# Patient Record
Sex: FEMALE | Race: BLACK OR AFRICAN AMERICAN | Employment: OTHER | URBAN - METROPOLITAN AREA
[De-identification: names, ages, dates, MRNs, and addresses within clinical notes are randomized per-mention and may not be internally consistent; named-entity substitution may affect disease eponyms.]

---

## 2018-01-01 ENCOUNTER — HOSPITAL ENCOUNTER (INPATIENT)
Age: 83
LOS: 3 days | Discharge: HOME HEALTH | DRG: 291 | End: 2018-11-11
Attending: EMERGENCY MEDICINE | Admitting: INTERNAL MEDICINE
Payer: MEDICARE

## 2018-01-01 ENCOUNTER — APPOINTMENT (OUTPATIENT)
Dept: GENERAL RADIOLOGY | Age: 83
DRG: 291 | End: 2018-01-01
Attending: HOSPITALIST
Payer: MEDICARE

## 2018-01-01 ENCOUNTER — APPOINTMENT (OUTPATIENT)
Dept: GENERAL RADIOLOGY | Age: 83
DRG: 291 | End: 2018-01-01
Attending: EMERGENCY MEDICINE
Payer: MEDICARE

## 2018-01-01 ENCOUNTER — APPOINTMENT (OUTPATIENT)
Dept: CT IMAGING | Age: 83
DRG: 291 | End: 2018-01-01
Attending: INTERNAL MEDICINE
Payer: MEDICARE

## 2018-01-01 ENCOUNTER — PATIENT OUTREACH (OUTPATIENT)
Dept: CASE MANAGEMENT | Age: 83
End: 2018-01-01

## 2018-01-01 ENCOUNTER — HOSPITAL ENCOUNTER (EMERGENCY)
Age: 83
Discharge: HOME | End: 2018-07-23
Attending: EMERGENCY MEDICINE
Payer: MEDICARE

## 2018-01-01 ENCOUNTER — APPOINTMENT (OUTPATIENT)
Dept: GENERAL RADIOLOGY | Age: 83
End: 2018-01-01
Attending: EMERGENCY MEDICINE
Payer: MEDICARE

## 2018-01-01 VITALS
WEIGHT: 111.5 LBS | HEART RATE: 66 BPM | DIASTOLIC BLOOD PRESSURE: 75 MMHG | BODY MASS INDEX: 18.58 KG/M2 | HEIGHT: 65 IN | OXYGEN SATURATION: 96 % | SYSTOLIC BLOOD PRESSURE: 114 MMHG | RESPIRATION RATE: 18 BRPM | TEMPERATURE: 98.4 F

## 2018-01-01 VITALS
OXYGEN SATURATION: 98 % | TEMPERATURE: 98 F | DIASTOLIC BLOOD PRESSURE: 72 MMHG | BODY MASS INDEX: 19.49 KG/M2 | WEIGHT: 117 LBS | HEART RATE: 66 BPM | HEIGHT: 65 IN | SYSTOLIC BLOOD PRESSURE: 131 MMHG | RESPIRATION RATE: 19 BRPM

## 2018-01-01 DIAGNOSIS — Z51.5 ENCOUNTER FOR PALLIATIVE CARE: ICD-10-CM

## 2018-01-01 DIAGNOSIS — I50.9 CONGESTIVE HEART FAILURE, UNSPECIFIED HF CHRONICITY, UNSPECIFIED HEART FAILURE TYPE (HCC): Primary | ICD-10-CM

## 2018-01-01 DIAGNOSIS — Z71.89 COUNSELING AND COORDINATION OF CARE: ICD-10-CM

## 2018-01-01 DIAGNOSIS — M25.552 LEFT HIP PAIN: Primary | ICD-10-CM

## 2018-01-01 DIAGNOSIS — R06.02 SOB (SHORTNESS OF BREATH): ICD-10-CM

## 2018-01-01 LAB
ALBUMIN SERPL-MCNC: 3 G/DL (ref 3.2–4.6)
ALBUMIN SERPL-MCNC: 3 G/DL (ref 3.2–4.6)
ALBUMIN/GLOB SERPL: 0.7 {RATIO} (ref 1.2–3.5)
ALBUMIN/GLOB SERPL: 0.8 {RATIO} (ref 1.2–3.5)
ALP SERPL-CCNC: 110 U/L (ref 50–136)
ALP SERPL-CCNC: 133 U/L (ref 50–136)
ALT SERPL-CCNC: 18 U/L (ref 12–65)
ALT SERPL-CCNC: 23 U/L (ref 12–65)
ANION GAP SERPL CALC-SCNC: 6 MMOL/L (ref 7–16)
ANION GAP SERPL CALC-SCNC: 7 MMOL/L (ref 7–16)
ANION GAP SERPL CALC-SCNC: 7 MMOL/L (ref 7–16)
ANION GAP SERPL CALC-SCNC: 9 MMOL/L (ref 7–16)
AST SERPL-CCNC: 17 U/L (ref 15–37)
AST SERPL-CCNC: 30 U/L (ref 15–37)
ATRIAL RATE: 55 BPM
ATRIAL RATE: 84 BPM
BACTERIA URNS QL MICRO: 0 /HPF
BASOPHILS # BLD: 0 K/UL (ref 0–0.2)
BASOPHILS # BLD: 0 K/UL (ref 0–0.2)
BASOPHILS NFR BLD: 0 % (ref 0–2)
BASOPHILS NFR BLD: 1 % (ref 0–2)
BILIRUB SERPL-MCNC: 0.5 MG/DL (ref 0.2–1.1)
BILIRUB SERPL-MCNC: 0.6 MG/DL (ref 0.2–1.1)
BNP SERPL-MCNC: 1247 PG/ML
BUN SERPL-MCNC: 10 MG/DL (ref 8–23)
BUN SERPL-MCNC: 10 MG/DL (ref 8–23)
BUN SERPL-MCNC: 8 MG/DL (ref 8–23)
BUN SERPL-MCNC: 9 MG/DL (ref 8–23)
CALCIUM SERPL-MCNC: 7.9 MG/DL (ref 8.3–10.4)
CALCIUM SERPL-MCNC: 8.3 MG/DL (ref 8.3–10.4)
CALCIUM SERPL-MCNC: 8.6 MG/DL (ref 8.3–10.4)
CALCIUM SERPL-MCNC: 9.1 MG/DL (ref 8.3–10.4)
CALCULATED R AXIS, ECG10: -65 DEGREES
CALCULATED R AXIS, ECG10: -67 DEGREES
CALCULATED T AXIS, ECG11: 32 DEGREES
CALCULATED T AXIS, ECG11: 52 DEGREES
CASTS URNS QL MICRO: 0 /LPF
CHLORIDE SERPL-SCNC: 106 MMOL/L (ref 98–107)
CHLORIDE SERPL-SCNC: 107 MMOL/L (ref 98–107)
CHLORIDE SERPL-SCNC: 109 MMOL/L (ref 98–107)
CHLORIDE SERPL-SCNC: 110 MMOL/L (ref 98–107)
CO2 SERPL-SCNC: 26 MMOL/L (ref 21–32)
CO2 SERPL-SCNC: 27 MMOL/L (ref 21–32)
CO2 SERPL-SCNC: 28 MMOL/L (ref 21–32)
CO2 SERPL-SCNC: 30 MMOL/L (ref 21–32)
CREAT SERPL-MCNC: 0.92 MG/DL (ref 0.6–1)
CREAT SERPL-MCNC: 1.07 MG/DL (ref 0.6–1)
CREAT SERPL-MCNC: 1.09 MG/DL (ref 0.6–1)
CREAT SERPL-MCNC: 1.1 MG/DL (ref 0.6–1)
CRYSTALS URNS QL MICRO: 0 /LPF
DIAGNOSIS, 93000: NORMAL
DIAGNOSIS, 93000: NORMAL
DIFFERENTIAL METHOD BLD: ABNORMAL
DIFFERENTIAL METHOD BLD: ABNORMAL
EOSINOPHIL # BLD: 0.1 K/UL (ref 0–0.8)
EOSINOPHIL # BLD: 0.3 K/UL (ref 0–0.8)
EOSINOPHIL NFR BLD: 4 % (ref 0.5–7.8)
EOSINOPHIL NFR BLD: 9 % (ref 0.5–7.8)
EPI CELLS #/AREA URNS HPF: NORMAL /HPF
ERYTHROCYTE [DISTWIDTH] IN BLOOD BY AUTOMATED COUNT: 16.9 % (ref 11.9–14.6)
ERYTHROCYTE [DISTWIDTH] IN BLOOD BY AUTOMATED COUNT: 18.6 %
GLOBULIN SER CALC-MCNC: 3.7 G/DL (ref 2.3–3.5)
GLOBULIN SER CALC-MCNC: 4.4 G/DL (ref 2.3–3.5)
GLUCOSE SERPL-MCNC: 106 MG/DL (ref 65–100)
GLUCOSE SERPL-MCNC: 73 MG/DL (ref 65–100)
GLUCOSE SERPL-MCNC: 75 MG/DL (ref 65–100)
GLUCOSE SERPL-MCNC: 86 MG/DL (ref 65–100)
HCT VFR BLD AUTO: 32.4 % (ref 35.8–46.3)
HCT VFR BLD AUTO: 34.2 % (ref 35.8–46.3)
HGB BLD-MCNC: 10.6 G/DL (ref 11.7–15.4)
HGB BLD-MCNC: 10.6 G/DL (ref 11.7–15.4)
IMM GRANULOCYTES # BLD: 0 K/UL (ref 0–0.5)
IMM GRANULOCYTES # BLD: 0 K/UL (ref 0–0.5)
IMM GRANULOCYTES NFR BLD AUTO: 0 % (ref 0–5)
IMM GRANULOCYTES NFR BLD AUTO: 0 % (ref 0–5)
INR PPP: 2.2
INR PPP: 3
INR PPP: 3.5
INR PPP: 4
LYMPHOCYTES # BLD: 1.1 K/UL (ref 0.5–4.6)
LYMPHOCYTES # BLD: 1.4 K/UL (ref 0.5–4.6)
LYMPHOCYTES NFR BLD: 31 % (ref 13–44)
LYMPHOCYTES NFR BLD: 37 % (ref 13–44)
MAGNESIUM SERPL-MCNC: 1.8 MG/DL (ref 1.8–2.4)
MCH RBC QN AUTO: 25.9 PG (ref 26.1–32.9)
MCH RBC QN AUTO: 26.2 PG (ref 26.1–32.9)
MCHC RBC AUTO-ENTMCNC: 31 G/DL (ref 31.4–35)
MCHC RBC AUTO-ENTMCNC: 32.7 G/DL (ref 31.4–35)
MCV RBC AUTO: 80.2 FL (ref 79.6–97.8)
MCV RBC AUTO: 83.6 FL (ref 79.6–97.8)
MONOCYTES # BLD: 0.3 K/UL (ref 0.1–1.3)
MONOCYTES # BLD: 0.3 K/UL (ref 0.1–1.3)
MONOCYTES NFR BLD: 8 % (ref 4–12)
MONOCYTES NFR BLD: 9 % (ref 4–12)
MUCOUS THREADS URNS QL MICRO: 0 /LPF
NEUTS SEG # BLD: 1.8 K/UL (ref 1.7–8.2)
NEUTS SEG # BLD: 1.9 K/UL (ref 1.7–8.2)
NEUTS SEG NFR BLD: 46 % (ref 43–78)
NEUTS SEG NFR BLD: 55 % (ref 43–78)
NRBC # BLD: 0 K/UL (ref 0–0.2)
OTHER OBSERVATIONS,UCOM: NORMAL
PLATELET # BLD AUTO: 102 K/UL (ref 150–450)
PLATELET # BLD AUTO: 121 K/UL (ref 150–450)
PMV BLD AUTO: 11.2 FL (ref 10.8–14.1)
PMV BLD AUTO: ABNORMAL FL (ref 9.4–12.3)
POTASSIUM SERPL-SCNC: 2.9 MMOL/L (ref 3.5–5.1)
POTASSIUM SERPL-SCNC: 3.2 MMOL/L (ref 3.5–5.1)
POTASSIUM SERPL-SCNC: 3.2 MMOL/L (ref 3.5–5.1)
POTASSIUM SERPL-SCNC: 3.7 MMOL/L (ref 3.5–5.1)
PROT SERPL-MCNC: 6.7 G/DL (ref 6.3–8.2)
PROT SERPL-MCNC: 7.4 G/DL (ref 6.3–8.2)
PROTHROMBIN TIME: 23.8 SEC (ref 11.5–14.5)
PROTHROMBIN TIME: 30 SEC (ref 11.5–14.5)
PROTHROMBIN TIME: 33.4 SEC (ref 11.5–14.5)
PROTHROMBIN TIME: 37.2 SEC (ref 11.5–14.5)
Q-T INTERVAL, ECG07: 428 MS
Q-T INTERVAL, ECG07: 442 MS
QRS DURATION, ECG06: 118 MS
QRS DURATION, ECG06: 130 MS
QTC CALCULATION (BEZET), ECG08: 413 MS
QTC CALCULATION (BEZET), ECG08: 522 MS
RBC # BLD AUTO: 4.04 M/UL (ref 4.05–5.25)
RBC # BLD AUTO: 4.09 M/UL (ref 4.05–5.2)
RBC #/AREA URNS HPF: NORMAL /HPF
SODIUM SERPL-SCNC: 142 MMOL/L (ref 136–145)
SODIUM SERPL-SCNC: 142 MMOL/L (ref 136–145)
SODIUM SERPL-SCNC: 143 MMOL/L (ref 136–145)
SODIUM SERPL-SCNC: 145 MMOL/L (ref 136–145)
TROPONIN I SERPL-MCNC: 0.03 NG/ML (ref 0.02–0.05)
TROPONIN I SERPL-MCNC: 0.04 NG/ML (ref 0.02–0.05)
TROPONIN I SERPL-MCNC: 0.05 NG/ML (ref 0.02–0.05)
TROPONIN I SERPL-MCNC: 0.05 NG/ML (ref 0.02–0.05)
TROPONIN I SERPL-MCNC: <0.02 NG/ML (ref 0.02–0.05)
VENTRICULAR RATE, ECG03: 56 BPM
VENTRICULAR RATE, ECG03: 84 BPM
WBC # BLD AUTO: 3.4 K/UL (ref 4.3–11.1)
WBC # BLD AUTO: 3.8 K/UL (ref 4.3–11.1)
WBC URNS QL MICRO: NORMAL /HPF

## 2018-01-01 PROCEDURE — 84484 ASSAY OF TROPONIN QUANT: CPT | Performed by: EMERGENCY MEDICINE

## 2018-01-01 PROCEDURE — 76450000000

## 2018-01-01 PROCEDURE — 99285 EMERGENCY DEPT VISIT HI MDM: CPT | Performed by: EMERGENCY MEDICINE

## 2018-01-01 PROCEDURE — 74011250637 HC RX REV CODE- 250/637: Performed by: INTERNAL MEDICINE

## 2018-01-01 PROCEDURE — 99222 1ST HOSP IP/OBS MODERATE 55: CPT | Performed by: NURSE PRACTITIONER

## 2018-01-01 PROCEDURE — 85610 PROTHROMBIN TIME: CPT

## 2018-01-01 PROCEDURE — 65660000000 HC RM CCU STEPDOWN

## 2018-01-01 PROCEDURE — 71046 X-RAY EXAM CHEST 2 VIEWS: CPT

## 2018-01-01 PROCEDURE — 72100 X-RAY EXAM L-S SPINE 2/3 VWS: CPT

## 2018-01-01 PROCEDURE — 74011250637 HC RX REV CODE- 250/637: Performed by: HOSPITALIST

## 2018-01-01 PROCEDURE — 74011000250 HC RX REV CODE- 250: Performed by: INTERNAL MEDICINE

## 2018-01-01 PROCEDURE — 74011250636 HC RX REV CODE- 250/636: Performed by: HOSPITALIST

## 2018-01-01 PROCEDURE — G8980 MOBILITY D/C STATUS: HCPCS

## 2018-01-01 PROCEDURE — 93005 ELECTROCARDIOGRAM TRACING: CPT | Performed by: EMERGENCY MEDICINE

## 2018-01-01 PROCEDURE — 36415 COLL VENOUS BLD VENIPUNCTURE: CPT

## 2018-01-01 PROCEDURE — 70450 CT HEAD/BRAIN W/O DYE: CPT

## 2018-01-01 PROCEDURE — 97161 PT EVAL LOW COMPLEX 20 MIN: CPT

## 2018-01-01 PROCEDURE — 83880 ASSAY OF NATRIURETIC PEPTIDE: CPT

## 2018-01-01 PROCEDURE — G8979 MOBILITY GOAL STATUS: HCPCS

## 2018-01-01 PROCEDURE — 74011250636 HC RX REV CODE- 250/636: Performed by: INTERNAL MEDICINE

## 2018-01-01 PROCEDURE — 85025 COMPLETE CBC W/AUTO DIFF WBC: CPT

## 2018-01-01 PROCEDURE — 74011250637 HC RX REV CODE- 250/637: Performed by: NURSE PRACTITIONER

## 2018-01-01 PROCEDURE — 74011250636 HC RX REV CODE- 250/636: Performed by: FAMILY MEDICINE

## 2018-01-01 PROCEDURE — 71045 X-RAY EXAM CHEST 1 VIEW: CPT

## 2018-01-01 PROCEDURE — 96374 THER/PROPH/DIAG INJ IV PUSH: CPT | Performed by: EMERGENCY MEDICINE

## 2018-01-01 PROCEDURE — G8978 MOBILITY CURRENT STATUS: HCPCS

## 2018-01-01 PROCEDURE — 97530 THERAPEUTIC ACTIVITIES: CPT

## 2018-01-01 PROCEDURE — 74011250636 HC RX REV CODE- 250/636: Performed by: EMERGENCY MEDICINE

## 2018-01-01 PROCEDURE — 83735 ASSAY OF MAGNESIUM: CPT

## 2018-01-01 PROCEDURE — 73502 X-RAY EXAM HIP UNI 2-3 VIEWS: CPT

## 2018-01-01 PROCEDURE — C8929 TTE W OR WO FOL WCON,DOPPLER: HCPCS

## 2018-01-01 PROCEDURE — 81003 URINALYSIS AUTO W/O SCOPE: CPT | Performed by: EMERGENCY MEDICINE

## 2018-01-01 PROCEDURE — 84484 ASSAY OF TROPONIN QUANT: CPT

## 2018-01-01 PROCEDURE — 80053 COMPREHEN METABOLIC PANEL: CPT

## 2018-01-01 PROCEDURE — 85025 COMPLETE CBC W/AUTO DIFF WBC: CPT | Performed by: EMERGENCY MEDICINE

## 2018-01-01 PROCEDURE — 80053 COMPREHEN METABOLIC PANEL: CPT | Performed by: EMERGENCY MEDICINE

## 2018-01-01 PROCEDURE — 80048 BASIC METABOLIC PNL TOTAL CA: CPT

## 2018-01-01 PROCEDURE — 81015 MICROSCOPIC EXAM OF URINE: CPT | Performed by: EMERGENCY MEDICINE

## 2018-01-01 RX ORDER — COLCHICINE 0.6 MG/1
0.6 TABLET ORAL
Qty: 30 TAB | Refills: 0 | Status: SHIPPED
Start: 2018-01-01 | End: 2019-01-01

## 2018-01-01 RX ORDER — AMIODARONE HYDROCHLORIDE 100 MG/1
100 TABLET ORAL
Qty: 30 TAB | Refills: 0 | Status: SHIPPED
Start: 2018-01-01 | End: 2018-01-01

## 2018-01-01 RX ORDER — LEVOTHYROXINE SODIUM 75 UG/1
75 TABLET ORAL
Status: DISCONTINUED | OUTPATIENT
Start: 2018-01-01 | End: 2018-01-01 | Stop reason: HOSPADM

## 2018-01-01 RX ORDER — LOSARTAN POTASSIUM 25 MG/1
25 TABLET ORAL DAILY
Qty: 30 TAB | Refills: 2 | Status: SHIPPED | OUTPATIENT
Start: 2018-01-01 | End: 2018-01-01

## 2018-01-01 RX ORDER — GABAPENTIN 300 MG/1
300 CAPSULE ORAL 3 TIMES DAILY
Status: DISCONTINUED | OUTPATIENT
Start: 2018-01-01 | End: 2018-01-01 | Stop reason: HOSPADM

## 2018-01-01 RX ORDER — FUROSEMIDE 40 MG/1
40 TABLET ORAL DAILY
Status: DISCONTINUED | OUTPATIENT
Start: 2018-01-01 | End: 2018-01-01 | Stop reason: HOSPADM

## 2018-01-01 RX ORDER — POTASSIUM CHLORIDE 14.9 MG/ML
20 INJECTION INTRAVENOUS
Status: COMPLETED | OUTPATIENT
Start: 2018-01-01 | End: 2018-01-01

## 2018-01-01 RX ORDER — FUROSEMIDE 10 MG/ML
40 INJECTION INTRAMUSCULAR; INTRAVENOUS EVERY 12 HOURS
Status: DISCONTINUED | OUTPATIENT
Start: 2018-01-01 | End: 2018-01-01

## 2018-01-01 RX ORDER — NITROFURANTOIN 25; 75 MG/1; MG/1
100 CAPSULE ORAL 2 TIMES DAILY
Qty: 14 CAP | Refills: 0 | Status: SHIPPED | OUTPATIENT
Start: 2018-01-01 | End: 2018-01-01

## 2018-01-01 RX ORDER — FUROSEMIDE 10 MG/ML
40 INJECTION INTRAMUSCULAR; INTRAVENOUS
Status: COMPLETED | OUTPATIENT
Start: 2018-01-01 | End: 2018-01-01

## 2018-01-01 RX ORDER — PREDNISONE 20 MG/1
20 TABLET ORAL DAILY
Qty: 4 TAB | Refills: 0 | Status: SHIPPED | OUTPATIENT
Start: 2018-01-01 | End: 2018-01-01

## 2018-01-01 RX ORDER — LOSARTAN POTASSIUM 25 MG/1
25 TABLET ORAL DAILY
Status: DISCONTINUED | OUTPATIENT
Start: 2018-01-01 | End: 2018-01-01 | Stop reason: HOSPADM

## 2018-01-01 RX ORDER — LIDOCAINE 4 G/100G
PATCH TOPICAL
Qty: 1 PATCH | Refills: 0 | Status: SHIPPED | OUTPATIENT
Start: 2018-01-01 | End: 2019-01-01

## 2018-01-01 RX ORDER — LATANOPROST 50 UG/ML
1 SOLUTION/ DROPS OPHTHALMIC
Status: DISCONTINUED | OUTPATIENT
Start: 2018-01-01 | End: 2018-01-01 | Stop reason: HOSPADM

## 2018-01-01 RX ORDER — CARVEDILOL 3.12 MG/1
3.12 TABLET ORAL 2 TIMES DAILY WITH MEALS
Qty: 60 TAB | Refills: 2 | Status: SHIPPED | OUTPATIENT
Start: 2018-01-01 | End: 2018-01-01

## 2018-01-01 RX ORDER — FUROSEMIDE 10 MG/ML
20 INJECTION INTRAMUSCULAR; INTRAVENOUS 2 TIMES DAILY
Status: DISCONTINUED | OUTPATIENT
Start: 2018-01-01 | End: 2018-01-01

## 2018-01-01 RX ORDER — AMIODARONE HYDROCHLORIDE 200 MG/1
200 TABLET ORAL
Status: DISCONTINUED | OUTPATIENT
Start: 2018-01-01 | End: 2018-01-01

## 2018-01-01 RX ORDER — LISINOPRIL 5 MG/1
2.5 TABLET ORAL DAILY
Status: DISCONTINUED | OUTPATIENT
Start: 2018-01-01 | End: 2018-01-01

## 2018-01-01 RX ORDER — SIMVASTATIN 20 MG/1
20 TABLET, FILM COATED ORAL
Status: DISCONTINUED | OUTPATIENT
Start: 2018-01-01 | End: 2018-01-01 | Stop reason: HOSPADM

## 2018-01-01 RX ORDER — AMIODARONE HYDROCHLORIDE 200 MG/1
100 TABLET ORAL
Status: DISCONTINUED | OUTPATIENT
Start: 2018-01-01 | End: 2018-01-01

## 2018-01-01 RX ORDER — AMIODARONE HYDROCHLORIDE 200 MG/1
100 TABLET ORAL
Status: DISCONTINUED | OUTPATIENT
Start: 2018-01-01 | End: 2018-01-01 | Stop reason: HOSPADM

## 2018-01-01 RX ORDER — FUROSEMIDE 20 MG/1
20 TABLET ORAL 2 TIMES DAILY
Qty: 60 TAB | Refills: 2 | Status: SHIPPED | OUTPATIENT
Start: 2018-01-01 | End: 2018-01-01

## 2018-01-01 RX ORDER — AMIODARONE HYDROCHLORIDE 200 MG/1
100 TABLET ORAL DAILY
COMMUNITY
End: 2018-01-01

## 2018-01-01 RX ORDER — CARVEDILOL 3.12 MG/1
3.12 TABLET ORAL 2 TIMES DAILY WITH MEALS
Status: DISCONTINUED | OUTPATIENT
Start: 2018-01-01 | End: 2018-01-01 | Stop reason: HOSPADM

## 2018-01-01 RX ORDER — WARFARIN 1 MG/1
1 TABLET ORAL EVERY EVENING
Status: DISCONTINUED | OUTPATIENT
Start: 2018-01-01 | End: 2018-01-01 | Stop reason: HOSPADM

## 2018-01-01 RX ORDER — POTASSIUM CHLORIDE 20MEQ/15ML
40 LIQUID (ML) ORAL 2 TIMES DAILY WITH MEALS
Status: DISCONTINUED | OUTPATIENT
Start: 2018-01-01 | End: 2018-01-01 | Stop reason: HOSPADM

## 2018-01-01 RX ADMIN — AMIODARONE HYDROCHLORIDE 100 MG: 200 TABLET ORAL at 22:46

## 2018-01-01 RX ADMIN — FUROSEMIDE 20 MG: 10 INJECTION, SOLUTION INTRAMUSCULAR; INTRAVENOUS at 16:47

## 2018-01-01 RX ADMIN — CARVEDILOL 3.12 MG: 3.12 TABLET, FILM COATED ORAL at 08:31

## 2018-01-01 RX ADMIN — Medication 1 AMPULE: at 21:30

## 2018-01-01 RX ADMIN — SIMVASTATIN 20 MG: 20 TABLET, FILM COATED ORAL at 22:46

## 2018-01-01 RX ADMIN — FUROSEMIDE 40 MG: 40 TABLET ORAL at 12:28

## 2018-01-01 RX ADMIN — Medication 1 AMPULE: at 09:04

## 2018-01-01 RX ADMIN — POTASSIUM CHLORIDE 20 MEQ: 200 INJECTION, SOLUTION INTRAVENOUS at 10:54

## 2018-01-01 RX ADMIN — GABAPENTIN 300 MG: 300 CAPSULE ORAL at 21:29

## 2018-01-01 RX ADMIN — FUROSEMIDE 40 MG: 10 INJECTION, SOLUTION INTRAMUSCULAR; INTRAVENOUS at 22:46

## 2018-01-01 RX ADMIN — CARVEDILOL 3.12 MG: 3.12 TABLET, FILM COATED ORAL at 17:02

## 2018-01-01 RX ADMIN — LOSARTAN POTASSIUM 25 MG: 25 TABLET ORAL at 08:31

## 2018-01-01 RX ADMIN — GABAPENTIN 300 MG: 300 CAPSULE ORAL at 22:46

## 2018-01-01 RX ADMIN — Medication 1 AMPULE: at 21:52

## 2018-01-01 RX ADMIN — PERFLUTREN 1 ML: 6.52 INJECTION, SUSPENSION INTRAVENOUS at 10:00

## 2018-01-01 RX ADMIN — LEVOTHYROXINE SODIUM 75 MCG: 75 TABLET ORAL at 05:27

## 2018-01-01 RX ADMIN — GABAPENTIN 300 MG: 300 CAPSULE ORAL at 09:03

## 2018-01-01 RX ADMIN — Medication 1 AMPULE: at 21:25

## 2018-01-01 RX ADMIN — Medication 1 AMPULE: at 08:30

## 2018-01-01 RX ADMIN — POTASSIUM CHLORIDE 20 MEQ: 200 INJECTION, SOLUTION INTRAVENOUS at 10:00

## 2018-01-01 RX ADMIN — FUROSEMIDE 20 MG: 10 INJECTION, SOLUTION INTRAMUSCULAR; INTRAVENOUS at 09:02

## 2018-01-01 RX ADMIN — LATANOPROST 1 DROP: 50 SOLUTION OPHTHALMIC at 23:21

## 2018-01-01 RX ADMIN — FUROSEMIDE 40 MG: 10 INJECTION, SOLUTION INTRAMUSCULAR; INTRAVENOUS at 09:03

## 2018-01-01 RX ADMIN — LATANOPROST 1 DROP: 50 SOLUTION OPHTHALMIC at 21:57

## 2018-01-01 RX ADMIN — GABAPENTIN 300 MG: 300 CAPSULE ORAL at 16:48

## 2018-01-01 RX ADMIN — LISINOPRIL 2.5 MG: 5 TABLET ORAL at 09:03

## 2018-01-01 RX ADMIN — WARFARIN SODIUM 1 MG: 1 TABLET ORAL at 16:48

## 2018-01-01 RX ADMIN — POTASSIUM CHLORIDE 20 MEQ: 200 INJECTION, SOLUTION INTRAVENOUS at 12:28

## 2018-01-01 RX ADMIN — GABAPENTIN 300 MG: 300 CAPSULE ORAL at 21:52

## 2018-01-01 RX ADMIN — CARVEDILOL 3.12 MG: 3.12 TABLET, FILM COATED ORAL at 16:47

## 2018-01-01 RX ADMIN — CARVEDILOL 3.12 MG: 3.12 TABLET, FILM COATED ORAL at 17:16

## 2018-01-01 RX ADMIN — POTASSIUM CHLORIDE 20 MEQ: 200 INJECTION, SOLUTION INTRAVENOUS at 09:01

## 2018-01-01 RX ADMIN — FUROSEMIDE 20 MG: 10 INJECTION, SOLUTION INTRAMUSCULAR; INTRAVENOUS at 17:16

## 2018-01-01 RX ADMIN — SIMVASTATIN 20 MG: 20 TABLET, FILM COATED ORAL at 21:52

## 2018-01-01 RX ADMIN — GABAPENTIN 300 MG: 300 CAPSULE ORAL at 09:01

## 2018-01-01 RX ADMIN — GABAPENTIN 300 MG: 300 CAPSULE ORAL at 17:16

## 2018-01-01 RX ADMIN — FUROSEMIDE 20 MG: 10 INJECTION, SOLUTION INTRAMUSCULAR; INTRAVENOUS at 08:32

## 2018-01-01 RX ADMIN — Medication 1 AMPULE: at 09:01

## 2018-01-01 RX ADMIN — LATANOPROST 1 DROP: 50 SOLUTION OPHTHALMIC at 21:33

## 2018-01-01 RX ADMIN — LEVOTHYROXINE SODIUM 75 MCG: 75 TABLET ORAL at 05:23

## 2018-01-01 RX ADMIN — CARVEDILOL 3.12 MG: 3.12 TABLET, FILM COATED ORAL at 09:04

## 2018-01-01 RX ADMIN — LEVOTHYROXINE SODIUM 75 MCG: 75 TABLET ORAL at 06:30

## 2018-01-01 RX ADMIN — POTASSIUM CHLORIDE 40 MEQ: 20 SOLUTION ORAL at 17:01

## 2018-01-01 RX ADMIN — SIMVASTATIN 20 MG: 20 TABLET, FILM COATED ORAL at 21:29

## 2018-01-01 RX ADMIN — AMIODARONE HYDROCHLORIDE 100 MG: 200 TABLET ORAL at 21:29

## 2018-01-01 RX ADMIN — GABAPENTIN 300 MG: 300 CAPSULE ORAL at 17:02

## 2018-01-01 RX ADMIN — GABAPENTIN 300 MG: 300 CAPSULE ORAL at 08:31

## 2018-01-01 RX ADMIN — POTASSIUM CHLORIDE 40 MEQ: 20 SOLUTION ORAL at 08:30

## 2018-01-01 RX ADMIN — FUROSEMIDE 40 MG: 10 INJECTION, SOLUTION INTRAMUSCULAR; INTRAVENOUS at 15:13

## 2018-01-01 RX ADMIN — WARFARIN SODIUM 1 MG: 1 TABLET ORAL at 17:02

## 2018-01-01 RX ADMIN — AMIODARONE HYDROCHLORIDE 200 MG: 200 TABLET ORAL at 22:23

## 2018-07-23 NOTE — ED PROVIDER NOTES
HPI Comments: Patient is an 80-year-old female who presents with left hip pain. Patient states that she has had pain for the past 2-3 days, denies any falls or injuries, states just pain with movement of her left leg and to palpation. Denies any abdominal pain, family states she has had some diarrhea, a mistake she has had some fatigue lately, and that her blood pressure was low yesterday to 100/55 and that she felt fatigue then however no LOC, no chest pain, no abdominal pain. Patients only complaint at this time is left hip pain. Patient is a 80 y.o. female presenting with hip pain. The history is provided by the patient. No  was used. Hip Injury    Associated symptoms include back pain. Pertinent negatives include no neck pain. Past Medical History:   Diagnosis Date    Acquired hypothyroidism     Acute pancreatitis     Arthritis     CAD (coronary artery disease) 4/29/11    RCA occluded and fills with collaterals, LAD and Cx nonobstructive CAD, EF 50-55%    Cancer ([address]     THYROID - treated with surgery and radiation    Chronic pain     right hip    Coagulopathy ([address] 6/16/2011    Colon polyps     Follicular thyroid cancer ([address]     Gallstone pancreatitis     Glaucoma     Gout     hx of gout to right great toe - no problems    Hypertension     controlled with medication    Other ill-defined conditions(799.89)     Pancreatitis     PE (pulmonary embolism) 2008    Pulmonary HTN ([address]     moderate    Thromboembolus ([address] 1995    right leg - Hemashield graft/filter placed in abdomen    Thyroid disease     pt had total thyroidectomy - takes levoxyl daily       Past Surgical History:   Procedure Laterality Date    HX APPENDECTOMY  ? [address]  6/2011    Dr. Vera Piña - [facility]    [address]    hemashield graft/filter placed due to blood clot right leg    HX THYROIDECTOMY  2010    partial    HX TONSILLECTOMY      TOTAL HIP ARTHROPLASTY 2008 & 2011    right hip         Family History:   Problem Relation Age of Onset    Stroke Mother     Hypertension Mother     Cancer Father      prostate    Diabetes Maternal Uncle        Social History     Social History    Marital status:      Spouse name: N/A    Number of children: N/A    Years of education: N/A     Occupational History    Not on file. Social History Main Topics    Smoking status: Former Smoker    Smokeless tobacco: Never Used      Comment: quit smoking 1995    Alcohol use No    Drug use: No    Sexual activity: Not on file     Other Topics Concern    Not on file     Social History Narrative         ALLERGIES: Ativan [lorazepam]; Lortab [hydrocodone-acetaminophen]; and Morphine    Review of Systems   Constitutional: Negative for chills and fever. HENT: Negative for rhinorrhea and sore throat. Eyes: Negative for visual disturbance. Respiratory: Negative for cough and shortness of breath. Cardiovascular: Negative for chest pain and leg swelling. Gastrointestinal: Negative for abdominal pain, diarrhea, nausea and vomiting. Genitourinary: Negative for dysuria. Musculoskeletal: Positive for arthralgias and back pain. Negative for neck pain. Skin: Negative for rash. Neurological: Negative for weakness and headaches. Psychiatric/Behavioral: The patient is not nervous/anxious. Vitals:    07/23/18 1609 07/23/18 1621   BP: 124/76 139/79   Pulse: 66 65   Resp: 20    Temp: 98 °F (36.7 °C)    SpO2: 99% 100%   Weight: 53.1 kg (117 lb)    Height: 5' 5\" (1.651 m)             Physical Exam   Constitutional: She is oriented to person, place, and time. She appears well-developed and well-nourished. HENT:   Head: Normocephalic. Right Ear: External ear normal.   Left Ear: External ear normal.   Eyes: Conjunctivae and EOM are normal. Pupils are equal, round, and reactive to light. Neck: Normal range of motion. Neck supple. No tracheal deviation present. Cardiovascular: Normal rate, regular rhythm, normal heart sounds and intact distal pulses. No murmur heard. Pulmonary/Chest: Effort normal and breath sounds normal. No respiratory distress. Abdominal: Soft. She exhibits no distension. There is no tenderness. There is no rebound. Non-tender to deep palpation through-out entire abdomen. Very benign abdominal exam.   Genitourinary:   Genitourinary Comments: Formed brown stool, fecal occult negative. Rectal tone fully intact. Musculoskeletal: Normal range of motion. She exhibits tenderness (to palpation of lower lumbar spine, and left hip. ROM is intact and strength is 5/5 bilaterally. DP pulse 2+ bilaterally). Neurological: She is alert and oriented to person, place, and time. No cranial nerve deficit. Skin: No rash noted. Nursing note and vitals reviewed.        MDM  Number of Diagnoses or Management Options  Left hip pain: new and requires workup     Amount and/or Complexity of Data Reviewed  Clinical lab tests: ordered and reviewed  Tests in the radiology section of CPT®: ordered and reviewed  Tests in the medicine section of CPT®: ordered and reviewed  Review and summarize past medical records: yes    Risk of Complications, Morbidity, and/or Mortality  Presenting problems: high  Diagnostic procedures: high  Management options: high    Patient Progress  Patient progress: stable        ED Course       Procedures    Recent Results (from the past 12 hour(s))   CBC WITH AUTOMATED DIFF    Collection Time: 07/23/18  6:54 PM   Result Value Ref Range    WBC 3.8 (L) 4.3 - 11.1 K/uL    RBC 4.04 (L) 4.05 - 5.25 M/uL    HGB 10.6 (L) 11.7 - 15.4 g/dL    HCT 32.4 (L) 35.8 - 46.3 %    MCV 80.2 79.6 - 97.8 FL    MCH 26.2 26.1 - 32.9 PG    MCHC 32.7 31.4 - 35.0 g/dL    RDW 16.9 (H) 11.9 - 14.6 %    PLATELET 733 (L) 234 - 450 K/uL    MPV 11.2 10.8 - 14.1 FL    DF AUTOMATED      NEUTROPHILS 46 43 - 78 %    LYMPHOCYTES 37 13 - 44 %    MONOCYTES 8 4.0 - 12.0 % EOSINOPHILS 9 (H) 0.5 - 7.8 %    BASOPHILS 0 0.0 - 2.0 %    IMMATURE GRANULOCYTES 0 0.0 - 5.0 %    ABS. NEUTROPHILS 1.8 1.7 - 8.2 K/UL    ABS. LYMPHOCYTES 1.4 0.5 - 4.6 K/UL    ABS. MONOCYTES 0.3 0.1 - 1.3 K/UL    ABS. EOSINOPHILS 0.3 0.0 - 0.8 K/UL    ABS. BASOPHILS 0.0 0.0 - 0.2 K/UL    ABS. IMM. GRANS. 0.0 0.0 - 0.5 K/UL   METABOLIC PANEL, COMPREHENSIVE    Collection Time: 07/23/18  6:54 PM   Result Value Ref Range    Sodium 145 136 - 145 mmol/L    Potassium 3.2 (L) 3.5 - 5.1 mmol/L    Chloride 110 (H) 98 - 107 mmol/L    CO2 26 21 - 32 mmol/L    Anion gap 9 7 - 16 mmol/L    Glucose 86 65 - 100 mg/dL    BUN 10 8 - 23 MG/DL    Creatinine 0.92 0.6 - 1.0 MG/DL    GFR est AA >60 >60 ml/min/1.73m2    GFR est non-AA >60 >60 ml/min/1.73m2    Calcium 9.1 8.3 - 10.4 MG/DL    Bilirubin, total 0.5 0.2 - 1.1 MG/DL    ALT (SGPT) 23 12 - 65 U/L    AST (SGOT) 30 15 - 37 U/L    Alk.  phosphatase 133 50 - 136 U/L    Protein, total 7.4 6.3 - 8.2 g/dL    Albumin 3.0 (L) 3.2 - 4.6 g/dL    Globulin 4.4 (H) 2.3 - 3.5 g/dL    A-G Ratio 0.7 (L) 1.2 - 3.5     TROPONIN I    Collection Time: 07/23/18  6:54 PM   Result Value Ref Range    Troponin-I, Qt. <0.02 (L) 0.02 - 0.05 NG/ML   EKG, 12 LEAD, INITIAL    Collection Time: 07/23/18  7:39 PM   Result Value Ref Range    Ventricular Rate 56 BPM    Atrial Rate 55 BPM    QRS Duration 118 ms    Q-T Interval 428 ms    QTC Calculation (Bezet) 413 ms    Calculated R Axis -67 degrees    Calculated T Axis 32 degrees    Diagnosis       !! AGE AND GENDER SPECIFIC ECG ANALYSIS !!  Junctional rhythm  Left axis deviation  Right bundle branch block  Anterior infarct (cited on or before 23-JUL-2018)  Abnormal ECG  When compared with ECG of 23-JUL-2018 19:39,  Previous ECG has undetermined rhythm, needs review     URINE MICROSCOPIC    Collection Time: 07/23/18  8:04 PM   Result Value Ref Range    WBC 20-50 0 /hpf    RBC 0-3 0 /hpf    Epithelial cells 0-3 0 /hpf    Bacteria 0 0 /hpf    Casts 0 0 /lpf Crystals, urine 0 0 /LPF    Mucus 0 0 /lpf    Other observations RESULTS VERIFIED MANUALLY       Xr Spine Lumb 2 Or 3 V    Result Date: 7/23/2018  LUMBAR SPINE SERIES. Clinical Indication: Left hip pain, lower back pain for three days Findings: The bones are diffusely osteopenic. No obvious loss of vertebral body height. There is mild levoscoliosis with grade 1 anterolisthesis noted at L4-L5. The disc spaces are maintained. The SI joints are intact. IMPRESSION: Generalized osteopenia without definite acute compression fracture. Grade 1 anterolisthesis present at L4-L5 with mild levoscoliosis. Xr Hip Lt W Or Wo Pelv 2-3 Vws    Result Date: 7/23/2018  Left hip CLINICAL INDICATION: Left hip pain, no known injury FINDINGS: Three views of the left hip show no fracture or dislocation. The bones are osteopenic. The joint space is maintained. The SI joints and pubic symphysis are intact. Atherosclerotic vascular opacification is present. The patient is status post right hip arthroplasty. IMPRESSION: No acute osseous abnormality of the left hip. Note, the bones are demineralized. Occult fracture is possible. If there is strong clinical concern for occult fracture, consider further evaluation with MRI. 81 yo female with left hip pain:     Patient is very well appearing, NAD, only complaint is pain in her left hip and low back. She is ambulatory, her vital signs are completely within normal limits on monitoring while in ED for 3+ hours. Feel this is likely sciatica and discussed need for further evaluation with PCP or return with any worsening pain, any nausea or vomiting, any fevers or chills, any abdominal pain, any chest pain or SOB or any further concerns. Patient to see PCP tomorrow for further workup and management. Patient and family in room in full agreement with plan.

## 2018-07-24 NOTE — ED NOTES
I have reviewed discharge instructions with the patient. The patient verbalized understanding. Patient left ED via Discharge Method: wheelchair to Home with family. Opportunity for questions and clarification provided. Patient given 3 scripts. To continue your aftercare when you leave the hospital, you may receive an automated call from our care team to check in on how you are doing. This is a free service and part of our promise to provide the best care and service to meet your aftercare needs.  If you have questions, or wish to unsubscribe from this service please call [phone]. Thank you for Choosing our [facility].

## 2018-07-24 NOTE — DISCHARGE INSTRUCTIONS
AS WE DISCUSSED, WE DO NOT HAVE AN EXACT ETIOLOGY OF YOUR SYMPTOMS AT THIS TIME HOWEVER IF YOUR SYMPTOMS PERSIST OR WORSEN OR YOU DEVELOP DIFFICULTY WALKING DUE TO THE PAIN OR ANY FURTHER CONCERNS THEN PLEASE RETURN IMMEDIATELY TO THE EMERGENCY DEPARTMENT. OTHERWISE PLEASE FOLLOW UP WITH YOUR PRIMARY CARE PROVIDER AND ALSO WITH CARDIOLOGY FOR THE NEW RIGHT BUNDLE BRANCH BLOCK FOUND ON EKG WHICH WE DISCUSSED. Hip Pain: Care Instructions  Your Care Instructions    Hip pain may be caused by many things, including overuse, a fall, or a twisting movement. Another cause of hip pain is arthritis. Your pain may increase when you stand up, walk, or squat. The pain may come and go or may be constant. Home treatment can help relieve hip pain, swelling, and stiffness. If your pain is ongoing, you may need more tests and treatment. Follow-up care is a key part of your treatment and safety. Be sure to make and go to all appointments, and call your doctor if you are having problems. It's also a good idea to know your test results and keep a list of the medicines you take. How can you care for yourself at home? · Take pain medicines exactly as directed. ¨ If the doctor gave you a prescription medicine for pain, take it as prescribed. ¨ If you are not taking a prescription pain medicine, ask your doctor if you can take an over-the-counter medicine. · Rest and protect your hip. Take a break from any activity, including standing or walking, that may cause pain. · Put ice or a cold pack against your hip for 10 to 20 minutes at a time. Try to do this every 1 to 2 hours for the next 3 days (when you are awake) or until the swelling goes down. Put a thin cloth between the ice and your skin. · Sleep on your healthy side with a pillow between your knees, or sleep on your back with pillows under your knees. · If there is no swelling, you can put moist heat, a heating pad, or a warm cloth on your hip.  Do gentle stretching exercises to help keep your hip flexible. · Learn how to prevent falls. Have your vision and hearing checked regularly. Wear slippers or shoes with a nonskid sole. · Stay at a healthy weight. · Wear comfortable shoes. When should you call for help? Call 911 anytime you think you may need emergency care. For example, call if:    · You have sudden chest pain and shortness of breath, or you cough up blood.     · You are not able to stand or walk or bear weight.     · Your buttocks, legs, or feet feel numb or tingly.     · Your leg or foot is cool or pale or changes color.     · You have severe pain.    Call your doctor now or seek immediate medical care if:    · You have signs of infection, such as:  ¨ Increased pain, swelling, warmth, or redness in the hip area. ¨ Red streaks leading from the hip area. ¨ Pus draining from the hip area. ¨ A fever.     · You have signs of a blood clot, such as:  ¨ Pain in your calf, back of the knee, thigh, or groin. ¨ Redness and swelling in your leg or groin.     · You are not able to bend, straighten, or move your leg normally.     · You have trouble urinating or having bowel movements.    Watch closely for changes in your health, and be sure to contact your doctor if:    · You do not get better as expected. Where can you learn more? Go to http://denise-ana m.info/. Enter B136 in the search box to learn more about \"Hip Pain: Care Instructions. \"  Current as of: November 20, 2017  Content Version: 11.7  © 9053-9404 [employer]. Care instructions adapted under license by [employer] (which disclaims liability or warranty for this information). If you have questions about a medical condition or this instruction, always ask your healthcare professional. [place] [address] any warranty or liability for your use of this information.        Urinary Tract Infection in Women: Care Instructions  Your Care Instructions    A urinary tract infection, or UTI, is a general term for an infection anywhere between the kidneys and the urethra (where urine comes out). Most UTIs are bladder infections. They often cause pain or burning when you urinate. UTIs are caused by bacteria and can be cured with antibiotics. Be sure to complete your treatment so that the infection goes away. Follow-up care is a key part of your treatment and safety. Be sure to make and go to all appointments, and call your doctor if you are having problems. It's also a good idea to know your test results and keep a list of the medicines you take. How can you care for yourself at home? · Take your antibiotics as directed. Do not stop taking them just because you feel better. You need to take the full course of antibiotics. · Drink extra water and other fluids for the next day or two. This may help wash out the bacteria that are causing the infection. (If you have kidney, heart, or liver disease and have to limit fluids, talk with your doctor before you increase your fluid intake.)  · Avoid drinks that are carbonated or have caffeine. They can irritate the bladder. · Urinate often. Try to empty your bladder each time. · To relieve pain, take a hot bath or lay a heating pad set on low over your lower belly or genital area. Never go to sleep with a heating pad in place. To prevent UTIs  · Drink plenty of water each day. This helps you urinate often, which clears bacteria from your system. (If you have kidney, heart, or liver disease and have to limit fluids, talk with your doctor before you increase your fluid intake.)  · Urinate when you need to. · Urinate right after you have sex. · Change sanitary pads often. · Avoid douches, bubble baths, feminine hygiene sprays, and other feminine hygiene products that have deodorants. · After going to the bathroom, wipe from front to back. When should you call for help?   Call your doctor now or seek immediate medical care if:    · Symptoms such as fever, chills, nausea, or vomiting get worse or appear for the first time.     · You have new pain in your back just below your rib cage. This is called flank pain.     · There is new blood or pus in your urine.     · You have any problems with your antibiotic medicine.    Watch closely for changes in your health, and be sure to contact your doctor if:    · You are not getting better after taking an antibiotic for 2 days.     · Your symptoms go away but then come back. Where can you learn more? Go to http://denise-ana m.info/. Enter W179 in the search box to learn more about \"Urinary Tract Infection in Women: Care Instructions. \"  Current as of: May 12, 2017  Content Version: 11.7  © 3116-3462 [employer]. Care instructions adapted under license by [employer] (which disclaims liability or warranty for this information). If you have questions about a medical condition or this instruction, always ask your healthcare professional. [place] [address] any warranty or liability for your use of this information. Back Pain: Care Instructions  Your Care Instructions    Back pain has many possible causes. It is often related to problems with muscles and ligaments of the back. It may also be related to problems with the nerves, discs, or bones of the back. Moving, lifting, standing, sitting, or sleeping in an awkward way can strain the back. Sometimes you don't notice the injury until later. Arthritis is another common cause of back pain. Although it may hurt a lot, back pain usually improves on its own within several weeks. Most people recover in 12 weeks or less. Using good home treatment and being careful not to stress your back can help you feel better sooner. Follow-up care is a key part of your treatment and safety. Be sure to make and go to all appointments, and call your doctor if you are having problems.  It's also a good idea to know your test results and keep a list of the medicines you take. How can you care for yourself at home? · Sit or lie in positions that are most comfortable and reduce your pain. Try one of these positions when you lie down:  ¨ Lie on your back with your knees bent and supported by large pillows. ¨ Lie on the floor with your legs on the seat of a sofa or chair. [place] Left on your side with your knees and hips bent and a pillow between your legs. ¨ Lie on your stomach if it does not make pain worse. · Do not sit up in bed, and avoid soft couches and twisted positions. Bed rest can help relieve pain at first, but it delays healing. Avoid bed rest after the first day of back pain. · Change positions every 30 minutes. If you must sit for long periods of time, take breaks from sitting. Get up and walk around, or lie in a comfortable position. · Try using a heating pad on a low or medium setting for 15 to 20 minutes every 2 or 3 hours. Try a warm shower in place of one session with the heating pad. · You can also try an ice pack for 10 to 15 minutes every 2 to 3 hours. Put a thin cloth between the ice pack and your skin. · Take pain medicines exactly as directed. ¨ If the doctor gave you a prescription medicine for pain, take it as prescribed. ¨ If you are not taking a prescription pain medicine, ask your doctor if you can take an over-the-counter medicine. · Take short walks several times a day. You can start with 5 to 10 minutes, 3 or 4 times a day, and work up to longer walks. Walk on level surfaces and avoid hills and stairs until your back is better. · Return to work and other activities as soon as you can. Continued rest without activity is usually not good for your back. · To prevent future back pain, do exercises to stretch and strengthen your back and stomach. Learn how to use good posture, safe lifting techniques, and proper body mechanics. When should you call for help?   Call your doctor now or seek immediate medical care if:    · You have new or worsening numbness in your legs.     · You have new or worsening weakness in your legs. (This could make it hard to stand up.)     · You lose control of your bladder or bowels.    Watch closely for changes in your health, and be sure to contact your doctor if:    · You have a fever, lose weight, or don't feel well.     · You do not get better as expected. Where can you learn more? Go to http://denise-ana m.info/. Enter M087 in the search box to learn more about \"Back Pain: Care Instructions. \"  Current as of: November 29, 2017  Content Version: 11.7  © 9405-7673 [employer]. Care instructions adapted under license by [email or web address] (which disclaims liability or warranty for this information). If you have questions about a medical condition or this instruction, always ask your healthcare professional. [address] any warranty or liability for your use of this information.

## 2018-11-08 PROBLEM — R79.1 SUPRATHERAPEUTIC INR: Status: ACTIVE | Noted: 2018-01-01

## 2018-11-08 PROBLEM — J90 PLEURAL EFFUSION: Status: ACTIVE | Noted: 2018-01-01

## 2018-11-08 PROBLEM — R60.9 EDEMA: Status: ACTIVE | Noted: 2018-01-01

## 2018-11-08 PROBLEM — I50.9 CHF (CONGESTIVE HEART FAILURE) (HCC): Status: ACTIVE | Noted: 2018-01-01

## 2018-11-08 PROBLEM — R42 LIGHTHEADEDNESS: Status: ACTIVE | Noted: 2018-01-01

## 2018-11-08 PROBLEM — I48.0 PAROXYSMAL A-FIB (HCC): Status: ACTIVE | Noted: 2018-01-01

## 2018-11-08 NOTE — PROGRESS NOTES
Notified MD about patients concerns about lightheadedness upon standing. MD agreed to place external catheter for now. Will continue to monitor.

## 2018-11-08 NOTE — PROGRESS NOTES
TRANSFER - IN REPORT: 
 
Verbal report received from lukasz(name) on Para Jovanna  being received from er(unit) for routine progression of care Report consisted of patients Situation, Background, Assessment and  
Recommendations(SBAR). Information from the following report(s) ED Summary was reviewed with the receiving nurse. Opportunity for questions and clarification was provided. Assessment completed upon patients arrival to unit and care assumed. Awaiting arrival, report to [facility] PROVIDERS [facility] in [place]

## 2018-11-08 NOTE — PROGRESS NOTES
Pt arrived to room 829 via transport at 1630. Pt is axox3. Pt is up x 2 assist. Respirations are even and unlabored. Pt denies chest pain and/or SOB. Bilateral edema in lower left leg and lower right leg. Skin is intact. IV site is clean dry and intact. Bed is in low and locked position. Call light is within reach. Pt is instructed to ask for assistance if needed. Will continue to monitor.

## 2018-11-08 NOTE — H&P
Hospitalist H&P Note Admit Date:  2018 12:55 PM  
Name:  Kika Orozco Age:  80 y.o. 
:  1933 MRN:  [id number] PCP:  Manning Cooks, MD 
Treatment Team: Attending Provider: Júnior Banda MD; Primary Nurse: Tera Gonzalez RN 
CC: SOB and whooziness x 1 day HPI:  
80yr old female with phx sig for cad, chf last known ef around 42% follows with [facility] cardiology, afib, dvt's. Has noted leg swelling and sob for several weeks. Her son started giving her lasix that he had but when she went to her cardiology op visit on 2018- although her legs were still swollen she was told to stop taking his lasix and was not prescribed any of her own. She was at home this am around 3/4am when she did not feel right - had lightheadedness and whoozy in the head. Later in the morning when she got up. [place] She went to the kitchen, she washed some dishes in the sink made her coffee and went to sit down and could not get back up she was so sob. She denies any chest pain. Wt loss since  of unclear etiology. Does not weigh daily. Not on fluid restriction. Not on chf meds. Hard of hearing She was found to be in chf exacerbation here in the ER. Hospitalist asked to admit. 10 systems reviewed and negative except as noted in HPI. Past Medical History:  
Diagnosis Date  Acquired hypothyroidism  Acute pancreatitis  Arthritis  CAD (coronary artery disease) 11 RCA occluded and fills with collaterals, LAD and Cx nonobstructive CAD, EF 50-55%  Cancer ([address] THYROID - treated with surgery and radiation  CHF (congestive heart failure) ([address] 2018  Chronic pain   
 right hip  Coagulopathy ([address] 2011  Colon polyps  Follicular thyroid cancer ([address]  Gallstone pancreatitis  Glaucoma  Gout   
 hx of gout to right great toe - no problems  Hypertension   
 controlled with medication  Other ill-defined conditions(799.89)  Pancreatitis  PE (pulmonary embolism)   Pulmonary HTN (HCC)   
 moderate  Thromboembolus ([address]   
 right leg - Hemashield graft/filter placed in abdomen  Thyroid disease   
 pt had total thyroidectomy - takes levoxyl daily Past Surgical History:  
Procedure Laterality Date  HX APPENDECTOMY  ? 1990  
 HX CHOLECYSTECTOMY  2011 Dr. Alek Ramsey [facility] [address]  
 hemashield graft/filter placed due to blood clot right leg  HX THYROIDECTOMY    
 partial  
 HX TONSILLECTOMY [address] [facility] ARTHROPLASTY   &   
 right hip Allergies Allergen Reactions  Ativan [Lorazepam] Other (comments) Has opposite reaction  Lortab [Hydrocodone-Acetaminophen] Other (comments) Causes disorientation. - denies allergy, states she can take it  Morphine Other (comments) Causes disorientation Social History Tobacco Use  Smoking status: Former Smoker  Smokeless tobacco: Never Used  Tobacco comment: quit smoking  Substance Use Topics  Alcohol use: No  
  
Family History Problem Relation Age of Onset  Stroke Mother  Hypertension Mother  Cancer Father   
     prostate  Diabetes Maternal Uncle Immunization History Administered Date(s) Administered  Tuberculin 2008 PTA Medications: 
Prior to Admission Medications Prescriptions Last Dose Informant Patient Reported? Taking? ALPHAGAN P 0.1 % Drop  Self Yes No  
Sig: Apply 1 Drop to eye two (2) times a day. 1 DROP TO EACH EYE. Pt to use DOS per anesthesia protocol. HYDROcodone-acetaminophen (NORCO) 5-325 mg per tablet   No No  
Sig: Take 1 Tab by mouth every four (4) hours as needed for Pain. Max Daily Amount: 6 Tabs. TRAVATAN 0.004 % Drop  Self Yes No  
Si Drop by Both Eyes route every evening.    
acetaminophen (TYLENOL EX STR ARTHRITIS PAIN) 500 mg tablet   Yes No  
 Sig: Take  by mouth every six (6) hours as needed. amlodipine-benazepril (LOTREL) 10-20 mg per capsule   Yes No  
Sig: Take 1 Cap by mouth as needed. carvedilol (COREG) 6.25 mg tablet   Yes No  
Sig: Take 6.25 mg by mouth two (2) times daily (with meals). colchicine (COLCRYS) 0.6 mg tablet   Yes No  
Sig: Take 0.6 mg by mouth four (4) times daily as needed. Indications: GOUT  
ergocalciferol (ERGOCALCIFEROL) 50,000 unit capsule  Self Yes No  
Sig: Take 50,000 Units by mouth every Sunday. folic acid (FOLVITE) 1 mg tablet   Yes No  
Sig: Take 1 mg by mouth daily. gabapentin (NEURONTIN) 300 mg capsule   Yes No  
Sig: Take 300 mg by mouth three (3) times daily. levothyroxine (LEVOXYL) 125 mcg tablet  Self Yes No  
Sig: Take 125 mcg by mouth daily (before breakfast). Take DOS per anesthesia protocol. lidocaine (SALONPAS/ASPERCREME) 4 % patch   No No  
Sig: Place on area of pain  
ondansetron (ZOFRAN ODT) 4 mg disintegrating tablet   No No  
Sig: Take 1 Tab by mouth every eight (8) hours as needed for Nausea. oxyCODONE IR (ROXICODONE) 5 mg immediate release tablet   No No  
Sig: Take 1 Tab by mouth every six (6) hours as needed for Pain. Max Daily Amount: 20 mg.  
simvastatin (ZOCOR) 20 mg tablet  Self Yes No  
Sig: Take 20 mg by mouth daily. Take DOS per anesthesia protocol. warfarin (COUMADIN) 4 mg tablet   Yes No  
Sig: Take 4 mg by mouth as directed. To hold X 5 days prior to surgery. Facility-Administered Medications: None Objective:  
 
Patient Vitals for the past 24 hrs: 
 Temp Pulse Resp BP SpO2  
11/08/18 1425  (!) 113  135/83 90 % 11/08/18 1424  (!) 105 19  95 % 11/08/18 1422  (!) 118 18 134/86 93 % 11/08/18 1325  (!) 102 22 152/79 95 % 11/08/18 1306  85  (!) 152/121 95 % 11/08/18 1253 98.9 °F (37.2 °C) 85 18 (!) 156/94 95 % Oxygen Therapy O2 Sat (%): 90 % (11/08/18 1425) Pulse via Oximetry: 110 beats per minute (11/08/18 1425) O2 Device: Room air (11/08/18 1253) No intake or output data in the 24 hours ending 11/08/18 1606 Physical Exam: 
General:    Well nourished. Alert. Eyes:   Normal sclera. Extraocular movements intact. ENT:  Normocephalic, atraumatic. Moist mucous membranes CV:   RRR. No m/r/g. Peripheral pulses present. Capillary refill normal 
Lungs:  CTAB. No wheezing, rhonchi, or rales. diminshed in the based. Abdomen: Soft, nontender, nondistended. Bowel sounds normal.  
Extremities: Warm and dry. Significant bl pedal edema. Neurologic: CN II-XII grossly intact. Sensation intact. Skin:     No rashes or jaundice. Normal coloration Psych:  Normal mood and affect. I reviewed the labs, imaging, EKGs, telemetry, and other studies done this admission. Data Review:  
Recent Results (from the past 24 hour(s)) EKG, 12 LEAD, INITIAL Collection Time: 11/08/18 12:56 PM  
Result Value Ref Range Ventricular Rate 84 BPM  
 Atrial Rate 84 BPM  
 QRS Duration 130 ms  
 Q-T Interval 442 ms QTC Calculation (Bezet) 522 ms Calculated R Axis -65 degrees Calculated T Axis 52 degrees Diagnosis    
  undetermined rhythm ? afib Left axis deviation Right bundle branch block Anterior infarct (cited on or before 23-JUL-2018) Abnormal ECG When compared with ECG of 23-JUL-2018 19:39, 
Previous ECG has undetermined rhythm, needs review QT has lengthened 
rate has increased Confirmed by JOSE NICOLAS (), Caitlin Solitario ([id number]) on 11/8/2018 3:12:03 PM 
  
CBC WITH AUTOMATED DIFF Collection Time: 11/08/18  1:00 PM  
Result Value Ref Range WBC 3.4 (L) 4.3 - 11.1 K/uL  
 RBC 4.09 4.05 - 5.2 M/uL  
 HGB 10.6 (L) 11.7 - 15.4 g/dL HCT 34.2 (L) 35.8 - 46.3 % MCV 83.6 79.6 - 97.8 FL  
 MCH 25.9 (L) 26.1 - 32.9 PG  
 MCHC 31.0 (L) 31.4 - 35.0 g/dL  
 RDW 18.6 % PLATELET 811 (L) 695 - 450 K/uL MPV Unable to calculate. Recommend adding IPF. 9.4 - 12.3 FL ABSOLUTE NRBC 0.00 0.0 - 0.2 K/uL  
 DF AUTOMATED NEUTROPHILS 55 43 - 78 % LYMPHOCYTES 31 13 - 44 % MONOCYTES 9 4.0 - 12.0 % EOSINOPHILS 4 0.5 - 7.8 % BASOPHILS 1 0.0 - 2.0 % IMMATURE GRANULOCYTES 0 0.0 - 5.0 %  
 ABS. NEUTROPHILS 1.9 1.7 - 8.2 K/UL  
 ABS. LYMPHOCYTES 1.1 0.5 - 4.6 K/UL  
 ABS. MONOCYTES 0.3 0.1 - 1.3 K/UL  
 ABS. EOSINOPHILS 0.1 0.0 - 0.8 K/UL  
 ABS. BASOPHILS 0.0 0.0 - 0.2 K/UL  
 ABS. IMM. GRANS. 0.0 0.0 - 0.5 K/UL METABOLIC PANEL, COMPREHENSIVE Collection Time: 11/08/18  1:00 PM  
Result Value Ref Range Sodium 142 136 - 145 mmol/L Potassium 3.7 3.5 - 5.1 mmol/L Chloride 109 (H) 98 - 107 mmol/L  
 CO2 27 21 - 32 mmol/L Anion gap 6 (L) 7 - 16 mmol/L Glucose 106 (H) 65 - 100 mg/dL BUN 8 8 - 23 MG/DL Creatinine 1.09 (H) 0.6 - 1.0 MG/DL  
 GFR est AA >60 >60 ml/min/1.73m2 GFR est non-AA 51 (L) >60 ml/min/1.73m2 Calcium 8.6 8.3 - 10.4 MG/DL Bilirubin, total 0.6 0.2 - 1.1 MG/DL  
 ALT (SGPT) 18 12 - 65 U/L  
 AST (SGOT) 17 15 - 37 U/L Alk. phosphatase 110 50 - 136 U/L Protein, total 6.7 6.3 - 8.2 g/dL Albumin 3.0 (L) 3.2 - 4.6 g/dL Globulin 3.7 (H) 2.3 - 3.5 g/dL A-G Ratio 0.8 (L) 1.2 - 3.5 BNP Collection Time: 11/08/18  1:00 PM  
Result Value Ref Range BNP 1,247 pg/mL TROPONIN I Collection Time: 11/08/18  1:00 PM  
Result Value Ref Range Troponin-I, Qt. 0.05 0.02 - 0.05 NG/ML  
PROTHROMBIN TIME + INR Collection Time: 11/08/18  3:17 PM  
Result Value Ref Range Prothrombin time 37.2 (H) 11.5 - 14.5 sec INR 4.0 (HH) All Micro Results None Other Studies: Xr Chest Pa Lat Result Date: 11/8/2018 Two-view chest x-ray November 8, 2018 Reference exam: June 21, 2016 INDICATION: Short of breath FINDINGS: Cardiac silhouette is enlarged with some haziness blurring the vascular borders, probable effusions are seen bilaterally larger on the left. IMPRESSION: Findings suggest some CHF. Assessment and Plan: Hospital Problems as of 11/8/2018 Date Reviewed: 6/23/2011 Codes Class Noted - Resolved POA  
 CHF (congestive heart failure) ([facility] ICD-10-CM: I50.9 ICD-9-CM: 428.0  11/8/2018 - Present Unknown Supratherapeutic INR ICD-10-CM: R79.1 ICD-9-CM: 790.92  11/8/2018 - Present Yes Pleural effusion ICD-10-CM: J90 ICD-9-CM: 511.9  11/8/2018 - Present Yes Overview Signed 11/8/2018  4:18 PM by Marek Maier MD  
  bilateral 
  
  
   
 Edema ICD-10-CM: R60.9 ICD-9-CM: 782.3  11/8/2018 - Present Yes Overview Signed 11/8/2018  4:18 PM by Marek Maier MD  
  Bilateral lower legs Lightheadedness ICD-10-CM: X68 ICD-9-CM: 780.4  11/8/2018 - Present Yes Paroxysmal A-fib (HCC) ICD-10-CM: I48.0 ICD-9-CM: 427.31  11/8/2018 - Present Yes PLAN: 
· chf- manage per chf protocol, consult cards, echo, daily weight strict I' & O's 
· Pleural effusions and pulm edema- lasix · Paroxysmal afib- re-start her home amio · supra therapeutic inr - hold warfarin · Light headedness and dizziness- non- focal- ct head, orthostatics · Daughter requesting herman- will do wick- if that does not work then get herman. · Further workup and mgt based on her clinical course DVT ppx: On warfarin and supratherapeutic Anticipated DC needs:  chf teaching Code status:  Full Estimated LOS:  Greater than 2 midnights Risk:  high Signed: 
Erick Carter MD

## 2018-11-08 NOTE — ED TRIAGE NOTES
Pt reports she feels bad and that both of her legs are swelling. Pt reports she is also having trouble breathing since 3 last night

## 2018-11-08 NOTE — ED PROVIDER NOTES
77-year-old female with history of A. Fib presents with complaint of worsening shortness of breath the past several days and increased bilateral lower extremity edema. Denies history of CHF. States she is not on any home oxygen at this time. Denies chest pain, fever, chills, cough, hemoptysis, dizziness, weakness, abdominal pain. The patient last underwent echocardiogram in 2011 with EF of 55%. Patient's currently on warfarin. History of previous DVT. The history is provided by the patient and a relative. No  was used. Shortness of Breath This is a new problem. The average episode lasts 3 days. The problem occurs rarely. The current episode started more than 2 days ago. The problem has been gradually worsening. Associated symptoms include cough and leg swelling. Pertinent negatives include no fever, no headaches, no coryza, no rhinorrhea, no sore throat, no swollen glands, no sputum production, no hemoptysis, no wheezing, no chest pain, no syncope, no vomiting, no abdominal pain, no rash and no leg pain. She has tried nothing for the symptoms. The treatment provided no relief. Past Medical History:  
Diagnosis Date  Acquired hypothyroidism  Acute pancreatitis  Arthritis  CAD (coronary artery disease) 4/29/11 RCA occluded and fills with collaterals, LAD and Cx nonobstructive CAD, EF 50-55%  Cancer ([address] THYROID - treated with surgery and radiation  CHF (congestive heart failure) ([address] 11/8/2018  Chronic pain   
 right hip  Coagulopathy ([address] 6/16/2011  Colon polyps  Follicular thyroid cancer ([address]  Gallstone pancreatitis  Glaucoma  Gout   
 hx of gout to right great toe - no problems  Hypertension   
 controlled with medication  Other ill-defined conditions(799.89)  Pancreatitis  PE (pulmonary embolism) 2008  Pulmonary HTN (HCC)   
 moderate  Thromboembolus ([address] 1995 right leg - Hemashield graft/filter placed in abdomen  Thyroid disease   
 pt had total thyroidectomy - takes levoxyl daily Past Surgical History:  
Procedure Laterality Date  HX APPENDECTOMY  ? 1990  
 HX CHOLECYSTECTOMY  6/2011 Dr. Venus Naik [facility] [address]  
 hemashield graft/filter placed due to blood clot right leg  HX THYROIDECTOMY  2010  
 partial  
 HX TONSILLECTOMY [address] [facility] ARTHROPLASTY  2008 & 2011  
 right hip Family History:  
Problem Relation Age of Onset  Stroke Mother  Hypertension Mother  Cancer Father   
     prostate  Diabetes Maternal Uncle Social History Socioeconomic History  Marital status:  Spouse name: Not on file  Number of children: Not on file  Years of education: Not on file  Highest education level: Not on file Social Needs  Financial resource strain: Not on file  Food insecurity - worry: Not on file  Food insecurity - inability: Not on file  Transportation needs - medical: Not on file  Transportation needs - non-medical: Not on file Occupational History  Not on file Tobacco Use  Smoking status: Former Smoker  Smokeless tobacco: Never Used  Tobacco comment: quit smoking 1995 Substance and Sexual Activity  Alcohol use: No  
 Drug use: No  
 Sexual activity: Not on file Other Topics Concern  Not on file Social History Narrative  Not on file ALLERGIES: Ativan [lorazepam]; Lortab [hydrocodone-acetaminophen]; and Morphine Review of Systems Constitutional: Negative for fever. HENT: Negative for rhinorrhea and sore throat. Respiratory: Positive for cough and shortness of breath. Negative for hemoptysis, sputum production and wheezing. Cardiovascular: Positive for leg swelling. Negative for chest pain and syncope. Gastrointestinal: Negative for abdominal pain and vomiting. Skin: Negative for rash. Neurological: Negative for headaches. Vitals:  
 11/08/18 1325 11/08/18 1422 11/08/18 1424 11/08/18 1425 BP: 152/79 134/86  135/83 Pulse: (!) 102 (!) 118 (!) 105 (!) 113 Resp: 22 18 19 Temp:      
SpO2: 95% 93% 95% 90% Weight:      
Height:      
      
 
Physical Exam  
Constitutional: She is oriented to person, place, and time. She appears well-developed. Patient well-appearing and in no acute distress. HENT:  
Head: Normocephalic and atraumatic. MMM. Eyes: Conjunctivae and EOM are normal. Pupils are equal, round, and reactive to light. Neck: Neck supple. No JVD present. Cardiovascular: Normal rate, regular rhythm and normal heart sounds. Radial pulses 2+ and equal bilaterally. Pulmonary/Chest: Effort normal.  
Coarse breath sounds bilaterally. Abdominal: Soft. Bowel sounds are normal. She exhibits no distension. There is no tenderness. Soft, NTND. No CVAT. Musculoskeletal: Normal range of motion. She exhibits no edema. 2-3 pitting edema to bilateral LEs. Neurological: She is alert and oriented to person, place, and time. No cranial nerve deficit or sensory deficit. Strength 5/5 throughout. Normal sensory exam.  No facial droop. No dysarthria. Normal gait. Skin: Skin is warm and dry. No rash. Psychiatric: She has a normal mood and affect. Nursing note and vitals reviewed. MDM Number of Diagnoses or Management Options Congestive heart failure, unspecified HF chronicity, unspecified heart failure type [facility]): new and requires workup SOB (shortness of breath): new and requires workup Diagnosis management comments: Chest x-ray suggestive of CHF. BNP greater than 1200. EKG with A. Fib. Patient given Lasix 40 mg IV. Hospitalist consulted for admission. Amount and/or Complexity of Data Reviewed Clinical lab tests: ordered and reviewed Tests in the radiology section of CPT®: ordered and reviewed Tests in the medicine section of CPT®: ordered and reviewed Review and summarize past medical records: yes Independent visualization of images, tracings, or specimens: yes Risk of Complications, Morbidity, and/or Mortality Presenting problems: moderate Diagnostic procedures: moderate Management options: moderate Patient Progress Patient progress: stable EKG Date/Time: 11/8/2018 3:19 PM 
Performed by: Elana Fisher MD 
Authorized by: Elana Fisher MD  
 
ECG reviewed by ED Physician in the absence of a cardiologist: yes Rate:  
  ECG rate:  84 ECG rate assessment: normal   
Rhythm:  
  Rhythm: atrial fibrillation Ectopy:  
  Ectopy: none QRS:  
  QRS axis:  Left QRS intervals:  Normal 
Conduction:  
  Conduction: abnormal   
  Abnormal conduction: complete RBBB   
ST segments: ST segments:  Normal 
T waves:  
  T waves: normal   
 
 
 
Results Include: 
 
Recent Results (from the past 24 hour(s)) EKG, 12 LEAD, INITIAL Collection Time: 11/08/18 12:56 PM  
Result Value Ref Range Ventricular Rate 84 BPM  
 Atrial Rate 84 BPM  
 QRS Duration 130 ms  
 Q-T Interval 442 ms QTC Calculation (Bezet) 522 ms Calculated R Axis -65 degrees Calculated T Axis 52 degrees Diagnosis    
  undetermined rhythm ? afib Left axis deviation Right bundle branch block Anterior infarct (cited on or before 23-JUL-2018) Abnormal ECG When compared with ECG of 23-JUL-2018 19:39, 
Previous ECG has undetermined rhythm, needs review QT has lengthened 
rate has increased Confirmed by JOSE NICOLAS (), [facility] ([id number]) on 11/8/2018 3:12:03 PM 
  
CBC WITH AUTOMATED DIFF Collection Time: 11/08/18  1:00 PM  
Result Value Ref Range WBC 3.4 (L) 4.3 - 11.1 K/uL  
 RBC 4.09 4.05 - 5.2 M/uL  
 HGB 10.6 (L) 11.7 - 15.4 g/dL HCT 34.2 (L) 35.8 - 46.3 % MCV 83.6 79.6 - 97.8 FL  
 MCH 25.9 (L) 26.1 - 32.9 PG  
 MCHC 31.0 (L) 31.4 - 35.0 g/dL  
 RDW 18.6 % PLATELET 276 (L) 886 - 450 K/uL MPV Unable to calculate. Recommend adding IPF. 9.4 - 12.3 FL ABSOLUTE NRBC 0.00 0.0 - 0.2 K/uL  
 DF AUTOMATED NEUTROPHILS 55 43 - 78 % LYMPHOCYTES 31 13 - 44 % MONOCYTES 9 4.0 - 12.0 % EOSINOPHILS 4 0.5 - 7.8 % BASOPHILS 1 0.0 - 2.0 % IMMATURE GRANULOCYTES 0 0.0 - 5.0 %  
 ABS. NEUTROPHILS 1.9 1.7 - 8.2 K/UL  
 ABS. LYMPHOCYTES 1.1 0.5 - 4.6 K/UL  
 ABS. MONOCYTES 0.3 0.1 - 1.3 K/UL  
 ABS. EOSINOPHILS 0.1 0.0 - 0.8 K/UL  
 ABS. BASOPHILS 0.0 0.0 - 0.2 K/UL  
 ABS. IMM. GRANS. 0.0 0.0 - 0.5 K/UL METABOLIC PANEL, COMPREHENSIVE Collection Time: 11/08/18  1:00 PM  
Result Value Ref Range Sodium 142 136 - 145 mmol/L Potassium 3.7 3.5 - 5.1 mmol/L Chloride 109 (H) 98 - 107 mmol/L  
 CO2 27 21 - 32 mmol/L Anion gap 6 (L) 7 - 16 mmol/L Glucose 106 (H) 65 - 100 mg/dL BUN 8 8 - 23 MG/DL Creatinine 1.09 (H) 0.6 - 1.0 MG/DL  
 GFR est AA >60 >60 ml/min/1.73m2 GFR est non-AA 51 (L) >60 ml/min/1.73m2 Calcium 8.6 8.3 - 10.4 MG/DL Bilirubin, total 0.6 0.2 - 1.1 MG/DL  
 ALT (SGPT) 18 12 - 65 U/L  
 AST (SGOT) 17 15 - 37 U/L Alk. phosphatase 110 50 - 136 U/L Protein, total 6.7 6.3 - 8.2 g/dL Albumin 3.0 (L) 3.2 - 4.6 g/dL Globulin 3.7 (H) 2.3 - 3.5 g/dL A-G Ratio 0.8 (L) 1.2 - 3.5 BNP Collection Time: 11/08/18  1:00 PM  
Result Value Ref Range BNP 1,247 pg/mL TROPONIN I Collection Time: 11/08/18  1:00 PM  
Result Value Ref Range Troponin-I, Qt. 0.05 0.02 - 0.05 NG/ML Evgeny Isidro MD; 11/8/2018 @3:19 PM Voice dictation software was used during the making of this note. This software is not perfect and grammatical and other typographical errors may be present.   This note has not been proofread for errors. 
===================================================================

## 2018-11-08 NOTE — PROGRESS NOTES
Warfarin dosing per pharmacist 
 
Meghan Bro is a 80 y.o. female. Height: 5' 5\" (165.1 cm)    Weight: 49.4 kg (109 lb) Indication:  Afib and hx of DVT Goal INR:  2-3 Home dose: Unclear. 2 mg every evening per most recent anticoag clinic visit note on 9/27/18. 
4 mg daily per PTA med list, but suspect patient is taking one half tablet daily? Risk factors or significant drug interactions:  Amiodarone Other anticoagulants:  N/A Daily Monitoring Date  INR     Warfarin dose HGB              Notes 11/8  4.0  Hold  10.6 Pharmacy consulted to assist dosing warfarin in patient with history of Afib and DVT. INR supratherapeutic at 4.0 on admission, so will hold warfarin for now. Will need to clarify how patient has been taking warfarin at home recently before resuming. Will check INR daily for now. Thank you, 
Echo Mandujano, PharmD Clinical Pharmacist 
[phone]

## 2018-11-08 NOTE — PROGRESS NOTES
Pt resting in bed with family at the bedside. Pt denies chest pain or SOB at this time. SBAR report given to oncoming nurse.

## 2018-11-08 NOTE — ED NOTES
TRANSFER - OUT REPORT: 
 
Verbal report given to ana Burrell(name) on [facility] Flicker  being transferred to 829(unit) for routine progression of care Report consisted of patients Situation, Background, Assessment and  
Recommendations(SBAR). Information from the following report(s) SBAR, Kardex, ED Summary, [facility] and Recent Results was reviewed with the receiving nurse. Lines:  
Peripheral IV 11/08/18 Left Antecubital (Active) Site Assessment Clean, dry, & intact 11/8/2018  1:00 PM  
Phlebitis Assessment 0 11/8/2018  1:00 PM  
Infiltration Assessment 0 11/8/2018  1:00 PM  
Dressing Status Clean, dry, & intact 11/8/2018  1:00 PM  
  
 
Opportunity for questions and clarification was provided. Patient transported with: 
 [employer]

## 2018-11-09 NOTE — PROGRESS NOTES
Patient resting in bed with no complaints at this time. Patient is alert and orientated with no distress noted. IV intact and patent with no s/s of infection noted. Respirations even and unlabored with heart rate regular. Patient unable to ambulate independently without assistance; needs x2 r/t weakness and swelling. Bed in low locked position with call light within reach. Patient instructed to call if assistance is needed. Will continue to monitor.

## 2018-11-09 NOTE — PROGRESS NOTES
Echo reviewed. Severe LA enlargement and marked LV dysfxn. Will not attempt rhythm control but will rate control and rx for heart failure.

## 2018-11-09 NOTE — PROGRESS NOTES
Problem: Falls - Risk of 
Goal: *Absence of Falls Document [place] Fall Risk and appropriate interventions in the flowsheet. Outcome: Progressing Towards Goal 
Fall Risk Interventions: 
Mobility Interventions: Assess mobility with egress test, Communicate number of staff needed for ambulation/transfer, Patient to call before getting OOB, OT consult for ADLs, PT Consult for mobility concerns Medication Interventions: Evaluate medications/consider consulting pharmacy Elimination Interventions: Patient to call for help with toileting needs, Call light in reach

## 2018-11-09 NOTE — PROGRESS NOTES
Warfarin dosing per pharmacist 
 
Rogelio Nguyen is a 80 y.o. female. Height: 5' 5\" (165.1 cm)    Weight: 49.8 kg (109 lb 11.2 oz) Indication:  Afib and hx of DVT Goal INR:  2-3 Home dose: Unclear. 2 mg every evening per most recent anticoag clinic visit note on 9/27/18. 
4 mg daily per PTA med list, but suspect patient is taking one half tablet daily? Risk factors or significant drug interactions:  Amiodarone Other anticoagulants:  N/A Daily Monitoring Date  INR     Warfarin dose HGB              Notes 11/8  4.0  Hold  10.6  
11/9  3.5  Hold  --- Pharmacy consulted to assist dosing warfarin in patient with history of Afib and DVT. INR supratherapeutic on admission, so warfarin initially held. Will need to clarify how patient has been taking warfarin at home recently before resuming. INR remains supratherapeutic. Will continue holding warfarin. Will check INR daily for now. Thank you, Manuel Lenz, PharmD, BCPS Clinical Pharmacist 
[phone]

## 2018-11-09 NOTE — PROGRESS NOTES
Patient stable with no complaints at this time. Patient is resting in bed with family surrounding her. Patient walked with PT today with little assistance. Family given opportunity to speak with MD and get all questions answered. Patient has sat up in recliner for most of the day. Patient denies any pain and appears comfortable. Call light within reach and patient instructed to call if assistance is needed. Report to be given to oncoming RN 7p-7a

## 2018-11-09 NOTE — PROGRESS NOTES
Patient resting in bed alert and oriented, cooperative with care, breathing even and unlabored, denies pain or distress, call light within reach, safety measures in place.

## 2018-11-09 NOTE — ACP (ADVANCE CARE PLANNING)
Counseled on the chronic and progressive nature of CHF. Family is hopeful she can live a long time with CHF. Encouraged home health support at discharge for monitoring and CHF education. Pt has a HCPOA and Living Will on file.

## 2018-11-09 NOTE — H&P
Hospitalist H&P Note Admit Date:  2018 12:55 PM  
Name:  Tigre Cabezas Age:  80 y.o. 
:  1933 MRN:  [id number] PCP:  Girish Barker MD 
Treatment Team: Attending Provider: Alberta Snellen, MD; Utilization Review: Saddie Cushing, RN 
CC: SOB and whooziness x 1 day SUBJECTIVE:  
80yr old female with phx sig for cad, chf last known ef around 42% follows with [facility] cardiology, afib, dvt's admitted on  for acute decompensated CHF in view of elevated BNP 1200's and bilateral LE edema with acute hypoxic resp failure on pulmonary edema on CXR. 
 
 
: 
Pt seen at bedside Reports feeling better, saturating >94% on RA Denies chest pain, palpitations, nausea/vomiting, headache, fever, chills or night sweats Pt stated that main reason she came to ER yesterday was dizziness/lightheadedness, which has resolved now. Discussed with the family about plan of care. 10 systems reviewed and negative except as noted in HPI. Past Medical History:  
Diagnosis Date  Acquired hypothyroidism  Acute pancreatitis  Arthritis  CAD (coronary artery disease) 11 RCA occluded and fills with collaterals, LAD and Cx nonobstructive CAD, EF 50-55%  Cancer ([address] THYROID - treated with surgery and radiation  CHF (congestive heart failure) ([address] 2018  Chronic pain   
 right hip  Coagulopathy ([address] 2011  Colon polyps  Follicular thyroid cancer ([address]  Gallstone pancreatitis  Glaucoma  Gout   
 hx of gout to right great toe - no problems  Hypertension   
 controlled with medication  Other ill-defined conditions(799.89)  Pancreatitis  Paroxysmal A-fib ([address] 2018  PE (pulmonary embolism)   Pulmonary HTN (HCC)   
 moderate  Thromboembolus ([address]   
 right leg - Hemashield graft/filter placed in abdomen  Thyroid disease   
 pt had total thyroidectomy - takes levoxyl daily Past Surgical History: Procedure Laterality Date  HX APPENDECTOMY  ?   
 HX CHOLECYSTECTOMY  2011 [facility] [address]  
 hemashield graft/filter placed due to blood clot right leg  HX THYROIDECTOMY    
 partial  
 HX TONSILLECTOMY [facility] ARTHROPLASTY   &   
 right hip Allergies Allergen Reactions  Ativan [Lorazepam] Other (comments) Has opposite reaction  Lortab [Hydrocodone-Acetaminophen] Other (comments) Causes disorientation. - denies allergy, states she can take it  Morphine Other (comments) Causes disorientation Social History Tobacco Use  Smoking status: Former Smoker  Smokeless tobacco: Never Used  Tobacco comment: quit smoking  Substance Use Topics  Alcohol use: No  
  
Family History Problem Relation Age of Onset  Stroke Mother  Hypertension Mother  Cancer Father   
     prostate  Diabetes Maternal Uncle Immunization History Administered Date(s) Administered  Tuberculin 2008 PTA Medications: 
Prior to Admission Medications Prescriptions Last Dose Informant Patient Reported? Taking? ALPHAGAN P 0.1 % Drop  Self Yes No  
Sig: Apply 1 Drop to eye two (2) times a day. 1 DROP TO EACH EYE. Pt to use DOS per anesthesia protocol. HYDROcodone-acetaminophen (NORCO) 5-325 mg per tablet   No No  
Sig: Take 1 Tab by mouth every four (4) hours as needed for Pain. Max Daily Amount: 6 Tabs. TRAVATAN 0.004 % Drop  Self Yes No  
Si Drop by Both Eyes route every evening. acetaminophen (TYLENOL EX STR ARTHRITIS PAIN) 500 mg tablet   Yes No  
Sig: Take  by mouth every six (6) hours as needed. amlodipine-benazepril (LOTREL) 10-20 mg per capsule   Yes No  
Sig: Take 1 Cap by mouth as needed. carvedilol (COREG) 6.25 mg tablet   Yes No  
Sig: Take 6.25 mg by mouth two (2) times daily (with meals).   
colchicine (COLCRYS) 0.6 mg tablet   Yes No  
 Sig: Take 0.6 mg by mouth four (4) times daily as needed. Indications: GOUT  
ergocalciferol (ERGOCALCIFEROL) 50,000 unit capsule  Self Yes No  
Sig: Take 50,000 Units by mouth every Sunday. folic acid (FOLVITE) 1 mg tablet   Yes No  
Sig: Take 1 mg by mouth daily. gabapentin (NEURONTIN) 300 mg capsule   Yes No  
Sig: Take 300 mg by mouth three (3) times daily. levothyroxine (LEVOXYL) 125 mcg tablet  Self Yes No  
Sig: Take 125 mcg by mouth daily (before breakfast). Take DOS per anesthesia protocol. lidocaine (SALONPAS/ASPERCREME) 4 % patch   No No  
Sig: Place on area of pain  
ondansetron (ZOFRAN ODT) 4 mg disintegrating tablet   No No  
Sig: Take 1 Tab by mouth every eight (8) hours as needed for Nausea. oxyCODONE IR (ROXICODONE) 5 mg immediate release tablet   No No  
Sig: Take 1 Tab by mouth every six (6) hours as needed for Pain. Max Daily Amount: 20 mg.  
simvastatin (ZOCOR) 20 mg tablet  Self Yes No  
Sig: Take 20 mg by mouth daily. Take DOS per anesthesia protocol. warfarin (COUMADIN) 4 mg tablet   Yes No  
Sig: Take 4 mg by mouth as directed. To hold X 5 days prior to surgery. Facility-Administered Medications: None Objective:  
 
Patient Vitals for the past 24 hrs: 
 Temp Pulse Resp BP SpO2  
11/09/18 0255 98.9 °F (37.2 °C) 100 18 115/69 95 % 11/08/18 2355 98.8 °F (37.1 °C) 100 18 104/73 95 % 11/08/18 1901 98.6 °F (37 °C) 100 18 (!) 138/93 93 % 11/08/18 1636 98.4 °F (36.9 °C) 97 19 133/85 95 % 11/08/18 1605  (!) 107 25 (!) 145/93 95 % 11/08/18 1425  (!) 113  135/83 90 % 11/08/18 1424  (!) 105 19  95 % 11/08/18 1422  (!) 118 18 134/86 93 % 11/08/18 1325  (!) 102 22 152/79 95 % 11/08/18 1306  85  (!) 152/121 95 % 11/08/18 1253 98.9 °F (37.2 °C) 85 18 (!) 156/94 95 % Oxygen Therapy O2 Sat (%): 95 % (11/09/18 0255) Pulse via Oximetry: 104 beats per minute (11/08/18 1605) O2 Device: Room air (11/08/18 1253) Intake/Output Summary (Last 24 hours) at 11/9/2018 0775 Last data filed at 11/9/2018 7414 Gross per 24 hour Intake 238 ml Output 2800 ml Net -2562 ml Physical Exam: 
General:    Well nourished. Alert. Eyes:   Normal sclera. Extraocular movements intact. ENT:  Normocephalic, atraumatic. Moist mucous membranes CV:   RRR. No m/r/g. Peripheral pulses present. Capillary refill normal 
Lungs:  CTAB. No wheezing, rhonchi, or rales. diminshed in the bases. Abdomen: Soft, nontender, nondistended. Bowel sounds normal.  
Extremities: Warm and dry. Significant bl pedal edema. Neurologic: CN II-XII grossly intact. Sensation intact. Skin:     No rashes or jaundice. Normal coloration Psych:  Normal mood and affect. I reviewed the labs, imaging, EKGs, telemetry, and other studies done this admission. Data Review:  
Recent Results (from the past 24 hour(s)) EKG, 12 LEAD, INITIAL Collection Time: 11/08/18 12:56 PM  
Result Value Ref Range Ventricular Rate 84 BPM  
 Atrial Rate 84 BPM  
 QRS Duration 130 ms  
 Q-T Interval 442 ms QTC Calculation (Bezet) 522 ms Calculated R Axis -65 degrees Calculated T Axis 52 degrees Diagnosis    
  undetermined rhythm ? afib Left axis deviation Right bundle branch block Anterior infarct (cited on or before 23-JUL-2018) Abnormal ECG When compared with ECG of 23-JUL-2018 19:39, 
Previous ECG has undetermined rhythm, needs review QT has lengthened 
rate has increased Confirmed by JOSE NICOLAS (), Bhavin Umana ([id number]) on 11/8/2018 3:12:03 PM 
  
CBC WITH AUTOMATED DIFF Collection Time: 11/08/18  1:00 PM  
Result Value Ref Range WBC 3.4 (L) 4.3 - 11.1 K/uL  
 RBC 4.09 4.05 - 5.2 M/uL  
 HGB 10.6 (L) 11.7 - 15.4 g/dL HCT 34.2 (L) 35.8 - 46.3 % MCV 83.6 79.6 - 97.8 FL  
 MCH 25.9 (L) 26.1 - 32.9 PG  
 MCHC 31.0 (L) 31.4 - 35.0 g/dL  
 RDW 18.6 % PLATELET 318 (L) 630 - 450 K/uL MPV Unable to calculate. Recommend adding IPF. 9.4 - 12.3 FL ABSOLUTE NRBC 0.00 0.0 - 0.2 K/uL  
 DF AUTOMATED NEUTROPHILS 55 43 - 78 % LYMPHOCYTES 31 13 - 44 % MONOCYTES 9 4.0 - 12.0 % EOSINOPHILS 4 0.5 - 7.8 % BASOPHILS 1 0.0 - 2.0 % IMMATURE GRANULOCYTES 0 0.0 - 5.0 %  
 ABS. NEUTROPHILS 1.9 1.7 - 8.2 K/UL  
 ABS. LYMPHOCYTES 1.1 0.5 - 4.6 K/UL  
 ABS. MONOCYTES 0.3 0.1 - 1.3 K/UL  
 ABS. EOSINOPHILS 0.1 0.0 - 0.8 K/UL  
 ABS. BASOPHILS 0.0 0.0 - 0.2 K/UL  
 ABS. IMM. GRANS. 0.0 0.0 - 0.5 K/UL METABOLIC PANEL, COMPREHENSIVE Collection Time: 11/08/18  1:00 PM  
Result Value Ref Range Sodium 142 136 - 145 mmol/L Potassium 3.7 3.5 - 5.1 mmol/L Chloride 109 (H) 98 - 107 mmol/L  
 CO2 27 21 - 32 mmol/L Anion gap 6 (L) 7 - 16 mmol/L Glucose 106 (H) 65 - 100 mg/dL BUN 8 8 - 23 MG/DL Creatinine 1.09 (H) 0.6 - 1.0 MG/DL  
 GFR est AA >60 >60 ml/min/1.73m2 GFR est non-AA 51 (L) >60 ml/min/1.73m2 Calcium 8.6 8.3 - 10.4 MG/DL Bilirubin, total 0.6 0.2 - 1.1 MG/DL  
 ALT (SGPT) 18 12 - 65 U/L  
 AST (SGOT) 17 15 - 37 U/L Alk. phosphatase 110 50 - 136 U/L Protein, total 6.7 6.3 - 8.2 g/dL Albumin 3.0 (L) 3.2 - 4.6 g/dL Globulin 3.7 (H) 2.3 - 3.5 g/dL A-G Ratio 0.8 (L) 1.2 - 3.5 BNP Collection Time: 11/08/18  1:00 PM  
Result Value Ref Range BNP 1,247 pg/mL TROPONIN I Collection Time: 11/08/18  1:00 PM  
Result Value Ref Range Troponin-I, Qt. 0.05 0.02 - 0.05 NG/ML  
PROTHROMBIN TIME + INR Collection Time: 11/08/18  3:17 PM  
Result Value Ref Range Prothrombin time 37.2 (H) 11.5 - 14.5 sec INR 4.0 (HH)    
TROPONIN I Collection Time: 11/08/18 11:52 PM  
Result Value Ref Range Troponin-I, Qt. 0.05 0.02 - 0.05 NG/ML  
MAGNESIUM Collection Time: 11/08/18 11:52 PM  
Result Value Ref Range Magnesium 1.8 1.8 - 2.4 mg/dL TROPONIN I Collection Time: 11/09/18  5:22 AM  
Result Value Ref Range Troponin-I, Qt. 0.04 0.02 - 0.05 NG/ML  
PROTHROMBIN TIME + INR Collection Time: 11/09/18  5:22 AM  
Result Value Ref Range Prothrombin time 33.4 (H) 11.5 - 14.5 sec INR 3.5 All Micro Results None Other Studies: Xr Chest Pa Lat Result Date: 11/8/2018 Two-view chest x-ray November 8, 2018 Reference exam: June 21, 2016 INDICATION: Short of breath FINDINGS: Cardiac silhouette is enlarged with some haziness blurring the vascular borders, probable effusions are seen bilaterally larger on the left. IMPRESSION: Findings suggest some CHF. Ct Head Wo Cont Result Date: 11/9/2018 HEAD CT without contrast  11/9/2018 7:13 AM CLINICAL INFORMATION: Lightheadedness and dizziness. COMPARISON: 6/21/2016, 8/9/2015. PROCEDURE: Emergent noncontrast head CT was performed in the axial plane. Brain, bone, and soft tissue windows reviewed. Radiation dose reduction techniques were used for this study. Our CT scanners used one or all of the following: Automated exposure control, adjustment of the MA and/or kV according to patient size, iterative reconstruction. FINDINGS: No new acute intracranial hemorrhage, mass, or mass effect. Small hyperattenuating lesion at the base of the foramen of Monro is unchanged, likely a colloid cyst. No extra-axial fluid collections. No midline shift; the basilar cisterns are patent. The ventricles are normal in size and symmetric to the subarachnoid spaces. Mild periventricular and deep patchy white matter low attenuation is nonspecific but likely related to chronic small vessel ischemic change. Small hypodensities are new in the left basal ganglia (image 13), likely related to subacute/chronic interval lacunar infarcts. There are no specific signs to suggest acute large vessel territory ischemia. The gray-white differentiation is otherwise preserved. Vascular calcifications are present.  No aggressive calvarial process. Chronic opacification of the right maxillary sinus is partially visualized. Visualized portions of the paranasal sinuses and mastoid air cells are otherwise patent. IMPRESSION: 1. No convincing acute intracranial abnormality. Please note that MRI is more sensitive for detection of acute ischemia. 2. Interval development of small hypodensities in the left basal ganglia suspicious for subacute/chronic lacunar infarcts. Assessment and Plan:  
 
Hospital Problems as of 11/9/2018 Date Reviewed: 6/23/2011 Codes Class Noted - Resolved POA  
 CHF (congestive heart failure) ([facility] [address] ICD-10-CM: I50.9 ICD-9-CM: 428.0  11/8/2018 - Present Unknown Supratherapeutic INR ICD-10-CM: R79.1 ICD-9-CM: 790.92  11/8/2018 - Present Yes Pleural effusion ICD-10-CM: J90 ICD-9-CM: 511.9  11/8/2018 - Present Yes Overview Signed 11/8/2018  4:18 PM by Glen Samuel MD  
  bilateral 
  
  
   
 Edema ICD-10-CM: R60.9 ICD-9-CM: 782.3  11/8/2018 - Present Yes Overview Signed 11/8/2018  4:18 PM by Glen Samuel MD  
  Bilateral lower legs Lightheadedness ICD-10-CM: K11 ICD-9-CM: 780.4  11/8/2018 - Present Yes Paroxysmal A-fib (HCC) ICD-10-CM: I48.0 ICD-9-CM: 427.31  11/8/2018 - Present Yes PLAN: 
· Dizziness/lightheadedness: resolved. Likely from volume depletion. Pt's family mentioned that pt does not take lasix, BB due to her BP being on lower side. For past 1 weeks, she took lasix from her son for B/L LE edema. · ?CHF: currently on lasix IV 20 mg bid, coreg and losartan. ECHO is pending. As per pt's daughter, pt's primary cardiologist didn't recommend any BB, lasix or ACE-inh. Will f/u with final recommendations from Card. · Pleural effusions and pulm edema- lasix · Paroxysmal afib- re-start her home amio · supra therapeutic inr  3.5- hold warfarin · Will get orthostatic vitals · Further workup and mgt based on her clinical course DVT ppx: On warfarin and supratherapeutic Anticipated DC needs:  chf teaching Code status:  Full Estimated LOS:  Potential discharge to home in next 1-2 days. Risk:  high Signed: 
Carissa Jenkins MD

## 2018-11-09 NOTE — CONSULTS
[facility] Cardiology Consult Date of  Admission: 11/8/2018 12:55 PM  
 
Primary Care Physician: Dr Lluvia Butler Primary Cardiologist: [facility] Referring Physician: Hospital Medicine Consulting Physician: Dr Phil Ge 
 
CC/Reason for consult: CHF w/ PAF, Bertha Lee is a 80 y.o. female with a h/o PAF,HFrEF, CAD w/  of RCA, colon polyps, follicular thyroid CA, HTN, PE, pulmonary HTN, and thyroidectomy. She was admitted by hospital medicine after being brought to the hospital on 11/8/2018 for noted leg swelling and SOB. Her SOB has been for several weeks. Per the patient she was told to stop her lasix on 11/6/2018 even with her legs still being swollen. On the morning of 11/8/2018 she was not feeling right including light headedness, and not being able to stand because she was so SOB. PT was found to be in a fib RVR on admission with a supertheraputic INR. The patient was restarted on her home amiodarone and CHF protocol was started with IV lasix given by hospital medicine. After talking with the patient she was never given Lasix by her own Dr but was taking her brothers because of the swelling in her legs. She stated the main reason she is hear at the hospital is for her weakness and dizziness not the swelling that is an ongoing problem but has gotten worse. She denies CP, Chest pressure, palpitations, N/V, GIB, ha, current SOB, or other new medical problems. Echo: Pending Previous EF of by limited Echo on 10/29 at [facility] showed · The left ventricular systolic function is decreased (40 - 45%). Left ventricular global hypokinesis. There is a small pericardial effusion present NST: 8/1/2018 Protocol: Resting ECG: Atrial fibrillation, , RBBB, LAFB. Hemodynamic Response: adequate. Symptoms: shortness of breath. Stress ECG: unchanged from baseline. LV Function: EF is 45%. LV function is mildly reduced. There is global  Hypokinesis.   Defect 1: Small defect of severe severity present in the apex location. The defect is non-reversible. Defect 2: Large defect of mild severity present in the basal inferior,  basal inferolateral, mid inferior, mid inferolateral and apical inferior  
location. The defect is reversible. Findings consistent with a small apical transmural scar; mild  inferior/inferolateral ischemia extending from base to apex. Knox Rotunda Trop I: 0.04 EKG: A Fib RBBB Event Monitor: 10/12/2018 The patient was enrolled for 31 days during which 1 total transmissions were posted. The initial Baseline transmission shows SINUS BRADYCARDIA, IVCD The high average heart rate seen for the monitored period was 50 BPM. The low average heart rate seen for the monitored period was 50 BPM. No Pauses noted for 3 seconds or longer. Onl baseline rhythm was transmitted. No arrhythmias detected. No symptoms recorded. Patient Active Problem List  
Diagnosis Code  [facility]st[facility] failure [facility] T84.018A, F39.284  KENNEDY (total hip arthroplasty)  Unstable angina (HCC) I20.0  H/O PE (pulmonary embolism) & IVC filter I26.99  
 Acute pancreatitis K85.90  
 Coagulopathy ([facility]) D68.9  
 HTN (hypertension) I10  
 Acquired hypothyroidism E03.9  Pericardial effusion I31.3  Pancreatitis K85.90  Gallstone pancreatitis K85.10  Follicular thyroid cancer ([facility] C73  
 Nutritional deficiency E63.9  Impaired mobility Z74.09  
 Debility R53.81  
 Colon polyps K63.5  Iron deficiency anemia D50.9  CHF (congestive heart failure) ([facility]) I50.9  Supratherapeutic INR R79.1  Pleural effusion J90  
 Edema R60.9 [address]  Paroxysmal A-fib (HCC) I48.0 Past Medical History:  
Diagnosis Date  Acquired hypothyroidism  Acute pancreatitis  Arthritis  CAD (coronary artery disease) 4/29/11 RCA occluded and fills with collaterals, LAD and Cx nonobstructive CAD, EF 50-55%  Cancer ([facility] THYROID - treated with surgery and radiation  CHF (congestive heart failure) ([facility] 11/8/2018  Chronic pain   
 right hip  Coagulopathy ([facility] 6/16/2011  Colon polyps  Follicular thyroid cancer ([facility]  Gallstone pancreatitis  Glaucoma  Gout   
 hx of gout to right great toe - no problems  Hypertension   
 controlled with medication  Other ill-defined conditions(799.89)  Pancreatitis  Paroxysmal A-fib ([facility] 11/8/2018  PE (pulmonary embolism) 2008  Pulmonary HTN (HCC)   
 moderate  Thromboembolus ([facility] 1995  
 right leg - Hemashield graft/filter placed in abdomen  Thyroid disease   
 pt had total thyroidectomy - takes levoxyl daily Past Surgical History:  
Procedure Laterality Date  HX APPENDECTOMY  ? 1990  
 HX CHOLECYSTECTOMY  6/2011 Dr. Annabella Mary [address] [employer]  
 hemashield graft/filter placed due to blood clot right leg  HX THYROIDECTOMY  2010  
 partial  
 HX TONSILLECTOMY [address] [facility] ARTHROPLASTY  2008 & 2011  
 right hip Allergies Allergen Reactions  Ativan [Lorazepam] Other (comments) Has opposite reaction  Lortab [Hydrocodone-Acetaminophen] Other (comments) Causes disorientation. - denies allergy, states she can take it  Morphine Other (comments) Causes disorientation Family History Problem Relation Age of Onset  Stroke Mother  Hypertension Mother  Cancer Father   
     prostate  Diabetes Maternal Uncle Social History Socioeconomic History  Marital status:  Spouse name: Not on file  Number of children: Not on file  Years of education: Not on file  Highest education level: Not on file Social Needs  Financial resource strain: Not on file  Food insecurity - worry: Not on file  Food insecurity - inability: Not on file  Transportation needs - medical: Not on file  Transportation needs - non-medical: Not on file Occupational History  Not on file Tobacco Use  Smoking status: Former Smoker  Smokeless tobacco: Never Used  Tobacco comment: quit smoking 1995 Substance and Sexual Activity  Alcohol use: No  
 Drug use: No  
 Sexual activity: Not on file Other Topics Concern  Not on file Social History Narrative  Not on file Current Facility-Administered Medications Medication Dose Route Frequency  lisinopril (PRINIVIL, ZESTRIL) tablet 2.5 mg  2.5 mg Oral DAILY  carvedilol (COREG) tablet 3.125 mg  3.125 mg Oral BID WITH MEALS  furosemide (LASIX) injection 40 mg  40 mg IntraVENous Q12H  levothyroxine (SYNTHROID) tablet 75 mcg  75 mcg Oral ACB  amiodarone (CORDARONE) tablet 100 mg  100 mg Oral QHS  simvastatin (ZOCOR) tablet 20 mg  20 mg Oral QHS  latanoprost (XALATAN) 0.005 % ophthalmic solution 1 Drop  1 Drop Both Eyes QHS  gabapentin (NEURONTIN) capsule 300 mg  300 mg Oral TID  alcohol 62% (NOZIN) nasal  1 Ampule  1 Ampule Topical Q12H  Warfarin Pharmacy Dosing  - On Hold   Other Rx Dosing/Monitoring Review of Systems Constitution: Negative for chills, diaphoresis, fever, weight gain and weight loss. HENT: Negative. Eyes: Negative. Cardiovascular: Positive for leg swelling. Negative for chest pain (currently pain free), claudication, cyanosis, dyspnea on exertion, irregular heartbeat, near-syncope, orthopnea, palpitations, paroxysmal nocturnal dyspnea and syncope. Respiratory: Negative for cough, shortness of breath and wheezing. Endocrine: Negative. Skin: Negative. Musculoskeletal: Negative. Gastrointestinal: Negative for nausea and vomiting. Genitourinary: Negative. Neurological: Positive for dizziness (not currently dizzy). Psychiatric/Behavioral: Negative. Allergic/Immunologic: Negative. Physical Exam 
Vitals:  
 11/08/18 1901 11/08/18 2355 11/09/18 0255 11/09/18 0099 BP: (!) 138/93 104/73 115/69 Pulse: 100 100 100 Resp: 18 18 18 Temp: 98.6 °F (37 °C) 98.8 °F (37.1 °C) 98.9 °F (37.2 °C) SpO2: 93% 95% 95% Weight:    49.8 kg (109 lb 11.2 oz) Height:      
 
 
Physical Exam: 
General: Well Developed, Well Nourished, No Acute Distress HEENT: pupils equal and round, no abnormalities noted Neck: supple, no JVD, no carotid bruits Heart: S1S2 with ireggular RR without murmurs or gallops Lungs: Clear throughout auscultation bilaterally without adventitious sounds Abd: soft, nontender, nondistended, with good bowel sounds Ext: warm, +2 edema, calves supple/nontender, pulses 2+ bilaterally Skin: warm and dry Psychiatric: Normal mood and affect Neurologic: Alert and oriented X 3 Labs:  
Recent Labs 11/09/18 
0522 11/08/18 
2352 11/08/18 [id number] 11/08/18 
1300 NA  --   --   --  142 K  --   --   --  3.7 MG  --  1.8  --   --   
BUN  --   --   --  8  
CREA  --   --   --  1.09* GLU  --   --   --  106* WBC  --   --   --  3.4* HGB  --   --   --  10.6* HCT  --   --   --  34.2*  
PLT  --   --   --  121* INR 3.5  --  4.0*  --   
 
 
Echo Results  (Last 48 hours) None CXR Results  (Last 48 hours) 11/08/18 1343  XR CHEST PA LAT Final result Impression:  IMPRESSION: Findings suggest some CHF. Narrative:  Two-view chest x-ray November 8, 2018 Reference exam: June 21, 2016 INDICATION: Short of breath FINDINGS: Cardiac silhouette is enlarged with some haziness blurring the  
vascular borders, probable effusions are seen bilaterally larger on the left. Assessment/Plan: 
 
 Assessment:  
  
Active Problems: 
  CHF (congestive heart failure) (HCC) (11/8/2018) Agree with fluid restriction, daily weights, IV lasix 40 mg BID for now. Pt is on HF approved BB. Will switched to ARB from ACE for possible future use of Entresto for HF. Supratherapeutic INR (11/8/2018) Pharmacy Managing Pleural effusion (11/8/2018) Overview: bilateral 
 
  Edema (11/8/2018) Overview: Bilateral lower legs see above Lightheadedness (11/8/2018) Paroxysmal A-fib ([address] (11/8/2018) on [facility] pharmacy managing, Placed back on Amioderone but will increase dosing. Thank you very much for this referral. We appreciate the opportunity to participate in this patient's care. We will follow along with above stated plan. Jordon Simeon NP Consulting MD: Bernie Cobb MD

## 2018-11-09 NOTE — PROGRESS NOTES
Visit with patient to build rapport with . Patient is calm. Receptive to  presence. Encouraged and assured patient of our continued care. Tez Monaco,  Staff  C: [phone]  /  [email or web address]

## 2018-11-09 NOTE — PROGRESS NOTES
Problem: Mobility Impaired (Adult and Pediatric) Goal: *Acute Goals and Plan of Care (Insert Text) LTG: 
(1.)Ms. Hailee Carr will move from supine to sit and sit to supine , scoot up and down and roll side to side with MODIFIED INDEPENDENCE within 7 treatment day(s). (2.)Ms. Hailee Carr will transfer from bed to chair and chair to bed with MODIFIED INDEPENDENCE using the least restrictive device within 7 treatment day(s). (3.)Ms. Hailee Carr will ambulate with MODIFIED INDEPENDENCE for 250+ feet with the least restrictive device within 7 treatment day(s). (4.)Ms. Hailee Carr will be independent in [facility] for B LE strengthening and mobility within 7 treatment days. (5.)Ms. Hailee Carr will perform 3 steps with HR and SBA within 7 treatment days for safety ascending and descending stairs for home.  
______________________________________________________________________________________________ PHYSICAL THERAPY: Initial Assessment, AM 11/9/2018INPATIENT: Hospital Day: 2 Payor: CARE IMPROVEMENT PLUS [place] / Product Type: [facility] Care Medicare /  
  
NAME/AGE/GENDER: Deandre Funes is a 80 y.o. female PRIMARY DIAGNOSIS: CHF (congestive heart failure) ([facility]) <principal problem not specified> <principal problem not specified> 
 
  
ICD-10: Treatment Diagnosis:  
 · Generalized Muscle Weakness (M62.81) · Difficulty in walking, Not elsewhere classified (R26.2) Precaution/Allergies: 
Ativan [lorazepam]; Lortab [hydrocodone-acetaminophen]; and Morphine ASSESSMENT:  
Ms. Hailee Carr presents with decreased bed mobility, transfers, ambulation, balance, strength and mobility, and overall general functional mobility s/p hospital admission with CHF. Pt A & O x 3, family present in room during assessment. Pt on room air, pure wick donned. Pt states lives in 1 story home, 3 steps to enter with HR. Pt uses \"crutch\"/cane in home as well as furniture walks. Pt has RW for community.  Pt reports independent in ADLs, does have some assist with socks and shoes. Pt today required MIN A for bed mobility, min A x 2 for sit to stand transfer to [facility]. Pt leaning posterior in sitting, limiting hip flexion, cues required to bend B LEs and place feet to floor. Pt leaning posteriorly so much she urinated on floor, required max to total assist for cleaning and donning of new brief. Pt ambulated into hallway for 150' with RW, wearing lift shoe on R for leg length discrepancy. Pt CGA for mobility, fair to good standing balance. PT to follow for acute care needs to address deficits noted above, pt may benefit from HHPT at discharge pending progress. Ms. Li Lakhani was discharged from our facility before further treatment could be provided in this setting. This section established at most recent assessment PROBLEM LIST (Impairments causing functional limitations): 1. Decreased Strength 2. Decreased ADL/Functional Activities 3. Decreased Transfer Abilities 4. Decreased Ambulation Ability/Technique 5. Decreased Balance 6. Decreased Activity Tolerance 7. Decreased [place] with Home Exercise Program 
 INTERVENTIONS PLANNED: (Benefits and precautions of physical therapy have been discussed with the patient.) 1. Balance Exercise 2. Bed Mobility 3. Family Education 4. Gait Training 5. Home Exercise Program (HEP) 6. Therapeutic Activites 7. Therapeutic Exercise/Strengthening 8. Transfer Training TREATMENT PLAN: Frequency/Duration: 3 times a week for duration of hospital stay Rehabilitation Potential For Stated Goals: Good RECOMMENDED REHABILITATION/EQUIPMENT: (at time of discharge pending progress): Due to the probability of continued deficits (see above) this patient will likely need continued skilled physical therapy after discharge. Equipment:  
? None at this time HISTORY:  
History of Present Injury/Illness (Reason for Referral): 
 80yr old female with phx sig for cad, chf last known ef around 42% follows with [facility] cardiology, afib, dvt's. Has noted leg swelling and sob for several weeks. Her son started giving her lasix that he had but when she went to her cardiology op visit on 11/06/2018- although her legs were still swollen she was told to stop taking his lasix and was not prescribed any of her own.  
  
She was at home this am around 3/4am when she did not feel right - had lightheadedness and whoozy in the head. Later in the morning when she got up. Dean Allen She went to the kitchen, she washed some dishes in the sink made her coffee and went to sit down and could not get back up she was so sob. She denies any chest pain.   
  
Wt loss since 2014 of unclear etiology. Does not weigh daily. Not on fluid restriction. Not on chf meds. Hard of hearing 
  
Past Medical History/Comorbidities: Ms. Lindy Boateng  has a past medical history of Acquired hypothyroidism, Acute pancreatitis, Arthritis, CAD (coronary artery disease), Cancer ([address] CHF (congestive heart failure) ([address] Chronic pain, Coagulopathy ([address] Colon polyps, Follicular thyroid cancer ([address] Gallstone pancreatitis, Glaucoma, Gout, Hypertension, Other ill-defined conditions(799.89), Pancreatitis, Paroxysmal A-fib ([address] PE (pulmonary embolism), Pulmonary HTN ([address] Thromboembolus ([address] and Thyroid disease. Ms. Lindy Boateng  has a past surgical history that includes hx thyroidectomy (2010); hx appendectomy (?1990); hx tonsillectomy; hx other surgical (1995); pr total hip arthroplasty (2008 & 2011); hx cholecystectomy (6/2011); HIP ARTHROPLASTY TOTAL REVISION (Right, 10/27/2011); CHOLECYSTECTOMY LAPAROSCOPIC WITH INTRAOP CHOLANGIOGRAM (N/A, 6/23/2011); and HIP ARTHROPLASTY TOTAL REVISION (Right, 4/26/2011). Social History/Living Environment:  
Home Environment: Private residence # Steps to Enter: 3 One/Two Story Residence: One story Living Alone: No 
Support Systems: Child(chavo) Patient Expects to be Discharged to[de-identified] Private residence Current DME Used/Available at Home: Cane, straight, Shower chair, Walker, rolling Tub or Shower Type: Tub/Shower combination Prior Level of Function/Work/Activity: 
Lives with 2 sons; has 12 children who are attentive; Pt uses \"crutch\"/cane, RW for amb; some assist ADLs for lower body dressing Personal Factors:   
      Sex:  female Age:  80 y.o. Overall Behavior:  agreeable Number of Personal Factors/Comorbidities that affect the Plan of Care: 
CAD, CHF, cancer 3+: HIGH COMPLEXITY EXAMINATION:  
Most Recent Physical Functioning:  
Gross Assessment: 
AROM: Generally decreased, functional(B LE) Strength: Generally decreased, functional(B LE grossly 4/5) Coordination: Generally decreased, functional 
         
  
Posture: 
Posture (WDL): Exceptions to [facility] Posture Assessment: Rounded shoulders Balance: 
Sitting: Impaired Sitting - Static: Prop sitting Sitting - Dynamic: Fair (occasional) Standing: Impaired Standing - Static: Good;Fair 
Standing - Dynamic : Fair Bed Mobility: 
Supine to Sit: Contact guard assistance;Minimum assistance Sit to Supine: (left up in chair) Wheelchair Mobility: 
  
Transfers: 
Sit to Stand: Minimum assistance Stand to Sit: Minimum assistance Bed to Chair: Minimum assistance Gait: 
  
Base of Support: Narrowed Speed/Laura: Slow Step Length: Left shortened;Right shortened Swing Pattern: Left symmetrical;Right symmetrical 
Gait Abnormalities: Decreased step clearance; Steppage gait Distance (ft): 150 Feet (ft) Assistive Device: Walker, rolling Ambulation - Level of Assistance: Contact guard assistance Interventions: Safety awareness training;Verbal cues Body Structures Involved: 1. Heart 2. Muscles Body Functions Affected: 1. Cardio 2. Movement Related Activities and Participation Affected: 1. General Tasks and Demands 2. Mobility 3. Self Care Number of elements that affect the Plan of Care: 4+: HIGH COMPLEXITY CLINICAL PRESENTATION:  
Presentation: Evolving clinical presentation with changing clinical characteristics: MODERATE COMPLEXITY CLINICAL DECISION MAKIN [facility] [address] [address] AM-PAC 6 Clicks Basic Mobility Inpatient Short Form How much difficulty does the patient currently have. .. Unable A Lot A Little None 1. Turning over in bed (including adjusting bedclothes, sheets and blankets)? [] 1   [] 2   [x] 3   [] 4  
2. Sitting down on and standing up from a chair with arms ( e.g., wheelchair, bedside commode, etc.)   [] 1   [] 2   [x] 3   [] 4  
3. Moving from lying on back to sitting on the side of the bed? [] 1   [] 2   [x] 3   [] 4 How much help from another person does the patient currently need. .. Total A Lot A Little None 4. Moving to and from a bed to a chair (including a wheelchair)? [] 1   [] 2   [x] 3   [] 4  
5. Need to walk in hospital room? [] 1   [] 2   [x] 3   [] 4  
6. Climbing 3-5 steps with a railing? [] 1   [] 2   [x] 3   [] 4  
© , Trustees of [facility] [address] [address], under license to [employer]. All rights reserved Score:  Initial: 18 Most Recent: X (Date: -- ) Interpretation of Tool:  Represents activities that are increasingly more difficult (i.e. Bed mobility, Transfers, Gait). Score 24 23 22-20 19-15 14-10 9-7 6 Modifier CH CI CJ CK CL CM CN   
 
? Mobility - Walking and Moving Around:  
  - CURRENT STATUS: CK - 40%-59% impaired, limited or restricted  - GOAL STATUS: CJ - 20%-39% impaired, limited or restricted  - D/C STATUS:  CK - 40%-59% impaired, limited or restricted Payor: CARE IMPROVEMENT PLUS [place] / Product Type: [facility] Medicare /   
 
Medical Necessity:    
· Patient is expected to demonstrate progress in strength, range of motion, balance and coordination to decrease assistance required with overall functional mobility, transfers, ambulation. · Patient demonstrates fair rehab potential due to higher previous functional level. Reason for Services/Other Comments: 
· Patient continues to require present interventions due to patient's inability to perform bed mobility, transfers, ambulation safely and effectively. Use of outcome tool(s) and clinical judgement create a POC that gives a: Clear prediction of patient's progress: LOW COMPLEXITY  
  
 
 
 
TREATMENT:  
(In addition to Assessment/Re-Assessment sessions the following treatments were rendered) Pre-treatment Symptoms/Complaints:  \"I guess I will, how far to I have to go? \" 
Pain: Initial:  
Pain Intensity 1: 0  Post Session:  0/10 post session Therapeutic Activity: (    10 min): Therapeutic activities including Toilet transfers, Ambulation on level ground and weight shifting, walker safety to improve mobility, strength, balance and coordination. Required minimal Safety awareness training;Verbal cues to promote static and dynamic balance in standing and promote coordination of bilateral, lower extremity(s). Braces/Orthotics/Lines/Etc:  
· pure wick · O2 Device: Room air Treatment/Session Assessment:   
· Response to Treatment:  Tolerated well · Interdisciplinary Collaboration:  
o Physical Therapist 
o Registered Nurse · After treatment position/precautions:  
o Up in chair 
o Bed/Chair-wheels locked 
o Bed in low position 
o Call light within reach 
o RN notified 
o Family at bedside · Compliance with Program/Exercises: Will assess as treatment progresses · Recommendations/Intent for next treatment session: \"Next visit will focus on advancements to more challenging activities\". Total Treatment Duration: PT Patient Time In/Time Out Time In: 1742 Time Out: 1156 Lyndsay Ahmadi, PT

## 2018-11-09 NOTE — CONSULTS
Palliative Care Patient: Bennie Napier MRN: [id number]  SSN: [id number] YOB: 1933  Age: 80 y.o. Sex: female Date of Request: 11/9/2018 Date of Consult:  11/9/2018 Reason for Consult:  CHF Requesting Physician: Dr Vishnu Lopez Assessment/Plan:  
 
Principal Diagnosis: Dyspnea  R06.00 Additional Diagnoses: · Debility, Unspecified  R53.81 
· Edema  R60.9 · Counseling, Encounter for Medical Advice  Z71.9 
· Encounter for Palliative Care  Z51.5 Palliative Performance Scale (PPS): PPS: 70 Medical Decision Making:  
Reviewed and summarized notes from admission to present Discussed case with appropriate providers Reviewed laboratory and x-ray data from admission to present Pt sitting in chair, no distress noted. Daughter, Sergio Parks [facility]), at bedside. Introduced role of PC and reviewed events of hospitalization. Daughter states they have never been told pt had CHF until this hospitalization. Counseled on the chronic and progressive nature of CHF. Family is hopeful she can live a long time with CHF. Encouraged home health support at discharge for monitoring and CHF education. Pt has a HCPOA and Living Will on file. We will not plan to follow. Will discuss findings with members of the interdisciplinary team.   
 
Thank you for this referral.    
 
  
. 
 
Subjective:  
 
History obtained from:  Patient, Family and Chart Chief Complaint: CHF History of Present Illness:  Ms Maureen Squires is an 81 yo AA female with PMH Of CAD, CHF, thyroid cancer, HTN, and other conditions listed below, who presented to the ER from home on 11/8/2018 with c/o increasing LE edema and dyspnea for several weeks. She had apparently been taking her sons lasix without improvement in symptoms. Work up revealed CHF exacerbation, and pt was admitted for further management. She reports her breathing has improved since admission. Advance Directive:  Yes      
 Code Status:  Full Code Health Care Power of : Yes - Copy of [address] on file. Past Medical History:  
Diagnosis Date  Acquired hypothyroidism  Acute pancreatitis  Arthritis  CAD (coronary artery disease) 4/29/11 RCA occluded and fills with collaterals, LAD and Cx nonobstructive CAD, EF 50-55%  Cancer ([facility] THYROID - treated with surgery and radiation  CHF (congestive heart failure) ([address] 11/8/2018  Chronic pain   
 right hip  Coagulopathy ([address] 6/16/2011  Colon polyps  Follicular thyroid cancer ([facility]  Gallstone pancreatitis  Glaucoma  Gout   
 hx of gout to right great toe - no problems  Hypertension   
 controlled with medication  Other ill-defined conditions(799.89)  Pancreatitis  Paroxysmal A-fib ([facility] 11/8/2018  PE (pulmonary embolism) 2008  Pulmonary HTN (HCC)   
 moderate  Thromboembolus ([address] 1995  
 right leg - Hemashield graft/filter placed in abdomen  Thyroid disease   
 pt had total thyroidectomy - takes levoxyl daily Past Surgical History:  
Procedure Laterality Date  HX APPENDECTOMY  ? 1990  
 HX CHOLECYSTECTOMY  6/2011 Dr. Donovan [facility] [address]  
 hemashield graft/filter placed due to blood clot right leg  HX THYROIDECTOMY  2010  
 partial  
 HX TONSILLECTOMY [address] [facility] ARTHROPLASTY  2008 & 2011  
 right hip Family History Problem Relation Age of Onset  Stroke Mother  Hypertension Mother  Cancer Father   
     prostate  Diabetes Maternal Uncle Social History Tobacco Use  Smoking status: Former Smoker  Smokeless tobacco: Never Used  Tobacco comment: quit smoking 1995 Substance Use Topics  Alcohol use: No  
 
Prior to Admission medications Medication Sig Start Date End Date Taking? Authorizing Provider  
amiodarone (CORDARONE) 200 mg tablet Take 100 mg by mouth daily.    Yes Provider, Historical  
 lidocaine (SALONPAS/ASPERCREME) 4 % patch Place on area of pain 7/23/18  Yes Kerry Arevalo MD  
HYDROcodone-acetaminophen (NORCO) 5-325 mg per tablet Take 1 Tab by mouth every four (4) hours as needed for Pain. Max Daily Amount: 6 Tabs. 6/21/16  Yes Rossi Birmingham MD  
ondansetron (ZOFRAN ODT) 4 mg disintegrating tablet Take 1 Tab by mouth every eight (8) hours as needed for Nausea. 6/17/12  Yes Darien Ivan MD  
colchicine (COLCRYS) 0.6 mg tablet Take 0.6 mg by mouth four (4) times daily as needed. Indications: GOUT   Yes Provider, Historical  
folic acid (FOLVITE) 1 mg tablet Take 1 mg by mouth daily. Yes Provider, Historical  
gabapentin (NEURONTIN) 300 mg capsule Take 300 mg by mouth three (3) times daily. Yes Provider, Historical  
warfarin (COUMADIN) 4 mg tablet Take 4 mg by mouth as directed. To hold X 5 days prior to surgery. Yes Provider, Historical  
levothyroxine (LEVOXYL) 125 mcg tablet Take 75 mcg by mouth Daily (before breakfast). Take DOS per anesthesia protocol. Yes Provider, Historical  
simvastatin (ZOCOR) 20 mg tablet Take 20 mg by mouth daily. Take DOS per anesthesia protocol. 4/29/11  Yes Bernice Kitchen MD  
ALPHAGAN P 0.1 % Drop Apply 1 Drop to eye two (2) times a day. 1 DROP TO EACH EYE. Pt to use DOS per anesthesia protocol. 4/21/11  Yes Provider, Historical  
TRAVATAN 0.004 % Drop 1 Drop by Both Eyes route every evening. Yes Provider, Historical  
oxyCODONE IR (ROXICODONE) 5 mg immediate release tablet Take 1 Tab by mouth every six (6) hours as needed for Pain. Max Daily Amount: 20 mg. 1/17/16   Elvia Cleveland MD  
acetaminophen (TYLENOL EX STR ARTHRITIS PAIN) 500 mg tablet Take  by mouth every six (6) hours as needed. Provider, Historical  
carvedilol (COREG) 6.25 mg tablet Take 6.25 mg by mouth two (2) times daily (with meals).     Provider, Historical  
amlodipine-benazepril (LOTREL) 10-20 mg per capsule Take 1 Cap by mouth as needed. Provider, Historical  
ergocalciferol (ERGOCALCIFEROL) 50,000 unit capsule Take 50,000 Units by mouth every Sunday. 6/19/11   Other, MD Emi  
 
 
Allergies Allergen Reactions  Ativan [Lorazepam] Other (comments) Has opposite reaction  Lortab [Hydrocodone-Acetaminophen] Other (comments) Causes disorientation. - denies allergy, states she can take it  Morphine Other (comments) Causes disorientation Review of Systems: A comprehensive review of systems was negative except for:  
Constitutional: Positive for fatigue. Respiratory: Positive for improving dyspnea Objective:  
 
Visit Vitals BP 99/65 Pulse (!) 112 Temp 98.3 °F (36.8 °C) Resp 17 Ht 5' 5\" (1.651 m) Wt 109 lb 11.2 oz (49.8 kg) SpO2 95% Breastfeeding? No  
BMI 18.26 kg/m² Physical Exam: 
 
General:  Cooperative. No acute distress. Eyes:  Conjunctivae/corneas clear Nose: Nares normal. Septum midline. Neck: Supple, symmetrical, trachea midline Lungs:   Clear to auscultation bilaterally, unlabored Heart:  Regular rate and rhythm Abdomen:   Soft, non-tender, non-distended Extremities: Normal, atraumatic, no cyanosis. BLE edema Skin: Skin color, texture, turgor normal.   
Neurologic: Nonfocal  
Psych: Alert and oriented Assessment:  
 
Hospital Problems  Date Reviewed: 6/23/2011 Codes Class Noted POA  
 CHF [facility]congestive [facility] failure) ([facility] ICD-10-CM: I50.9 ICD-9-CM: 428.0  11/8/2018 Unknown Supratherapeutic INR ICD-10-CM: R79.1 ICD-9-CM: 790.92  11/8/2018 Yes Pleural effusion ICD-10-CM: J90 ICD-9-CM: 511.9  11/8/2018 Yes Overview Signed 11/8/2018  4:18 PM by Kyung Ahumada MD  
  bilateral 
  
  
   
 Edema ICD-10-CM: R60.9 ICD-9-CM: 782.3  11/8/2018 Yes Overview Signed 11/8/2018  4:18 PM by Kyung Ahumada MD  
  Bilateral lower legs Lightheadedness ICD-10-CM: H87 ICD-9-CM: 780.4  11/8/2018 Yes Paroxysmal A-fib (HCC) ICD-10-CM: I48.0 ICD-9-CM: 427.31  11/8/2018 Yes Signed By: Linda Whaley NP November 9, 2018

## 2018-11-09 NOTE — ROUTINE PROCESS
Spoke with patient and son in regards to CHF and patient had very little insight into disease state. We discussed disease process and s/s related to disease. We discussed medication changes and how to manage once OP. Patient will need lots of reinforcement. Planner left at bedside. Medication regimen is managed by daughter and she was not available at time of discussion. Numerous medication discrepancies were evident. CHF teaching started post introduction to pt/family; aware of diagnosis. Planner/scale @ BS and will follow. Smoking/ ETOH/Illicit drug use cessation and maintain a healthy weight covered. Pt/family aware that I can not prescribe nor adjust  medications: 15mins Palliative Care score: 22 entered ACP- on file Start 2L/D Fluid restriction/ cardiac diet CHF teaching continues to pt/family. Emphasis on taking prescription meds as ordered, to keep F/U appts and to call MD STAT if any of the following occur: ? If you gain 2 lbs in one day or 5 lbs in a week, and short of breath. ? If you can not lay flat without developing short of breath or rapid breathing at night; or if it wakes you up. Develop a cough or wheezing. ? If you notice swollen hands/feet/ankles or stomach with a bloated/ full feeling. ? If you become confused or mentally fuzzy or dizzy. ? If you notice a rapid or change in your heart rate. ? If you become more exhausted all the time and unable to do the same level of activity without stopping to catch your breath. Drink no more than 8 cups a day in 8 oz. cups. Your Heart can not handle any more. Stay away from salt (limit anything with salt or sodium in it). Limit to 250mg per serving. Pt/family verbalizes understanding, will follow to reinforce teaching skills: 35 mins

## 2018-11-09 NOTE — PROGRESS NOTES
IP consult to Cardiac Rehab. Chart review completed. Echo results pending at this time. I will follow for a qualifying EF result and see pt if appropriate.

## 2018-11-09 NOTE — PROGRESS NOTES
Care Management Interventions PCP Verified by CM: Yes Physical Therapy Consult: Yes Occupational Therapy Consult: No 
Current Support Network: Other Confirm Follow Up Transport: Family Plan discussed with Pt/Family/Caregiver: Yes Freedom of Choice Offered: Yes Discharge Location Discharge Placement: Home Patient is alert and oriented. Two of her sons live with her. She's independent with ambulation and ADLs. She tells CM that the only thing she can't do is put a sock on her right leg. Patient's family (12 children) are supportive and drive her to medical appts. No home 02. No history of HH or STR. Patient doesn't think she will need HH at discharge. CM following.

## 2018-11-09 NOTE — PROGRESS NOTES
Patient voices no concerns at this time. Patient is resting in bed with family at bedside and talking with MD.  Patient did walk with PT today and appears very independent with walker. Patient denies any pain and appears comfortable at this time. Call light within reach and patient instructed to call if assistance is needed. Will continue to monitor.

## 2018-11-10 PROBLEM — I50.23 ACUTE ON CHRONIC SYSTOLIC (CONGESTIVE) HEART FAILURE (HCC): Status: ACTIVE | Noted: 2018-01-01

## 2018-11-10 NOTE — PROGRESS NOTES
Patient alert and oriented, tracheostomy  In place, dry and clean, PEG in place, dry and clean breathing even and unlabored, no visual s/s of pain or distress, safety measure in place, call light within reach.

## 2018-11-10 NOTE — H&P
Hospitalist H&P Note Admit Date:  2018 12:55 PM  
Name:  Esmer Munoz Age:  80 y.o. 
:  1933 MRN:  [id number] PCP:  Vennie Lesch, MD 
Treatment Team: Attending Provider: Shamika Cisse MD; Utilization Review: Cherylene Gruber, RN; Consulting Provider: Sabine Muñoz MD; Care Manager: Roselyn Reece 
CC: SOB and whooziness x 1 day SUBJECTIVE:  
80yr old female with phx sig for cad, chf last known ef around 42% follows with [facility] cardiology, afib, dvt's admitted on  for acute decompensated CHF in view of elevated BNP 1200's and bilateral LE edema with acute hypoxic resp failure on pulmonary edema on CXR. 
 
 
11/10: 
Pt seen at bedside Reports feeling okay, c/o of chronic fatigue. Denies chest pain, nausea/vomiting, chills, fever, SOB, cough. Pedal edema is improving. Pt's son present at bedside. Discussed about echo findings with pt, her son & daughter. 10 systems reviewed and negative except as noted in HPI. Past Medical History:  
Diagnosis Date  Acquired hypothyroidism  Acute pancreatitis  Arthritis  CAD (coronary artery disease) 11 RCA occluded and fills with collaterals, LAD and Cx nonobstructive CAD, EF 50-55%  Cancer ([address] THYROID - treated with surgery and radiation  CHF (congestive heart failure) ([address] 2018  Chronic pain   
 right hip  Coagulopathy ([address] 2011  Colon polyps  Follicular thyroid cancer ([address]  Gallstone pancreatitis  Glaucoma  Gout   
 hx of gout to right great toe - no problems  Hypertension   
 controlled with medication  Other ill-defined conditions(799.89)  Pancreatitis  Paroxysmal A-fib ([address] 2018  PE (pulmonary embolism)   Pulmonary HTN (HCC)   
 moderate  Thromboembolus ([address]   
 right leg - Hemashield graft/filter placed in abdomen  Thyroid disease   
 pt had total thyroidectomy - takes levoxyl daily Past Surgical History:  
Procedure Laterality Date  HX APPENDECTOMY  ?   
 HX CHOLECYSTECTOMY  2011 Dr. [facility]k [facility] [address] [place]  
 hemashield graft/filter placed due to blood clot right leg  HX THYROIDECTOMY  2010  
 partial  
 HX TONSILLECTOMY [address] [facility] ARTHROPLASTY   &   
 right hip Allergies Allergen Reactions  Ativan [Lorazepam] Other (comments) Has opposite reaction  Lortab [Hydrocodone-Acetaminophen] Other (comments) Causes disorientation. - denies allergy, states she can take it  Morphine Other (comments) Causes disorientation Social History Tobacco Use  Smoking status: Former Smoker  Smokeless tobacco: Never Used  Tobacco comment: quit smoking  Substance Use Topics  Alcohol use: No  
  
Family History Problem Relation Age of Onset  Stroke Mother  Hypertension Mother  Cancer Father   
     prostate  Diabetes Maternal Uncle Immunization History Administered Date(s) Administered  Tuberculin 2008 PTA Medications: 
Prior to Admission Medications Prescriptions Last Dose Informant Patient Reported? Taking? ALPHAGAN P 0.1 % Drop  Self Yes No  
Sig: Apply 1 Drop to eye two (2) times a day. 1 DROP TO EACH EYE. Pt to use DOS per anesthesia protocol. HYDROcodone-acetaminophen (NORCO) 5-325 mg per tablet   No No  
Sig: Take 1 Tab by mouth every four (4) hours as needed for Pain. Max Daily Amount: 6 Tabs. TRAVATAN 0.004 % Drop  Self Yes No  
Si Drop by Both Eyes route every evening. acetaminophen (TYLENOL EX STR ARTHRITIS PAIN) 500 mg tablet   Yes No  
Sig: Take  by mouth every six (6) hours as needed. amlodipine-benazepril (LOTREL) 10-20 mg per capsule   Yes No  
Sig: Take 1 Cap by mouth as needed. carvedilol (COREG) 6.25 mg tablet   Yes No  
Sig: Take 6.25 mg by mouth two (2) times daily (with meals).   
colchicine (COLCRYS) 0.6 mg tablet   Yes No  
 Sig: Take 0.6 mg by mouth four (4) times daily as needed. Indications: GOUT  
ergocalciferol (ERGOCALCIFEROL) 50,000 unit capsule  Self Yes No  
Sig: Take 50,000 Units by mouth every Sunday. folic acid (FOLVITE) 1 mg tablet   Yes No  
Sig: Take 1 mg by mouth daily. gabapentin (NEURONTIN) 300 mg capsule   Yes No  
Sig: Take 300 mg by mouth three (3) times daily. levothyroxine (LEVOXYL) 125 mcg tablet  Self Yes No  
Sig: Take 125 mcg by mouth daily (before breakfast). Take DOS per anesthesia protocol. lidocaine (SALONPAS/ASPERCREME) 4 % patch   No No  
Sig: Place on area of pain  
ondansetron (ZOFRAN ODT) 4 mg disintegrating tablet   No No  
Sig: Take 1 Tab by mouth every eight (8) hours as needed for Nausea. oxyCODONE IR (ROXICODONE) 5 mg immediate release tablet   No No  
Sig: Take 1 Tab by mouth every six (6) hours as needed for Pain. Max Daily Amount: 20 mg.  
simvastatin (ZOCOR) 20 mg tablet  Self Yes No  
Sig: Take 20 mg by mouth daily. Take DOS per anesthesia protocol. warfarin (COUMADIN) 4 mg tablet   Yes No  
Sig: Take 4 mg by mouth as directed. To hold X 5 days prior to surgery. Facility-Administered Medications: None Objective:  
 
Patient Vitals for the past 24 hrs: 
 Temp Pulse Resp BP SpO2  
11/10/18 0726 98.4 °F (36.9 °C) (!) 54 18 100/59 94 % 11/10/18 0346 97.7 °F (36.5 °C) 61 18 107/62 100 % 11/09/18 2340  62     
11/09/18 2324 98.4 °F (36.9 °C) (!) 54 18 114/65 96 % 11/09/18 1932 98.5 °F (36.9 °C) (!) 53 18 103/66 96 % 11/09/18 1525 97.6 °F (36.4 °C) 63 18 103/59 98 % 11/09/18 1131 98.3 °F (36.8 °C) (!) 112 17 99/65 95 % 11/09/18 1123     96 % Oxygen Therapy O2 Sat (%): 94 % (11/10/18 0726) Pulse via Oximetry: 104 beats per minute (11/08/18 1605) O2 Device: Room air (11/09/18 1123) Intake/Output Summary (Last 24 hours) at 11/10/2018 2348 Last data filed at 11/10/2018 3925 Gross per 24 hour Intake 238 ml Output 1950 ml Net -1712 ml Physical Exam: 
General:    Well nourished. Alert. Eyes:   Normal sclera. Extraocular movements intact. ENT:  Normocephalic, atraumatic. Moist mucous membranes CV:   RRR. No m/r/g. Peripheral pulses present. Capillary refill normal 
Lungs:  CTAB. No wheezing, rhonchi, or rales. diminshed in the bases. Abdomen: Soft, nontender, nondistended. Bowel sounds normal.  
Extremities: Warm and dry. Significant bl pedal edema. Neurologic: CN II-XII grossly intact. Sensation intact. Skin:     No rashes or jaundice. Normal coloration Psych:  Normal mood and affect. I reviewed the labs, imaging, EKGs, telemetry, and other studies done this admission. Data Review:  
Recent Results (from the past 24 hour(s)) TROPONIN I Collection Time: 11/09/18  9:59 AM  
Result Value Ref Range Troponin-I, Qt. 0.03 0.02 - 0.05 NG/ML  
PROTHROMBIN TIME + INR Collection Time: 11/10/18  5:55 AM  
Result Value Ref Range Prothrombin time 30.0 (H) 11.5 - 14.5 sec INR 3.0 METABOLIC PANEL, BASIC Collection Time: 11/10/18  5:55 AM  
Result Value Ref Range Sodium 143 136 - 145 mmol/L Potassium 2.9 (LL) 3.5 - 5.1 mmol/L Chloride 106 98 - 107 mmol/L  
 CO2 30 21 - 32 mmol/L Anion gap 7 7 - 16 mmol/L Glucose 75 65 - 100 mg/dL BUN 9 8 - 23 MG/DL Creatinine 1.10 (H) 0.6 - 1.0 MG/DL  
 GFR est AA >60 >60 ml/min/1.73m2 GFR est non-AA 50 (L) >60 ml/min/1.73m2 Calcium 7.9 (L) 8.3 - 10.4 MG/DL All Micro Results None Other Studies: No results found. Assessment and Plan:  
 
Hospital Problems as of 11/10/2018 Date Reviewed: 6/23/2011 Codes Class Noted - Resolved POA  
 [facility] ICD-10-CM: I50.9 ICD-9-CM: 428.0  11/8/2018 - Present Unknown Supratherapeutic INR ICD-10-CM: R79.1 ICD-9-CM: 790.92  11/8/2018 - Present Yes Pleural effusion ICD-10-CM: J90 ICD-9-CM: 511.9  11/8/2018 - Present Yes Overview Signed 11/8/2018  4:18 PM by David Holt MD  
  bilateral 
  
  
   
 Edema ICD-10-CM: R60.9 ICD-9-CM: 782.3  11/8/2018 - Present Yes Overview Signed 11/8/2018  4:18 PM by David Holt MD  
  Bilateral lower legs Lightheadedness ICD-10-CM: H96 ICD-9-CM: 780.4  11/8/2018 - Present Yes Paroxysmal A-fib (HCC) ICD-10-CM: I48.0 ICD-9-CM: 427.31  11/8/2018 - Present Yes PLAN: 
· Acute decompensated systolic CHF with EF 95-27%: repeat CXR on 11/10 showed interval improvement in vascular congestion. Continue IV lasix 20 mg bid. · Continue coreg and losartan for optimal BP control and CHF management. May be transitioned to [facility] down the road as outpatient. · Replacing potassium with po and IV, F/U with BMP in AM 
· Pleural effusions and pulm edema- improved · Paroxysmal afib- re-start her home amio · supra therapeutic inr  3.0- hold warfarin DVT ppx: On warfarin and supratherapeutic Anticipated DC needs:  chf teaching Code status:  Full Estimated LOS:  Potential discharge to home in next 1-2 days. Risk:  high Signed: 
Dave Pop MD

## 2018-11-10 NOTE — PROGRESS NOTES
Patient potassium level 2.9 Md Read paged and informed about this patient status. Md Cheng Feeling orders:\"give 40 mEq of potassium chloride via IV PB\".

## 2018-11-10 NOTE — PROGRESS NOTES
Warfarin dosing per pharmacist 
 
Juanita Nugent is a 80 y.o. female. Height: 5' 5\" (165.1 cm)    Weight: 48.6 kg (107 lb 3.2 oz) Indication:  Afib and hx of DVT Goal INR:  2-3 Home dose:  2 mg every evening per most recent anticoag clinic visit note on 9/27/18. Risk factors or significant drug interactions:  Amiodarone Other anticoagulants:  N/A Daily Monitoring Date  INR     Warfarin dose HGB              Notes 11/8  4.0  Hold  10.6  
11/9  3.5  Hold  --- 
11/10  3.0  1 mg  --- Pharmacy consulted to assist dosing warfarin in patient with history of Afib and DVT. INR supratherapeutic on admission, so warfarin initially held. INR down to 3.0 after being held for the last 2 days. Will restart with 50% home dose of 1 mg this evening. Following daily INR. Thank you, Jackie Canseco, Pharm. D. Clinical Pharmacist 
[phone]

## 2018-11-10 NOTE — PROGRESS NOTES
PROGRESS NOTE I was paged because the patient and patient's daughter did not want the patient to have Amiodarone 200mg. I spoke with them and the daughter stated that she had previously been on Amiodarone 200mg and they had a hard time controlling her INR, so she was lowered back to 100mg. She has been \"doing fine\" on this dose. I told them to do the 100mg tonight, and the Cardiologist would discuss options with them in the morning. Otherwise, no acute issues.  
 
Viviana Heck, DO

## 2018-11-10 NOTE — PROGRESS NOTES
Patient resting in bed with no complaints at this time. Patient is alert and orientated with no distress noted. IV intact and patent with no s/s of infection noted. Respirations even and unlabored with heart rate regular. Patient able to ambulate independently without assistance if patient has her shoe r/t right leg shortened. Bed in low locked position with call light within reach. Patient instructed to call if assistance is needed. Will continue to monitor.

## 2018-11-10 NOTE — PROGRESS NOTES
Patient resting in bed alert and oriented, cooperative with care, breathing even and unlabored, no visual s/s of pain or distress, call light within reach.

## 2018-11-10 NOTE — PROGRESS NOTES
[facility] CARDIOLOGY PROGRESS NOTE 
      
 
11/10/2018 9:59 AM 
 
Admit Date: 11/8/2018 Subjective: She complains of chronic fatigue. ROS: 
GEN:  No fever or chills Cardiovascular:  As noted above:no CP. Pulmonary: :no SOB As noted above Neuro:  No new focal motor or sensory loss Objective:  
  
Vitals:  
 11/09/18 2340 11/10/18 0346 11/10/18 0352 11/10/18 6348 BP:  107/62  100/59 Pulse: 62 61  (!) 54 Resp:  18  18 Temp:  97.7 °F (36.5 °C)  98.4 °F (36.9 °C) SpO2:  100%  94% Weight:   48.6 kg (107 lb 3.2 oz) Height:      
 
 
Physical Exam: 
General-no distress Neck- supple, no JVD 
CV- regular rate and rhythm with 1/6 RAYSHAWN Lung- decreased bs bilaterally Abd- soft, nontender, nondistended Ext- trace edema bilaterally. Skin- warm and dry Psychiatric:  Normal mood and affect. Neurologic:  Alert and oriented X 3 Data Review:  
Recent Labs 11/10/18 
0555 11/09/18 
0522 11/08/18 
2352  11/08/18 
1300   --   --   --  142  
K 2.9*  --   --   --  3.7 MG  --   --  1.8  --   --   
BUN 9  --   --   --  8  
CREA 1.10*  --   --   --  1.09* GLU 75  --   --   --  106* WBC  --   --   --   --  3.4* HGB  --   --   --   --  10.6* HCT  --   --   --   --  34.2*  
PLT  --   --   --   --  121* INR 3.0 3.5  --    < >  --   
 < > = values in this interval not displayed. TELEMETRY:  afib Assessment/Plan: Active Problems: 
  CHF (congestive heart failure) ([facility] (11/8/2018): Systolic CHF:Responding to current treatment. Hopefully transition to po Lasix soon. Correct hypokalemia. Supratherapeutic INR (11/8/2018) Pleural effusion (11/8/2018) Overview: bilateral 
 
  Edema (11/8/2018) Overview: Bilateral lower legs Lightheadedness (11/8/2018) Paroxysmal A-fib (HCC) (11/8/2018):Rate control. .Continue current medications.  
 
 
 
 
 
 
 
Lul Bran MD 
11/10/2018 9:59 AM

## 2018-11-10 NOTE — ROUTINE PROCESS
CHF teaching started post introduction to pt/family; aware of diagnosis. Planner/scale @ BS and will follow. Smoking/ ETOH/Illicit drug use cessation and maintain a healthy weight covered. Pt/family aware that I can not prescribe nor adjust  medications: 15mins Palliative Care score:  RATT 22, on  
 
Start 2L/D Fluid restriction/ cardiac diet CHF teaching continues to pt/family. Emphasis on taking prescription meds as ordered, to keep F/U appts and to call MD STAT. 
  
CHF teaching continus, verbalize emphasis on monitoring self and report to MD: 
· If you gain 2 lbs in one day or 5 lbs in a week, and short of breath. · If you can not lay flat without developing short of breath or rapid breathing at night; or if it wakes you up. Develop a cough or wheezing.

## 2018-11-10 NOTE — PROGRESS NOTES
Problem: Falls - Risk of 
Goal: *Absence of Falls Document Kelly Gibbons Fall Risk and appropriate interventions in the flowsheet. Outcome: Progressing Towards Goal 
Fall Risk Interventions: 
Mobility Interventions: Assess mobility with egress test, PT Consult for mobility concerns, PT Consult for assist device competence, OT consult for ADLs, Patient to call before getting OOB, Strengthening exercises (ROM-active/passive) Medication Interventions: Evaluate medications/consider consulting pharmacy Elimination Interventions: Patient to call for help with toileting needs, Call light in reach

## 2018-11-10 NOTE — PROGRESS NOTES
Patient voices no concerns at this time. Patient is resting in bed with son at bedside for support. Patient has received 2 bags of IV K+ this AM.  Patient denies any pain and appears comfortable at this time. Call light within reach and patient instructed to call if assistance is needed. Will continue to monitor.

## 2018-11-10 NOTE — ROUTINE PROCESS
CHF teaching reinforced to pt; emphasis to call STAT with any worsening dyspnea, will follow post XRAY with family @ BS @ 930:m 10mins

## 2018-11-11 NOTE — PROGRESS NOTES
Spoke with Aleena Mane RN and she will contact this RN orders that are to be completed before DC are done.

## 2018-11-11 NOTE — DISCHARGE INSTRUCTIONS
Heart Failure: Care Instructions  Your Care Instructions    Heart failure occurs when your heart does not pump as much blood as the body needs. Failure does not mean that the heart has stopped pumping but rather that it is not pumping as well as it should. Over time, this causes fluid buildup in your lungs and other parts of your body. Fluid buildup can cause shortness of breath, fatigue, swollen ankles, and other problems. By taking medicines regularly, reducing sodium (salt) in your diet, checking your weight every day, and making lifestyle changes, you can feel better and live longer. Follow-up care is a key part of your treatment and safety. Be sure to make and go to all appointments, and call your doctor if you are having problems. It's also a good idea to know your test results and keep a list of the medicines you take. How can you care for yourself at home? Medicines    · Be safe with medicines. Take your medicines exactly as prescribed. Call your doctor if you think you are having a problem with your medicine.     · Do not take any vitamins, over-the-counter medicine, or herbal products without talking to your doctor first. Quinn Saas not take ibuprofen (Advil or Motrin) and naproxen (Aleve) without talking to your doctor first. They could make your heart failure worse.     · You may be taking some of the following medicine. ? Beta-blockers can slow heart rate, decrease blood pressure, and improve your condition. Taking a beta-blocker may lower your chance of needing to be hospitalized. ? Angiotensin-converting enzyme inhibitors (ACEIs) reduce the heart's workload, lower blood pressure, and reduce swelling. Taking an ACEI may lower your chance of needing to be hospitalized again. ? Angiotensin II receptor blockers (ARBs) work like ACEIs. Your doctor may prescribe them instead of ACEIs. ? Diuretics, also called water pills, reduce swelling. ?  Potassium supplements replace this important mineral, which is sometimes lost with diuretics. ? Aspirin and other blood thinners prevent blood clots, which can cause a stroke or heart attack.    You will get more details on the specific medicines your doctor prescribes. Diet    · Your doctor may suggest that you limit sodium to 2,000 milligrams (mg) a day or less. That is less than 1 teaspoon of salt a day, including all the salt you eat in cooking or in packaged foods. People get most of their sodium from processed foods. Fast food and restaurant meals also tend to be very high in sodium.     · Ask your doctor how much liquid you can drink each day. You may have to limit liquids.    Weight    · Weigh yourself without clothing at the same time each day. Record your weight. Call your doctor if you have a sudden weight gain, such as more than 2 to 3 pounds in a day or 5 pounds in a week. (Your doctor may suggest a different range of weight gain.) A sudden weight gain may mean that your heart failure is getting worse.    Activity level    · Start light exercise (if your doctor says it is okay). Even if you can only do a small amount, exercise will help you get stronger, have more energy, and manage your weight and your stress. Walking is an easy way to get exercise. Start out by walking a little more than you did before. Bit by bit, increase the amount you walk.     · When you exercise, watch for signs that your heart is working too hard. You are pushing yourself too hard if you cannot talk while you are exercising. If you become short of breath or dizzy or have chest pain, stop, sit down, and rest.     · If you feel \"wiped out\" the day after you exercise, walk slower or for a shorter distance until you can work up to a better pace.     · Get enough rest at night. Sleeping with 1 or 2 pillows under your upper body and head may help you breathe easier.    Lifestyle changes    · Do not smoke. Smoking can make a heart condition worse.  If you need help quitting, talk to your doctor about stop-smoking programs and medicines. These can increase your chances of quitting for good. Quitting smoking may be the most important step you can take to protect your heart.     · Limit alcohol to 2 drinks a day for men and 1 drink a day for women. Too much alcohol can cause health problems.     · Avoid getting sick from colds and the flu. Get a pneumococcal vaccine shot. If you have had one before, ask your doctor whether you need another dose. Get a flu shot each year. If you must be around people with colds or the flu, wash your hands often. When should you call for help? Call 911 if you have symptoms of sudden heart failure such as:    · You have severe trouble breathing.     · You cough up pink, foamy mucus.     · You have a new irregular or rapid heartbeat.    Call your doctor now or seek immediate medical care if:    · You have new or increased shortness of breath.     · You are dizzy or lightheaded, or you feel like you may faint.     · You have sudden weight gain, such as more than 2 to 3 pounds in a day or 5 pounds in a week. (Your doctor may suggest a different range of weight gain.)     · You have increased swelling in your legs, ankles, or feet.     · You are suddenly so tired or weak that you cannot do your usual activities.    Watch closely for changes in your health, and be sure to contact your doctor if you develop new symptoms. Where can you learn more? Go to http://denise-ana m.info/. Enter D241 in the search box to learn more about \"Heart Failure: Care Instructions. \"  Current as of: December 6, 2017  Content Version: 11.8  © 0639-7117 [employer]. Care instructions adapted under license by [employer] (which disclaims liability or warranty for this information).  If you have questions about a medical condition or this instruction, always ask your healthcare professional. [address] any warranty or liability for your use of this information. DISCHARGE SUMMARY from Nurse    PATIENT INSTRUCTIONS:    After general anesthesia or intravenous sedation, for 24 hours or while taking prescription Narcotics:  · Limit your activities  · Do not drive and operate hazardous machinery  · Do not make important personal or business decisions  · Do  not drink alcoholic beverages  · If you have not urinated within 8 hours after discharge, please contact your surgeon on call. Report the following to your surgeon:  · Excessive pain, swelling, redness or odor of or around the surgical area  · Temperature over 100.5  · Nausea and vomiting lasting longer than 4 hours or if unable to take medications  · Any signs of decreased circulation or nerve impairment to extremity: change in color, persistent  numbness, tingling, coldness or increase pain  · Any questions      *  Please give a list of your current medications to your Primary Care Provider. *  Please update this list whenever your medications are discontinued, doses are      changed, or new medications (including over-the-counter products) are added. *  Please carry medication information at all times in case of emergency situations. These are general instructions for a healthy lifestyle:    No smoking/ No tobacco products/ Avoid exposure to second hand smoke  Surgeon General's Warning:  Quitting smoking now greatly reduces serious risk to your health. Obesity, smoking, and sedentary lifestyle greatly increases your risk for illness    A healthy diet, regular physical exercise & weight monitoring are important for maintaining a healthy lifestyle    You may be retaining fluid if you have a history of heart failure or if you experience any of the following symptoms:  Weight gain of 3 pounds or more overnight or 5 pounds in a week, increased swelling in our hands or feet or shortness of breath while lying flat in bed.   Please call your doctor as soon as you notice any of these symptoms; do not wait until your next office visit. Recognize signs and symptoms of STROKE:    F-face looks uneven    A-arms unable to move or move unevenly    S-speech slurred or non-existent    T-time-call 911 as soon as signs and symptoms begin-DO NOT go       Back to bed or wait to see if you get better-TIME IS BRAIN. Warning Signs of HEART ATTACK     Call 911 if you have these symptoms:   Chest discomfort. Most heart attacks involve discomfort in the center of the chest that lasts more than a few minutes, or that goes away and comes back. It can feel like uncomfortable pressure, squeezing, fullness, or pain.  Discomfort in other areas of the upper body. Symptoms can include pain or discomfort in one or both arms, the back, neck, jaw, or stomach.  Shortness of breath with or without chest discomfort.  Other signs may include breaking out in a cold sweat, nausea, or lightheadedness. Don't wait more than five minutes to call 911 - MINUTES MATTER! Fast action can save your life. Calling 911 is almost always the fastest way to get lifesaving treatment. Emergency Medical Services staff can begin treatment when they arrive -- up to an hour sooner than if someone gets to the hospital by car. The discharge information has been reviewed with the patient. The patient verbalized understanding. Discharge medications reviewed with the patient and appropriate educational materials and side effects teaching were provided.   ___________________________________________________________________________________________________________________________________

## 2018-11-11 NOTE — PROGRESS NOTES
Patients family members verbalized understanding of all discharge and medication instructions and copy given. Rx's for  Lasix and Losartan given. They verbalized understanding of which meds changed and which ones to stop. Copy given to both family members. Primary RN removed IV. Patient left floor via wheel chair per staff to the company of her family with no distress noted on room air.

## 2018-11-11 NOTE — DISCHARGE SUMMARY
Hospitalist Discharge Summary Patient ID: 
Evelin Mas [id number] 
80 y.o. 
5/14/1933 Admit date: 11/8/2018 12:55 PM 
Discharge date and time: 11/11/2018 Attending: Annia Burnett MD 
PCP:  Lashawn Vargas MD 
Treatment Team: Attending Provider: Annia Burnett MD; Utilization Review: Phoenix Washington RN; Care Manager: [facility] Principal Diagnosis Acute on chronic systolic CHF with EF 41-75% Bilateral pedal edema due to acute exacerbation of CHF Supratherapeutic INR Paroxysmal A.fib Physical deconditioning Dizziness; resolved Hypokalemia Active Problems: 
  Supratherapeutic INR (11/8/2018) Pleural effusion (11/8/2018) Overview: bilateral 
 
  Edema (11/8/2018) Overview: Bilateral lower legs Lightheadedness (11/8/2018) Paroxysmal A-fib ([address] (11/8/2018) Acute on chronic systolic (congestive) heart failure ([address] (11/10/2018) Hospital Course: 
Please refer to the admission H&P for details of presentation. In summary, the patient is 80yr old female with phx sig for cad, chf last known ef around 42% follows with [facility] cardiology, afib, dvt's admitted on 11/8 for acute decompensated CHF in view of elevated BNP 1200's and bilateral LE edema with acute hypoxic resp failure on pulmonary edema on CXR. Pt had a repeat ECHO, which showed EF of 30-35% with global hypokinesis. Pt was diuresed with IV lasix with good urine output, and was also started on losartan and continued on Coreg. Cardiology was on board, recommended to continue the same treatment with possible switch to [facility] as outpatient. Pt was weaned off supplemental oxygen within 24 hours and was clinically doing well. Pedal edema subsided completely. Electrolytes were replaced as needed. Pt and her kids are counseled about importance of medication compliance. She is medically cleared for discharge from medical stand point.  She will be discharged with HHA and PT. Rest of the hospital course was uneventful. Significant Diagnostic Studies:  
ECHO: 
SUMMARY: 
 
-  Left ventricle: Systolic function was moderately to markedly reduced. Ejection fraction was estimated in the range of 30 % to 35 %. This study was 
inadequate for the evaluation of regional wall motion. -  Right ventricle: Systolic function was reduced. -  Left atrium: The atrium was markedly dilated. -  Right atrium: The atrium was markedly dilated. -  Aortic valve: The valve was trileaflet. Leaflets exhibited moderately to 
markedly increased thickness. The FILI by planimetry was 1.6cm^2. There was  
mild 
to moderate regurgitation. -  Mitral valve: There was mild to moderate regurgitation. -  Tricuspid valve: There was moderate regurgitation. -  Pericardium: A small to moderate pericardial effusion was identified. CXR: 
Portable AP semiupright chest dated 11/10/2018 at 0903 hours 
  
Prior 11/8/2018 
  
CLINICAL INFORMATION: Pulmonary edema 
  
Cardiomegaly persists. Thoracic aorta is mildly tortuous. Vascularity has 
improved and is now only mildly congested. There is mild hazy infiltrate at the 
left lung base with a small left pleural effusion. 
  
IMPRESSION IMPRESSION: Interval improvement in the appearance of the lungs Two-view chest x-ray November 8, 2018 
  
Reference exam: June 21, 2016 
  
INDICATION: Short of breath 
  
FINDINGS: Cardiac silhouette is enlarged with some haziness blurring the 
vascular borders, probable effusions are seen bilaterally larger on the left. 
  
IMPRESSION IMPRESSION: Findings suggest some CHF. Labs: Results:  
   
Chemistry Recent Labs 11/11/18 
0544 11/10/18 
0555 11/08/18 
1300 GLU 73 75 106*  143 142  
K 3.2* 2.9* 3.7  106 109* CO2 28 30 27 BUN 10 9 8 CREA 1.07* 1.10* 1.09* CA 8.3 7.9* 8.6 AGAP 7 7 6* AP  --   --  110 TP  --   --  6.7 ALB  --   --  3.0*  
 GLOB  --   --  3.7* AGRAT  --   --  0.8* CBC w/Diff Recent Labs 11/08/18 
1300 WBC 3.4*  
RBC 4.09  
HGB 10.6* HCT 34.2*  
* GRANS 55 LYMPH 31 EOS 4 Cardiac Enzymes No results for input(s): CPK, CKND1, KARSON in the last 72 hours. No lab exists for component: Sanam Flores Coagulation Recent Labs 11/11/18 
0384 [id number] 11/10/18 
2152 PTP 23.8* 30.0* INR 2.2 3.0 Lipid Panel Lab Results Component Value Date/Time Cholesterol, total 80 04/28/2011 11:45 PM  
 HDL Cholesterol 24 (L) 04/28/2011 11:45 PM  
 LDL, calculated 37.8 04/28/2011 11:45 PM  
 VLDL, calculated 18.2 04/28/2011 11:45 PM  
 Triglyceride 93 06/20/2011 05:00 AM  
 CHOL/HDL Ratio 3.3 04/28/2011 11:45 PM  
  
BNP No results for input(s): BNPP in the last 72 hours. Liver Enzymes Recent Labs 11/08/18 
1300 TP 6.7 ALB 3.0*  
 SGOT 17 Thyroid Studies Lab Results Component Value Date/Time TSH 0.014 (L) 09/26/2011 01:37 PM  
    
 
 
Discharge Exam: 
Visit Keon Wolf /74 Pulse (!) 57 Temp 98.2 °F (36.8 °C) Resp 18 Ht 5' 5\" (1.651 m) Wt 50.6 kg (111 lb 8 oz) SpO2 93% Breastfeeding? No  
BMI 18.55 kg/m² General:          Well nourished. Alert. Eyes:               Normal sclera. Extraocular movements intact. ENT:                Normocephalic, atraumatic. Moist mucous membranes CV:                  RRR. No m/r/g. Peripheral pulses present. Capillary refill normal 
Lungs:             CTAB. No wheezing, rhonchi, or rales. diminshed in the bases. Abdomen:        Soft, nontender, nondistended. Bowel sounds normal.  
Extremities:     Warm and dry. Significant bl pedal edema. Neurologic:      CN II-XII grossly intact. Sensation intact. Skin:                No rashes or jaundice. Normal coloration Psych:             Normal mood and affect. 
  
 
 
 
Disposition: home Discharge Condition: stable Patient Instructions:  
Current Discharge Medication List  
  
 START taking these medications Details  
losartan (COZAAR) 25 mg tablet Take 1 Tab by mouth daily for 30 days. Qty: 30 Tab, Refills: 2  
  
furosemide (LASIX) 20 mg tablet Take 1 Tab by mouth two (2) times a day for 30 days. Qty: 60 Tab, Refills: 2 CONTINUE these medications which have CHANGED Details  
colchicine (COLCRYS) 0.6 mg tablet Take 1 Tab by mouth daily as needed. Qty: 30 Tab, Refills: 0  
  
carvedilol (COREG) 3.125 mg tablet Take 1 Tab by mouth two (2) times daily (with meals) for 30 days. Qty: 60 Tab, Refills: 2  
  
amiodarone (PACERONE) 100 mg tablet Take 1 Tab by mouth nightly for 30 days. Qty: 30 Tab, Refills: 0 CONTINUE these medications which have NOT CHANGED Details  
lidocaine (SALONPAS/ASPERCREME) 4 % patch Place on area of pain 
Qty: 1 Patch, Refills: 0  
  
ondansetron (ZOFRAN ODT) 4 mg disintegrating tablet Take 1 Tab by mouth every eight (8) hours as needed for Nausea. Qty: 15 Tab, Refills: 0  
  
folic acid (FOLVITE) 1 mg tablet Take 1 mg by mouth daily. gabapentin (NEURONTIN) 300 mg capsule Take 300 mg by mouth three (3) times daily. warfarin (COUMADIN) 4 mg tablet Take 4 mg by mouth as directed. To hold X 5 days prior to surgery. levothyroxine (LEVOXYL) 125 mcg tablet Take 75 mcg by mouth Daily (before breakfast). Take DOS per anesthesia protocol. simvastatin (ZOCOR) 20 mg tablet Take 20 mg by mouth daily. Take DOS per anesthesia protocol. ALPHAGAN P 0.1 % Drop Apply 1 Drop to eye two (2) times a day. 1 DROP TO EACH EYE. Pt to use DOS per anesthesia protocol. TRAVATAN 0.004 % Drop 1 Drop by Both Eyes route every evening. oxyCODONE IR (ROXICODONE) 5 mg immediate release tablet Take 1 Tab by mouth every six (6) hours as needed for Pain. Max Daily Amount: 20 mg. 
Qty: 13 Tab, Refills: 0  
  
acetaminophen (TYLENOL EX STR ARTHRITIS PAIN) 500 mg tablet Take  by mouth every six (6) hours as needed. ergocalciferol (ERGOCALCIFEROL) 50,000 unit capsule Take 50,000 Units by mouth every Sunday. STOP taking these medications HYDROcodone-acetaminophen (NORCO) 5-325 mg per tablet Comments:  
Reason for Stopping:   
   
 amlodipine-benazepril (LOTREL) 10-20 mg per capsule Comments:  
Reason for Stopping:   
   
  
 
 
Activity: PT/OT per [address] [facility] Diet: Cardiac Diet Wound Care: None needed Follow-up ·   Follow up with primary Cardiologist in 1-2 weeks. · Follow up with PCP in 1 week. Time spent to discharge patient 35 minutes Signed: 
Moe Null MD 
11/11/2018 10:33 AM

## 2018-11-11 NOTE — PROGRESS NOTES
Warfarin dosing per pharmacist 
 
Juanita Nugent is a 80 y.o. female. Height: 5' 5\" (165.1 cm)    Weight: 50.6 kg (111 lb 8 oz) Indication:  Afib and hx of DVT Goal INR:  2-3 Home dose:  2 mg every evening per most recent anticoag clinic visit note on 9/27/18. Risk factors or significant drug interactions:  Amiodarone Other anticoagulants:  N/A Daily Monitoring Date  INR     Warfarin dose HGB              Notes 11/8  4.0  Hold  10.6  
11/9  3.5  Hold  --- 
11/10  3.0  1 mg  --- 
11/11  2.2  1 mg  --- Pharmacy consulted to assist dosing warfarin in patient with history of Afib and DVT. INR supratherapeutic on admission, so warfarin initially held. INR trending down. Warfarin restarted yesterday. Will continue warfarin 1 mg daily for now. Would not expect to change in INR until at least 3 days following the re-initiation of warfarin. Daily INR. Thank you, Fabian Rubio, PharmD, BCPS Clinical Pharmacist 
[phone]

## 2018-11-11 NOTE — PROGRESS NOTES
PT note: PT received call after lunch to see pt prior to discharge home today per MD. PT attempted to perform PT treatment this afternoon. Pt with Potassium IV running, declined OOB activity despite reassuring pt IV could roll with us. Pt and family present stated they felt like they were good for home and no need for PT treatment. Per chart, pt and family have declined HHPT. No treatment provided, RN made aware. Akilah Sheridan, PT 
11/11/2018

## 2018-11-11 NOTE — PROGRESS NOTES
CM met with patient to discuss consult for [place]. Patient daughter at bedside states that they are not interested in [place]. States that patient always have family in the home with her. Patient / daughter have declines home health. Patient will be d/c home today. Care Management Interventions PCP Verified by CM: Yes Physical Therapy Consult: Yes Occupational Therapy Consult: No 
Current Support Network: Other Confirm Follow Up Transport: Family Plan discussed with Pt/Family/Caregiver: Yes Freedom of Choice Offered: Yes Discharge Location Discharge Placement: Home

## 2018-11-11 NOTE — ROUTINE PROCESS
CHF teaching completed  CHF teaching completed to pt; emphasis to report worsening Daily WTs, dyspnea, edema, generalized weakness. Cardio diet/ FR 
 reinfroced. Planner/scale @ home.  Pass post test: 1 hr total 
 
CM / PT to see prior to DC today: [facility]

## 2018-11-11 NOTE — PROGRESS NOTES
DC paperwork completed in computer, will review with patient after lunch as she must receive two doses of IV potassium and work with PT before leaving.

## 2018-11-11 NOTE — PROGRESS NOTES
[facility] CARDIOLOGY PROGRESS NOTE 
      
 
11/11/2018 10:08 AM 
 
Admit Date: 11/8/2018 Subjective:  
Expecting discharge. ROS: 
GEN:  No fever or chills Cardiovascular:  As noted above:no CP or palpitations. Pulmonary:  As noted above:no SOB. Neuro:  No new focal motor or sensory loss Objective:  
  
Vitals:  
 11/10/18 2256 11/11/18 9120 11/11/18 0350 11/11/18 0703 BP: 130/74 111/62  118/74 Pulse: (!) 56 (!) 58  (!) 57 Resp: 18 18 18 Temp: 99.8 °F (37.7 °C) 97.3 °F (36.3 °C)  98.2 °F (36.8 °C) SpO2: 95% 94%  93% Weight:   50.6 kg (111 lb 8 oz) Height:      
 
 
Physical Exam: 
General-no distress Neck- supple, no JVD 
CV- regular rate and rhythm with 1/6 RAYSHAWN Lung- clear bilaterally Abd- soft, nontender, nondistended Ext- no edema bilaterally. Skin- warm and dry Psychiatric:  Normal mood and affect. Neurologic:  Alert and oriented X 3 Data Review:  
Recent Labs 11/11/18 
0544 11/10/18 
0555  11/08/18 
2352  11/08/18 
1300  143  --   --   --  142  
K 3.2* 2.9*  --   --   --  3.7 MG  --   --   --  1.8  --   --   
BUN 10 9  --   --   --  8  
CREA 1.07* 1.10*  --   --   --  1.09* GLU 73 75  --   --   --  106* WBC  --   --   --   --   --  3.4* HGB  --   --   --   --   --  10.6* HCT  --   --   --   --   --  34.2*  
PLT  --   --   --   --   --  121* INR 2.2 3.0   < >  --    < >  --   
 < > = values in this interval not displayed. TELEMETRY:  NSR Assessment/Plan: Active Problems: 
  Supratherapeutic INR (11/8/2018): Stable. Pharmacy doses Warfarin. Target INR 2-3. Pleural effusion (11/8/2018) Overview: bilateral 
 
  Edema (11/8/2018) Overview: Bilateral lower legs Lightheadedness (11/8/2018) Paroxysmal A-fib (HCC) (11/8/2018):Remains in NSR on low Amiodarone and Warfarin.  
 
  Acute on chronic systolic (congestive) heart failure ([facility] (11/10/2018): Improved. Continue Coreg & Losartan. Stop iv Lasix. Start po Lasix. Will sign off.  
 
 
 
 
 
 
 
Nabil Torres MD 
11/11/2018 10:08 AM

## 2018-11-11 NOTE — PROGRESS NOTES
Patient resting in bed, alert and oriented, cooperative with care, denies pain or distress, call light within reach, safety measures in place.

## 2018-11-12 NOTE — PROGRESS NOTES
Please note UTR patient for LYNN 1. Please see ph log for details. Scheduled LYNN 2. Worley Holstein, LPN/ Care Coordinator [email or web address]:[phone] [place] [place] [address] 
[email or web address] note will not be viewable in [address]

## 2019-01-01 ENCOUNTER — HOME CARE VISIT (OUTPATIENT)
Dept: SCHEDULING | Facility: HOME HEALTH | Age: 84
End: 2019-01-01
Payer: MEDICARE

## 2019-01-01 ENCOUNTER — APPOINTMENT (OUTPATIENT)
Dept: GENERAL RADIOLOGY | Age: 84
DRG: 389 | End: 2019-01-01
Attending: INTERNAL MEDICINE
Payer: MEDICARE

## 2019-01-01 ENCOUNTER — HOSPITAL ENCOUNTER (INPATIENT)
Age: 84
LOS: 6 days | Discharge: HOME HEALTH | DRG: 389 | End: 2019-06-04
Attending: EMERGENCY MEDICINE | Admitting: INTERNAL MEDICINE
Payer: MEDICARE

## 2019-01-01 ENCOUNTER — APPOINTMENT (OUTPATIENT)
Dept: GENERAL RADIOLOGY | Age: 84
DRG: 682 | End: 2019-01-01
Attending: SURGERY
Payer: MEDICARE

## 2019-01-01 ENCOUNTER — APPOINTMENT (OUTPATIENT)
Dept: GENERAL RADIOLOGY | Age: 84
DRG: 389 | End: 2019-01-01
Attending: EMERGENCY MEDICINE
Payer: MEDICARE

## 2019-01-01 ENCOUNTER — HOME CARE VISIT (OUTPATIENT)
Dept: HOSPICE | Facility: HOSPICE | Age: 84
End: 2019-01-01
Payer: MEDICARE

## 2019-01-01 ENCOUNTER — PATIENT OUTREACH (OUTPATIENT)
Dept: CASE MANAGEMENT | Age: 84
End: 2019-01-01

## 2019-01-01 ENCOUNTER — APPOINTMENT (OUTPATIENT)
Dept: GENERAL RADIOLOGY | Age: 84
DRG: 682 | End: 2019-01-01
Attending: EMERGENCY MEDICINE
Payer: MEDICARE

## 2019-01-01 ENCOUNTER — APPOINTMENT (OUTPATIENT)
Dept: CT IMAGING | Age: 84
DRG: 682 | End: 2019-01-01
Attending: EMERGENCY MEDICINE
Payer: MEDICARE

## 2019-01-01 ENCOUNTER — HOSPITAL ENCOUNTER (INPATIENT)
Age: 84
LOS: 13 days | Discharge: HOSPICE | DRG: 682 | End: 2019-07-11
Attending: EMERGENCY MEDICINE | Admitting: HOSPITALIST
Payer: MEDICARE

## 2019-01-01 ENCOUNTER — APPOINTMENT (OUTPATIENT)
Dept: MRI IMAGING | Age: 84
DRG: 682 | End: 2019-01-01
Attending: INTERNAL MEDICINE
Payer: MEDICARE

## 2019-01-01 ENCOUNTER — APPOINTMENT (OUTPATIENT)
Dept: CT IMAGING | Age: 84
DRG: 391 | End: 2019-01-01
Attending: EMERGENCY MEDICINE
Payer: MEDICARE

## 2019-01-01 ENCOUNTER — HOSPICE ADMISSION (OUTPATIENT)
Dept: HOSPICE | Facility: HOSPICE | Age: 84
End: 2019-01-01
Payer: MEDICARE

## 2019-01-01 ENCOUNTER — HOSPITAL ENCOUNTER (EMERGENCY)
Age: 84
Discharge: HOME | DRG: 389 | End: 2019-05-28
Attending: EMERGENCY MEDICINE
Payer: MEDICARE

## 2019-01-01 ENCOUNTER — APPOINTMENT (OUTPATIENT)
Dept: CT IMAGING | Age: 84
DRG: 389 | End: 2019-01-01
Attending: EMERGENCY MEDICINE
Payer: MEDICARE

## 2019-01-01 ENCOUNTER — APPOINTMENT (OUTPATIENT)
Dept: GENERAL RADIOLOGY | Age: 84
DRG: 391 | End: 2019-01-01
Attending: INTERNAL MEDICINE
Payer: MEDICARE

## 2019-01-01 ENCOUNTER — APPOINTMENT (OUTPATIENT)
Dept: CT IMAGING | Age: 84
DRG: 682 | End: 2019-01-01
Attending: SURGERY
Payer: MEDICARE

## 2019-01-01 ENCOUNTER — APPOINTMENT (OUTPATIENT)
Dept: CT IMAGING | Age: 84
DRG: 682 | End: 2019-01-01
Attending: INTERNAL MEDICINE
Payer: MEDICARE

## 2019-01-01 ENCOUNTER — HOSPITAL ENCOUNTER (INPATIENT)
Age: 84
LOS: 6 days | Discharge: HOME | DRG: 391 | End: 2019-06-19
Attending: EMERGENCY MEDICINE | Admitting: INTERNAL MEDICINE
Payer: MEDICARE

## 2019-01-01 ENCOUNTER — APPOINTMENT (OUTPATIENT)
Dept: GENERAL RADIOLOGY | Age: 84
DRG: 389 | End: 2019-01-01
Attending: SURGERY
Payer: MEDICARE

## 2019-01-01 ENCOUNTER — APPOINTMENT (OUTPATIENT)
Dept: GENERAL RADIOLOGY | Age: 84
DRG: 389 | End: 2019-01-01
Attending: HOSPITALIST
Payer: MEDICARE

## 2019-01-01 ENCOUNTER — APPOINTMENT (OUTPATIENT)
Dept: GENERAL RADIOLOGY | Age: 84
DRG: 682 | End: 2019-01-01
Attending: INTERNAL MEDICINE
Payer: MEDICARE

## 2019-01-01 ENCOUNTER — APPOINTMENT (OUTPATIENT)
Dept: CT IMAGING | Age: 84
DRG: 389 | End: 2019-01-01
Attending: NURSE PRACTITIONER
Payer: MEDICARE

## 2019-01-01 VITALS — SYSTOLIC BLOOD PRESSURE: 122 MMHG | DIASTOLIC BLOOD PRESSURE: 82 MMHG | RESPIRATION RATE: 18 BRPM | HEART RATE: 122 BPM

## 2019-01-01 VITALS
BODY MASS INDEX: 21.12 KG/M2 | RESPIRATION RATE: 18 BRPM | WEIGHT: 114.8 LBS | TEMPERATURE: 98.1 F | SYSTOLIC BLOOD PRESSURE: 132 MMHG | HEART RATE: 65 BPM | OXYGEN SATURATION: 100 % | HEIGHT: 62 IN | DIASTOLIC BLOOD PRESSURE: 70 MMHG

## 2019-01-01 VITALS
BODY MASS INDEX: 25.52 KG/M2 | WEIGHT: 130 LBS | HEART RATE: 127 BPM | DIASTOLIC BLOOD PRESSURE: 85 MMHG | TEMPERATURE: 97.4 F | OXYGEN SATURATION: 96 % | HEIGHT: 60 IN | RESPIRATION RATE: 20 BRPM | SYSTOLIC BLOOD PRESSURE: 126 MMHG

## 2019-01-01 VITALS
HEART RATE: 128 BPM | SYSTOLIC BLOOD PRESSURE: 126 MMHG | OXYGEN SATURATION: 95 % | DIASTOLIC BLOOD PRESSURE: 80 MMHG | TEMPERATURE: 97.4 F

## 2019-01-01 VITALS
BODY MASS INDEX: 18.49 KG/M2 | HEART RATE: 70 BPM | WEIGHT: 111 LBS | DIASTOLIC BLOOD PRESSURE: 77 MMHG | TEMPERATURE: 98.5 F | HEIGHT: 65 IN | SYSTOLIC BLOOD PRESSURE: 107 MMHG | RESPIRATION RATE: 18 BRPM | OXYGEN SATURATION: 98 %

## 2019-01-01 VITALS
RESPIRATION RATE: 18 BRPM | HEART RATE: 120 BPM | TEMPERATURE: 98.4 F | DIASTOLIC BLOOD PRESSURE: 70 MMHG | SYSTOLIC BLOOD PRESSURE: 124 MMHG

## 2019-01-01 VITALS — SYSTOLIC BLOOD PRESSURE: 122 MMHG | RESPIRATION RATE: 24 BRPM | DIASTOLIC BLOOD PRESSURE: 71 MMHG | HEART RATE: 72 BPM

## 2019-01-01 VITALS — SYSTOLIC BLOOD PRESSURE: 78 MMHG | DIASTOLIC BLOOD PRESSURE: 50 MMHG | HEART RATE: 88 BPM | RESPIRATION RATE: 12 BRPM

## 2019-01-01 VITALS
DIASTOLIC BLOOD PRESSURE: 52 MMHG | HEIGHT: 62 IN | BODY MASS INDEX: 19.07 KG/M2 | TEMPERATURE: 97.7 F | SYSTOLIC BLOOD PRESSURE: 100 MMHG | RESPIRATION RATE: 16 BRPM | WEIGHT: 103.62 LBS | HEART RATE: 48 BPM | OXYGEN SATURATION: 98 %

## 2019-01-01 VITALS — HEART RATE: 112 BPM | DIASTOLIC BLOOD PRESSURE: 70 MMHG | RESPIRATION RATE: 16 BRPM | SYSTOLIC BLOOD PRESSURE: 110 MMHG

## 2019-01-01 DIAGNOSIS — M54.9 ACUTE BILATERAL BACK PAIN, UNSPECIFIED BACK LOCATION: ICD-10-CM

## 2019-01-01 DIAGNOSIS — Z71.89 ACP (ADVANCE CARE PLANNING): ICD-10-CM

## 2019-01-01 DIAGNOSIS — E87.6 HYPOKALEMIA: ICD-10-CM

## 2019-01-01 DIAGNOSIS — R45.1 RESTLESSNESS AND AGITATION: ICD-10-CM

## 2019-01-01 DIAGNOSIS — N63.0 BREAST MASS: ICD-10-CM

## 2019-01-01 DIAGNOSIS — K56.7 ILEUS (HCC): ICD-10-CM

## 2019-01-01 DIAGNOSIS — Z51.5 COMFORT MEASURES ONLY STATUS: ICD-10-CM

## 2019-01-01 DIAGNOSIS — M54.9 OTHER ACUTE BACK PAIN: ICD-10-CM

## 2019-01-01 DIAGNOSIS — R53.1 WEAKNESS: ICD-10-CM

## 2019-01-01 DIAGNOSIS — M84.48XA PATHOLOGICAL FRACTURE OF LUMBAR VERTEBRA, INITIAL ENCOUNTER: ICD-10-CM

## 2019-01-01 DIAGNOSIS — N39.0 URINARY TRACT INFECTION WITHOUT HEMATURIA, SITE UNSPECIFIED: ICD-10-CM

## 2019-01-01 DIAGNOSIS — R54 FRAILTY SYNDROME IN GERIATRIC PATIENT: ICD-10-CM

## 2019-01-01 DIAGNOSIS — R53.81 DEBILITY: ICD-10-CM

## 2019-01-01 DIAGNOSIS — R13.10 DYSPHAGIA, UNSPECIFIED TYPE: ICD-10-CM

## 2019-01-01 DIAGNOSIS — Z51.5 ENCOUNTER FOR PALLIATIVE CARE: ICD-10-CM

## 2019-01-01 DIAGNOSIS — G93.40 ENCEPHALOPATHY: ICD-10-CM

## 2019-01-01 DIAGNOSIS — E86.0 DEHYDRATION: ICD-10-CM

## 2019-01-01 DIAGNOSIS — R19.7 DIARRHEA, UNSPECIFIED TYPE: Primary | ICD-10-CM

## 2019-01-01 DIAGNOSIS — K56.50 SMALL BOWEL OBSTRUCTION DUE TO ADHESIONS (HCC): Primary | ICD-10-CM

## 2019-01-01 DIAGNOSIS — N17.9 ACUTE KIDNEY INJURY (HCC): ICD-10-CM

## 2019-01-01 DIAGNOSIS — R10.9 ACUTE ABDOMINAL PAIN: Primary | ICD-10-CM

## 2019-01-01 LAB
ALBUMIN SERPL-MCNC: 2.3 G/DL (ref 3.2–4.6)
ALBUMIN SERPL-MCNC: 2.3 G/DL (ref 3.2–4.6)
ALBUMIN SERPL-MCNC: 2.4 G/DL (ref 3.2–4.6)
ALBUMIN SERPL-MCNC: 2.5 G/DL (ref 3.2–4.6)
ALBUMIN SERPL-MCNC: 2.6 G/DL (ref 3.2–4.6)
ALBUMIN SERPL-MCNC: 2.6 G/DL (ref 3.2–4.6)
ALBUMIN SERPL-MCNC: 2.7 G/DL (ref 3.2–4.6)
ALBUMIN SERPL-MCNC: 2.8 G/DL (ref 3.2–4.6)
ALBUMIN SERPL-MCNC: 3 G/DL (ref 3.2–4.6)
ALBUMIN SERPL-MCNC: 3.2 G/DL (ref 3.2–4.6)
ALBUMIN SERPL-MCNC: 3.2 G/DL (ref 3.2–4.6)
ALBUMIN SERPL-MCNC: 3.8 G/DL (ref 3.2–4.6)
ALBUMIN/GLOB SERPL: 0.6 {RATIO} (ref 1.2–3.5)
ALBUMIN/GLOB SERPL: 0.6 {RATIO} (ref 1.2–3.5)
ALBUMIN/GLOB SERPL: 0.7 {RATIO} (ref 1.2–3.5)
ALBUMIN/GLOB SERPL: 0.8 {RATIO} (ref 1.2–3.5)
ALBUMIN/GLOB SERPL: 0.9 {RATIO} (ref 1.2–3.5)
ALBUMIN/GLOB SERPL: 0.9 {RATIO} (ref 1.2–3.5)
ALP SERPL-CCNC: 100 U/L (ref 50–136)
ALP SERPL-CCNC: 104 U/L (ref 50–136)
ALP SERPL-CCNC: 106 U/L (ref 50–136)
ALP SERPL-CCNC: 121 U/L (ref 50–136)
ALP SERPL-CCNC: 127 U/L (ref 50–136)
ALP SERPL-CCNC: 135 U/L (ref 50–136)
ALP SERPL-CCNC: 144 U/L (ref 50–136)
ALP SERPL-CCNC: 86 U/L (ref 50–136)
ALP SERPL-CCNC: 87 U/L (ref 50–136)
ALP SERPL-CCNC: 87 U/L (ref 50–136)
ALP SERPL-CCNC: 94 U/L (ref 50–136)
ALP SERPL-CCNC: 98 U/L (ref 50–136)
ALT SERPL-CCNC: 17 U/L (ref 12–65)
ALT SERPL-CCNC: 17 U/L (ref 12–65)
ALT SERPL-CCNC: 18 U/L (ref 12–65)
ALT SERPL-CCNC: 20 U/L (ref 12–65)
ALT SERPL-CCNC: 22 U/L (ref 12–65)
ALT SERPL-CCNC: 23 U/L (ref 12–65)
ALT SERPL-CCNC: 25 U/L (ref 12–65)
ALT SERPL-CCNC: 25 U/L (ref 12–65)
ALT SERPL-CCNC: 27 U/L (ref 12–65)
ALT SERPL-CCNC: 27 U/L (ref 12–65)
AMMONIA PLAS-SCNC: 21 UMOL/L (ref 11–32)
AMORPH CRY URNS QL MICRO: ABNORMAL
AMORPH CRY URNS QL MICRO: ABNORMAL
ANA SER QL: NEGATIVE
ANION GAP SERPL CALC-SCNC: 10 MMOL/L (ref 7–16)
ANION GAP SERPL CALC-SCNC: 11 MMOL/L (ref 7–16)
ANION GAP SERPL CALC-SCNC: 13 MMOL/L (ref 7–16)
ANION GAP SERPL CALC-SCNC: 14 MMOL/L (ref 7–16)
ANION GAP SERPL CALC-SCNC: 6 MMOL/L (ref 7–16)
ANION GAP SERPL CALC-SCNC: 6 MMOL/L (ref 7–16)
ANION GAP SERPL CALC-SCNC: 7 MMOL/L (ref 7–16)
ANION GAP SERPL CALC-SCNC: 8 MMOL/L (ref 7–16)
ANION GAP SERPL CALC-SCNC: 9 MMOL/L (ref 7–16)
ANION GAP SERPL CALC-SCNC: 9 MMOL/L (ref 7–16)
APPEARANCE UR: ABNORMAL
APPEARANCE UR: ABNORMAL
APTT PPP: 105.6 SEC (ref 24.7–39.8)
APTT PPP: 132.8 SEC (ref 24.7–39.8)
APTT PPP: 166.9 SEC (ref 24.7–39.8)
APTT PPP: 28 SEC (ref 24.7–39.8)
APTT PPP: 29.5 SEC (ref 24.7–39.8)
APTT PPP: 30.4 SEC (ref 24.7–39.8)
APTT PPP: 45.5 SEC (ref 24.7–39.8)
APTT PPP: 47.9 SEC (ref 24.7–39.8)
APTT PPP: 62.1 SEC (ref 24.7–39.8)
APTT PPP: 69.1 SEC (ref 24.7–39.8)
APTT PPP: 78.7 SEC (ref 24.7–39.8)
APTT PPP: 78.9 SEC (ref 24.7–39.8)
AST SERPL-CCNC: 14 U/L (ref 15–37)
AST SERPL-CCNC: 18 U/L (ref 15–37)
AST SERPL-CCNC: 19 U/L (ref 15–37)
AST SERPL-CCNC: 20 U/L (ref 15–37)
AST SERPL-CCNC: 24 U/L (ref 15–37)
AST SERPL-CCNC: 26 U/L (ref 15–37)
AST SERPL-CCNC: 29 U/L (ref 15–37)
AST SERPL-CCNC: 34 U/L (ref 15–37)
AST SERPL-CCNC: 38 U/L (ref 15–37)
AST SERPL-CCNC: 44 U/L (ref 15–37)
ATRIAL RATE: 110 BPM
ATRIAL RATE: 113 BPM
ATRIAL RATE: 47 BPM
ATRIAL RATE: 66 BPM
ATRIAL RATE: 74 BPM
ATRIAL RATE: 86 BPM
BACTERIA SPEC CULT: ABNORMAL
BACTERIA SPEC CULT: NORMAL
BACTERIA SPEC CULT: NORMAL
BACTERIA URNS QL MICRO: ABNORMAL /HPF
BACTERIA URNS QL MICRO: ABNORMAL /HPF
BACTERIA URNS QL MICRO: NORMAL /HPF
BASOPHILS # BLD: 0 K/UL (ref 0–0.2)
BASOPHILS # BLD: 0.1 K/UL (ref 0–0.2)
BASOPHILS NFR BLD: 0 % (ref 0–2)
BASOPHILS NFR BLD: 1 % (ref 0–2)
BILIRUB SERPL-MCNC: 0.3 MG/DL (ref 0.2–1.1)
BILIRUB SERPL-MCNC: 0.4 MG/DL (ref 0.2–1.1)
BILIRUB SERPL-MCNC: 0.4 MG/DL (ref 0.2–1.1)
BILIRUB SERPL-MCNC: 0.5 MG/DL (ref 0.2–1.1)
BILIRUB SERPL-MCNC: 0.5 MG/DL (ref 0.2–1.1)
BILIRUB SERPL-MCNC: 0.6 MG/DL (ref 0.2–1.1)
BILIRUB SERPL-MCNC: 0.7 MG/DL (ref 0.2–1.1)
BILIRUB UR QL: ABNORMAL
BILIRUB UR QL: ABNORMAL
BNP SERPL-MCNC: 1236 PG/ML
BNP SERPL-MCNC: 2142 PG/ML
BNP SERPL-MCNC: 256 PG/ML
BUN SERPL-MCNC: 13 MG/DL (ref 8–23)
BUN SERPL-MCNC: 14 MG/DL (ref 8–23)
BUN SERPL-MCNC: 15 MG/DL (ref 8–23)
BUN SERPL-MCNC: 16 MG/DL (ref 8–23)
BUN SERPL-MCNC: 18 MG/DL (ref 8–23)
BUN SERPL-MCNC: 18 MG/DL (ref 8–23)
BUN SERPL-MCNC: 26 MG/DL (ref 8–23)
BUN SERPL-MCNC: 31 MG/DL (ref 8–23)
BUN SERPL-MCNC: 31 MG/DL (ref 8–23)
BUN SERPL-MCNC: 32 MG/DL (ref 8–23)
BUN SERPL-MCNC: 34 MG/DL (ref 8–23)
BUN SERPL-MCNC: 34 MG/DL (ref 8–23)
BUN SERPL-MCNC: 41 MG/DL (ref 8–23)
BUN SERPL-MCNC: 43 MG/DL (ref 8–23)
BUN SERPL-MCNC: 46 MG/DL (ref 8–23)
BUN SERPL-MCNC: 6 MG/DL (ref 8–23)
BUN SERPL-MCNC: 6 MG/DL (ref 8–23)
BUN SERPL-MCNC: 8 MG/DL (ref 8–23)
BUN SERPL-MCNC: 8 MG/DL (ref 8–23)
BUN SERPL-MCNC: 9 MG/DL (ref 8–23)
BUN SERPL-MCNC: 9 MG/DL (ref 8–23)
C DIFF GDH STL QL: NORMAL
C DIFF TOX A+B STL QL IA: NORMAL
CALCIUM SERPL-MCNC: 10 MG/DL (ref 8.3–10.4)
CALCIUM SERPL-MCNC: 10.2 MG/DL (ref 8.3–10.4)
CALCIUM SERPL-MCNC: 7.6 MG/DL (ref 8.3–10.4)
CALCIUM SERPL-MCNC: 8 MG/DL (ref 8.3–10.4)
CALCIUM SERPL-MCNC: 8.2 MG/DL (ref 8.3–10.4)
CALCIUM SERPL-MCNC: 8.2 MG/DL (ref 8.3–10.4)
CALCIUM SERPL-MCNC: 8.3 MG/DL (ref 8.3–10.4)
CALCIUM SERPL-MCNC: 8.3 MG/DL (ref 8.3–10.4)
CALCIUM SERPL-MCNC: 8.4 MG/DL (ref 8.3–10.4)
CALCIUM SERPL-MCNC: 8.5 MG/DL (ref 8.3–10.4)
CALCIUM SERPL-MCNC: 8.6 MG/DL (ref 8.3–10.4)
CALCIUM SERPL-MCNC: 8.7 MG/DL (ref 8.3–10.4)
CALCIUM SERPL-MCNC: 8.8 MG/DL (ref 8.3–10.4)
CALCIUM SERPL-MCNC: 8.8 MG/DL (ref 8.3–10.4)
CALCIUM SERPL-MCNC: 9 MG/DL (ref 8.3–10.4)
CALCIUM SERPL-MCNC: 9.1 MG/DL (ref 8.3–10.4)
CALCIUM SERPL-MCNC: 9.2 MG/DL (ref 8.3–10.4)
CALCIUM SERPL-MCNC: 9.3 MG/DL (ref 8.3–10.4)
CALCIUM SERPL-MCNC: 9.3 MG/DL (ref 8.3–10.4)
CALCULATED P AXIS, ECG09: 0 DEGREES
CALCULATED P AXIS, ECG09: 43 DEGREES
CALCULATED R AXIS, ECG10: -59 DEGREES
CALCULATED R AXIS, ECG10: -64 DEGREES
CALCULATED R AXIS, ECG10: -66 DEGREES
CALCULATED R AXIS, ECG10: -66 DEGREES
CALCULATED R AXIS, ECG10: -73 DEGREES
CALCULATED R AXIS, ECG10: -75 DEGREES
CALCULATED T AXIS, ECG11: -57 DEGREES
CALCULATED T AXIS, ECG11: 108 DEGREES
CALCULATED T AXIS, ECG11: 116 DEGREES
CALCULATED T AXIS, ECG11: 131 DEGREES
CALCULATED T AXIS, ECG11: 86 DEGREES
CALCULATED T AXIS, ECG11: 89 DEGREES
CASTS URNS QL MICRO: ABNORMAL /LPF
CASTS URNS QL MICRO: ABNORMAL /LPF
CASTS URNS QL MICRO: NORMAL /LPF
CHLORIDE SERPL-SCNC: 100 MMOL/L (ref 98–107)
CHLORIDE SERPL-SCNC: 103 MMOL/L (ref 98–107)
CHLORIDE SERPL-SCNC: 104 MMOL/L (ref 98–107)
CHLORIDE SERPL-SCNC: 106 MMOL/L (ref 98–107)
CHLORIDE SERPL-SCNC: 109 MMOL/L (ref 98–107)
CHLORIDE SERPL-SCNC: 109 MMOL/L (ref 98–107)
CHLORIDE SERPL-SCNC: 110 MMOL/L (ref 98–107)
CHLORIDE SERPL-SCNC: 110 MMOL/L (ref 98–107)
CHLORIDE SERPL-SCNC: 111 MMOL/L (ref 98–107)
CHLORIDE SERPL-SCNC: 112 MMOL/L (ref 98–107)
CHLORIDE SERPL-SCNC: 112 MMOL/L (ref 98–107)
CHLORIDE SERPL-SCNC: 113 MMOL/L (ref 98–107)
CHLORIDE SERPL-SCNC: 113 MMOL/L (ref 98–107)
CHLORIDE SERPL-SCNC: 114 MMOL/L (ref 98–107)
CHLORIDE SERPL-SCNC: 115 MMOL/L (ref 98–107)
CHLORIDE SERPL-SCNC: 116 MMOL/L (ref 98–107)
CHLORIDE SERPL-SCNC: 116 MMOL/L (ref 98–107)
CHLORIDE SERPL-SCNC: 117 MMOL/L (ref 98–107)
CHLORIDE SERPL-SCNC: 117 MMOL/L (ref 98–107)
CHLORIDE SERPL-SCNC: 120 MMOL/L (ref 98–107)
CHLORIDE SERPL-SCNC: 99 MMOL/L (ref 98–107)
CLINICAL CONSIDERATION: NORMAL
CO2 SERPL-SCNC: 18 MMOL/L (ref 21–32)
CO2 SERPL-SCNC: 19 MMOL/L (ref 21–32)
CO2 SERPL-SCNC: 19 MMOL/L (ref 21–32)
CO2 SERPL-SCNC: 20 MMOL/L (ref 21–32)
CO2 SERPL-SCNC: 20 MMOL/L (ref 21–32)
CO2 SERPL-SCNC: 21 MMOL/L (ref 21–32)
CO2 SERPL-SCNC: 22 MMOL/L (ref 21–32)
CO2 SERPL-SCNC: 23 MMOL/L (ref 21–32)
CO2 SERPL-SCNC: 24 MMOL/L (ref 21–32)
CO2 SERPL-SCNC: 24 MMOL/L (ref 21–32)
CO2 SERPL-SCNC: 25 MMOL/L (ref 21–32)
CO2 SERPL-SCNC: 26 MMOL/L (ref 21–32)
CO2 SERPL-SCNC: 28 MMOL/L (ref 21–32)
CO2 SERPL-SCNC: 29 MMOL/L (ref 21–32)
CO2 SERPL-SCNC: 31 MMOL/L (ref 21–32)
COLOR UR: ABNORMAL
COLOR UR: YELLOW
CREAT SERPL-MCNC: 0.61 MG/DL (ref 0.6–1)
CREAT SERPL-MCNC: 0.62 MG/DL (ref 0.6–1)
CREAT SERPL-MCNC: 0.68 MG/DL (ref 0.6–1)
CREAT SERPL-MCNC: 0.75 MG/DL (ref 0.6–1)
CREAT SERPL-MCNC: 0.76 MG/DL (ref 0.6–1)
CREAT SERPL-MCNC: 0.8 MG/DL (ref 0.6–1)
CREAT SERPL-MCNC: 0.81 MG/DL (ref 0.6–1)
CREAT SERPL-MCNC: 0.81 MG/DL (ref 0.6–1)
CREAT SERPL-MCNC: 0.85 MG/DL (ref 0.6–1)
CREAT SERPL-MCNC: 0.89 MG/DL (ref 0.6–1)
CREAT SERPL-MCNC: 0.92 MG/DL (ref 0.6–1)
CREAT SERPL-MCNC: 0.94 MG/DL (ref 0.6–1)
CREAT SERPL-MCNC: 0.96 MG/DL (ref 0.6–1)
CREAT SERPL-MCNC: 0.99 MG/DL (ref 0.6–1)
CREAT SERPL-MCNC: 1.02 MG/DL (ref 0.6–1)
CREAT SERPL-MCNC: 1.02 MG/DL (ref 0.6–1)
CREAT SERPL-MCNC: 1.11 MG/DL (ref 0.6–1)
CREAT SERPL-MCNC: 1.16 MG/DL (ref 0.6–1)
CREAT SERPL-MCNC: 1.24 MG/DL (ref 0.6–1)
CREAT SERPL-MCNC: 1.26 MG/DL (ref 0.6–1)
CREAT SERPL-MCNC: 1.46 MG/DL (ref 0.6–1)
CREAT SERPL-MCNC: 1.64 MG/DL (ref 0.6–1)
CREAT SERPL-MCNC: 1.71 MG/DL (ref 0.6–1)
CRYSTALS URNS QL MICRO: ABNORMAL /LPF
DIAGNOSIS, 93000: NORMAL
DIFFERENTIAL METHOD BLD: ABNORMAL
EOSINOPHIL # BLD: 0 K/UL (ref 0–0.8)
EOSINOPHIL # BLD: 0.1 K/UL (ref 0–0.8)
EOSINOPHIL # BLD: 0.1 K/UL (ref 0–0.8)
EOSINOPHIL # BLD: 0.2 K/UL (ref 0–0.8)
EOSINOPHIL # BLD: 0.3 K/UL (ref 0–0.8)
EOSINOPHIL NFR BLD: 0 % (ref 0.5–7.8)
EOSINOPHIL NFR BLD: 1 % (ref 0.5–7.8)
EOSINOPHIL NFR BLD: 1 % (ref 0.5–7.8)
EOSINOPHIL NFR BLD: 3 % (ref 0.5–7.8)
EOSINOPHIL NFR BLD: 4 % (ref 0.5–7.8)
EOSINOPHIL NFR BLD: 4 % (ref 0.5–7.8)
EOSINOPHIL NFR BLD: 5 % (ref 0.5–7.8)
EOSINOPHIL NFR BLD: 5 % (ref 0.5–7.8)
EOSINOPHIL NFR BLD: 6 % (ref 0.5–7.8)
EOSINOPHIL NFR BLD: 7 % (ref 0.5–7.8)
EPI CELLS #/AREA URNS HPF: ABNORMAL /HPF
EPI CELLS #/AREA URNS HPF: NORMAL /HPF
ERYTHROCYTE [DISTWIDTH] IN BLOOD BY AUTOMATED COUNT: 16.8 % (ref 11.9–14.6)
ERYTHROCYTE [DISTWIDTH] IN BLOOD BY AUTOMATED COUNT: 16.8 % (ref 11.9–14.6)
ERYTHROCYTE [DISTWIDTH] IN BLOOD BY AUTOMATED COUNT: 17 % (ref 11.9–14.6)
ERYTHROCYTE [DISTWIDTH] IN BLOOD BY AUTOMATED COUNT: 17.1 % (ref 11.9–14.6)
ERYTHROCYTE [DISTWIDTH] IN BLOOD BY AUTOMATED COUNT: 17.3 % (ref 11.9–14.6)
ERYTHROCYTE [DISTWIDTH] IN BLOOD BY AUTOMATED COUNT: 17.4 % (ref 11.9–14.6)
ERYTHROCYTE [DISTWIDTH] IN BLOOD BY AUTOMATED COUNT: 17.5 % (ref 11.9–14.6)
ERYTHROCYTE [DISTWIDTH] IN BLOOD BY AUTOMATED COUNT: 17.6 % (ref 11.9–14.6)
ERYTHROCYTE [DISTWIDTH] IN BLOOD BY AUTOMATED COUNT: 17.8 % (ref 11.9–14.6)
ERYTHROCYTE [DISTWIDTH] IN BLOOD BY AUTOMATED COUNT: 18 % (ref 11.9–14.6)
ERYTHROCYTE [DISTWIDTH] IN BLOOD BY AUTOMATED COUNT: 18 % (ref 11.9–14.6)
ERYTHROCYTE [DISTWIDTH] IN BLOOD BY AUTOMATED COUNT: 18.4 % (ref 11.9–14.6)
ERYTHROCYTE [DISTWIDTH] IN BLOOD BY AUTOMATED COUNT: 18.6 % (ref 11.9–14.6)
ERYTHROCYTE [DISTWIDTH] IN BLOOD BY AUTOMATED COUNT: 18.9 % (ref 11.9–14.6)
ERYTHROCYTE [DISTWIDTH] IN BLOOD BY AUTOMATED COUNT: 19.1 % (ref 11.9–14.6)
ERYTHROCYTE [DISTWIDTH] IN BLOOD BY AUTOMATED COUNT: 19.1 % (ref 11.9–14.6)
ERYTHROCYTE [DISTWIDTH] IN BLOOD BY AUTOMATED COUNT: 19.2 % (ref 11.9–14.6)
FAT STL QL: NORMAL
FOLATE SERPL-MCNC: 30 NG/ML (ref 3.1–17.5)
GLOBULIN SER CALC-MCNC: 3.2 G/DL (ref 2.3–3.5)
GLOBULIN SER CALC-MCNC: 3.3 G/DL (ref 2.3–3.5)
GLOBULIN SER CALC-MCNC: 3.4 G/DL (ref 2.3–3.5)
GLOBULIN SER CALC-MCNC: 3.5 G/DL (ref 2.3–3.5)
GLOBULIN SER CALC-MCNC: 3.6 G/DL (ref 2.3–3.5)
GLOBULIN SER CALC-MCNC: 3.6 G/DL (ref 2.3–3.5)
GLOBULIN SER CALC-MCNC: 3.7 G/DL (ref 2.3–3.5)
GLOBULIN SER CALC-MCNC: 3.7 G/DL (ref 2.3–3.5)
GLOBULIN SER CALC-MCNC: 3.9 G/DL (ref 2.3–3.5)
GLOBULIN SER CALC-MCNC: 4.1 G/DL (ref 2.3–3.5)
GLOBULIN SER CALC-MCNC: 4.3 G/DL (ref 2.3–3.5)
GLOBULIN SER CALC-MCNC: 5 G/DL (ref 2.3–3.5)
GLUCOSE SERPL-MCNC: 100 MG/DL (ref 65–100)
GLUCOSE SERPL-MCNC: 107 MG/DL (ref 65–100)
GLUCOSE SERPL-MCNC: 107 MG/DL (ref 65–100)
GLUCOSE SERPL-MCNC: 109 MG/DL (ref 65–100)
GLUCOSE SERPL-MCNC: 115 MG/DL (ref 65–100)
GLUCOSE SERPL-MCNC: 123 MG/DL (ref 65–100)
GLUCOSE SERPL-MCNC: 130 MG/DL (ref 65–100)
GLUCOSE SERPL-MCNC: 140 MG/DL (ref 65–100)
GLUCOSE SERPL-MCNC: 158 MG/DL (ref 65–100)
GLUCOSE SERPL-MCNC: 168 MG/DL (ref 65–100)
GLUCOSE SERPL-MCNC: 64 MG/DL (ref 65–100)
GLUCOSE SERPL-MCNC: 74 MG/DL (ref 65–100)
GLUCOSE SERPL-MCNC: 75 MG/DL (ref 65–100)
GLUCOSE SERPL-MCNC: 83 MG/DL (ref 65–100)
GLUCOSE SERPL-MCNC: 83 MG/DL (ref 65–100)
GLUCOSE SERPL-MCNC: 89 MG/DL (ref 65–100)
GLUCOSE SERPL-MCNC: 89 MG/DL (ref 65–100)
GLUCOSE SERPL-MCNC: 90 MG/DL (ref 65–100)
GLUCOSE SERPL-MCNC: 93 MG/DL (ref 65–100)
GLUCOSE SERPL-MCNC: 94 MG/DL (ref 65–100)
GLUCOSE SERPL-MCNC: 94 MG/DL (ref 65–100)
GLUCOSE SERPL-MCNC: 97 MG/DL (ref 65–100)
GLUCOSE SERPL-MCNC: 99 MG/DL (ref 65–100)
GLUCOSE UR STRIP.AUTO-MCNC: NEGATIVE MG/DL
GLUCOSE UR STRIP.AUTO-MCNC: NEGATIVE MG/DL
HCT VFR BLD AUTO: 29.4 % (ref 35.8–46.3)
HCT VFR BLD AUTO: 29.8 % (ref 35.8–46.3)
HCT VFR BLD AUTO: 31 % (ref 35.8–46.3)
HCT VFR BLD AUTO: 32.7 % (ref 35.8–46.3)
HCT VFR BLD AUTO: 32.8 % (ref 35.8–46.3)
HCT VFR BLD AUTO: 33.1 % (ref 35.8–46.3)
HCT VFR BLD AUTO: 33.6 % (ref 35.8–46.3)
HCT VFR BLD AUTO: 34.1 % (ref 35.8–46.3)
HCT VFR BLD AUTO: 34.2 % (ref 35.8–46.3)
HCT VFR BLD AUTO: 34.7 % (ref 35.8–46.3)
HCT VFR BLD AUTO: 34.7 % (ref 35.8–46.3)
HCT VFR BLD AUTO: 34.9 % (ref 35.8–46.3)
HCT VFR BLD AUTO: 35 % (ref 35.8–46.3)
HCT VFR BLD AUTO: 35.7 % (ref 35.8–46.3)
HCT VFR BLD AUTO: 35.8 % (ref 35.8–46.3)
HCT VFR BLD AUTO: 36 % (ref 35.8–46.3)
HCT VFR BLD AUTO: 36.6 % (ref 35.8–46.3)
HCT VFR BLD AUTO: 36.9 % (ref 35.8–46.3)
HCT VFR BLD AUTO: 37.7 % (ref 35.8–46.3)
HCT VFR BLD AUTO: 39.2 % (ref 35.8–46.3)
HCT VFR BLD AUTO: 39.2 % (ref 35.8–46.3)
HCT VFR BLD AUTO: 39.4 % (ref 35.8–46.3)
HCT VFR BLD AUTO: 42 % (ref 35.8–46.3)
HGB BLD-MCNC: 10 G/DL (ref 11.7–15.4)
HGB BLD-MCNC: 10.2 G/DL (ref 11.7–15.4)
HGB BLD-MCNC: 10.2 G/DL (ref 11.7–15.4)
HGB BLD-MCNC: 10.3 G/DL (ref 11.7–15.4)
HGB BLD-MCNC: 10.4 G/DL (ref 11.7–15.4)
HGB BLD-MCNC: 10.9 G/DL (ref 11.7–15.4)
HGB BLD-MCNC: 10.9 G/DL (ref 11.7–15.4)
HGB BLD-MCNC: 11 G/DL (ref 11.7–15.4)
HGB BLD-MCNC: 11 G/DL (ref 11.7–15.4)
HGB BLD-MCNC: 11.1 G/DL (ref 11.7–15.4)
HGB BLD-MCNC: 11.2 G/DL (ref 11.7–15.4)
HGB BLD-MCNC: 11.3 G/DL (ref 11.7–15.4)
HGB BLD-MCNC: 11.5 G/DL (ref 11.7–15.4)
HGB BLD-MCNC: 11.6 G/DL (ref 11.7–15.4)
HGB BLD-MCNC: 11.6 G/DL (ref 11.7–15.4)
HGB BLD-MCNC: 12 G/DL (ref 11.7–15.4)
HGB BLD-MCNC: 12.2 G/DL (ref 11.7–15.4)
HGB BLD-MCNC: 12.4 G/DL (ref 11.7–15.4)
HGB BLD-MCNC: 12.4 G/DL (ref 11.7–15.4)
HGB BLD-MCNC: 12.8 G/DL (ref 11.7–15.4)
HGB BLD-MCNC: 13.5 G/DL (ref 11.7–15.4)
HGB BLD-MCNC: 9.6 G/DL (ref 11.7–15.4)
HGB BLD-MCNC: 9.6 G/DL (ref 11.7–15.4)
HGB UR QL STRIP: ABNORMAL
HGB UR QL STRIP: ABNORMAL
IMM GRANULOCYTES # BLD AUTO: 0 K/UL (ref 0–0.5)
IMM GRANULOCYTES # BLD AUTO: 0.1 K/UL (ref 0–0.5)
IMM GRANULOCYTES NFR BLD AUTO: 0 % (ref 0–5)
IMM GRANULOCYTES NFR BLD AUTO: 1 % (ref 0–5)
INR PPP: 1.2
INR PPP: 1.3
INR PPP: 1.4
INR PPP: 1.4
INR PPP: 1.5
INR PPP: 1.5
INR PPP: 1.6
INR PPP: 1.7
INR PPP: 1.8
INR PPP: 2
INR PPP: 2.3
INR PPP: 2.6
INR PPP: 3
INR PPP: 4.4
INR PPP: 4.6
INR PPP: 4.7
INR PPP: 4.9
INR PPP: 6.5
INR PPP: 6.6
INR PPP: 6.8
INTERPRETATION: NORMAL
KETONES UR QL STRIP.AUTO: ABNORMAL MG/DL
KETONES UR QL STRIP.AUTO: NEGATIVE MG/DL
LACTATE BLD-SCNC: 2.03 MMOL/L (ref 0.5–1.9)
LEUKOCYTE ESTERASE UR QL STRIP.AUTO: ABNORMAL
LEUKOCYTE ESTERASE UR QL STRIP.AUTO: ABNORMAL
LIPASE SERPL-CCNC: 109 U/L (ref 73–393)
LIPASE SERPL-CCNC: 61 U/L (ref 73–393)
LIPASE SERPL-CCNC: 65 U/L (ref 73–393)
LYMPHOCYTES # BLD: 0.5 K/UL (ref 0.5–4.6)
LYMPHOCYTES # BLD: 0.6 K/UL (ref 0.5–4.6)
LYMPHOCYTES # BLD: 0.7 K/UL (ref 0.5–4.6)
LYMPHOCYTES # BLD: 0.9 K/UL (ref 0.5–4.6)
LYMPHOCYTES # BLD: 1 K/UL (ref 0.5–4.6)
LYMPHOCYTES # BLD: 1.2 K/UL (ref 0.5–4.6)
LYMPHOCYTES # BLD: 1.2 K/UL (ref 0.5–4.6)
LYMPHOCYTES # BLD: 1.3 K/UL (ref 0.5–4.6)
LYMPHOCYTES # BLD: 1.4 K/UL (ref 0.5–4.6)
LYMPHOCYTES # BLD: 1.5 K/UL (ref 0.5–4.6)
LYMPHOCYTES # BLD: 1.6 K/UL (ref 0.5–4.6)
LYMPHOCYTES # BLD: 1.6 K/UL (ref 0.5–4.6)
LYMPHOCYTES NFR BLD: 10 % (ref 13–44)
LYMPHOCYTES NFR BLD: 13 % (ref 13–44)
LYMPHOCYTES NFR BLD: 17 % (ref 13–44)
LYMPHOCYTES NFR BLD: 24 % (ref 13–44)
LYMPHOCYTES NFR BLD: 25 % (ref 13–44)
LYMPHOCYTES NFR BLD: 26 % (ref 13–44)
LYMPHOCYTES NFR BLD: 27 % (ref 13–44)
LYMPHOCYTES NFR BLD: 36 % (ref 13–44)
LYMPHOCYTES NFR BLD: 37 % (ref 13–44)
LYMPHOCYTES NFR BLD: 39 % (ref 13–44)
LYMPHOCYTES NFR BLD: 40 % (ref 13–44)
LYMPHOCYTES NFR BLD: 6 % (ref 13–44)
LYMPHOCYTES NFR BLD: 7 % (ref 13–44)
LYMPHOCYTES NFR BLD: 9 % (ref 13–44)
MAGNESIUM SERPL-MCNC: 1.3 MG/DL (ref 1.8–2.4)
MAGNESIUM SERPL-MCNC: 1.5 MG/DL (ref 1.8–2.4)
MAGNESIUM SERPL-MCNC: 1.9 MG/DL (ref 1.8–2.4)
MAGNESIUM SERPL-MCNC: 2.2 MG/DL (ref 1.8–2.4)
MAGNESIUM SERPL-MCNC: 2.3 MG/DL (ref 1.8–2.4)
MAGNESIUM SERPL-MCNC: 2.5 MG/DL (ref 1.8–2.4)
MCH RBC QN AUTO: 24.9 PG (ref 26.1–32.9)
MCH RBC QN AUTO: 25.1 PG (ref 26.1–32.9)
MCH RBC QN AUTO: 25.2 PG (ref 26.1–32.9)
MCH RBC QN AUTO: 25.3 PG (ref 26.1–32.9)
MCH RBC QN AUTO: 25.4 PG (ref 26.1–32.9)
MCH RBC QN AUTO: 25.5 PG (ref 26.1–32.9)
MCH RBC QN AUTO: 25.5 PG (ref 26.1–32.9)
MCH RBC QN AUTO: 25.6 PG (ref 26.1–32.9)
MCH RBC QN AUTO: 25.6 PG (ref 26.1–32.9)
MCH RBC QN AUTO: 25.7 PG (ref 26.1–32.9)
MCH RBC QN AUTO: 25.8 PG (ref 26.1–32.9)
MCH RBC QN AUTO: 25.9 PG (ref 26.1–32.9)
MCH RBC QN AUTO: 26 PG (ref 26.1–32.9)
MCHC RBC AUTO-ENTMCNC: 30.8 G/DL (ref 31.4–35)
MCHC RBC AUTO-ENTMCNC: 31 G/DL (ref 31.4–35)
MCHC RBC AUTO-ENTMCNC: 31.1 G/DL (ref 31.4–35)
MCHC RBC AUTO-ENTMCNC: 31.4 G/DL (ref 31.4–35)
MCHC RBC AUTO-ENTMCNC: 31.4 G/DL (ref 31.4–35)
MCHC RBC AUTO-ENTMCNC: 31.5 G/DL (ref 31.4–35)
MCHC RBC AUTO-ENTMCNC: 31.5 G/DL (ref 31.4–35)
MCHC RBC AUTO-ENTMCNC: 31.6 G/DL (ref 31.4–35)
MCHC RBC AUTO-ENTMCNC: 31.7 G/DL (ref 31.4–35)
MCHC RBC AUTO-ENTMCNC: 31.7 G/DL (ref 31.4–35)
MCHC RBC AUTO-ENTMCNC: 31.8 G/DL (ref 31.4–35)
MCHC RBC AUTO-ENTMCNC: 31.8 G/DL (ref 31.4–35)
MCHC RBC AUTO-ENTMCNC: 31.9 G/DL (ref 31.4–35)
MCHC RBC AUTO-ENTMCNC: 31.9 G/DL (ref 31.4–35)
MCHC RBC AUTO-ENTMCNC: 32 G/DL (ref 31.4–35)
MCHC RBC AUTO-ENTMCNC: 32.1 G/DL (ref 31.4–35)
MCHC RBC AUTO-ENTMCNC: 32.2 G/DL (ref 31.4–35)
MCHC RBC AUTO-ENTMCNC: 32.3 G/DL (ref 31.4–35)
MCHC RBC AUTO-ENTMCNC: 32.3 G/DL (ref 31.4–35)
MCHC RBC AUTO-ENTMCNC: 32.4 G/DL (ref 31.4–35)
MCHC RBC AUTO-ENTMCNC: 32.7 G/DL (ref 31.4–35)
MCHC RBC AUTO-ENTMCNC: 32.7 G/DL (ref 31.4–35)
MCHC RBC AUTO-ENTMCNC: 33.3 G/DL (ref 31.4–35)
MCV RBC AUTO: 77.5 FL (ref 79.6–97.8)
MCV RBC AUTO: 77.7 FL (ref 79.6–97.8)
MCV RBC AUTO: 77.9 FL (ref 79.6–97.8)
MCV RBC AUTO: 78.8 FL (ref 79.6–97.8)
MCV RBC AUTO: 79 FL (ref 79.6–97.8)
MCV RBC AUTO: 79 FL (ref 79.6–97.8)
MCV RBC AUTO: 79.2 FL (ref 79.6–97.8)
MCV RBC AUTO: 79.7 FL (ref 79.6–97.8)
MCV RBC AUTO: 79.9 FL (ref 79.6–97.8)
MCV RBC AUTO: 80 FL (ref 79.6–97.8)
MCV RBC AUTO: 80.2 FL (ref 79.6–97.8)
MCV RBC AUTO: 80.8 FL (ref 79.6–97.8)
MCV RBC AUTO: 80.9 FL (ref 79.6–97.8)
MCV RBC AUTO: 80.9 FL (ref 79.6–97.8)
MCV RBC AUTO: 81 FL (ref 79.6–97.8)
MCV RBC AUTO: 81 FL (ref 79.6–97.8)
MCV RBC AUTO: 81.2 FL (ref 79.6–97.8)
MCV RBC AUTO: 81.2 FL (ref 79.6–97.8)
MCV RBC AUTO: 81.7 FL (ref 79.6–97.8)
MCV RBC AUTO: 81.8 FL (ref 79.6–97.8)
MCV RBC AUTO: 82 FL (ref 79.6–97.8)
MM INDURATION POC: 0 MM (ref 0–5)
MM INDURATION POC: NORMAL 0 (ref 0–5)
MM INDURATION POC: NORMAL MM (ref 0–5)
MONOCYTES # BLD: 0.3 K/UL (ref 0.1–1.3)
MONOCYTES # BLD: 0.3 K/UL (ref 0.1–1.3)
MONOCYTES # BLD: 0.4 K/UL (ref 0.1–1.3)
MONOCYTES # BLD: 0.5 K/UL (ref 0.1–1.3)
MONOCYTES # BLD: 0.6 K/UL (ref 0.1–1.3)
MONOCYTES # BLD: 0.7 K/UL (ref 0.1–1.3)
MONOCYTES # BLD: 0.8 K/UL (ref 0.1–1.3)
MONOCYTES NFR BLD: 10 % (ref 4–12)
MONOCYTES NFR BLD: 11 % (ref 4–12)
MONOCYTES NFR BLD: 12 % (ref 4–12)
MONOCYTES NFR BLD: 4 % (ref 4–12)
MONOCYTES NFR BLD: 4 % (ref 4–12)
MONOCYTES NFR BLD: 6 % (ref 4–12)
MONOCYTES NFR BLD: 7 % (ref 4–12)
MONOCYTES NFR BLD: 7 % (ref 4–12)
MONOCYTES NFR BLD: 8 % (ref 4–12)
MONOCYTES NFR BLD: 9 % (ref 4–12)
MONOCYTES NFR BLD: 9 % (ref 4–12)
NEUTRAL FAT STL QL: NORMAL
NEUTS SEG # BLD: 1.3 K/UL (ref 1.7–8.2)
NEUTS SEG # BLD: 1.6 K/UL (ref 1.7–8.2)
NEUTS SEG # BLD: 1.7 K/UL (ref 1.7–8.2)
NEUTS SEG # BLD: 1.8 K/UL (ref 1.7–8.2)
NEUTS SEG # BLD: 1.9 K/UL (ref 1.7–8.2)
NEUTS SEG # BLD: 2.6 K/UL (ref 1.7–8.2)
NEUTS SEG # BLD: 3 K/UL (ref 1.7–8.2)
NEUTS SEG # BLD: 3.3 K/UL (ref 1.7–8.2)
NEUTS SEG # BLD: 3.3 K/UL (ref 1.7–8.2)
NEUTS SEG # BLD: 3.5 K/UL (ref 1.7–8.2)
NEUTS SEG # BLD: 3.9 K/UL (ref 1.7–8.2)
NEUTS SEG # BLD: 4.3 K/UL (ref 1.7–8.2)
NEUTS SEG # BLD: 5.4 K/UL (ref 1.7–8.2)
NEUTS SEG # BLD: 6.3 K/UL (ref 1.7–8.2)
NEUTS SEG # BLD: 7.5 K/UL (ref 1.7–8.2)
NEUTS SEG # BLD: 8.1 K/UL (ref 1.7–8.2)
NEUTS SEG # BLD: 8.5 K/UL (ref 1.7–8.2)
NEUTS SEG # BLD: 8.8 K/UL (ref 1.7–8.2)
NEUTS SEG NFR BLD: 42 % (ref 43–78)
NEUTS SEG NFR BLD: 43 % (ref 43–78)
NEUTS SEG NFR BLD: 46 % (ref 43–78)
NEUTS SEG NFR BLD: 47 % (ref 43–78)
NEUTS SEG NFR BLD: 56 % (ref 43–78)
NEUTS SEG NFR BLD: 58 % (ref 43–78)
NEUTS SEG NFR BLD: 59 % (ref 43–78)
NEUTS SEG NFR BLD: 60 % (ref 43–78)
NEUTS SEG NFR BLD: 62 % (ref 43–78)
NEUTS SEG NFR BLD: 63 % (ref 43–78)
NEUTS SEG NFR BLD: 64 % (ref 43–78)
NEUTS SEG NFR BLD: 66 % (ref 43–78)
NEUTS SEG NFR BLD: 72 % (ref 43–78)
NEUTS SEG NFR BLD: 79 % (ref 43–78)
NEUTS SEG NFR BLD: 83 % (ref 43–78)
NEUTS SEG NFR BLD: 86 % (ref 43–78)
NEUTS SEG NFR BLD: 88 % (ref 43–78)
NEUTS SEG NFR BLD: 89 % (ref 43–78)
NITRITE UR QL STRIP.AUTO: NEGATIVE
NITRITE UR QL STRIP.AUTO: POSITIVE
NRBC # BLD: 0 K/UL (ref 0–0.2)
NRBC # BLD: 0.02 K/UL (ref 0–0.2)
NRBC # BLD: 0.04 K/UL (ref 0–0.2)
NRBC # BLD: 0.25 K/UL (ref 0–0.2)
NRBC # BLD: 0.27 K/UL (ref 0–0.2)
NRBC # BLD: 0.36 K/UL (ref 0–0.2)
O+P SPEC MICRO: NORMAL
O+P STL CONC: NORMAL
P-R INTERVAL, ECG05: 310 MS
PATH REV BLD -IMP: NORMAL
PCR REFLEX: NORMAL
PH UR STRIP: 6 [PH] (ref 5–9)
PH UR STRIP: 8.5 [PH] (ref 5–9)
PHOSPHATE SERPL-MCNC: 1.5 MG/DL (ref 2.3–3.7)
PHOSPHATE SERPL-MCNC: 2 MG/DL (ref 2.3–3.7)
PHOSPHATE SERPL-MCNC: 2.3 MG/DL (ref 2.3–3.7)
PHOSPHATE SERPL-MCNC: 2.4 MG/DL (ref 2.3–3.7)
PHOSPHATE SERPL-MCNC: 2.5 MG/DL (ref 2.3–3.7)
PHOSPHATE SERPL-MCNC: 2.5 MG/DL (ref 2.3–3.7)
PHOSPHATE SERPL-MCNC: 2.9 MG/DL (ref 2.3–3.7)
PHOSPHATE SERPL-MCNC: 2.9 MG/DL (ref 2.3–3.7)
PHOSPHATE SERPL-MCNC: 3 MG/DL (ref 2.3–3.7)
PLATELET # BLD AUTO: 102 K/UL (ref 150–450)
PLATELET # BLD AUTO: 106 K/UL (ref 150–450)
PLATELET # BLD AUTO: 107 K/UL (ref 150–450)
PLATELET # BLD AUTO: 116 K/UL (ref 150–450)
PLATELET # BLD AUTO: 135 K/UL (ref 150–450)
PLATELET # BLD AUTO: 147 K/UL (ref 150–450)
PLATELET # BLD AUTO: 149 K/UL (ref 150–450)
PLATELET # BLD AUTO: 156 K/UL (ref 150–450)
PLATELET # BLD AUTO: 159 K/UL (ref 150–450)
PLATELET # BLD AUTO: 168 K/UL (ref 150–450)
PLATELET # BLD AUTO: 184 K/UL (ref 150–450)
PLATELET # BLD AUTO: 79 K/UL (ref 150–450)
PLATELET # BLD AUTO: 82 K/UL (ref 150–450)
PLATELET # BLD AUTO: 84 K/UL (ref 150–450)
PLATELET # BLD AUTO: 86 K/UL (ref 150–450)
PLATELET # BLD AUTO: 88 K/UL (ref 150–450)
PLATELET # BLD AUTO: 89 K/UL (ref 150–450)
PLATELET # BLD AUTO: 91 K/UL (ref 150–450)
PLATELET # BLD AUTO: 93 K/UL (ref 150–450)
PLATELET # BLD AUTO: 94 K/UL (ref 150–450)
PLATELET # BLD AUTO: 94 K/UL (ref 150–450)
PLATELET # BLD AUTO: 95 K/UL (ref 150–450)
PLATELET # BLD AUTO: 96 K/UL (ref 150–450)
PMV BLD AUTO: 11 FL (ref 9.4–12.3)
PMV BLD AUTO: 11.1 FL (ref 9.4–12.3)
PMV BLD AUTO: 11.4 FL (ref 9.4–12.3)
PMV BLD AUTO: 11.6 FL (ref 9.4–12.3)
PMV BLD AUTO: 11.8 FL (ref 9.4–12.3)
PMV BLD AUTO: 12.2 FL (ref 9.4–12.3)
PMV BLD AUTO: 12.2 FL (ref 9.4–12.3)
PMV BLD AUTO: 12.4 FL (ref 9.4–12.3)
PMV BLD AUTO: 12.5 FL (ref 9.4–12.3)
PMV BLD AUTO: 12.7 FL (ref 9.4–12.3)
PMV BLD AUTO: ABNORMAL FL (ref 9.4–12.3)
POTASSIUM SERPL-SCNC: 2.5 MMOL/L (ref 3.5–5.1)
POTASSIUM SERPL-SCNC: 2.9 MMOL/L (ref 3.5–5.1)
POTASSIUM SERPL-SCNC: 3 MMOL/L (ref 3.5–5.1)
POTASSIUM SERPL-SCNC: 3.4 MMOL/L (ref 3.5–5.1)
POTASSIUM SERPL-SCNC: 3.6 MMOL/L (ref 3.5–5.1)
POTASSIUM SERPL-SCNC: 3.7 MMOL/L (ref 3.5–5.1)
POTASSIUM SERPL-SCNC: 3.8 MMOL/L (ref 3.5–5.1)
POTASSIUM SERPL-SCNC: 3.9 MMOL/L (ref 3.5–5.1)
POTASSIUM SERPL-SCNC: 4 MMOL/L (ref 3.5–5.1)
POTASSIUM SERPL-SCNC: 4 MMOL/L (ref 3.5–5.1)
POTASSIUM SERPL-SCNC: 4.1 MMOL/L (ref 3.5–5.1)
POTASSIUM SERPL-SCNC: 4.1 MMOL/L (ref 3.5–5.1)
POTASSIUM SERPL-SCNC: 4.2 MMOL/L (ref 3.5–5.1)
POTASSIUM SERPL-SCNC: 4.2 MMOL/L (ref 3.5–5.1)
POTASSIUM SERPL-SCNC: 4.4 MMOL/L (ref 3.5–5.1)
POTASSIUM SERPL-SCNC: 4.4 MMOL/L (ref 3.5–5.1)
POTASSIUM SERPL-SCNC: 4.5 MMOL/L (ref 3.5–5.1)
POTASSIUM SERPL-SCNC: 4.5 MMOL/L (ref 3.5–5.1)
POTASSIUM SERPL-SCNC: 4.6 MMOL/L (ref 3.5–5.1)
POTASSIUM SERPL-SCNC: 4.6 MMOL/L (ref 3.5–5.1)
POTASSIUM SERPL-SCNC: 4.7 MMOL/L (ref 3.5–5.1)
PPD POC: NEGATIVE NEGATIVE
PPD POC: NORMAL NEGATIVE
PROT SERPL-MCNC: 5.7 G/DL (ref 6.3–8.2)
PROT SERPL-MCNC: 5.8 G/DL (ref 6.3–8.2)
PROT SERPL-MCNC: 5.8 G/DL (ref 6.3–8.2)
PROT SERPL-MCNC: 5.9 G/DL (ref 6.3–8.2)
PROT SERPL-MCNC: 5.9 G/DL (ref 6.3–8.2)
PROT SERPL-MCNC: 6.2 G/DL (ref 6.3–8.2)
PROT SERPL-MCNC: 6.4 G/DL (ref 6.3–8.2)
PROT SERPL-MCNC: 6.9 G/DL (ref 6.3–8.2)
PROT SERPL-MCNC: 7.1 G/DL (ref 6.3–8.2)
PROT SERPL-MCNC: 7.1 G/DL (ref 6.3–8.2)
PROT SERPL-MCNC: 7.8 G/DL (ref 6.3–8.2)
PROT SERPL-MCNC: 8.1 G/DL (ref 6.3–8.2)
PROT UR STRIP-MCNC: 100 MG/DL
PROT UR STRIP-MCNC: 30 MG/DL
PROTHROMBIN TIME: 15.1 SEC (ref 11.7–14.5)
PROTHROMBIN TIME: 16 SEC (ref 11.7–14.5)
PROTHROMBIN TIME: 16.9 SEC (ref 11.7–14.5)
PROTHROMBIN TIME: 17.3 SEC (ref 11.7–14.5)
PROTHROMBIN TIME: 18.2 SEC (ref 11.7–14.5)
PROTHROMBIN TIME: 18.2 SEC (ref 11.7–14.5)
PROTHROMBIN TIME: 18.9 SEC (ref 11.7–14.5)
PROTHROMBIN TIME: 20.1 SEC (ref 11.7–14.5)
PROTHROMBIN TIME: 20.6 SEC (ref 11.7–14.5)
PROTHROMBIN TIME: 20.6 SEC (ref 11.7–14.5)
PROTHROMBIN TIME: 20.7 SEC (ref 11.7–14.5)
PROTHROMBIN TIME: 22.3 SEC (ref 11.7–14.5)
PROTHROMBIN TIME: 24.9 SEC (ref 11.7–14.5)
PROTHROMBIN TIME: 27.7 SEC (ref 11.7–14.5)
PROTHROMBIN TIME: 30.6 SEC (ref 11.7–14.5)
PROTHROMBIN TIME: 41.4 SEC (ref 11.7–14.5)
PROTHROMBIN TIME: 42.8 SEC (ref 11.7–14.5)
PROTHROMBIN TIME: 43.6 SEC (ref 11.7–14.5)
PROTHROMBIN TIME: 45 SEC (ref 11.7–14.5)
PROTHROMBIN TIME: 56.1 SEC (ref 11.7–14.5)
PROTHROMBIN TIME: 56.7 SEC (ref 11.7–14.5)
PROTHROMBIN TIME: 57.6 SEC (ref 11.7–14.5)
Q-T INTERVAL, ECG07: 306 MS
Q-T INTERVAL, ECG07: 352 MS
Q-T INTERVAL, ECG07: 422 MS
Q-T INTERVAL, ECG07: 468 MS
Q-T INTERVAL, ECG07: 470 MS
Q-T INTERVAL, ECG07: 562 MS
QRS DURATION, ECG06: 128 MS
QRS DURATION, ECG06: 136 MS
QRS DURATION, ECG06: 142 MS
QRS DURATION, ECG06: 144 MS
QRS DURATION, ECG06: 146 MS
QRS DURATION, ECG06: 146 MS
QTC CALCULATION (BEZET), ECG08: 412 MS
QTC CALCULATION (BEZET), ECG08: 443 MS
QTC CALCULATION (BEZET), ECG08: 497 MS
QTC CALCULATION (BEZET), ECG08: 538 MS
QTC CALCULATION (BEZET), ECG08: 546 MS
QTC CALCULATION (BEZET), ECG08: 639 MS
RBC # BLD AUTO: 3.72 M/UL (ref 4.05–5.2)
RBC # BLD AUTO: 3.78 M/UL (ref 4.05–5.2)
RBC # BLD AUTO: 3.98 M/UL (ref 4.05–5.2)
RBC # BLD AUTO: 4.05 M/UL (ref 4.05–5.2)
RBC # BLD AUTO: 4.06 M/UL (ref 4.05–5.2)
RBC # BLD AUTO: 4.11 M/UL (ref 4.05–5.2)
RBC # BLD AUTO: 4.14 M/UL (ref 4.05–5.2)
RBC # BLD AUTO: 4.22 M/UL (ref 4.05–5.2)
RBC # BLD AUTO: 4.23 M/UL (ref 4.05–5.2)
RBC # BLD AUTO: 4.27 M/UL (ref 4.05–5.2)
RBC # BLD AUTO: 4.34 M/UL (ref 4.05–5.2)
RBC # BLD AUTO: 4.36 M/UL (ref 4.05–5.2)
RBC # BLD AUTO: 4.39 M/UL (ref 4.05–5.2)
RBC # BLD AUTO: 4.41 M/UL (ref 4.05–5.2)
RBC # BLD AUTO: 4.45 M/UL (ref 4.05–5.2)
RBC # BLD AUTO: 4.61 M/UL (ref 4.05–5.2)
RBC # BLD AUTO: 4.61 M/UL (ref 4.05–5.2)
RBC # BLD AUTO: 4.66 M/UL (ref 4.05–5.2)
RBC # BLD AUTO: 4.72 M/UL (ref 4.05–5.2)
RBC # BLD AUTO: 4.83 M/UL (ref 4.05–5.2)
RBC # BLD AUTO: 4.85 M/UL (ref 4.05–5.2)
RBC # BLD AUTO: 4.95 M/UL (ref 4.05–5.2)
RBC # BLD AUTO: 5.24 M/UL (ref 4.05–5.2)
RBC #/AREA URNS HPF: ABNORMAL /HPF
RBC #/AREA URNS HPF: ABNORMAL /HPF
RBC #/AREA URNS HPF: NORMAL /HPF
SERVICE CMNT-IMP: ABNORMAL
SERVICE CMNT-IMP: ABNORMAL
SERVICE CMNT-IMP: NORMAL
SERVICE CMNT-IMP: NORMAL
SODIUM SERPL-SCNC: 136 MMOL/L (ref 136–145)
SODIUM SERPL-SCNC: 137 MMOL/L (ref 136–145)
SODIUM SERPL-SCNC: 138 MMOL/L (ref 136–145)
SODIUM SERPL-SCNC: 139 MMOL/L (ref 136–145)
SODIUM SERPL-SCNC: 141 MMOL/L (ref 136–145)
SODIUM SERPL-SCNC: 141 MMOL/L (ref 136–145)
SODIUM SERPL-SCNC: 142 MMOL/L (ref 136–145)
SODIUM SERPL-SCNC: 143 MMOL/L (ref 136–145)
SODIUM SERPL-SCNC: 144 MMOL/L (ref 136–145)
SODIUM SERPL-SCNC: 145 MMOL/L (ref 136–145)
SODIUM SERPL-SCNC: 146 MMOL/L (ref 136–145)
SODIUM SERPL-SCNC: 146 MMOL/L (ref 136–145)
SODIUM SERPL-SCNC: 147 MMOL/L (ref 136–145)
SODIUM SERPL-SCNC: 147 MMOL/L (ref 136–145)
SODIUM SERPL-SCNC: 150 MMOL/L (ref 136–145)
SP GR UR REFRACTOMETRY: 1.01 (ref 1–1.02)
SP GR UR REFRACTOMETRY: 1.03 (ref 1–1.02)
SPECIMEN SOURCE: NORMAL
T3 SERPL-MCNC: 0.56 NG/ML (ref 0.6–1.81)
T3 SERPL-MCNC: 0.8 NG/ML (ref 0.6–1.81)
T4 FREE SERPL-MCNC: 2.2 NG/DL (ref 0.9–1.8)
T4 FREE SERPL-MCNC: 3 NG/DL (ref 0.78–1.46)
TRIGL SERPL-MCNC: 118 MG/DL (ref 35–150)
TRIGL SERPL-MCNC: 64 MG/DL (ref 35–150)
TRIGL SERPL-MCNC: 78 MG/DL (ref 35–150)
TRIGL SERPL-MCNC: 83 MG/DL (ref 35–150)
TROPONIN I SERPL-MCNC: <0.02 NG/ML (ref 0.02–0.05)
TSH SERPL DL<=0.005 MIU/L-ACNC: <0.005 UIU/ML (ref 0.36–3.74)
UROBILINOGEN UR QL STRIP.AUTO: 2 EU/DL (ref 0.2–1)
UROBILINOGEN UR QL STRIP.AUTO: 4 EU/DL (ref 0.2–1)
VENTRICULAR RATE, ECG03: 109 BPM
VENTRICULAR RATE, ECG03: 111 BPM
VENTRICULAR RATE, ECG03: 145 BPM
VENTRICULAR RATE, ECG03: 47 BPM
VENTRICULAR RATE, ECG03: 54 BPM
VENTRICULAR RATE, ECG03: 98 BPM
VIT B12 SERPL-MCNC: 667 PG/ML (ref 193–986)
WBC # BLD AUTO: 10.1 K/UL (ref 4.3–11.1)
WBC # BLD AUTO: 10.3 K/UL (ref 4.3–11.1)
WBC # BLD AUTO: 10.3 K/UL (ref 4.3–11.1)
WBC # BLD AUTO: 3.1 K/UL (ref 4.3–11.1)
WBC # BLD AUTO: 3.3 K/UL (ref 4.3–11.1)
WBC # BLD AUTO: 3.5 K/UL (ref 4.3–11.1)
WBC # BLD AUTO: 3.8 K/UL (ref 4.3–11.1)
WBC # BLD AUTO: 4 K/UL (ref 4.3–11.1)
WBC # BLD AUTO: 4.5 K/UL (ref 4.3–11.1)
WBC # BLD AUTO: 5.2 K/UL (ref 4.3–11.1)
WBC # BLD AUTO: 5.3 K/UL (ref 4.3–11.1)
WBC # BLD AUTO: 5.4 K/UL (ref 4.3–11.1)
WBC # BLD AUTO: 5.6 K/UL (ref 4.3–11.1)
WBC # BLD AUTO: 5.9 K/UL (ref 4.3–11.1)
WBC # BLD AUTO: 6.6 K/UL (ref 4.3–11.1)
WBC # BLD AUTO: 6.9 K/UL (ref 4.3–11.1)
WBC # BLD AUTO: 7.2 K/UL (ref 4.3–11.1)
WBC # BLD AUTO: 7.4 K/UL (ref 4.3–11.1)
WBC # BLD AUTO: 7.5 K/UL (ref 4.3–11.1)
WBC # BLD AUTO: 7.7 K/UL (ref 4.3–11.1)
WBC # BLD AUTO: 8 K/UL (ref 4.3–11.1)
WBC # BLD AUTO: 8.1 K/UL (ref 4.3–11.1)
WBC # BLD AUTO: 8.5 K/UL (ref 4.3–11.1)
WBC URNS QL MICRO: ABNORMAL /HPF
WBC URNS QL MICRO: ABNORMAL /HPF
WBC URNS QL MICRO: NORMAL /HPF

## 2019-01-01 PROCEDURE — 80053 COMPREHEN METABOLIC PANEL: CPT

## 2019-01-01 PROCEDURE — 74011000258 HC RX REV CODE- 258: Performed by: EMERGENCY MEDICINE

## 2019-01-01 PROCEDURE — 74011250636 HC RX REV CODE- 250/636: Performed by: INTERNAL MEDICINE

## 2019-01-01 PROCEDURE — 93005 ELECTROCARDIOGRAM TRACING: CPT | Performed by: INTERNAL MEDICINE

## 2019-01-01 PROCEDURE — 85025 COMPLETE CBC W/AUTO DIFF WBC: CPT

## 2019-01-01 PROCEDURE — 83690 ASSAY OF LIPASE: CPT

## 2019-01-01 PROCEDURE — 87045 FECES CULTURE AEROBIC BACT: CPT

## 2019-01-01 PROCEDURE — 86580 TB INTRADERMAL TEST: CPT | Performed by: INTERNAL MEDICINE

## 2019-01-01 PROCEDURE — 36415 COLL VENOUS BLD VENIPUNCTURE: CPT

## 2019-01-01 PROCEDURE — 74011000258 HC RX REV CODE- 258: Performed by: SURGERY

## 2019-01-01 PROCEDURE — 65660000000 HC RM CCU STEPDOWN

## 2019-01-01 PROCEDURE — 85610 PROTHROMBIN TIME: CPT

## 2019-01-01 PROCEDURE — 84478 ASSAY OF TRIGLYCERIDES: CPT

## 2019-01-01 PROCEDURE — 74011000250 HC RX REV CODE- 250: Performed by: SURGERY

## 2019-01-01 PROCEDURE — 80048 BASIC METABOLIC PNL TOTAL CA: CPT

## 2019-01-01 PROCEDURE — 97166 OT EVAL MOD COMPLEX 45 MIN: CPT

## 2019-01-01 PROCEDURE — 82140 ASSAY OF AMMONIA: CPT

## 2019-01-01 PROCEDURE — 81001 URINALYSIS AUTO W/SCOPE: CPT

## 2019-01-01 PROCEDURE — 81003 URINALYSIS AUTO W/O SCOPE: CPT | Performed by: EMERGENCY MEDICINE

## 2019-01-01 PROCEDURE — 74022 RADEX COMPL AQT ABD SERIES: CPT

## 2019-01-01 PROCEDURE — G0156 HHCP-SVS OF AIDE,EA 15 MIN: HCPCS

## 2019-01-01 PROCEDURE — 74011250637 HC RX REV CODE- 250/637: Performed by: HOSPITALIST

## 2019-01-01 PROCEDURE — 96360 HYDRATION IV INFUSION INIT: CPT | Performed by: EMERGENCY MEDICINE

## 2019-01-01 PROCEDURE — 74011636320 HC RX REV CODE- 636/320: Performed by: EMERGENCY MEDICINE

## 2019-01-01 PROCEDURE — 83735 ASSAY OF MAGNESIUM: CPT

## 2019-01-01 PROCEDURE — 74011000250 HC RX REV CODE- 250: Performed by: EMERGENCY MEDICINE

## 2019-01-01 PROCEDURE — 77030008771 HC TU NG SALEM SUMP -A

## 2019-01-01 PROCEDURE — 77030019605

## 2019-01-01 PROCEDURE — 0651 HSPC ROUTINE HOME CARE

## 2019-01-01 PROCEDURE — 87086 URINE CULTURE/COLONY COUNT: CPT

## 2019-01-01 PROCEDURE — 82607 VITAMIN B-12: CPT

## 2019-01-01 PROCEDURE — 87177 OVA AND PARASITES SMEARS: CPT

## 2019-01-01 PROCEDURE — 87449 NOS EACH ORGANISM AG IA: CPT

## 2019-01-01 PROCEDURE — 74177 CT ABD & PELVIS W/CONTRAST: CPT

## 2019-01-01 PROCEDURE — 99285 EMERGENCY DEPT VISIT HI MDM: CPT | Performed by: EMERGENCY MEDICINE

## 2019-01-01 PROCEDURE — G0155 HHCP-SVS OF CSW,EA 15 MIN: HCPCS

## 2019-01-01 PROCEDURE — 74011250636 HC RX REV CODE- 250/636: Performed by: HOSPITALIST

## 2019-01-01 PROCEDURE — 85730 THROMBOPLASTIN TIME PARTIAL: CPT

## 2019-01-01 PROCEDURE — 74011000250 HC RX REV CODE- 250: Performed by: INTERNAL MEDICINE

## 2019-01-01 PROCEDURE — 74011250637 HC RX REV CODE- 250/637: Performed by: EMERGENCY MEDICINE

## 2019-01-01 PROCEDURE — 74011250637 HC RX REV CODE- 250/637: Performed by: INTERNAL MEDICINE

## 2019-01-01 PROCEDURE — 94760 N-INVAS EAR/PLS OXIMETRY 1: CPT

## 2019-01-01 PROCEDURE — 76450000000

## 2019-01-01 PROCEDURE — C8929 TTE W OR WO FOL WCON,DOPPLER: HCPCS

## 2019-01-01 PROCEDURE — 99497 ADVNCD CARE PLAN 30 MIN: CPT | Performed by: NURSE PRACTITIONER

## 2019-01-01 PROCEDURE — G0299 HHS/HOSPICE OF RN EA 15 MIN: HCPCS

## 2019-01-01 PROCEDURE — 84100 ASSAY OF PHOSPHORUS: CPT

## 2019-01-01 PROCEDURE — 99284 EMERGENCY DEPT VISIT MOD MDM: CPT | Performed by: EMERGENCY MEDICINE

## 2019-01-01 PROCEDURE — 70450 CT HEAD/BRAIN W/O DYE: CPT

## 2019-01-01 PROCEDURE — 77030020250 HC SOL INJ D 5% LFCR 1000ML BG LF

## 2019-01-01 PROCEDURE — 74011000258 HC RX REV CODE- 258: Performed by: HOSPITALIST

## 2019-01-01 PROCEDURE — 84443 ASSAY THYROID STIM HORMONE: CPT

## 2019-01-01 PROCEDURE — 77010033678 HC OXYGEN DAILY

## 2019-01-01 PROCEDURE — 96375 TX/PRO/DX INJ NEW DRUG ADDON: CPT | Performed by: EMERGENCY MEDICINE

## 2019-01-01 PROCEDURE — P9047 ALBUMIN (HUMAN), 25%, 50ML: HCPCS | Performed by: INTERNAL MEDICINE

## 2019-01-01 PROCEDURE — 74011250636 HC RX REV CODE- 250/636: Performed by: SURGERY

## 2019-01-01 PROCEDURE — 87186 SC STD MICRODIL/AGAR DIL: CPT

## 2019-01-01 PROCEDURE — 74011000302 HC RX REV CODE- 302: Performed by: INTERNAL MEDICINE

## 2019-01-01 PROCEDURE — 74011250637 HC RX REV CODE- 250/637: Performed by: NURSE PRACTITIONER

## 2019-01-01 PROCEDURE — 77030020263 HC SOL INJ SOD CL0.9% LFCR 1000ML

## 2019-01-01 PROCEDURE — 81015 MICROSCOPIC EXAM OF URINE: CPT

## 2019-01-01 PROCEDURE — 74011000258 HC RX REV CODE- 258: Performed by: INTERNAL MEDICINE

## 2019-01-01 PROCEDURE — 93308 TTE F-UP OR LMTD: CPT

## 2019-01-01 PROCEDURE — 84439 ASSAY OF FREE THYROXINE: CPT

## 2019-01-01 PROCEDURE — 3331090004 HSPC SERVICE INTENSITY ADD-ON

## 2019-01-01 PROCEDURE — 70551 MRI BRAIN STEM W/O DYE: CPT

## 2019-01-01 PROCEDURE — 99231 SBSQ HOSP IP/OBS SF/LOW 25: CPT | Performed by: NURSE PRACTITIONER

## 2019-01-01 PROCEDURE — HOSPICE MEDICATION HC HH HOSPICE MEDICATION

## 2019-01-01 PROCEDURE — 73502 X-RAY EXAM HIP UNI 2-3 VIEWS: CPT

## 2019-01-01 PROCEDURE — 93005 ELECTROCARDIOGRAM TRACING: CPT | Performed by: EMERGENCY MEDICINE

## 2019-01-01 PROCEDURE — 86038 ANTINUCLEAR ANTIBODIES: CPT

## 2019-01-01 PROCEDURE — 65270000029 HC RM PRIVATE

## 2019-01-01 PROCEDURE — 51798 US URINE CAPACITY MEASURE: CPT

## 2019-01-01 PROCEDURE — 74011250636 HC RX REV CODE- 250/636: Performed by: FAMILY MEDICINE

## 2019-01-01 PROCEDURE — 83880 ASSAY OF NATRIURETIC PEPTIDE: CPT

## 2019-01-01 PROCEDURE — 74176 CT ABD & PELVIS W/O CONTRAST: CPT

## 2019-01-01 PROCEDURE — 74019 RADEX ABDOMEN 2 VIEWS: CPT

## 2019-01-01 PROCEDURE — 99356 PR PROLONGED SVC I/P OR OBS SETTING 1ST HOUR: CPT | Performed by: NURSE PRACTITIONER

## 2019-01-01 PROCEDURE — 84480 ASSAY TRIIODOTHYRONINE (T3): CPT

## 2019-01-01 PROCEDURE — 3336500001 HSPC ELECTION

## 2019-01-01 PROCEDURE — 74011000258 HC RX REV CODE- 258: Performed by: FAMILY MEDICINE

## 2019-01-01 PROCEDURE — 82705 FATS/LIPIDS FECES QUAL: CPT

## 2019-01-01 PROCEDURE — 96376 TX/PRO/DX INJ SAME DRUG ADON: CPT | Performed by: EMERGENCY MEDICINE

## 2019-01-01 PROCEDURE — 77030020253 HC SOL INJ D545NS .05 DEX .45 SAL

## 2019-01-01 PROCEDURE — 77030034849

## 2019-01-01 PROCEDURE — 82746 ASSAY OF FOLIC ACID SERUM: CPT

## 2019-01-01 PROCEDURE — 74018 RADEX ABDOMEN 1 VIEW: CPT

## 2019-01-01 PROCEDURE — 97530 THERAPEUTIC ACTIVITIES: CPT

## 2019-01-01 PROCEDURE — 92610 EVALUATE SWALLOWING FUNCTION: CPT

## 2019-01-01 PROCEDURE — 71045 X-RAY EXAM CHEST 1 VIEW: CPT

## 2019-01-01 PROCEDURE — 77030032490 HC SLV COMPR SCD KNE COVD -B

## 2019-01-01 PROCEDURE — 74011250636 HC RX REV CODE- 250/636: Performed by: EMERGENCY MEDICINE

## 2019-01-01 PROCEDURE — 74011250636 HC RX REV CODE- 250/636: Performed by: NURSE PRACTITIONER

## 2019-01-01 PROCEDURE — 74011250636 HC RX REV CODE- 250/636: Performed by: PHYSICIAN ASSISTANT

## 2019-01-01 PROCEDURE — 97162 PT EVAL MOD COMPLEX 30 MIN: CPT

## 2019-01-01 PROCEDURE — 87088 URINE BACTERIA CULTURE: CPT

## 2019-01-01 PROCEDURE — 72170 X-RAY EXAM OF PELVIS: CPT

## 2019-01-01 PROCEDURE — 99223 1ST HOSP IP/OBS HIGH 75: CPT | Performed by: NURSE PRACTITIONER

## 2019-01-01 PROCEDURE — 85027 COMPLETE CBC AUTOMATED: CPT

## 2019-01-01 PROCEDURE — 74011636320 HC RX REV CODE- 636/320: Performed by: FAMILY MEDICINE

## 2019-01-01 PROCEDURE — 96365 THER/PROPH/DIAG IV INF INIT: CPT | Performed by: EMERGENCY MEDICINE

## 2019-01-01 PROCEDURE — 74011000250 HC RX REV CODE- 250: Performed by: NURSE PRACTITIONER

## 2019-01-01 PROCEDURE — 99233 SBSQ HOSP IP/OBS HIGH 50: CPT | Performed by: NURSE PRACTITIONER

## 2019-01-01 PROCEDURE — 97110 THERAPEUTIC EXERCISES: CPT

## 2019-01-01 PROCEDURE — 84484 ASSAY OF TROPONIN QUANT: CPT

## 2019-01-01 PROCEDURE — 83605 ASSAY OF LACTIC ACID: CPT

## 2019-01-01 PROCEDURE — A6250 SKIN SEAL PROTECT MOISTURIZR: HCPCS

## 2019-01-01 PROCEDURE — 96374 THER/PROPH/DIAG INJ IV PUSH: CPT | Performed by: EMERGENCY MEDICINE

## 2019-01-01 RX ORDER — MAGNESIUM SULFATE HEPTAHYDRATE 40 MG/ML
2 INJECTION, SOLUTION INTRAVENOUS ONCE
Status: COMPLETED | OUTPATIENT
Start: 2019-01-01 | End: 2019-01-01

## 2019-01-01 RX ORDER — FACIAL-BODY WIPES
10 EACH TOPICAL DAILY PRN
Status: DISCONTINUED | OUTPATIENT
Start: 2019-01-01 | End: 2019-01-01

## 2019-01-01 RX ORDER — ONDANSETRON 2 MG/ML
4 INJECTION INTRAMUSCULAR; INTRAVENOUS
Status: COMPLETED | OUTPATIENT
Start: 2019-01-01 | End: 2019-01-01

## 2019-01-01 RX ORDER — MORPHINE SULFATE 2 MG/ML
2 INJECTION, SOLUTION INTRAMUSCULAR; INTRAVENOUS
Status: DISCONTINUED | OUTPATIENT
Start: 2019-01-01 | End: 2019-01-01 | Stop reason: HOSPADM

## 2019-01-01 RX ORDER — FACIAL-BODY WIPES
10 EACH TOPICAL
Status: DISCONTINUED | OUTPATIENT
Start: 2019-01-01 | End: 2019-01-01 | Stop reason: HOSPADM

## 2019-01-01 RX ORDER — ONDANSETRON 2 MG/ML
4 INJECTION INTRAMUSCULAR; INTRAVENOUS
Status: DISCONTINUED | OUTPATIENT
Start: 2019-01-01 | End: 2019-01-01 | Stop reason: HOSPADM

## 2019-01-01 RX ORDER — POTASSIUM CHLORIDE 20 MEQ/1
20 TABLET, EXTENDED RELEASE ORAL 2 TIMES DAILY
Status: DISCONTINUED | OUTPATIENT
Start: 2019-01-01 | End: 2019-01-01 | Stop reason: HOSPADM

## 2019-01-01 RX ORDER — MAG HYDROX/ALUMINUM HYD/SIMETH 200-200-20
30 SUSPENSION, ORAL (FINAL DOSE FORM) ORAL
Status: DISCONTINUED | OUTPATIENT
Start: 2019-01-01 | End: 2019-01-01

## 2019-01-01 RX ORDER — DIPHENHYDRAMINE HCL 25 MG
25 CAPSULE ORAL
Status: DISCONTINUED | OUTPATIENT
Start: 2019-01-01 | End: 2019-01-01

## 2019-01-01 RX ORDER — ENOXAPARIN SODIUM 100 MG/ML
60 INJECTION SUBCUTANEOUS EVERY 12 HOURS
Status: DISCONTINUED | OUTPATIENT
Start: 2019-01-01 | End: 2019-01-01

## 2019-01-01 RX ORDER — HYDROMORPHONE HYDROCHLORIDE 1 MG/ML
0.5 INJECTION, SOLUTION INTRAMUSCULAR; INTRAVENOUS; SUBCUTANEOUS
Status: DISCONTINUED | OUTPATIENT
Start: 2019-01-01 | End: 2019-01-01

## 2019-01-01 RX ORDER — LORAZEPAM 2 MG/ML
1 INJECTION INTRAMUSCULAR
Status: DISCONTINUED | OUTPATIENT
Start: 2019-01-01 | End: 2019-01-01 | Stop reason: HOSPADM

## 2019-01-01 RX ORDER — MORPHINE SULFATE 100 MG/5ML
6 SOLUTION ORAL
Status: DISCONTINUED | OUTPATIENT
Start: 2019-01-01 | End: 2019-01-01

## 2019-01-01 RX ORDER — MORPHINE SULFATE 2 MG/ML
2 INJECTION, SOLUTION INTRAMUSCULAR; INTRAVENOUS ONCE
Status: COMPLETED | OUTPATIENT
Start: 2019-01-01 | End: 2019-01-01

## 2019-01-01 RX ORDER — ONDANSETRON 4 MG/1
4 TABLET, ORALLY DISINTEGRATING ORAL
Status: DISCONTINUED | OUTPATIENT
Start: 2019-01-01 | End: 2019-01-01 | Stop reason: HOSPADM

## 2019-01-01 RX ORDER — POTASSIUM CHLORIDE 29.8 MG/ML
40 INJECTION INTRAVENOUS ONCE
Status: DISCONTINUED | OUTPATIENT
Start: 2019-01-01 | End: 2019-01-01 | Stop reason: SDUPTHER

## 2019-01-01 RX ORDER — ACETAMINOPHEN 650 MG/1
650 SUPPOSITORY RECTAL
Status: DISCONTINUED | OUTPATIENT
Start: 2019-01-01 | End: 2019-01-01 | Stop reason: HOSPADM

## 2019-01-01 RX ORDER — LIDOCAINE HYDROCHLORIDE 20 MG/ML
15 SOLUTION OROPHARYNGEAL
Status: COMPLETED | OUTPATIENT
Start: 2019-01-01 | End: 2019-01-01

## 2019-01-01 RX ORDER — SODIUM CHLORIDE 0.9 % (FLUSH) 0.9 %
10 SYRINGE (ML) INJECTION
Status: COMPLETED | OUTPATIENT
Start: 2019-01-01 | End: 2019-01-01

## 2019-01-01 RX ORDER — MORPHINE SULFATE 2 MG/ML
2 INJECTION, SOLUTION INTRAMUSCULAR; INTRAVENOUS
Status: DISCONTINUED | OUTPATIENT
Start: 2019-01-01 | End: 2019-01-01

## 2019-01-01 RX ORDER — SAME BUTANEDISULFONATE/BETAINE 400-600 MG
500 POWDER IN PACKET (EA) ORAL 2 TIMES DAILY
Qty: 28 CAP | Refills: 0 | Status: SHIPPED | OUTPATIENT
Start: 2019-01-01 | End: 2019-01-01

## 2019-01-01 RX ORDER — CEFPODOXIME PROXETIL 200 MG/1
200 TABLET, FILM COATED ORAL EVERY 24 HOURS
Qty: 2 TAB | Refills: 0 | Status: SHIPPED | OUTPATIENT
Start: 2019-01-01 | End: 2019-01-01

## 2019-01-01 RX ORDER — ALBUMIN HUMAN 250 G/1000ML
12.5 SOLUTION INTRAVENOUS 2 TIMES DAILY
Status: COMPLETED | OUTPATIENT
Start: 2019-01-01 | End: 2019-01-01

## 2019-01-01 RX ORDER — METRONIDAZOLE 500 MG/100ML
500 INJECTION, SOLUTION INTRAVENOUS EVERY 12 HOURS
Status: DISCONTINUED | OUTPATIENT
Start: 2019-01-01 | End: 2019-01-01

## 2019-01-01 RX ORDER — FUROSEMIDE 10 MG/ML
20 INJECTION INTRAMUSCULAR; INTRAVENOUS ONCE
Status: COMPLETED | OUTPATIENT
Start: 2019-01-01 | End: 2019-01-01

## 2019-01-01 RX ORDER — LIDOCAINE 4 G/100G
PATCH TOPICAL
Qty: 15 PATCH | Refills: 0 | Status: SHIPPED | OUTPATIENT
Start: 2019-01-01

## 2019-01-01 RX ORDER — SODIUM CHLORIDE AND POTASSIUM CHLORIDE .9; .15 G/100ML; G/100ML
SOLUTION INTRAVENOUS CONTINUOUS
Status: DISCONTINUED | OUTPATIENT
Start: 2019-01-01 | End: 2019-01-01

## 2019-01-01 RX ORDER — DIPHENHYDRAMINE HCL 25 MG
25 CAPSULE ORAL
Status: DISCONTINUED | OUTPATIENT
Start: 2019-01-01 | End: 2019-01-01 | Stop reason: HOSPADM

## 2019-01-01 RX ORDER — POTASSIUM CHLORIDE 14.9 MG/ML
20 INJECTION INTRAVENOUS
Status: DISPENSED | OUTPATIENT
Start: 2019-01-01 | End: 2019-01-01

## 2019-01-01 RX ORDER — LEVOTHYROXINE SODIUM 75 UG/1
75 TABLET ORAL
Status: DISCONTINUED | OUTPATIENT
Start: 2019-01-01 | End: 2019-01-01

## 2019-01-01 RX ORDER — NALOXONE HYDROCHLORIDE 0.4 MG/ML
0.4 INJECTION, SOLUTION INTRAMUSCULAR; INTRAVENOUS; SUBCUTANEOUS AS NEEDED
Status: DISCONTINUED | OUTPATIENT
Start: 2019-01-01 | End: 2019-01-01 | Stop reason: HOSPADM

## 2019-01-01 RX ORDER — LISINOPRIL 5 MG/1
10 TABLET ORAL DAILY
Status: DISCONTINUED | OUTPATIENT
Start: 2019-01-01 | End: 2019-01-01 | Stop reason: HOSPADM

## 2019-01-01 RX ORDER — DILTIAZEM HYDROCHLORIDE 30 MG/1
30 TABLET, FILM COATED ORAL EVERY 8 HOURS
Status: DISCONTINUED | OUTPATIENT
Start: 2019-01-01 | End: 2019-01-01

## 2019-01-01 RX ORDER — POTASSIUM CHLORIDE 20 MEQ/1
40 TABLET, EXTENDED RELEASE ORAL
Status: COMPLETED | OUTPATIENT
Start: 2019-01-01 | End: 2019-01-01

## 2019-01-01 RX ORDER — SODIUM CHLORIDE 0.9 % (FLUSH) 0.9 %
5-40 SYRINGE (ML) INJECTION AS NEEDED
Status: DISCONTINUED | OUTPATIENT
Start: 2019-01-01 | End: 2019-01-01 | Stop reason: HOSPADM

## 2019-01-01 RX ORDER — DEXTROSE, SODIUM CHLORIDE, AND POTASSIUM CHLORIDE 5; .45; .15 G/100ML; G/100ML; G/100ML
125 INJECTION INTRAVENOUS CONTINUOUS
Status: DISCONTINUED | OUTPATIENT
Start: 2019-01-01 | End: 2019-01-01

## 2019-01-01 RX ORDER — HEPARIN SODIUM 5000 [USP'U]/100ML
12-25 INJECTION, SOLUTION INTRAVENOUS
Status: DISCONTINUED | OUTPATIENT
Start: 2019-01-01 | End: 2019-01-01 | Stop reason: HOSPADM

## 2019-01-01 RX ORDER — DEXTROSE MONOHYDRATE 50 MG/ML
75 INJECTION, SOLUTION INTRAVENOUS CONTINUOUS
Status: DISCONTINUED | OUTPATIENT
Start: 2019-01-01 | End: 2019-01-01

## 2019-01-01 RX ORDER — ADHESIVE BANDAGE
30 BANDAGE TOPICAL DAILY PRN
Status: DISCONTINUED | OUTPATIENT
Start: 2019-01-01 | End: 2019-01-01

## 2019-01-01 RX ORDER — METRONIDAZOLE 500 MG/100ML
500 INJECTION, SOLUTION INTRAVENOUS EVERY 8 HOURS
Status: DISCONTINUED | OUTPATIENT
Start: 2019-01-01 | End: 2019-01-01

## 2019-01-01 RX ORDER — WARFARIN 2 MG/1
2 TABLET ORAL EVERY EVENING
Status: DISCONTINUED | OUTPATIENT
Start: 2019-01-01 | End: 2019-01-01 | Stop reason: HOSPADM

## 2019-01-01 RX ORDER — HYDROMORPHONE HYDROCHLORIDE 1 MG/ML
1 INJECTION, SOLUTION INTRAMUSCULAR; INTRAVENOUS; SUBCUTANEOUS
Status: DISCONTINUED | OUTPATIENT
Start: 2019-01-01 | End: 2019-01-01

## 2019-01-01 RX ORDER — GABAPENTIN 300 MG/1
600 CAPSULE ORAL DAILY
Status: DISCONTINUED | OUTPATIENT
Start: 2019-01-01 | End: 2019-01-01 | Stop reason: HOSPADM

## 2019-01-01 RX ORDER — POTASSIUM CHLORIDE 14.9 MG/ML
20 INJECTION INTRAVENOUS
Status: COMPLETED | OUTPATIENT
Start: 2019-01-01 | End: 2019-01-01

## 2019-01-01 RX ORDER — DOCUSATE SODIUM 100 MG/1
100 CAPSULE, LIQUID FILLED ORAL DAILY
Status: DISCONTINUED | OUTPATIENT
Start: 2019-01-01 | End: 2019-01-01

## 2019-01-01 RX ORDER — DILTIAZEM HYDROCHLORIDE 30 MG/1
30 TABLET, FILM COATED ORAL EVERY 6 HOURS
Status: DISCONTINUED | OUTPATIENT
Start: 2019-01-01 | End: 2019-01-01

## 2019-01-01 RX ORDER — CEFPODOXIME PROXETIL 200 MG/1
200 TABLET, FILM COATED ORAL EVERY 24 HOURS
Status: DISCONTINUED | OUTPATIENT
Start: 2019-01-01 | End: 2019-01-01 | Stop reason: HOSPADM

## 2019-01-01 RX ORDER — HEPARIN SODIUM 5000 [USP'U]/ML
5000 INJECTION, SOLUTION INTRAVENOUS; SUBCUTANEOUS EVERY 8 HOURS
Status: DISCONTINUED | OUTPATIENT
Start: 2019-01-01 | End: 2019-01-01

## 2019-01-01 RX ORDER — DEXTROSE MONOHYDRATE AND SODIUM CHLORIDE 5; .45 G/100ML; G/100ML
75 INJECTION, SOLUTION INTRAVENOUS CONTINUOUS
Status: DISPENSED | OUTPATIENT
Start: 2019-01-01 | End: 2019-01-01

## 2019-01-01 RX ORDER — ENOXAPARIN SODIUM 100 MG/ML
50 INJECTION SUBCUTANEOUS EVERY 24 HOURS
Status: DISCONTINUED | OUTPATIENT
Start: 2019-01-01 | End: 2019-01-01

## 2019-01-01 RX ORDER — IBUPROFEN 200 MG
1 TABLET ORAL
Status: DISCONTINUED | OUTPATIENT
Start: 2019-01-01 | End: 2019-01-01

## 2019-01-01 RX ORDER — HALOPERIDOL 5 MG/ML
2 INJECTION INTRAMUSCULAR ONCE
Status: COMPLETED | OUTPATIENT
Start: 2019-01-01 | End: 2019-01-01

## 2019-01-01 RX ORDER — MORPHINE SULFATE 100 MG/5ML
5 SOLUTION ORAL
Status: DISCONTINUED | OUTPATIENT
Start: 2019-01-01 | End: 2019-01-01 | Stop reason: HOSPADM

## 2019-01-01 RX ORDER — DILTIAZEM HYDROCHLORIDE 120 MG/1
120 CAPSULE, COATED, EXTENDED RELEASE ORAL DAILY
Status: DISCONTINUED | OUTPATIENT
Start: 2019-01-01 | End: 2019-01-01

## 2019-01-01 RX ORDER — HEPARIN SODIUM 5000 [USP'U]/ML
35 INJECTION, SOLUTION INTRAVENOUS; SUBCUTANEOUS ONCE
Status: COMPLETED | OUTPATIENT
Start: 2019-01-01 | End: 2019-01-01

## 2019-01-01 RX ORDER — SODIUM CHLORIDE 0.9 % (FLUSH) 0.9 %
5-40 SYRINGE (ML) INJECTION EVERY 8 HOURS
Status: DISCONTINUED | OUTPATIENT
Start: 2019-01-01 | End: 2019-01-01

## 2019-01-01 RX ORDER — METOPROLOL TARTRATE 5 MG/5ML
2.5 INJECTION INTRAVENOUS EVERY 6 HOURS
Status: DISCONTINUED | OUTPATIENT
Start: 2019-01-01 | End: 2019-01-01

## 2019-01-01 RX ORDER — NALOXONE HYDROCHLORIDE 0.4 MG/ML
0.4 INJECTION, SOLUTION INTRAMUSCULAR; INTRAVENOUS; SUBCUTANEOUS AS NEEDED
Status: DISCONTINUED | OUTPATIENT
Start: 2019-01-01 | End: 2019-01-01

## 2019-01-01 RX ORDER — SODIUM CHLORIDE 9 MG/ML
150 INJECTION, SOLUTION INTRAVENOUS CONTINUOUS
Status: DISPENSED | OUTPATIENT
Start: 2019-01-01 | End: 2019-01-01

## 2019-01-01 RX ORDER — FUROSEMIDE 20 MG/1
20 TABLET ORAL 2 TIMES DAILY
Status: ON HOLD | COMMUNITY
End: 2019-01-01 | Stop reason: SDUPTHER

## 2019-01-01 RX ORDER — LISINOPRIL 10 MG/1
10 TABLET ORAL DAILY
Qty: 30 TAB | Refills: 0 | Status: SHIPPED | OUTPATIENT
Start: 2019-01-01 | End: 2019-01-01

## 2019-01-01 RX ORDER — HYDRALAZINE HYDROCHLORIDE 20 MG/ML
10 INJECTION INTRAMUSCULAR; INTRAVENOUS
Status: DISCONTINUED | OUTPATIENT
Start: 2019-01-01 | End: 2019-01-01

## 2019-01-01 RX ORDER — NALOXONE HYDROCHLORIDE 0.4 MG/ML
0.4 INJECTION, SOLUTION INTRAMUSCULAR; INTRAVENOUS; SUBCUTANEOUS AS NEEDED
Status: DISCONTINUED | OUTPATIENT
Start: 2019-01-01 | End: 2019-01-01 | Stop reason: SDUPTHER

## 2019-01-01 RX ORDER — FAMOTIDINE 10 MG/ML
20 INJECTION INTRAVENOUS EVERY 12 HOURS
Status: DISCONTINUED | OUTPATIENT
Start: 2019-01-01 | End: 2019-01-01 | Stop reason: DRUGHIGH

## 2019-01-01 RX ORDER — HEPARIN SODIUM 5000 [USP'U]/100ML
12-25 INJECTION, SOLUTION INTRAVENOUS
Status: DISCONTINUED | OUTPATIENT
Start: 2019-01-01 | End: 2019-01-01

## 2019-01-01 RX ORDER — PHYTONADIONE 5 MG/1
2.5 TABLET ORAL
Status: DISCONTINUED | OUTPATIENT
Start: 2019-01-01 | End: 2019-01-01

## 2019-01-01 RX ORDER — HYDROCODONE BITARTRATE AND ACETAMINOPHEN 5; 325 MG/1; MG/1
1 TABLET ORAL
Status: DISCONTINUED | OUTPATIENT
Start: 2019-01-01 | End: 2019-01-01 | Stop reason: HOSPADM

## 2019-01-01 RX ORDER — HALOPERIDOL 5 MG/ML
2 INJECTION INTRAMUSCULAR
Status: DISCONTINUED | OUTPATIENT
Start: 2019-01-01 | End: 2019-01-01

## 2019-01-01 RX ORDER — DEXTROSE, SODIUM CHLORIDE, AND POTASSIUM CHLORIDE 5; .45; .3 G/100ML; G/100ML; G/100ML
INJECTION INTRAVENOUS CONTINUOUS
Status: DISCONTINUED | OUTPATIENT
Start: 2019-01-01 | End: 2019-01-01

## 2019-01-01 RX ORDER — SODIUM CHLORIDE 9 MG/ML
150 INJECTION, SOLUTION INTRAVENOUS CONTINUOUS
Status: DISCONTINUED | OUTPATIENT
Start: 2019-01-01 | End: 2019-01-01

## 2019-01-01 RX ORDER — ACETAMINOPHEN 325 MG/1
650 TABLET ORAL
Status: DISCONTINUED | OUTPATIENT
Start: 2019-01-01 | End: 2019-01-01 | Stop reason: HOSPADM

## 2019-01-01 RX ORDER — CEPHALEXIN 500 MG/1
500 CAPSULE ORAL 2 TIMES DAILY
Qty: 4 CAP | Refills: 0 | Status: SHIPPED | OUTPATIENT
Start: 2019-01-01 | End: 2019-01-01

## 2019-01-01 RX ORDER — METOPROLOL SUCCINATE 25 MG/1
25 TABLET, EXTENDED RELEASE ORAL DAILY
COMMUNITY
End: 2019-01-01

## 2019-01-01 RX ORDER — VANCOMYCIN/0.9 % SOD CHLORIDE 750 MG/250
750 PLASTIC BAG, INJECTION (ML) INTRAVENOUS EVERY 24 HOURS
Status: DISCONTINUED | OUTPATIENT
Start: 2019-01-01 | End: 2019-01-01

## 2019-01-01 RX ORDER — PROMETHAZINE HYDROCHLORIDE 25 MG/1
25 TABLET ORAL
Status: CANCELLED | OUTPATIENT
Start: 2019-01-01

## 2019-01-01 RX ORDER — SIMVASTATIN 20 MG/1
20 TABLET, FILM COATED ORAL
Status: DISCONTINUED | OUTPATIENT
Start: 2019-01-01 | End: 2019-01-01 | Stop reason: HOSPADM

## 2019-01-01 RX ORDER — WARFARIN 2 MG/1
2 TABLET ORAL EVERY EVENING
Qty: 30 TAB | Refills: 2 | Status: SHIPPED | OUTPATIENT
Start: 2019-01-01 | End: 2019-01-01

## 2019-01-01 RX ORDER — SODIUM CHLORIDE 9 MG/ML
125 INJECTION, SOLUTION INTRAVENOUS CONTINUOUS
Status: DISCONTINUED | OUTPATIENT
Start: 2019-01-01 | End: 2019-01-01

## 2019-01-01 RX ORDER — HYDROCODONE BITARTRATE AND ACETAMINOPHEN 5; 325 MG/1; MG/1
1 TABLET ORAL
Status: DISCONTINUED | OUTPATIENT
Start: 2019-01-01 | End: 2019-01-01

## 2019-01-01 RX ORDER — FUROSEMIDE 10 MG/ML
40 INJECTION INTRAMUSCULAR; INTRAVENOUS EVERY 12 HOURS
Status: DISCONTINUED | OUTPATIENT
Start: 2019-01-01 | End: 2019-01-01

## 2019-01-01 RX ORDER — ALBUMIN HUMAN 250 G/1000ML
12.5 SOLUTION INTRAVENOUS
Status: COMPLETED | OUTPATIENT
Start: 2019-01-01 | End: 2019-01-01

## 2019-01-01 RX ORDER — FUROSEMIDE 20 MG/1
40 TABLET ORAL 2 TIMES DAILY
Qty: 120 TAB | Refills: 0 | Status: SHIPPED | OUTPATIENT
Start: 2019-01-01 | End: 2019-01-01

## 2019-01-01 RX ORDER — LATANOPROST 50 UG/ML
1 SOLUTION/ DROPS OPHTHALMIC EVERY EVENING
Status: DISCONTINUED | OUTPATIENT
Start: 2019-01-01 | End: 2019-01-01 | Stop reason: HOSPADM

## 2019-01-01 RX ORDER — HALOPERIDOL 2 MG/ML
2 SOLUTION ORAL
Status: DISCONTINUED | OUTPATIENT
Start: 2019-01-01 | End: 2019-01-01 | Stop reason: HOSPADM

## 2019-01-01 RX ORDER — WARFARIN 1 MG/1
2 TABLET ORAL EVERY EVENING
Status: DISCONTINUED | OUTPATIENT
Start: 2019-01-01 | End: 2019-01-01

## 2019-01-01 RX ORDER — CEPHALEXIN 500 MG/1
500 CAPSULE ORAL EVERY 8 HOURS
Status: DISCONTINUED | OUTPATIENT
Start: 2019-01-01 | End: 2019-01-01

## 2019-01-01 RX ORDER — VANCOMYCIN HYDROCHLORIDE
1250 ONCE
Status: COMPLETED | OUTPATIENT
Start: 2019-01-01 | End: 2019-01-01

## 2019-01-01 RX ORDER — HYDROMORPHONE HYDROCHLORIDE 1 MG/ML
0.5 INJECTION, SOLUTION INTRAMUSCULAR; INTRAVENOUS; SUBCUTANEOUS ONCE
Status: COMPLETED | OUTPATIENT
Start: 2019-01-01 | End: 2019-01-01

## 2019-01-01 RX ORDER — ONDANSETRON 2 MG/ML
4 INJECTION INTRAMUSCULAR; INTRAVENOUS
Status: DISCONTINUED | OUTPATIENT
Start: 2019-01-01 | End: 2019-01-01

## 2019-01-01 RX ORDER — PHYTONADIONE 5 MG/1
5 TABLET ORAL
Status: DISCONTINUED | OUTPATIENT
Start: 2019-01-01 | End: 2019-01-01

## 2019-01-01 RX ORDER — FUROSEMIDE 40 MG/1
40 TABLET ORAL DAILY
Status: DISCONTINUED | OUTPATIENT
Start: 2019-01-01 | End: 2019-01-01 | Stop reason: HOSPADM

## 2019-01-01 RX ORDER — METOPROLOL SUCCINATE 25 MG/1
25 TABLET, EXTENDED RELEASE ORAL DAILY
Status: DISCONTINUED | OUTPATIENT
Start: 2019-01-01 | End: 2019-01-01 | Stop reason: HOSPADM

## 2019-01-01 RX ORDER — OXYCODONE HYDROCHLORIDE 5 MG/1
5 TABLET ORAL
Status: DISCONTINUED | OUTPATIENT
Start: 2019-01-01 | End: 2019-01-01 | Stop reason: HOSPADM

## 2019-01-01 RX ORDER — MAG HYDROX/ALUMINUM HYD/SIMETH 200-200-20
30 SUSPENSION, ORAL (FINAL DOSE FORM) ORAL
Status: COMPLETED | OUTPATIENT
Start: 2019-01-01 | End: 2019-01-01

## 2019-01-01 RX ORDER — METOPROLOL TARTRATE 25 MG/1
25 TABLET, FILM COATED ORAL 2 TIMES DAILY
COMMUNITY
End: 2019-01-01

## 2019-01-01 RX ORDER — WARFARIN 2 MG/1
2 TABLET ORAL EVERY EVENING
Status: DISCONTINUED | OUTPATIENT
Start: 2019-01-01 | End: 2019-01-01

## 2019-01-01 RX ORDER — SODIUM CHLORIDE 9 MG/ML
100 INJECTION, SOLUTION INTRAVENOUS CONTINUOUS
Status: DISPENSED | OUTPATIENT
Start: 2019-01-01 | End: 2019-01-01

## 2019-01-01 RX ORDER — SODIUM CHLORIDE 0.9 % (FLUSH) 0.9 %
5-40 SYRINGE (ML) INJECTION EVERY 8 HOURS
Status: DISCONTINUED | OUTPATIENT
Start: 2019-01-01 | End: 2019-01-01 | Stop reason: HOSPADM

## 2019-01-01 RX ORDER — DILTIAZEM HYDROCHLORIDE 120 MG/1
120 CAPSULE, COATED, EXTENDED RELEASE ORAL DAILY
Status: DISCONTINUED | OUTPATIENT
Start: 2019-01-01 | End: 2019-01-01 | Stop reason: HOSPADM

## 2019-01-01 RX ORDER — HALOPERIDOL 5 MG/ML
1 INJECTION INTRAMUSCULAR
Status: DISCONTINUED | OUTPATIENT
Start: 2019-01-01 | End: 2019-01-01

## 2019-01-01 RX ORDER — SODIUM CHLORIDE 0.9 % (FLUSH) 0.9 %
10 SYRINGE (ML) INJECTION
Status: DISCONTINUED | OUTPATIENT
Start: 2019-01-01 | End: 2019-01-01

## 2019-01-01 RX ORDER — LIDOCAINE 4 G/100G
1 PATCH TOPICAL EVERY 24 HOURS
Status: DISCONTINUED | OUTPATIENT
Start: 2019-01-01 | End: 2019-01-01 | Stop reason: HOSPADM

## 2019-01-01 RX ORDER — BISACODYL 5 MG
10 TABLET, DELAYED RELEASE (ENTERIC COATED) ORAL DAILY PRN
Status: DISCONTINUED | OUTPATIENT
Start: 2019-01-01 | End: 2019-01-01 | Stop reason: HOSPADM

## 2019-01-01 RX ORDER — AMOXICILLIN 250 MG
1 CAPSULE ORAL DAILY
Status: DISCONTINUED | OUTPATIENT
Start: 2019-01-01 | End: 2019-01-01

## 2019-01-01 RX ORDER — LISINOPRIL 5 MG/1
2.5 TABLET ORAL DAILY
Status: DISCONTINUED | OUTPATIENT
Start: 2019-01-01 | End: 2019-01-01

## 2019-01-01 RX ORDER — ZOLPIDEM TARTRATE 5 MG/1
5 TABLET ORAL
Status: DISCONTINUED | OUTPATIENT
Start: 2019-01-01 | End: 2019-01-01 | Stop reason: HOSPADM

## 2019-01-01 RX ORDER — MORPHINE SULFATE 100 MG/5ML
5 SOLUTION ORAL
Qty: 60 ML | Refills: 0 | Status: SHIPPED | OUTPATIENT
Start: 2019-01-01 | End: 2019-01-01

## 2019-01-01 RX ORDER — FAMOTIDINE 10 MG/ML
20 INJECTION INTRAVENOUS
Status: DISCONTINUED | OUTPATIENT
Start: 2019-01-01 | End: 2019-01-01 | Stop reason: SDUPTHER

## 2019-01-01 RX ORDER — HEPARIN 100 UNIT/ML
300 SYRINGE INTRAVENOUS AS NEEDED
Status: DISCONTINUED | OUTPATIENT
Start: 2019-01-01 | End: 2019-01-01

## 2019-01-01 RX ORDER — HEPARIN SODIUM 5000 [USP'U]/ML
INJECTION, SOLUTION INTRAVENOUS; SUBCUTANEOUS
Status: ACTIVE
Start: 2019-01-01 | End: 2019-01-01

## 2019-01-01 RX ORDER — LOPERAMIDE HCL 2 MG
2 TABLET ORAL
Qty: 30 TAB | Refills: 0 | Status: SHIPPED | OUTPATIENT
Start: 2019-01-01 | End: 2019-01-01

## 2019-01-01 RX ORDER — WARFARIN 1 MG/1
1 TABLET ORAL EVERY EVENING
Status: DISCONTINUED | OUTPATIENT
Start: 2019-01-01 | End: 2019-01-01 | Stop reason: HOSPADM

## 2019-01-01 RX ORDER — METOPROLOL TARTRATE 5 MG/5ML
2.5 INJECTION INTRAVENOUS
Status: COMPLETED | OUTPATIENT
Start: 2019-01-01 | End: 2019-01-01

## 2019-01-01 RX ORDER — VANCOMYCIN HYDROCHLORIDE
1250 ONCE
Status: DISCONTINUED | OUTPATIENT
Start: 2019-01-01 | End: 2019-01-01 | Stop reason: SDUPTHER

## 2019-01-01 RX ORDER — POLYETHYLENE GLYCOL 3350 17 G/17G
17 POWDER, FOR SOLUTION ORAL DAILY
Qty: 30 PACKET | Refills: 2 | Status: SHIPPED | OUTPATIENT
Start: 2019-01-01 | End: 2019-01-01

## 2019-01-01 RX ORDER — MAGNESIUM SULFATE HEPTAHYDRATE 40 MG/ML
4 INJECTION, SOLUTION INTRAVENOUS ONCE
Status: COMPLETED | OUTPATIENT
Start: 2019-01-01 | End: 2019-01-01

## 2019-01-01 RX ORDER — FACIAL-BODY WIPES
10 EACH TOPICAL
Qty: 15 SUPPOSITORY | Refills: 0 | Status: SHIPPED | OUTPATIENT
Start: 2019-01-01

## 2019-01-01 RX ORDER — POTASSIUM CHLORIDE 750 MG/1
10 TABLET, FILM COATED, EXTENDED RELEASE ORAL DAILY
COMMUNITY
End: 2019-01-01

## 2019-01-01 RX ORDER — LEVOFLOXACIN 5 MG/ML
750 INJECTION, SOLUTION INTRAVENOUS
Status: DISCONTINUED | OUTPATIENT
Start: 2019-01-01 | End: 2019-01-01

## 2019-01-01 RX ORDER — HALOPERIDOL 2 MG/ML
2 SOLUTION ORAL
Qty: 30 ML | Refills: 0 | Status: SHIPPED | OUTPATIENT
Start: 2019-01-01

## 2019-01-01 RX ORDER — LORAZEPAM 2 MG/ML
0.5 INJECTION INTRAMUSCULAR
Status: DISCONTINUED | OUTPATIENT
Start: 2019-01-01 | End: 2019-01-01 | Stop reason: HOSPADM

## 2019-01-01 RX ORDER — CLONIDINE HYDROCHLORIDE 0.1 MG/1
0.2 TABLET ORAL
Status: DISCONTINUED | OUTPATIENT
Start: 2019-01-01 | End: 2019-01-01

## 2019-01-01 RX ORDER — SAME BUTANEDISULFONATE/BETAINE 400-600 MG
500 POWDER IN PACKET (EA) ORAL 2 TIMES DAILY
Status: DISCONTINUED | OUTPATIENT
Start: 2019-01-01 | End: 2019-01-01 | Stop reason: HOSPADM

## 2019-01-01 RX ORDER — RISPERIDONE 0.5 MG/1
0.5 TABLET, FILM COATED ORAL 2 TIMES DAILY
Status: DISCONTINUED | OUTPATIENT
Start: 2019-01-01 | End: 2019-01-01

## 2019-01-01 RX ORDER — METOPROLOL SUCCINATE 25 MG/1
12.5 TABLET, EXTENDED RELEASE ORAL DAILY
Status: DISCONTINUED | OUTPATIENT
Start: 2019-01-01 | End: 2019-01-01

## 2019-01-01 RX ORDER — ENOXAPARIN SODIUM 100 MG/ML
50 INJECTION SUBCUTANEOUS EVERY 12 HOURS
Status: DISCONTINUED | OUTPATIENT
Start: 2019-01-01 | End: 2019-01-01

## 2019-01-01 RX ORDER — DEXTROSE MONOHYDRATE AND SODIUM CHLORIDE 5; .45 G/100ML; G/100ML
75 INJECTION, SOLUTION INTRAVENOUS CONTINUOUS
Status: DISCONTINUED | OUTPATIENT
Start: 2019-01-01 | End: 2019-01-01

## 2019-01-01 RX ORDER — HYDROMORPHONE HYDROCHLORIDE 1 MG/ML
0.25 INJECTION, SOLUTION INTRAMUSCULAR; INTRAVENOUS; SUBCUTANEOUS
Status: COMPLETED | OUTPATIENT
Start: 2019-01-01 | End: 2019-01-01

## 2019-01-01 RX ORDER — METOCLOPRAMIDE HYDROCHLORIDE 5 MG/ML
10 INJECTION INTRAMUSCULAR; INTRAVENOUS
Status: DISCONTINUED | OUTPATIENT
Start: 2019-01-01 | End: 2019-01-01

## 2019-01-01 RX ORDER — DEXTROSE, SODIUM CHLORIDE, AND POTASSIUM CHLORIDE 5; .45; .15 G/100ML; G/100ML; G/100ML
100 INJECTION INTRAVENOUS CONTINUOUS
Status: DISCONTINUED | OUTPATIENT
Start: 2019-01-01 | End: 2019-01-01

## 2019-01-01 RX ORDER — WARFARIN 1 MG/1
0.5 TABLET ORAL EVERY EVENING
Status: DISCONTINUED | OUTPATIENT
Start: 2019-01-01 | End: 2019-01-01

## 2019-01-01 RX ORDER — DILTIAZEM HYDROCHLORIDE 120 MG/1
120 CAPSULE, COATED, EXTENDED RELEASE ORAL DAILY
Qty: 30 CAP | Refills: 2 | Status: SHIPPED | OUTPATIENT
Start: 2019-01-01 | End: 2019-01-01

## 2019-01-01 RX ORDER — AMIODARONE HYDROCHLORIDE 200 MG/1
100 TABLET ORAL DAILY
COMMUNITY
End: 2019-01-01

## 2019-01-01 RX ORDER — METOPROLOL TARTRATE 5 MG/5ML
5 INJECTION INTRAVENOUS
Status: DISCONTINUED | OUTPATIENT
Start: 2019-01-01 | End: 2019-01-01 | Stop reason: HOSPADM

## 2019-01-01 RX ORDER — METRONIDAZOLE 500 MG/1
500 TABLET ORAL 2 TIMES DAILY
Status: DISCONTINUED | OUTPATIENT
Start: 2019-01-01 | End: 2019-01-01 | Stop reason: HOSPADM

## 2019-01-01 RX ADMIN — ENOXAPARIN SODIUM 50 MG: 60 INJECTION SUBCUTANEOUS at 05:05

## 2019-01-01 RX ADMIN — ENOXAPARIN SODIUM 60 MG: 60 INJECTION, SOLUTION INTRAVENOUS; SUBCUTANEOUS at 08:37

## 2019-01-01 RX ADMIN — Medication 10 ML: at 13:28

## 2019-01-01 RX ADMIN — ONDANSETRON 4 MG: 2 INJECTION INTRAMUSCULAR; INTRAVENOUS at 20:38

## 2019-01-01 RX ADMIN — ENOXAPARIN SODIUM 60 MG: 60 INJECTION, SOLUTION INTRAVENOUS; SUBCUTANEOUS at 08:54

## 2019-01-01 RX ADMIN — HALOPERIDOL LACTATE 2 MG: 5 INJECTION INTRAMUSCULAR at 14:34

## 2019-01-01 RX ADMIN — HEPARIN SODIUM 1800 UNITS: 5000 INJECTION INTRAVENOUS; SUBCUTANEOUS at 20:13

## 2019-01-01 RX ADMIN — METOPROLOL TARTRATE 2.5 MG: 5 INJECTION INTRAVENOUS at 05:01

## 2019-01-01 RX ADMIN — Medication 10 ML: at 05:30

## 2019-01-01 RX ADMIN — Medication 5 ML: at 04:41

## 2019-01-01 RX ADMIN — HYDROMORPHONE HYDROCHLORIDE 0.25 MG: 1 INJECTION, SOLUTION INTRAMUSCULAR; INTRAVENOUS; SUBCUTANEOUS at 09:33

## 2019-01-01 RX ADMIN — Medication 10 ML: at 22:06

## 2019-01-01 RX ADMIN — VANCOMYCIN HYDROCHLORIDE 750 MG: 10 INJECTION, POWDER, LYOPHILIZED, FOR SOLUTION INTRAVENOUS at 13:27

## 2019-01-01 RX ADMIN — Medication 10 ML: at 14:26

## 2019-01-01 RX ADMIN — METOPROLOL TARTRATE 2.5 MG: 5 INJECTION INTRAVENOUS at 12:30

## 2019-01-01 RX ADMIN — MAGNESIUM SULFATE HEPTAHYDRATE 2 G: 40 INJECTION, SOLUTION INTRAVENOUS at 10:08

## 2019-01-01 RX ADMIN — HYDROCODONE BITARTRATE AND ACETAMINOPHEN 0.5 TABLET: 5; 325 TABLET ORAL at 23:29

## 2019-01-01 RX ADMIN — TUBERCULIN PURIFIED PROTEIN DERIVATIVE 5 UNITS: 5 INJECTION, SOLUTION INTRADERMAL at 10:48

## 2019-01-01 RX ADMIN — Medication 10 ML: at 15:16

## 2019-01-01 RX ADMIN — ENOXAPARIN SODIUM 60 MG: 60 INJECTION, SOLUTION INTRAVENOUS; SUBCUTANEOUS at 17:23

## 2019-01-01 RX ADMIN — HEPARIN SODIUM 12 UNITS/KG/HR: 5000 INJECTION, SOLUTION INTRAVENOUS at 07:21

## 2019-01-01 RX ADMIN — DEXTROSE MONOHYDRATE AND SODIUM CHLORIDE 50 ML/HR: 5; .45 INJECTION, SOLUTION INTRAVENOUS at 11:34

## 2019-01-01 RX ADMIN — HEPARIN SODIUM 1800 UNITS: 5000 INJECTION INTRAVENOUS; SUBCUTANEOUS at 01:45

## 2019-01-01 RX ADMIN — MORPHINE SULFATE 2 MG: 2 INJECTION, SOLUTION INTRAMUSCULAR; INTRAVENOUS at 08:00

## 2019-01-01 RX ADMIN — WARFARIN SODIUM 2 MG: 2 TABLET ORAL at 18:24

## 2019-01-01 RX ADMIN — MORPHINE SULFATE 2 MG: 2 INJECTION, SOLUTION INTRAMUSCULAR; INTRAVENOUS at 13:26

## 2019-01-01 RX ADMIN — ALUMINUM HYDROXIDE, MAGNESIUM HYDROXIDE, AND SIMETHICONE 30 ML: 200; 200; 20 SUSPENSION ORAL at 23:19

## 2019-01-01 RX ADMIN — SIMVASTATIN 20 MG: 20 TABLET, FILM COATED ORAL at 21:12

## 2019-01-01 RX ADMIN — DOCUSATE SODIUM 100 MG: 100 CAPSULE, LIQUID FILLED ORAL at 08:54

## 2019-01-01 RX ADMIN — DEXTROSE MONOHYDRATE 75 ML/HR: 5 INJECTION, SOLUTION INTRAVENOUS at 13:10

## 2019-01-01 RX ADMIN — POTASSIUM CHLORIDE 20 MEQ: 20 TABLET, EXTENDED RELEASE ORAL at 08:26

## 2019-01-01 RX ADMIN — ENOXAPARIN SODIUM 60 MG: 60 INJECTION, SOLUTION INTRAVENOUS; SUBCUTANEOUS at 08:03

## 2019-01-01 RX ADMIN — HYDROMORPHONE HYDROCHLORIDE 0.5 MG: 1 INJECTION, SOLUTION INTRAMUSCULAR; INTRAVENOUS; SUBCUTANEOUS at 17:38

## 2019-01-01 RX ADMIN — Medication 10 ML: at 22:55

## 2019-01-01 RX ADMIN — POTASSIUM CHLORIDE 20 MEQ: 20 TABLET, EXTENDED RELEASE ORAL at 17:27

## 2019-01-01 RX ADMIN — Medication 10 ML: at 21:47

## 2019-01-01 RX ADMIN — VANCOMYCIN HYDROCHLORIDE 1250 MG: 10 INJECTION, POWDER, LYOPHILIZED, FOR SOLUTION INTRAVENOUS at 14:10

## 2019-01-01 RX ADMIN — POTASSIUM CHLORIDE 20 MEQ: 200 INJECTION, SOLUTION INTRAVENOUS at 02:12

## 2019-01-01 RX ADMIN — GABAPENTIN 600 MG: 300 CAPSULE ORAL at 08:21

## 2019-01-01 RX ADMIN — SOYBEAN OIL 250 ML: 20 INJECTION, SOLUTION INTRAVENOUS at 17:41

## 2019-01-01 RX ADMIN — DIATRIZOATE MEGLUMINE AND DIATRIZOATE SODIUM 15 ML: 660; 100 LIQUID ORAL; RECTAL at 08:15

## 2019-01-01 RX ADMIN — Medication 10 ML: at 05:49

## 2019-01-01 RX ADMIN — METRONIDAZOLE 500 MG: 500 TABLET, FILM COATED ORAL at 08:26

## 2019-01-01 RX ADMIN — SOYBEAN OIL 250 ML: 20 INJECTION, SOLUTION INTRAVENOUS at 17:27

## 2019-01-01 RX ADMIN — DILTIAZEM HYDROCHLORIDE 120 MG: 120 CAPSULE, COATED, EXTENDED RELEASE ORAL at 09:54

## 2019-01-01 RX ADMIN — HALOPERIDOL LACTATE 2 MG: 5 INJECTION INTRAMUSCULAR at 04:34

## 2019-01-01 RX ADMIN — MAGNESIUM SULFATE HEPTAHYDRATE: 500 INJECTION, SOLUTION INTRAMUSCULAR; INTRAVENOUS at 17:45

## 2019-01-01 RX ADMIN — METOPROLOL SUCCINATE 12.5 MG: 25 TABLET, EXTENDED RELEASE ORAL at 09:13

## 2019-01-01 RX ADMIN — Medication 10 ML: at 21:17

## 2019-01-01 RX ADMIN — Medication 500 MG: at 08:26

## 2019-01-01 RX ADMIN — METOPROLOL TARTRATE 2.5 MG: 5 INJECTION INTRAVENOUS at 13:13

## 2019-01-01 RX ADMIN — CEPHALEXIN 500 MG: 500 CAPSULE ORAL at 21:15

## 2019-01-01 RX ADMIN — POTASSIUM CHLORIDE 20 MEQ: 200 INJECTION, SOLUTION INTRAVENOUS at 13:18

## 2019-01-01 RX ADMIN — POTASSIUM CHLORIDE 20 MEQ: 20 TABLET, EXTENDED RELEASE ORAL at 17:24

## 2019-01-01 RX ADMIN — MORPHINE SULFATE 2 MG: 2 INJECTION, SOLUTION INTRAMUSCULAR; INTRAVENOUS at 01:35

## 2019-01-01 RX ADMIN — DOCUSATE SODIUM 100 MG: 100 CAPSULE, LIQUID FILLED ORAL at 09:46

## 2019-01-01 RX ADMIN — METOPROLOL TARTRATE 2.5 MG: 5 INJECTION INTRAVENOUS at 06:41

## 2019-01-01 RX ADMIN — SIMVASTATIN 20 MG: 20 TABLET, FILM COATED ORAL at 21:17

## 2019-01-01 RX ADMIN — SODIUM CHLORIDE 250 ML: 900 INJECTION, SOLUTION INTRAVENOUS at 03:01

## 2019-01-01 RX ADMIN — MORPHINE SULFATE 5 MG: 100 SOLUTION ORAL at 11:22

## 2019-01-01 RX ADMIN — WATER 10 MG: 1 INJECTION INTRAMUSCULAR; INTRAVENOUS; SUBCUTANEOUS at 00:03

## 2019-01-01 RX ADMIN — Medication 5 ML: at 14:00

## 2019-01-01 RX ADMIN — HYDROMORPHONE HYDROCHLORIDE 0.5 MG: 1 INJECTION, SOLUTION INTRAMUSCULAR; INTRAVENOUS; SUBCUTANEOUS at 20:28

## 2019-01-01 RX ADMIN — ALUMINUM HYDROXIDE, MAGNESIUM HYDROXIDE, AND SIMETHICONE 30 ML: 200; 200; 20 SUSPENSION ORAL at 10:24

## 2019-01-01 RX ADMIN — ONDANSETRON 4 MG: 2 INJECTION INTRAMUSCULAR; INTRAVENOUS at 02:52

## 2019-01-01 RX ADMIN — DILTIAZEM HYDROCHLORIDE 30 MG: 30 TABLET, FILM COATED ORAL at 05:31

## 2019-01-01 RX ADMIN — HALOPERIDOL LACTATE 2 MG: 2 SOLUTION, CONCENTRATE ORAL at 13:07

## 2019-01-01 RX ADMIN — SODIUM CHLORIDE 500 ML: 900 INJECTION, SOLUTION INTRAVENOUS at 10:00

## 2019-01-01 RX ADMIN — POTASSIUM CHLORIDE AND SODIUM CHLORIDE: 900; 150 INJECTION, SOLUTION INTRAVENOUS at 20:30

## 2019-01-01 RX ADMIN — HALOPERIDOL LACTATE 2 MG: 5 INJECTION INTRAMUSCULAR at 17:16

## 2019-01-01 RX ADMIN — LEVOFLOXACIN 750 MG: 5 INJECTION, SOLUTION INTRAVENOUS at 13:41

## 2019-01-01 RX ADMIN — DILTIAZEM HYDROCHLORIDE 30 MG: 30 TABLET, FILM COATED ORAL at 11:37

## 2019-01-01 RX ADMIN — SODIUM CHLORIDE 150 ML/HR: 900 INJECTION, SOLUTION INTRAVENOUS at 23:44

## 2019-01-01 RX ADMIN — CEFTRIAXONE SODIUM 1 G: 1 INJECTION, POWDER, FOR SOLUTION INTRAMUSCULAR; INTRAVENOUS at 20:36

## 2019-01-01 RX ADMIN — WATER 10 MG: 1 INJECTION INTRAMUSCULAR; INTRAVENOUS; SUBCUTANEOUS at 22:56

## 2019-01-01 RX ADMIN — METOPROLOL TARTRATE 2.5 MG: 5 INJECTION INTRAVENOUS at 12:07

## 2019-01-01 RX ADMIN — DILTIAZEM HYDROCHLORIDE 30 MG: 30 TABLET, FILM COATED ORAL at 23:58

## 2019-01-01 RX ADMIN — METOPROLOL TARTRATE 2.5 MG: 5 INJECTION INTRAVENOUS at 23:08

## 2019-01-01 RX ADMIN — HYDROMORPHONE HYDROCHLORIDE 1 MG: 1 INJECTION, SOLUTION INTRAMUSCULAR; INTRAVENOUS; SUBCUTANEOUS at 06:24

## 2019-01-01 RX ADMIN — Medication 10 ML: at 05:12

## 2019-01-01 RX ADMIN — SODIUM CHLORIDE 100 ML: 900 INJECTION, SOLUTION INTRAVENOUS at 12:23

## 2019-01-01 RX ADMIN — HYDROMORPHONE HYDROCHLORIDE 1 MG: 1 INJECTION, SOLUTION INTRAMUSCULAR; INTRAVENOUS; SUBCUTANEOUS at 14:46

## 2019-01-01 RX ADMIN — LATANOPROST 1 DROP: 50 SOLUTION OPHTHALMIC at 18:03

## 2019-01-01 RX ADMIN — Medication 10 ML: at 05:04

## 2019-01-01 RX ADMIN — Medication 10 ML: at 05:40

## 2019-01-01 RX ADMIN — WARFARIN SODIUM 2 MG: 2 TABLET ORAL at 19:58

## 2019-01-01 RX ADMIN — HYDROMORPHONE HYDROCHLORIDE 1 MG: 1 INJECTION, SOLUTION INTRAMUSCULAR; INTRAVENOUS; SUBCUTANEOUS at 13:38

## 2019-01-01 RX ADMIN — DEXTROSE MONOHYDRATE, SODIUM CHLORIDE, AND POTASSIUM CHLORIDE 125 ML/HR: 50; 4.5; 1.49 INJECTION, SOLUTION INTRAVENOUS at 15:21

## 2019-01-01 RX ADMIN — HYDROMORPHONE HYDROCHLORIDE 0.5 MG: 1 INJECTION, SOLUTION INTRAMUSCULAR; INTRAVENOUS; SUBCUTANEOUS at 19:01

## 2019-01-01 RX ADMIN — IOPAMIDOL 100 ML: 755 INJECTION, SOLUTION INTRAVENOUS at 12:23

## 2019-01-01 RX ADMIN — SODIUM CHLORIDE 100 ML: 900 INJECTION, SOLUTION INTRAVENOUS at 21:47

## 2019-01-01 RX ADMIN — SODIUM CHLORIDE, SODIUM LACTATE, POTASSIUM CHLORIDE, AND CALCIUM CHLORIDE 500 ML: 600; 310; 30; 20 INJECTION, SOLUTION INTRAVENOUS at 21:19

## 2019-01-01 RX ADMIN — ONDANSETRON 4 MG: 2 INJECTION INTRAMUSCULAR; INTRAVENOUS at 09:33

## 2019-01-01 RX ADMIN — CEFTRIAXONE SODIUM 1 G: 1 INJECTION, POWDER, FOR SOLUTION INTRAMUSCULAR; INTRAVENOUS at 12:00

## 2019-01-01 RX ADMIN — DIATRIZOATE MEGLUMINE AND DIATRIZOATE SODIUM 15 ML: 660; 100 LIQUID ORAL; RECTAL at 12:27

## 2019-01-01 RX ADMIN — METOPROLOL TARTRATE 2.5 MG: 5 INJECTION INTRAVENOUS at 23:28

## 2019-01-01 RX ADMIN — DILTIAZEM HYDROCHLORIDE 120 MG: 120 CAPSULE, COATED, EXTENDED RELEASE ORAL at 08:21

## 2019-01-01 RX ADMIN — METRONIDAZOLE 500 MG: 500 INJECTION, SOLUTION INTRAVENOUS at 20:57

## 2019-01-01 RX ADMIN — POTASSIUM CHLORIDE 20 MEQ: 200 INJECTION, SOLUTION INTRAVENOUS at 05:35

## 2019-01-01 RX ADMIN — Medication 10 ML: at 06:22

## 2019-01-01 RX ADMIN — Medication 10 ML: at 21:12

## 2019-01-01 RX ADMIN — FUROSEMIDE 20 MG: 10 INJECTION, SOLUTION INTRAMUSCULAR; INTRAVENOUS at 11:09

## 2019-01-01 RX ADMIN — Medication 10 ML: at 05:45

## 2019-01-01 RX ADMIN — METRONIDAZOLE 500 MG: 500 INJECTION, SOLUTION INTRAVENOUS at 08:40

## 2019-01-01 RX ADMIN — CEFTRIAXONE SODIUM 1 G: 1 INJECTION, POWDER, FOR SOLUTION INTRAMUSCULAR; INTRAVENOUS at 12:43

## 2019-01-01 RX ADMIN — HEPARIN SODIUM 12 UNITS/KG/HR: 5000 INJECTION, SOLUTION INTRAVENOUS at 11:47

## 2019-01-01 RX ADMIN — HEPARIN SODIUM 19 UNITS/KG/HR: 5000 INJECTION, SOLUTION INTRAVENOUS at 07:33

## 2019-01-01 RX ADMIN — POTASSIUM CHLORIDE 20 MEQ: 20 TABLET, EXTENDED RELEASE ORAL at 17:34

## 2019-01-01 RX ADMIN — Medication 10 ML: at 23:12

## 2019-01-01 RX ADMIN — ALBUMIN (HUMAN) 12.5 G: 0.25 INJECTION, SOLUTION INTRAVENOUS at 09:33

## 2019-01-01 RX ADMIN — SIMVASTATIN 20 MG: 20 TABLET, FILM COATED ORAL at 21:38

## 2019-01-01 RX ADMIN — LISINOPRIL 2.5 MG: 5 TABLET ORAL at 09:14

## 2019-01-01 RX ADMIN — METOPROLOL TARTRATE 2.5 MG: 5 INJECTION INTRAVENOUS at 18:22

## 2019-01-01 RX ADMIN — METOPROLOL TARTRATE 2.5 MG: 5 INJECTION INTRAVENOUS at 17:29

## 2019-01-01 RX ADMIN — Medication 10 ML: at 17:22

## 2019-01-01 RX ADMIN — GABAPENTIN 600 MG: 300 CAPSULE ORAL at 08:24

## 2019-01-01 RX ADMIN — POTASSIUM CHLORIDE 20 MEQ: 20 TABLET, EXTENDED RELEASE ORAL at 08:41

## 2019-01-01 RX ADMIN — TUBERCULIN PURIFIED PROTEIN DERIVATIVE 5 UNITS: 5 INJECTION, SOLUTION INTRADERMAL at 15:09

## 2019-01-01 RX ADMIN — CEFTRIAXONE SODIUM 1 G: 1 INJECTION, POWDER, FOR SOLUTION INTRAMUSCULAR; INTRAVENOUS at 13:12

## 2019-01-01 RX ADMIN — Medication 10 ML: at 14:05

## 2019-01-01 RX ADMIN — WATER 10 MG: 1 INJECTION INTRAMUSCULAR; INTRAVENOUS; SUBCUTANEOUS at 22:31

## 2019-01-01 RX ADMIN — FAMOTIDINE 20 MG: 10 INJECTION INTRAVENOUS at 17:25

## 2019-01-01 RX ADMIN — DEXTROSE MONOHYDRATE, SODIUM CHLORIDE, AND POTASSIUM CHLORIDE 100 ML/HR: 50; 4.5; 1.49 INJECTION, SOLUTION INTRAVENOUS at 05:50

## 2019-01-01 RX ADMIN — Medication 500 MG: at 17:22

## 2019-01-01 RX ADMIN — DEXTROSE MONOHYDRATE, SODIUM CHLORIDE, AND POTASSIUM CHLORIDE 100 ML/HR: 50; 4.5; 1.49 INJECTION, SOLUTION INTRAVENOUS at 19:44

## 2019-01-01 RX ADMIN — DILTIAZEM HYDROCHLORIDE 30 MG: 30 TABLET, FILM COATED ORAL at 10:16

## 2019-01-01 RX ADMIN — ONDANSETRON 4 MG: 2 INJECTION INTRAMUSCULAR; INTRAVENOUS at 21:30

## 2019-01-01 RX ADMIN — HALOPERIDOL LACTATE 2 MG: 5 INJECTION INTRAMUSCULAR at 15:34

## 2019-01-01 RX ADMIN — WARFARIN SODIUM 0.5 MG: 1 TABLET ORAL at 17:35

## 2019-01-01 RX ADMIN — CEFTRIAXONE SODIUM 1 G: 1 INJECTION, POWDER, FOR SOLUTION INTRAMUSCULAR; INTRAVENOUS at 20:34

## 2019-01-01 RX ADMIN — CEFTRIAXONE SODIUM 1 G: 1 INJECTION, POWDER, FOR SOLUTION INTRAMUSCULAR; INTRAVENOUS at 21:17

## 2019-01-01 RX ADMIN — HEPARIN SODIUM 16 UNITS/KG/HR: 5000 INJECTION, SOLUTION INTRAVENOUS at 18:02

## 2019-01-01 RX ADMIN — WATER 10 MG: 1 INJECTION INTRAMUSCULAR; INTRAVENOUS; SUBCUTANEOUS at 13:30

## 2019-01-01 RX ADMIN — LISINOPRIL 10 MG: 5 TABLET ORAL at 08:42

## 2019-01-01 RX ADMIN — LISINOPRIL 10 MG: 5 TABLET ORAL at 08:41

## 2019-01-01 RX ADMIN — LIDOCAINE HYDROCHLORIDE 15 ML: 20 SOLUTION ORAL; TOPICAL at 10:24

## 2019-01-01 RX ADMIN — HYDROMORPHONE HYDROCHLORIDE 0.5 MG: 1 INJECTION, SOLUTION INTRAMUSCULAR; INTRAVENOUS; SUBCUTANEOUS at 10:14

## 2019-01-01 RX ADMIN — Medication 10 ML: at 21:33

## 2019-01-01 RX ADMIN — HYDROMORPHONE HYDROCHLORIDE 1 MG: 1 INJECTION, SOLUTION INTRAMUSCULAR; INTRAVENOUS; SUBCUTANEOUS at 23:15

## 2019-01-01 RX ADMIN — LATANOPROST 1 DROP: 50 SOLUTION OPHTHALMIC at 17:54

## 2019-01-01 RX ADMIN — SODIUM CHLORIDE 500 ML: 900 INJECTION, SOLUTION INTRAVENOUS at 16:25

## 2019-01-01 RX ADMIN — SIMVASTATIN 20 MG: 20 TABLET, FILM COATED ORAL at 22:03

## 2019-01-01 RX ADMIN — ONDANSETRON 4 MG: 2 INJECTION INTRAMUSCULAR; INTRAVENOUS at 20:31

## 2019-01-01 RX ADMIN — CEFTRIAXONE SODIUM 1 G: 1 INJECTION, POWDER, FOR SOLUTION INTRAMUSCULAR; INTRAVENOUS at 13:00

## 2019-01-01 RX ADMIN — Medication 10 ML: at 13:27

## 2019-01-01 RX ADMIN — CEFTRIAXONE SODIUM 1 G: 1 INJECTION, POWDER, FOR SOLUTION INTRAMUSCULAR; INTRAVENOUS at 12:27

## 2019-01-01 RX ADMIN — METOPROLOL TARTRATE 2.5 MG: 5 INJECTION INTRAVENOUS at 06:27

## 2019-01-01 RX ADMIN — HYDROMORPHONE HYDROCHLORIDE 0.5 MG: 1 INJECTION, SOLUTION INTRAMUSCULAR; INTRAVENOUS; SUBCUTANEOUS at 02:59

## 2019-01-01 RX ADMIN — FUROSEMIDE 40 MG: 10 INJECTION, SOLUTION INTRAMUSCULAR; INTRAVENOUS at 22:03

## 2019-01-01 RX ADMIN — HALOPERIDOL LACTATE 2 MG: 5 INJECTION INTRAMUSCULAR at 23:45

## 2019-01-01 RX ADMIN — DIATRIZOATE MEGLUMINE AND DIATRIZOATE SODIUM 15 ML: 600; 100 SOLUTION ORAL; RECTAL at 20:29

## 2019-01-01 RX ADMIN — SODIUM CHLORIDE 125 ML/HR: 900 INJECTION, SOLUTION INTRAVENOUS at 14:55

## 2019-01-01 RX ADMIN — WARFARIN SODIUM 2 MG: 2 TABLET ORAL at 17:55

## 2019-01-01 RX ADMIN — METOPROLOL TARTRATE 2.5 MG: 5 INJECTION INTRAVENOUS at 12:00

## 2019-01-01 RX ADMIN — METRONIDAZOLE 500 MG: 500 INJECTION, SOLUTION INTRAVENOUS at 05:45

## 2019-01-01 RX ADMIN — POTASSIUM CHLORIDE 20 MEQ: 200 INJECTION, SOLUTION INTRAVENOUS at 03:41

## 2019-01-01 RX ADMIN — FUROSEMIDE 40 MG: 10 INJECTION, SOLUTION INTRAMUSCULAR; INTRAVENOUS at 09:15

## 2019-01-01 RX ADMIN — Medication 5 ML: at 20:35

## 2019-01-01 RX ADMIN — Medication 5 ML: at 22:08

## 2019-01-01 RX ADMIN — DILTIAZEM HYDROCHLORIDE 30 MG: 30 TABLET, FILM COATED ORAL at 14:07

## 2019-01-01 RX ADMIN — POTASSIUM CHLORIDE 20 MEQ: 20 TABLET, EXTENDED RELEASE ORAL at 09:14

## 2019-01-01 RX ADMIN — HYDROMORPHONE HYDROCHLORIDE 0.5 MG: 1 INJECTION, SOLUTION INTRAMUSCULAR; INTRAVENOUS; SUBCUTANEOUS at 22:26

## 2019-01-01 RX ADMIN — Medication 10 ML: at 21:38

## 2019-01-01 RX ADMIN — DIPHENHYDRAMINE HYDROCHLORIDE 25 MG: 25 CAPSULE ORAL at 23:43

## 2019-01-01 RX ADMIN — HALOPERIDOL LACTATE 2 MG: 5 INJECTION INTRAMUSCULAR at 21:32

## 2019-01-01 RX ADMIN — WARFARIN SODIUM 2 MG: 2 TABLET ORAL at 18:03

## 2019-01-01 RX ADMIN — PHYTONADIONE 10 MG: 10 INJECTION, EMULSION INTRAMUSCULAR; INTRAVENOUS; SUBCUTANEOUS at 13:51

## 2019-01-01 RX ADMIN — HALOPERIDOL LACTATE 1 MG: 5 INJECTION INTRAMUSCULAR at 13:20

## 2019-01-01 RX ADMIN — POTASSIUM CHLORIDE 20 MEQ: 200 INJECTION, SOLUTION INTRAVENOUS at 18:00

## 2019-01-01 RX ADMIN — DEXTROSE MONOHYDRATE AND SODIUM CHLORIDE 125 ML/HR: 5; .45 INJECTION, SOLUTION INTRAVENOUS at 22:30

## 2019-01-01 RX ADMIN — ALBUMIN (HUMAN) 12.5 G: 0.25 INJECTION, SOLUTION INTRAVENOUS at 17:23

## 2019-01-01 RX ADMIN — Medication 10 ML: at 09:16

## 2019-01-01 RX ADMIN — Medication 10 ML: at 13:38

## 2019-01-01 RX ADMIN — FAMOTIDINE 20 MG: 10 INJECTION INTRAVENOUS at 17:33

## 2019-01-01 RX ADMIN — POTASSIUM CHLORIDE 40 MEQ: 20 TABLET, EXTENDED RELEASE ORAL at 20:08

## 2019-01-01 RX ADMIN — HALOPERIDOL LACTATE 2 MG: 5 INJECTION INTRAMUSCULAR at 21:22

## 2019-01-01 RX ADMIN — PERFLUTREN 1 ML: 6.52 INJECTION, SUSPENSION INTRAVENOUS at 10:57

## 2019-01-01 RX ADMIN — LATANOPROST 1 DROP: 50 SOLUTION OPHTHALMIC at 17:45

## 2019-01-01 RX ADMIN — GABAPENTIN 600 MG: 300 CAPSULE ORAL at 08:41

## 2019-01-01 RX ADMIN — MAGNESIUM SULFATE IN WATER 4 G: 40 INJECTION, SOLUTION INTRAVENOUS at 00:26

## 2019-01-01 RX ADMIN — SIMVASTATIN 20 MG: 20 TABLET, FILM COATED ORAL at 21:55

## 2019-01-01 RX ADMIN — MORPHINE SULFATE 2 MG: 2 INJECTION, SOLUTION INTRAMUSCULAR; INTRAVENOUS at 18:12

## 2019-01-01 RX ADMIN — GABAPENTIN 600 MG: 300 CAPSULE ORAL at 09:12

## 2019-01-01 RX ADMIN — DEXTROSE MONOHYDRATE AND SODIUM CHLORIDE 75 ML/HR: 5; .45 INJECTION, SOLUTION INTRAVENOUS at 00:45

## 2019-01-01 RX ADMIN — METRONIDAZOLE 500 MG: 500 TABLET, FILM COATED ORAL at 17:24

## 2019-01-01 RX ADMIN — MAGNESIUM SULFATE HEPTAHYDRATE 2 G: 40 INJECTION, SOLUTION INTRAVENOUS at 08:54

## 2019-01-01 RX ADMIN — LEVOFLOXACIN 750 MG: 5 INJECTION, SOLUTION INTRAVENOUS at 12:00

## 2019-01-01 RX ADMIN — ALBUMIN (HUMAN) 12.5 G: 0.25 INJECTION, SOLUTION INTRAVENOUS at 17:17

## 2019-01-01 RX ADMIN — Medication 10 ML: at 21:31

## 2019-01-01 RX ADMIN — Medication 10 ML: at 14:10

## 2019-01-01 RX ADMIN — MORPHINE SULFATE 2 MG: 2 INJECTION, SOLUTION INTRAMUSCULAR; INTRAVENOUS at 20:52

## 2019-01-01 RX ADMIN — Medication 5 ML: at 05:50

## 2019-01-01 RX ADMIN — DEXTROSE MONOHYDRATE, SODIUM CHLORIDE, AND POTASSIUM CHLORIDE 100 ML/HR: 50; 4.5; 1.49 INJECTION, SOLUTION INTRAVENOUS at 02:37

## 2019-01-01 RX ADMIN — METOPROLOL TARTRATE 2.5 MG: 5 INJECTION INTRAVENOUS at 17:23

## 2019-01-01 RX ADMIN — Medication 10 ML: at 06:04

## 2019-01-01 RX ADMIN — METOPROLOL TARTRATE 2.5 MG: 5 INJECTION INTRAVENOUS at 16:58

## 2019-01-01 RX ADMIN — DEXTROSE MONOHYDRATE, SODIUM CHLORIDE, AND POTASSIUM CHLORIDE 100 ML/HR: 50; 4.5; 1.49 INJECTION, SOLUTION INTRAVENOUS at 10:59

## 2019-01-01 RX ADMIN — ENOXAPARIN SODIUM 60 MG: 60 INJECTION, SOLUTION INTRAVENOUS; SUBCUTANEOUS at 21:31

## 2019-01-01 RX ADMIN — FAMOTIDINE 20 MG: 10 INJECTION INTRAVENOUS at 18:03

## 2019-01-01 RX ADMIN — MORPHINE SULFATE 6 MG: 100 SOLUTION ORAL at 13:56

## 2019-01-01 RX ADMIN — METOPROLOL TARTRATE 2.5 MG: 5 INJECTION INTRAVENOUS at 17:25

## 2019-01-01 RX ADMIN — MAGNESIUM SULFATE HEPTAHYDRATE 2 G: 40 INJECTION, SOLUTION INTRAVENOUS at 08:19

## 2019-01-01 RX ADMIN — HALOPERIDOL LACTATE 2 MG: 5 INJECTION INTRAMUSCULAR at 16:20

## 2019-01-01 RX ADMIN — DIATRIZOATE MEGLUMINE AND DIATRIZOATE SODIUM 15 ML: 660; 100 LIQUID ORAL; RECTAL at 13:18

## 2019-01-01 RX ADMIN — ENOXAPARIN SODIUM 60 MG: 60 INJECTION, SOLUTION INTRAVENOUS; SUBCUTANEOUS at 21:22

## 2019-01-01 RX ADMIN — FAMOTIDINE 20 MG: 10 INJECTION, SOLUTION INTRAVENOUS at 09:33

## 2019-01-01 RX ADMIN — METOPROLOL TARTRATE 2.5 MG: 5 INJECTION INTRAVENOUS at 17:01

## 2019-01-01 RX ADMIN — Medication 10 ML: at 13:44

## 2019-01-01 RX ADMIN — DILTIAZEM HYDROCHLORIDE 30 MG: 30 TABLET, FILM COATED ORAL at 08:31

## 2019-01-01 RX ADMIN — METOPROLOL TARTRATE 2.5 MG: 5 INJECTION INTRAVENOUS at 05:20

## 2019-01-01 RX ADMIN — HEPARIN SODIUM 1800 UNITS: 5000 INJECTION, SOLUTION INTRAVENOUS; SUBCUTANEOUS at 07:34

## 2019-01-01 RX ADMIN — FUROSEMIDE 40 MG: 10 INJECTION, SOLUTION INTRAMUSCULAR; INTRAVENOUS at 08:41

## 2019-01-01 RX ADMIN — WARFARIN SODIUM 2 MG: 2 TABLET ORAL at 17:33

## 2019-01-01 RX ADMIN — Medication 10 ML: at 05:05

## 2019-01-01 RX ADMIN — TUBERCULIN PURIFIED PROTEIN DERIVATIVE 5 UNITS: 5 INJECTION INTRADERMAL at 23:46

## 2019-01-01 RX ADMIN — METOPROLOL TARTRATE 2.5 MG: 5 INJECTION INTRAVENOUS at 05:12

## 2019-01-01 RX ADMIN — Medication 10 ML: at 02:43

## 2019-01-01 RX ADMIN — METOPROLOL TARTRATE 2.5 MG: 5 INJECTION INTRAVENOUS at 12:26

## 2019-01-01 RX ADMIN — Medication 10 ML: at 21:55

## 2019-01-01 RX ADMIN — ENOXAPARIN SODIUM 60 MG: 60 INJECTION, SOLUTION INTRAVENOUS; SUBCUTANEOUS at 21:33

## 2019-01-01 RX ADMIN — SIMVASTATIN 20 MG: 20 TABLET, FILM COATED ORAL at 21:03

## 2019-01-01 RX ADMIN — GABAPENTIN 600 MG: 300 CAPSULE ORAL at 08:32

## 2019-01-01 RX ADMIN — Medication 10 ML: at 05:24

## 2019-01-01 RX ADMIN — Medication 10 ML: at 13:58

## 2019-01-01 RX ADMIN — METOPROLOL TARTRATE 2.5 MG: 5 INJECTION INTRAVENOUS at 11:02

## 2019-01-01 RX ADMIN — DEXTROSE MONOHYDRATE 75 ML/HR: 5 INJECTION, SOLUTION INTRAVENOUS at 02:11

## 2019-01-01 RX ADMIN — DEXTROSE MONOHYDRATE, SODIUM CHLORIDE, AND POTASSIUM CHLORIDE: 50; 4.5; 2.98 INJECTION, SOLUTION INTRAVENOUS at 01:28

## 2019-01-01 RX ADMIN — LATANOPROST 1 DROP: 50 SOLUTION OPHTHALMIC at 19:39

## 2019-01-01 RX ADMIN — CEFTRIAXONE SODIUM 1 G: 1 INJECTION, POWDER, FOR SOLUTION INTRAMUSCULAR; INTRAVENOUS at 13:59

## 2019-01-01 RX ADMIN — ENOXAPARIN SODIUM 50 MG: 40 INJECTION SUBCUTANEOUS at 20:07

## 2019-01-01 RX ADMIN — Medication 10 ML: at 21:39

## 2019-01-01 RX ADMIN — FAMOTIDINE 20 MG: 10 INJECTION INTRAVENOUS at 17:15

## 2019-01-01 RX ADMIN — METOPROLOL TARTRATE 2.5 MG: 5 INJECTION INTRAVENOUS at 04:15

## 2019-01-01 RX ADMIN — HYDROMORPHONE HYDROCHLORIDE 0.5 MG: 1 INJECTION, SOLUTION INTRAMUSCULAR; INTRAVENOUS; SUBCUTANEOUS at 19:51

## 2019-01-01 RX ADMIN — HYDROMORPHONE HYDROCHLORIDE 0.5 MG: 1 INJECTION, SOLUTION INTRAMUSCULAR; INTRAVENOUS; SUBCUTANEOUS at 01:49

## 2019-01-01 RX ADMIN — ALBUMIN (HUMAN) 12.5 G: 0.25 INJECTION, SOLUTION INTRAVENOUS at 02:59

## 2019-01-01 RX ADMIN — Medication 10 ML: at 06:03

## 2019-01-01 RX ADMIN — HYDROMORPHONE HYDROCHLORIDE 1 MG: 1 INJECTION, SOLUTION INTRAMUSCULAR; INTRAVENOUS; SUBCUTANEOUS at 22:03

## 2019-01-01 RX ADMIN — METRONIDAZOLE 500 MG: 500 INJECTION, SOLUTION INTRAVENOUS at 15:21

## 2019-01-01 RX ADMIN — METOPROLOL TARTRATE 2.5 MG: 5 INJECTION INTRAVENOUS at 11:33

## 2019-01-01 RX ADMIN — SOYBEAN OIL 250 ML: 20 INJECTION, SOLUTION INTRAVENOUS at 17:25

## 2019-01-01 RX ADMIN — SODIUM CHLORIDE 100 ML/HR: 900 INJECTION, SOLUTION INTRAVENOUS at 00:29

## 2019-01-01 RX ADMIN — SODIUM CHLORIDE 1000 ML: 900 INJECTION, SOLUTION INTRAVENOUS at 13:00

## 2019-01-01 RX ADMIN — FUROSEMIDE 40 MG: 40 TABLET ORAL at 08:26

## 2019-01-01 RX ADMIN — HEPARIN SODIUM 1800 UNITS: 5000 INJECTION INTRAVENOUS; SUBCUTANEOUS at 18:02

## 2019-01-01 RX ADMIN — SODIUM CHLORIDE 150 ML/HR: 900 INJECTION, SOLUTION INTRAVENOUS at 08:39

## 2019-01-01 RX ADMIN — Medication 10 ML: at 14:21

## 2019-01-01 RX ADMIN — ENOXAPARIN SODIUM 50 MG: 60 INJECTION SUBCUTANEOUS at 16:59

## 2019-01-01 RX ADMIN — METOPROLOL TARTRATE 2.5 MG: 5 INJECTION INTRAVENOUS at 23:59

## 2019-01-01 RX ADMIN — SODIUM CHLORIDE 65 ML/HR: 900 INJECTION, SOLUTION INTRAVENOUS at 11:26

## 2019-01-01 RX ADMIN — SODIUM CHLORIDE, SODIUM LACTATE, POTASSIUM CHLORIDE, AND CALCIUM CHLORIDE 500 ML: 600; 310; 30; 20 INJECTION, SOLUTION INTRAVENOUS at 08:53

## 2019-01-01 RX ADMIN — MORPHINE SULFATE 5 MG: 100 SOLUTION ORAL at 18:09

## 2019-01-01 RX ADMIN — WARFARIN SODIUM 2 MG: 2 TABLET ORAL at 17:54

## 2019-01-01 RX ADMIN — METRONIDAZOLE 500 MG: 500 INJECTION, SOLUTION INTRAVENOUS at 22:03

## 2019-01-01 RX ADMIN — MORPHINE SULFATE 2 MG: 2 INJECTION, SOLUTION INTRAMUSCULAR; INTRAVENOUS at 17:59

## 2019-01-01 RX ADMIN — HYDROMORPHONE HYDROCHLORIDE 0.5 MG: 1 INJECTION, SOLUTION INTRAMUSCULAR; INTRAVENOUS; SUBCUTANEOUS at 09:02

## 2019-01-01 RX ADMIN — POTASSIUM CHLORIDE 20 MEQ: 200 INJECTION, SOLUTION INTRAVENOUS at 00:08

## 2019-01-01 RX ADMIN — HEPARIN SODIUM 5000 UNITS: 5000 INJECTION INTRAVENOUS; SUBCUTANEOUS at 05:20

## 2019-01-01 RX ADMIN — METRONIDAZOLE 500 MG: 500 TABLET, FILM COATED ORAL at 08:40

## 2019-01-01 RX ADMIN — Medication 500 MG: at 08:40

## 2019-01-01 RX ADMIN — POTASSIUM CHLORIDE 40 MEQ: 20 TABLET, EXTENDED RELEASE ORAL at 08:33

## 2019-01-01 RX ADMIN — ALBUMIN (HUMAN) 12.5 G: 0.25 INJECTION, SOLUTION INTRAVENOUS at 08:03

## 2019-01-01 RX ADMIN — MAGNESIUM SULFATE HEPTAHYDRATE: 500 INJECTION, SOLUTION INTRAMUSCULAR; INTRAVENOUS at 18:30

## 2019-01-01 RX ADMIN — Medication 10 ML: at 05:22

## 2019-01-01 RX ADMIN — SODIUM CHLORIDE 125 ML/HR: 900 INJECTION, SOLUTION INTRAVENOUS at 13:14

## 2019-01-01 RX ADMIN — ACETAMINOPHEN 650 MG: 325 TABLET, FILM COATED ORAL at 00:09

## 2019-01-01 RX ADMIN — MORPHINE SULFATE 5 MG: 100 SOLUTION ORAL at 03:40

## 2019-01-01 RX ADMIN — SODIUM CHLORIDE 125 ML/HR: 900 INJECTION, SOLUTION INTRAVENOUS at 00:58

## 2019-01-01 RX ADMIN — PHYTONADIONE 5 MG: 10 INJECTION, EMULSION INTRAMUSCULAR; INTRAVENOUS; SUBCUTANEOUS at 09:13

## 2019-01-01 RX ADMIN — SODIUM CHLORIDE 150 ML/HR: 900 INJECTION, SOLUTION INTRAVENOUS at 08:33

## 2019-01-01 RX ADMIN — METOPROLOL TARTRATE 2.5 MG: 5 INJECTION INTRAVENOUS at 01:02

## 2019-01-01 RX ADMIN — Medication 5 ML: at 05:37

## 2019-01-01 RX ADMIN — POTASSIUM CHLORIDE 20 MEQ: 200 INJECTION, SOLUTION INTRAVENOUS at 01:27

## 2019-01-01 RX ADMIN — LATANOPROST 1 DROP: 50 SOLUTION OPHTHALMIC at 17:25

## 2019-01-01 RX ADMIN — Medication 10 ML: at 13:59

## 2019-01-01 RX ADMIN — Medication 10 ML: at 05:53

## 2019-01-01 RX ADMIN — METOPROLOL SUCCINATE 25 MG: 25 TABLET, EXTENDED RELEASE ORAL at 08:41

## 2019-01-01 RX ADMIN — HEPARIN SODIUM 17 UNITS/KG/HR: 5000 INJECTION, SOLUTION INTRAVENOUS at 02:01

## 2019-01-01 RX ADMIN — BISACODYL 10 MG: 10 SUPPOSITORY RECTAL at 15:01

## 2019-01-01 RX ADMIN — POTASSIUM CHLORIDE AND SODIUM CHLORIDE: 900; 150 INJECTION, SOLUTION INTRAVENOUS at 08:18

## 2019-01-01 RX ADMIN — LEVOTHYROXINE SODIUM 75 MCG: 75 TABLET ORAL at 06:04

## 2019-01-01 RX ADMIN — WATER 10 MG: 1 INJECTION INTRAMUSCULAR; INTRAVENOUS; SUBCUTANEOUS at 21:31

## 2019-01-01 RX ADMIN — Medication 10 ML: at 12:23

## 2019-01-01 RX ADMIN — METOPROLOL TARTRATE 2.5 MG: 5 INJECTION INTRAVENOUS at 22:37

## 2019-01-01 RX ADMIN — Medication 5 ML: at 21:45

## 2019-01-01 RX ADMIN — DEXTROSE MONOHYDRATE, SODIUM CHLORIDE, AND POTASSIUM CHLORIDE 100 ML/HR: 50; 4.5; 1.49 INJECTION, SOLUTION INTRAVENOUS at 15:42

## 2019-01-01 RX ADMIN — SODIUM CHLORIDE 125 ML/HR: 900 INJECTION, SOLUTION INTRAVENOUS at 08:59

## 2019-01-01 RX ADMIN — DILTIAZEM HYDROCHLORIDE 30 MG: 30 TABLET, FILM COATED ORAL at 00:37

## 2019-01-01 RX ADMIN — MAGNESIUM SULFATE HEPTAHYDRATE: 500 INJECTION, SOLUTION INTRAMUSCULAR; INTRAVENOUS at 18:00

## 2019-01-01 RX ADMIN — FAMOTIDINE 20 MG: 10 INJECTION INTRAVENOUS at 17:54

## 2019-01-01 RX ADMIN — DEXTROSE MONOHYDRATE AND SODIUM CHLORIDE 50 ML/HR: 5; .45 INJECTION, SOLUTION INTRAVENOUS at 03:15

## 2019-01-01 RX ADMIN — MAGNESIUM SULFATE HEPTAHYDRATE: 500 INJECTION, SOLUTION INTRAMUSCULAR; INTRAVENOUS at 17:27

## 2019-01-01 RX ADMIN — METOPROLOL TARTRATE 2.5 MG: 5 INJECTION INTRAVENOUS at 05:49

## 2019-01-01 RX ADMIN — FUROSEMIDE 40 MG: 40 TABLET ORAL at 08:42

## 2019-01-01 RX ADMIN — SODIUM CHLORIDE 150 ML/HR: 900 INJECTION, SOLUTION INTRAVENOUS at 14:04

## 2019-01-01 RX ADMIN — DIPHENHYDRAMINE HYDROCHLORIDE 25 MG: 25 CAPSULE ORAL at 08:42

## 2019-01-01 RX ADMIN — MORPHINE SULFATE 2 MG: 2 INJECTION, SOLUTION INTRAMUSCULAR; INTRAVENOUS at 21:33

## 2019-01-01 RX ADMIN — MORPHINE SULFATE 6 MG: 100 SOLUTION ORAL at 05:01

## 2019-01-01 RX ADMIN — SODIUM CHLORIDE 65 ML/HR: 900 INJECTION, SOLUTION INTRAVENOUS at 19:57

## 2019-01-01 RX ADMIN — CEFPODOXIME PROXETIL 200 MG: 200 TABLET, FILM COATED ORAL at 08:40

## 2019-01-01 RX ADMIN — FUROSEMIDE 40 MG: 10 INJECTION, SOLUTION INTRAMUSCULAR; INTRAVENOUS at 20:57

## 2019-01-01 RX ADMIN — METRONIDAZOLE 500 MG: 500 INJECTION, SOLUTION INTRAVENOUS at 09:15

## 2019-01-01 RX ADMIN — POTASSIUM CHLORIDE 20 MEQ: 20 TABLET, EXTENDED RELEASE ORAL at 16:57

## 2019-01-01 RX ADMIN — HEPARIN SODIUM 5000 UNITS: 5000 INJECTION INTRAVENOUS; SUBCUTANEOUS at 22:49

## 2019-01-01 RX ADMIN — METOPROLOL TARTRATE 2.5 MG: 5 INJECTION INTRAVENOUS at 23:03

## 2019-01-01 RX ADMIN — METRONIDAZOLE 500 MG: 500 INJECTION, SOLUTION INTRAVENOUS at 21:17

## 2019-01-01 RX ADMIN — VANCOMYCIN HYDROCHLORIDE 750 MG: 10 INJECTION, POWDER, LYOPHILIZED, FOR SOLUTION INTRAVENOUS at 14:40

## 2019-01-01 RX ADMIN — METOPROLOL SUCCINATE 12.5 MG: 25 TABLET, EXTENDED RELEASE ORAL at 08:42

## 2019-01-01 RX ADMIN — CEFTRIAXONE SODIUM 1 G: 1 INJECTION, POWDER, FOR SOLUTION INTRAMUSCULAR; INTRAVENOUS at 13:20

## 2019-01-01 RX ADMIN — METOPROLOL TARTRATE 2.5 MG: 5 INJECTION INTRAVENOUS at 18:01

## 2019-01-01 RX ADMIN — Medication 10 ML: at 21:25

## 2019-01-01 RX ADMIN — DILTIAZEM HYDROCHLORIDE 30 MG: 30 TABLET, FILM COATED ORAL at 17:54

## 2019-01-01 RX ADMIN — HEPARIN SODIUM 12 UNITS/KG/HR: 5000 INJECTION, SOLUTION INTRAVENOUS at 02:40

## 2019-01-01 RX ADMIN — HALOPERIDOL LACTATE 2 MG: 2 SOLUTION, CONCENTRATE ORAL at 21:43

## 2019-01-01 RX ADMIN — Medication 5 ML: at 20:13

## 2019-01-01 RX ADMIN — METOPROLOL TARTRATE 2.5 MG: 5 INJECTION INTRAVENOUS at 11:40

## 2019-01-01 RX ADMIN — METOPROLOL TARTRATE 2.5 MG: 5 INJECTION INTRAVENOUS at 05:22

## 2019-01-01 RX ADMIN — METOPROLOL TARTRATE 2.5 MG: 5 INJECTION INTRAVENOUS at 05:10

## 2019-01-01 RX ADMIN — ENOXAPARIN SODIUM 60 MG: 60 INJECTION, SOLUTION INTRAVENOUS; SUBCUTANEOUS at 08:34

## 2019-01-01 RX ADMIN — METRONIDAZOLE 500 MG: 500 INJECTION, SOLUTION INTRAVENOUS at 20:34

## 2019-01-01 RX ADMIN — CEPHALEXIN 500 MG: 500 CAPSULE ORAL at 05:48

## 2019-01-01 RX ADMIN — MORPHINE SULFATE 2 MG: 2 INJECTION, SOLUTION INTRAMUSCULAR; INTRAVENOUS at 00:14

## 2019-01-01 RX ADMIN — WATER 10 MG: 1 INJECTION INTRAMUSCULAR; INTRAVENOUS; SUBCUTANEOUS at 11:00

## 2019-01-01 RX ADMIN — METOPROLOL TARTRATE 2.5 MG: 5 INJECTION INTRAVENOUS at 23:15

## 2019-01-01 RX ADMIN — SOYBEAN OIL 250 ML: 20 INJECTION, SOLUTION INTRAVENOUS at 18:31

## 2019-01-01 RX ADMIN — HEPARIN SODIUM 5000 UNITS: 5000 INJECTION INTRAVENOUS; SUBCUTANEOUS at 13:27

## 2019-01-01 RX ADMIN — DILTIAZEM HYDROCHLORIDE 30 MG: 30 TABLET, FILM COATED ORAL at 18:03

## 2019-01-01 RX ADMIN — METOPROLOL TARTRATE 2.5 MG: 5 INJECTION INTRAVENOUS at 23:11

## 2019-01-01 RX ADMIN — CEFTRIAXONE SODIUM 1 G: 1 INJECTION, POWDER, FOR SOLUTION INTRAMUSCULAR; INTRAVENOUS at 14:25

## 2019-01-01 RX ADMIN — CEFPODOXIME PROXETIL 200 MG: 200 TABLET, FILM COATED ORAL at 08:26

## 2019-01-01 RX ADMIN — Medication 10 ML: at 21:05

## 2019-01-01 RX ADMIN — IOPAMIDOL 100 ML: 755 INJECTION, SOLUTION INTRAVENOUS at 21:47

## 2019-01-01 RX ADMIN — HYDROMORPHONE HYDROCHLORIDE 0.5 MG: 1 INJECTION, SOLUTION INTRAMUSCULAR; INTRAVENOUS; SUBCUTANEOUS at 03:40

## 2019-01-01 RX ADMIN — DEXTROSE MONOHYDRATE, SODIUM CHLORIDE, AND POTASSIUM CHLORIDE 125 ML/HR: 50; 4.5; 1.49 INJECTION, SOLUTION INTRAVENOUS at 01:29

## 2019-01-01 RX ADMIN — DILTIAZEM HYDROCHLORIDE 30 MG: 30 TABLET, FILM COATED ORAL at 13:46

## 2019-01-01 RX ADMIN — SODIUM CHLORIDE 1000 ML: 900 INJECTION, SOLUTION INTRAVENOUS at 20:37

## 2019-01-01 RX ADMIN — DEXTROSE MONOHYDRATE AND SODIUM CHLORIDE 50 ML/HR: 5; .45 INJECTION, SOLUTION INTRAVENOUS at 08:51

## 2019-01-01 RX ADMIN — MAGNESIUM SULFATE HEPTAHYDRATE: 500 INJECTION, SOLUTION INTRAMUSCULAR; INTRAVENOUS at 17:25

## 2019-01-01 RX ADMIN — Medication 5 ML: at 05:32

## 2019-05-28 NOTE — ED TRIAGE NOTES
Pt arrives ems from home c/o diarrhea since Thursday every day consistently, getting progressively weak. Diarrhea has turned to a dark tarry color. Unable to stand without significant weakness starting today. States palpitations. Tachycardic on scene at 130 bpm, RBB on ekg. /80, BGL 85. Afebrile 98.9F. HR after fluid 90 bpm. 300 ccs fluid given en route.

## 2019-05-28 NOTE — ED PROVIDER NOTES
Patient states she has been having diarrhea for the past 5 days. She has had decreased appetite and by mouth intake over that period of time. The family states that she stands up. She denies any abdominal pain, denies any nausea or vomiting. The family states that her diarrhea has turned dark. The family denies any fever, denies similar symptoms in the past.  They deny any known sick contacts. Elements of this note were created using speech recognition software. As such, errors of speech recognition may be present. Past Medical History:  
Diagnosis Date  Acquired hypothyroidism  Acute pancreatitis  Arthritis  CAD (coronary artery disease) 4/29/11 RCA occluded and fills with collaterals, LAD and Cx nonobstructive CAD, EF 50-55%  Cancer ([address] THYROID - treated with surgery and radiation  CHF (congestive heart failure) ([address] 11/8/2018  Chronic pain   
 right hip  Coagulopathy ([address] 6/16/2011  Colon polyps  Follicular thyroid cancer ([address]  Gallstone pancreatitis  Glaucoma  Gout   
 hx of gout to right great toe - no problems  Hypertension   
 controlled with medication  Other ill-defined conditions(799.89)  Pancreatitis  Paroxysmal A-fib ([address] 11/8/2018  PE (pulmonary embolism) 2008  Pulmonary HTN (HCC)   
 moderate  Thromboembolus ([address] 1995  
 right leg - Hemashield graft/filter placed in abdomen  Thyroid disease   
 pt had total thyroidectomy - takes levoxyl daily Past Surgical History:  
Procedure Laterality Date  HX APPENDECTOMY  ? 1990  
 [facility] [address] [place]  
 hemashield graft/filter placed due to blood clot right leg  HX THYROIDECTOMY  2010  
 partial  
 HX TONSILLECTOMY [facility] ARTHROPLASTY  2008 & 2011  
 right hip Family History:  
Problem Relation Age of Onset  Stroke Mother  Hypertension Mother  Cancer Father prostate  Diabetes Maternal Uncle Social History Socioeconomic History  Marital status:  Spouse name: Not on file  Number of children: Not on file  Years of education: Not on file  Highest education level: Not on file Occupational History  Not on file Social Needs  Financial resource strain: Not on file  Food insecurity:  
  Worry: Not on file Inability: Not on file  Transportation needs:  
  Medical: Not on file Non-medical: Not on file Tobacco Use  Smoking status: Former Smoker  Smokeless tobacco: Never Used  Tobacco comment: quit smoking 1995 Substance and Sexual Activity  Alcohol use: No  
 Drug use: No  
 Sexual activity: Not on file Lifestyle  Physical activity:  
  Days per week: Not on file Minutes per session: Not on file  Stress: Not on file Relationships  Social connections:  
  Talks on phone: Not on file Gets together: Not on file Attends Worship service: Not on file Active member of club or organization: Not on file Attends meetings of clubs or organizations: Not on file Relationship status: Not on file  Intimate partner violence:  
  Fear of current or ex partner: Not on file Emotionally abused: Not on file Physically abused: Not on file Forced sexual activity: Not on file Other Topics Concern  Not on file Social History Narrative  Not on file ALLERGIES: Ativan [lorazepam]; Lortab [hydrocodone-acetaminophen]; and Morphine Review of Systems Constitutional: Positive for fatigue. Negative for chills and fever. Gastrointestinal: Positive for diarrhea. Negative for abdominal pain, nausea and vomiting. All other systems reviewed and are negative. Vitals:  
 05/28/19 1530 BP: 137/77 Pulse: 97 Resp: 18 Temp: 98.5 °F (36.9 °C) SpO2: 97% Weight: 50.3 kg (111 lb) Height: 5' 5\" (1.651 m) Physical Exam  
 Constitutional: She appears well-developed and well-nourished. HENT:  
Head: Normocephalic and atraumatic. Eyes: Pupils are equal, round, and reactive to light. Conjunctivae are normal.  
Neck: Normal range of motion. Neck supple. Cardiovascular: Normal rate, regular rhythm and normal heart sounds. Pulmonary/Chest: Effort normal and breath sounds normal.  
Abdominal: Soft. Bowel sounds are normal.  
Genitourinary: Rectal exam shows guaiac negative stool. Musculoskeletal: She exhibits no edema or tenderness. Neurological: She is alert. Skin: Skin is warm and dry. Psychiatric: She has a normal mood and affect. Her behavior is normal.  
Nursing note and vitals reviewed. MDM Number of Diagnoses or Management Options Diarrhea, unspecified type: new and does not require workup Hypokalemia: new and does not require workup Weakness: new and does not require workup Diagnosis management comments: 11/9/18 echo report: 
-  Left ventricle: Systolic function was moderately to markedly reduced. Ejection fraction was estimated in the range of 30 % to 35 %. This study was inadequate for the evaluation of regional wall motion. -  Right ventricle: Systolic function was reduced. -  Left atrium: The atrium was markedly dilated. -  Right atrium: The atrium was markedly dilated. 4:03 PM Echo report from November shows an EF of 30-35%. We'll give her gentle IV hydration 5:08 PM discussed results with patient and the family. Her potassium is low, she currently is taking potassium supplements 10 mEq daily. I instructed him to increase this to twice a day. Another family member states that she has been complaining of right hip pain. We will obtain an -ray prior to her discharge. Amount and/or Complexity of Data Reviewed Clinical lab tests: ordered and reviewed Tests in the radiology section of CPT®: ordered and reviewed Obtain history from someone other than the patient: yes Risk of Complications, Morbidity, and/or Mortality Presenting problems: moderate Diagnostic procedures: moderate Management options: moderate Patient Progress Patient progress: improved Procedures

## 2019-05-28 NOTE — ED NOTES
I have reviewed discharge instructions with the patient. The patient verbalized understanding. Patient left ED via Discharge Method: wheelchair to Home with family. Opportunity for questions and clarification provided. Patient given 0 scripts. To continue your aftercare when you leave the hospital, you may receive an automated call from our care team to check in on how you are doing. This is a free service and part of our promise to provide the best care and service to meet your aftercare needs.  If you have questions, or wish to unsubscribe from this service please call [phone]. Thank you for Choosing our [facility] Emergency Department.

## 2019-05-28 NOTE — DISCHARGE INSTRUCTIONS
Follow-up with her doctor later this week. Return to the emergency department if her symptoms worsen. Give her the potassium supplement twice a day for the next 7 days.

## 2019-05-29 PROBLEM — I74.9 THROMBOEMBOLIC DISORDER (HCC): Status: ACTIVE | Noted: 2019-01-01

## 2019-05-29 PROBLEM — I48.91 ATRIAL FIBRILLATION WITH RVR (HCC): Status: ACTIVE | Noted: 2019-01-01

## 2019-05-29 PROBLEM — Z95.828 PRESENCE OF IVC FILTER: Status: ACTIVE | Noted: 2019-01-01

## 2019-05-29 PROBLEM — C73 THYROID CANCER (HCC): Status: ACTIVE | Noted: 2019-01-01

## 2019-05-29 PROBLEM — I51.9 CHRONIC SYSTOLIC DYSFUNCTION OF LEFT VENTRICLE: Status: ACTIVE | Noted: 2019-01-01

## 2019-05-29 PROBLEM — R63.4 WEIGHT LOSS: Status: ACTIVE | Noted: 2019-01-01

## 2019-05-29 PROBLEM — N63.0 BREAST NODULE: Status: ACTIVE | Noted: 2019-01-01

## 2019-05-29 PROBLEM — K56.609 SBO (SMALL BOWEL OBSTRUCTION) (HCC): Status: ACTIVE | Noted: 2019-01-01

## 2019-05-29 PROBLEM — S32.040A COMPRESSION FRACTURE OF FOURTH LUMBAR VERTEBRA (HCC): Status: ACTIVE | Noted: 2019-01-01

## 2019-05-29 NOTE — ED TRIAGE NOTES
Patient arrives with family from home with CC of vomiting that started at [phone]. Patients family reports three episodes of vomiting with small amount of food or drink. Patient reports pain on RLQ. Family reports that patient states she needs to burp but is not able to. Patient seen in ED yesterday for diarrhea.

## 2019-05-30 PROBLEM — N28.9 ACUTE RENAL INSUFFICIENCY: Status: ACTIVE | Noted: 2019-01-01

## 2019-05-30 PROBLEM — E87.0 HYPERNATREMIA: Status: ACTIVE | Noted: 2019-01-01

## 2019-05-30 PROBLEM — E83.42 HYPOMAGNESEMIA: Status: ACTIVE | Noted: 2019-01-01

## 2019-05-30 NOTE — PROGRESS NOTES
Progress Note      Patient: Joni Luna               Sex: female          MRN: [id number]           YOB: 1933      Age:  80 y.o. PCP:  Amanda Dotson MD  Treatment Team: Attending Provider: Erin Velásquez DO; Consulting Provider: Linh Bailey MD; Utilization Review: Jocelyn Bragg, VALERIE; Care Manager: Kylee Kitchen RN    Admission HPI:      Subjective:     Patient was seen this afternoon, she did have one episode of vomiting, abdominal pain is improved, denies any chest pain or shortness of breath, nurses and general surgery try to put an NG tube but was unsuccessful patient was uncooperative and emotionally labile. Objective:   Physical Exam:   Visit Vitals  /73 (BP 1 Location: Left arm, BP Patient Position: At rest)   Pulse 63   Temp 98.6 °F (37 °C)   Resp 19   Ht 5' 2\" (1.575 m)   Wt 50.3 kg (111 lb)   SpO2 98%   BMI 20.30 kg/m²      Temp (24hrs), Av.3 °F (36.8 °C), Min:98.1 °F (36.7 °C), Max:98.6 °F (37 °C)    Oxygen Therapy  O2 Sat (%): 98 % (19 1152)  Pulse via Oximetry: 95 beats per minute (19 0126)  O2 Device: Room air (19 0730)    Intake/Output Summary (Last 24 hours) at 2019 1555  Last data filed at 2019 0730  Gross per 24 hour   Intake 1100 ml   Output 0 ml   Net 1100 ml          General:- Conscious, No acute distress   Neck:- Supple, No JVD  Lungs- CTA Bilaterally, No significant wheezing  Heart:- S1 S2 regular  Abdomen:- Soft, Decreased bowel sounds, NTND, No guarding/rigidity/rebound tend.   Extremities:-No pedal edema  Neurologic: - AOX3, No acute FND,  Musculoskeletal: No Acute findings  Psych: - Appropriate mood      LAB  Recent Results (from the past 24 hour(s))   EKG, 12 LEAD, INITIAL    Collection Time: 19  6:37 PM   Result Value Ref Range    Ventricular Rate 111 BPM    Atrial Rate 66 BPM    QRS Duration 144 ms    Q-T Interval 470 ms    QTC Calculation (Bezet) 639 ms    Calculated R Axis -75 degrees    Calculated T Axis 86 degrees    Diagnosis       Atrial fibrillation with rapid ventricular response with premature   ventricular or aberrantly conducted complexes  Left axis deviation  Right bundle branch block  Minimal voltage criteria for LVH, may be normal variant  Anterior infarct (cited on or before 23-JUL-2018)  T wave abnormality, consider lateral ischemia  Abnormal ECG    Confirmed by ST MILLY LESLIE MD (), TAYLOR FISH ([id number]) on 5/29/2019 8:04:04 PM     CBC WITH AUTOMATED DIFF    Collection Time: 05/29/19  6:45 PM   Result Value Ref Range    WBC 5.9 4.3 - 11.1 K/uL    RBC 5.24 (H) 4.05 - 5.2 M/uL    HGB 13.5 11.7 - 15.4 g/dL    HCT 42.0 35.8 - 46.3 %    MCV 80.2 79.6 - 97.8 FL    MCH 25.8 (L) 26.1 - 32.9 PG    MCHC 32.1 31.4 - 35.0 g/dL    RDW 16.8 (H) 11.9 - 14.6 %    PLATELET 279 (L) 128 - 450 K/uL    MPV 12.5 (H) 9.4 - 12.3 FL    ABSOLUTE NRBC 0.00 0.0 - 0.2 K/uL    DF AUTOMATED      NEUTROPHILS 66 43 - 78 %    LYMPHOCYTES 26 13 - 44 %    MONOCYTES 7 4.0 - 12.0 %    EOSINOPHILS 1 0.5 - 7.8 %    BASOPHILS 1 0.0 - 2.0 %    IMMATURE GRANULOCYTES 0 0.0 - 5.0 %    ABS. NEUTROPHILS 3.9 1.7 - 8.2 K/UL    ABS. LYMPHOCYTES 1.5 0.5 - 4.6 K/UL    ABS. MONOCYTES 0.4 0.1 - 1.3 K/UL    ABS. EOSINOPHILS 0.1 0.0 - 0.8 K/UL    ABS. BASOPHILS 0.0 0.0 - 0.2 K/UL    ABS. IMM. GRANS. 0.0 0.0 - 0.5 K/UL   METABOLIC PANEL, COMPREHENSIVE    Collection Time: 05/29/19  6:45 PM   Result Value Ref Range    Sodium 145 136 - 145 mmol/L    Potassium 3.0 (L) 3.5 - 5.1 mmol/L    Chloride 110 (H) 98 - 107 mmol/L    CO2 21 21 - 32 mmol/L    Anion gap 14 7 - 16 mmol/L    Glucose 93 65 - 100 mg/dL    BUN 18 8 - 23 MG/DL    Creatinine 1.24 (H) 0.6 - 1.0 MG/DL    GFR est AA 53 (L) >60 ml/min/1.73m2    GFR est non-AA 44 (L) >60 ml/min/1.73m2    Calcium 10.0 8.3 - 10.4 MG/DL    Bilirubin, total 0.7 0.2 - 1.1 MG/DL    ALT (SGPT) 27 12 - 65 U/L    AST (SGOT) 34 15 - 37 U/L    Alk.  phosphatase 144 (H) 50 - 136 U/L    Protein, total 8.1 6.3 - 8.2 g/dL    Albumin 3.8 3.2 - 4.6 g/dL    Globulin 4.3 (H) 2.3 - 3.5 g/dL    A-G Ratio 0.9 (L) 1.2 - 3.5     LIPASE    Collection Time: 05/29/19  6:45 PM   Result Value Ref Range    Lipase 65 (L) 73 - 393 U/L   PROTHROMBIN TIME + INR    Collection Time: 05/29/19  6:45 PM   Result Value Ref Range    Prothrombin time 41.4 (H) 11.7 - 14.5 sec    INR 4.4 (HH)     POC LACTIC ACID    Collection Time: 05/29/19  6:51 PM   Result Value Ref Range    Lactic Acid (POC) 2.03 (H) 0.5 - 1.9 mmol/L   PTT    Collection Time: 05/30/19 12:53 AM   Result Value Ref Range    aPTT 29.5 24.7 - 39.8 SEC   PROTHROMBIN TIME + INR    Collection Time: 05/30/19 12:53 AM   Result Value Ref Range    Prothrombin time 42.8 (H) 11.7 - 14.5 sec    INR 4.6 (HH)     PTT    Collection Time: 05/30/19  1:19 AM   Result Value Ref Range    aPTT 30.4 24.7 - 39.8 SEC   CBC WITH AUTOMATED DIFF    Collection Time: 05/30/19  1:37 AM   Result Value Ref Range    WBC 7.2 4.3 - 11.1 K/uL    RBC 4.85 4.05 - 5.2 M/uL    HGB 12.4 11.7 - 15.4 g/dL    HCT 39.4 35.8 - 46.3 %    MCV 81.2 79.6 - 97.8 FL    MCH 25.6 (L) 26.1 - 32.9 PG    MCHC 31.5 31.4 - 35.0 g/dL    RDW 17.1 (H) 11.9 - 14.6 %    PLATELET 589 (L) 744 - 450 K/uL    MPV Unable to calculate. Recommend adding IPF. 9.4 - 12.3 FL    ABSOLUTE NRBC 0.00 0.0 - 0.2 K/uL    DF AUTOMATED      NEUTROPHILS 88 (H) 43 - 78 %    LYMPHOCYTES 9 (L) 13 - 44 %    MONOCYTES 4 4.0 - 12.0 %    EOSINOPHILS 0 (L) 0.5 - 7.8 %    BASOPHILS 0 0.0 - 2.0 %    IMMATURE GRANULOCYTES 0 0.0 - 5.0 %    ABS. NEUTROPHILS 6.3 1.7 - 8.2 K/UL    ABS. LYMPHOCYTES 0.6 0.5 - 4.6 K/UL    ABS. MONOCYTES 0.3 0.1 - 1.3 K/UL    ABS. EOSINOPHILS 0.0 0.0 - 0.8 K/UL    ABS. BASOPHILS 0.0 0.0 - 0.2 K/UL    ABS. IMM.  GRANS. 0.0 0.0 - 0.5 K/UL   METABOLIC PANEL, COMPREHENSIVE    Collection Time: 05/30/19  5:27 AM   Result Value Ref Range    Sodium 147 (H) 136 - 145 mmol/L    Potassium 3.9 3.5 - 5.1 mmol/L    Chloride 115 (H) 98 - 107 mmol/L    CO2 21 21 - 32 mmol/L    Anion gap 11 7 - 16 mmol/L    Glucose 168 (H) 65 - 100 mg/dL    BUN 16 8 - 23 MG/DL    Creatinine 1.02 (H) 0.6 - 1.0 MG/DL    GFR est AA >60 >60 ml/min/1.73m2    GFR est non-AA 55 (L) >60 ml/min/1.73m2    Calcium 8.8 8.3 - 10.4 MG/DL    Bilirubin, total 0.6 0.2 - 1.1 MG/DL    ALT (SGPT) 25 12 - 65 U/L    AST (SGOT) 26 15 - 37 U/L    Alk. phosphatase 121 50 - 136 U/L    Protein, total 6.9 6.3 - 8.2 g/dL    Albumin 3.2 3.2 - 4.6 g/dL    Globulin 3.7 (H) 2.3 - 3.5 g/dL    A-G Ratio 0.9 (L) 1.2 - 3.5     CBC WITH AUTOMATED DIFF    Collection Time: 05/30/19  5:27 AM   Result Value Ref Range    WBC 10.1 4.3 - 11.1 K/uL    RBC 4.66 4.05 - 5.2 M/uL    HGB 12.0 11.7 - 15.4 g/dL    HCT 37.7 35.8 - 46.3 %    MCV 80.9 79.6 - 97.8 FL    MCH 25.8 (L) 26.1 - 32.9 PG    MCHC 31.8 31.4 - 35.0 g/dL    RDW 17.0 (H) 11.9 - 14.6 %    PLATELET 94 (L) 899 - 450 K/uL    MPV Unable to calculate. Recommend adding IPF. 9.4 - 12.3 FL    ABSOLUTE NRBC 0.00 0.0 - 0.2 K/uL    DF AUTOMATED      NEUTROPHILS 86 (H) 43 - 78 %    LYMPHOCYTES 7 (L) 13 - 44 %    MONOCYTES 6 4.0 - 12.0 %    EOSINOPHILS 0 (L) 0.5 - 7.8 %    BASOPHILS 0 0.0 - 2.0 %    IMMATURE GRANULOCYTES 1 0.0 - 5.0 %    ABS. NEUTROPHILS 8.8 (H) 1.7 - 8.2 K/UL    ABS. LYMPHOCYTES 0.7 0.5 - 4.6 K/UL    ABS. MONOCYTES 0.6 0.1 - 1.3 K/UL    ABS. EOSINOPHILS 0.0 0.0 - 0.8 K/UL    ABS. BASOPHILS 0.0 0.0 - 0.2 K/UL    ABS. IMM.  GRANS. 0.1 0.0 - 0.5 K/UL   MAGNESIUM    Collection Time: 05/30/19  5:27 AM   Result Value Ref Range    Magnesium 1.5 (L) 1.8 - 2.4 mg/dL   PROTHROMBIN TIME + INR    Collection Time: 05/30/19  5:27 AM   Result Value Ref Range    Prothrombin time 57.6 (H) 11.7 - 14.5 sec    INR 6.8 (HH)         Xr Abd (ap And Erect Or Decub)    Result Date: 5/30/2019  Flat and upright abdomen CLINICAL INDICATION: Small bowel obstruction follow-up exam, abdominal pain FINDINGS: Two views of the abdomen and pelvis submitted and compared to a similar exam from the previous day shows decrease in the distention of the small bowel loops. The remaining prominent large bowel loops throughout the upper abdomen. Contrast does distend the bladder. No free air appreciated. IMPRESSION: Interval decrease in caliber of air-filled loops small bowel in the lower abdomen. However there is no definite contrast in the large bowel on this exam. Recommend continued imaging follow-up and serial exams. Small bowel obstruction is thought to be less likely on this exam.    Xr Abd (ap And Erect Or Decub)    Result Date: 5/29/2019  Two view abdomen INDICATION:  Right lower quadrant pain. Vomiting. COMPARISON:None Flat and upright views the abdomen were obtained. FINDINGS: There are multiple dilated small bowel loops. No evidence of free air. Bones are osteopenic. There is a right total hip arthroplasty. IMPRESSION: 1. Multiple dilated small bowel loops, suspicious for bowel obstruction. Ct Abd Pelv W Cont    Result Date: 5/30/2019  CT abdomen and pelvis with contrast COMPARISON: June 16, 2011. INDICATION: Severe abdominal pain, pain worsening left upper quadrant and left lower quadrant. Evaluate for diverticulitis. TECHNIQUE: CT imaging was performed of the abdomen and pelvis following the uncomplicated administration of intravenous contrast (Isovue 370, 100 mL). Oral contrast was administered. Radiation dose reduction techniques were used for this study:  Our CT scanners use one or all of the following: Automated exposure control, adjustment of the mA and/or kVp according to patient's size, iterative reconstruction. FINDINGS: There is no pericardial effusion. Heart is enlarged. There is coronary artery calcification. There is dependent subsegmental atelectasis. There is a 12 mm nodule within the lateral right breast (series 2, image 5). Abdomen findings: There is subtle micronodular contour of the liver. There is periportal edema. The gallbladder is surgically absent. Pancreas is not well visualized. The spleen is normal in contour. Stable small right adrenal nodule. There are tiny nonobstructing renal calculi. No hydronephrosis left kidney is small in size. There is prominent atherosclerotic calcification of the abdominal aorta. The stomach is normal in contour. Pelvic findings: There are multiple fluid-filled dilated small bowel loops with transition point appearing to be in the lower abdomen. The colon appears normal in caliber. Urinary bladder is normal in contour. There is a pessary. There is no significant free fluid. No free air. Bones are osteopenic. There are degenerative changes of the spine. There is severe compression deformity of L4 with associated high density structure. Right total hip arthroplasty is partially seen. IMPRESSION: 1. Small bowel obstruction. Multiple fluid-filled dilated small bowel loops with transition point in the lower abdomen. Etiology is likely adhesion. Closed-loop obstruction is in the differential. 2. No evidence of colitis or diverticulitis, although the colon is not well visualized. . No hydronephrosis. 3. Severe compression deformity of L4 with associated high density material. This may be related to prior intervention such as kyphoplasty. Neoplastic involvement is considered less likely. Osteopenia. 4. A 12 mm nodule within the lateral right breast. Correlation with mammogram is recommended. Xr Abd (ap And Erect Or Decub)    Result Date: 5/30/2019  IMPRESSION: Interval decrease in caliber of air-filled loops small bowel in the lower abdomen. However there is no definite contrast in the large bowel on this exam. Recommend continued imaging follow-up and serial exams. Small bowel obstruction is thought to be less likely on this exam.    Xr Abd (ap And Erect Or Decub)    Result Date: 5/29/2019  IMPRESSION: 1. Multiple dilated small bowel loops, suspicious for bowel obstruction. Ct Abd Pelv W Cont    Result Date: 5/30/2019  IMPRESSION: 1. Small bowel obstruction. Multiple fluid-filled dilated small bowel loops with transition point in the lower abdomen. Etiology is likely adhesion. Closed-loop obstruction is in the differential. 2. No evidence of colitis or diverticulitis, although the colon is not well visualized. . No hydronephrosis. 3. Severe compression deformity of L4 with associated high density material. This may be related to prior intervention such as kyphoplasty. Neoplastic involvement is considered less likely. Osteopenia. 4. A 12 mm nodule within the lateral right breast. Correlation with mammogram is recommended.       All Micro Results     None          Current Medications Reviewed    Current Facility-Administered Medications:     dextrose 5% - 0.45% NaCl with KCl 20 mEq/L infusion, 100 mL/hr, IntraVENous, CONTINUOUS, Wanda Wells MD, Last Rate: 100 mL/hr at 05/30/19 1059, 100 mL/hr at 05/30/19 1059    LORazepam (ATIVAN) injection 0.5 mg, 0.5 mg, IntraVENous, Q4H PRN, Juan Jose Romero MD    famotidine (PF) (PEPCID) 20 mg in sodium chloride 0.9% 10 mL injection, 20 mg, IntraVENous, QPM, Wanda Wells MD    sodium chloride (NS) flush 5-40 mL, 5-40 mL, IntraVENous, Q8H, Annalisa Ortega DO, 10 mL at 05/30/19 1358    sodium chloride (NS) flush 5-40 mL, 5-40 mL, IntraVENous, PRN, Annalisa Ortega DO    tuberculin injection 5 Units, 5 Units, IntraDERMal, ONCE, Annalisa Ortega DO, 5 Units at 05/29/19 2346    morphine injection 2 mg, 2 mg, IntraVENous, Q3H PRN, Annalisa Ortega DO    naloxone [facility]) injection 0.4 mg, 0.4 mg, IntraVENous, PRN, Annalisa Ortega DO    ondansetron [facility]) injection 4 mg, 4 mg, IntraVENous, Q4H PRN, Annalisa Ortega DO, 4 mg at 05/30/19 0252    LORazepam (ATIVAN) injection 1 mg, 1 mg, IntraVENous, Q6H PRN, Annalisa Ortgea DO    latanoprost (XALATAN) 0.005 % ophthalmic solution 1 Drop, 1 Drop, Both Eyes, QPM, Annalisa Ortega DO    metoprolol (LOPRESSOR) injection 5 mg, 5 mg, IntraVENous, Q4H PRN, Morgan Dtoy, DO      Assessment/Plan     Principal Problem:    SBO (small bowel obstruction) ([address] (5/29/2019)    Active Problems:    Coagulopathy ([address] (6/16/2011)      Atrial fibrillation with RVR (HCC) (5/29/2019)      Chronic systolic dysfunction of left ventricle (5/29/2019)      Thyroid cancer ([address] (5/29/2019)      Compression fracture of fourth lumbar vertebra ([address] (5/29/2019)      Thromboembolic disorder ([address] (5/00/9840)      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    --Hypokalemia              Improved    --Acute renal insuff              Continue with IV fluids, creatinine is improved     --AFIB RVR              Rate improved around 100, tele- iv lopressor prn. Cardiology was consult. Hold oral amiodarone. Continue to monitor INR levels, if subtherapeutic then will start on heparin drip     --COUMADIN COAGULOPATHY              Hold - follow inr ,     --CHRONIC SYSTOLIC DYSFXN. Currently stable. , no sx or sx of acute chf     --wt loss, abnormal L4 compr. fx vs other, 12mm breast nodule              May need workup when bowel obst resolved.  Check tsh              ?? Unclear if accurate hx of thryoid malignancy vs other thyroid issue         Gurvinder Wilson MD  May 30, 2019

## 2019-05-30 NOTE — CONSULTS
H&P/Consult Note/Progress Note/Office Note:  
Brad Jarrett  MRN: [id number]  JQO:[id number]  Age:86 y.o. 
 
HPI: Brad Jarrett is a 80 y.o. female on coumadin for afib with INR>4 at presentation who came to the ER  
with 6 day h/o diarrhea for which she took several anti-diarrheal meds (pepto-bismol and lomotil among them) This was followed by diffuse, moderate, progressive, non-radiating abd pain and cramping. Nothing made symptoms better or worse. She was seen in ER day before admission and given IVF and released. She has associated N/V. No fever or leukocytosis on admission She has mult medical problems including CHF with EF 30-35%, pulm HTN, chronic afib on coumadin, s/p IVC filter for DVT/PE (1995), thyroid carcinoma treated with ablation. She is s/p appy, rhonda, right hip surgery, and back surgery She had the below CT performed in ER with SBO, right breast mass, and abnormal L4 spine 5/29/19 CT abd/pelvis with oral and IV contrast 
There is no pericardial effusion. Heart is enlarged. There is coronary artery calcification. There is dependent subsegmental atelectasis. 
  
There is a 12 mm nodule within the lateral right breast (series 2, image 5). There is subtle micronodular contour of the liver. There is periportal edema. The gallbladder is surgically absent. Pancreas is not well visualized. The 
spleen is normal in contour. Stable small right adrenal nodule (since 2011).   
There are tiny nonobstructing renal calculi. No hydronephrosis left kidney is 
small in size. There is prominent atherosclerotic calcification of the abdominal aorta. 
  
The stomach is normal in contour. 
  
Pelvic findings: There are multiple fluid-filled dilated small bowel loops with transition point 
appearing to be in the lower abdomen. The colon appears normal in caliber. Urinary bladder is normal in contour. There is a pessary. 
  
There is no significant free fluid. No free air. Bones are osteopenic. There are 
degenerative changes of the spine. There is severe compression deformity of L4 
with associated high density structure. Right total hip arthroplasty is partially seen. 
  
  
IMPRESSION: 
  
1. Small bowel obstruction. Multiple fluid-filled dilated small bowel loops with 
transition point in the lower abdomen. Etiology is likely adhesion. Closed-loop 
obstruction is in the differential. 
  
2. No evidence of colitis or diverticulitis, although the colon is not well 
visualized. . No hydronephrosis. 
  
3. Severe compression deformity of L4 with associated high density material. 
This may be related to prior intervention such as kyphoplasty. Neoplastic 
involvement is considered less likely. Osteopenia. 
  
4. A 12 mm nodule within the lateral right breast. Correlation with mammogram is 
recommended. Past Medical History:  
Diagnosis Date  Acquired hypothyroidism  Acute pancreatitis  Arthritis  CAD (coronary artery disease) 4/29/11 RCA occluded and fills with collaterals, LAD and Cx nonobstructive CAD, EF 50-55%  Cancer ([address] THYROID - treated with surgery and radiation  CHF (congestive heart failure) ([address] 11/8/2018  Chronic pain   
 right hip  Coagulopathy ([address] 6/16/2011  Colon polyps  Follicular thyroid cancer ([address]  Gallstone pancreatitis  Glaucoma  Gout   
 hx of gout to right great toe - no problems  Hypertension   
 controlled with medication  Other ill-defined conditions(799.89)  Pancreatitis  Paroxysmal A-fib ([address] 11/8/2018  PE (pulmonary embolism) 2008  Pulmonary HTN (HCC)   
 moderate  Thromboembolus ([address] 1995  
 right leg - Hemashield graft/filter placed in abdomen  Thyroid disease   
 pt had total thyroidectomy - takes levoxyl daily Past Surgical History:  
Procedure Laterality Date  HX APPENDECTOMY  ? 1990  
 HX CHOLECYSTECTOMY  6/2011 Dr. Juanis Sharp - [facility] [address] [facility] hemashield graft/filter placed due to blood clot right leg  HX THYROIDECTOMY  2010  
 partial  
 HX TONSILLECTOMY [address] [facility] ARTHROPLASTY  2008 & 2011  
 right hip Current Facility-Administered Medications Medication Dose Route Frequency  dextrose 5% - 0.45% NaCl with KCl 20 mEq/L infusion  100 mL/hr IntraVENous CONTINUOUS  
 magnesium sulfate 2 g/50 ml IVPB (premix or compounded)  2 g IntraVENous ONCE  
 sodium chloride (NS) flush 5-40 mL  5-40 mL IntraVENous Q8H  
 sodium chloride (NS) flush 5-40 mL  5-40 mL IntraVENous PRN  
 tuberculin injection 5 Units  5 Units IntraDERMal ONCE  
 morphine injection 2 mg  2 mg IntraVENous Q3H PRN  
 naloxone (NARCAN) injection 0.4 mg  0.4 mg IntraVENous PRN  
 ondansetron (ZOFRAN) injection 4 mg  4 mg IntraVENous Q4H PRN  
 LORazepam (ATIVAN) injection 1 mg  1 mg IntraVENous Q6H PRN  potassium chloride 20 mEq in 100 ml IVPB  20 mEq IntraVENous Q2H  
 heparin 25,000 units in dextrose 500 mL infusion  12-25 Units/kg/hr IntraVENous TITRATE  latanoprost (XALATAN) 0.005 % ophthalmic solution 1 Drop  1 Drop Both Eyes QPM  
 metoprolol (LOPRESSOR) injection 5 mg  5 mg IntraVENous Q4H PRN Ativan [lorazepam]; Lortab [hydrocodone-acetaminophen]; and Morphine Social History Socioeconomic History  Marital status:  Spouse name: Not on file  Number of children: Not on file  Years of education: Not on file  Highest education level: Not on file Tobacco Use  Smoking status: Former Smoker  Smokeless tobacco: Never Used  Tobacco comment: quit smoking 1995 Substance and Sexual Activity  Alcohol use: No  
 Drug use: No  
 
Social History Tobacco Use Smoking Status Former Smoker Smokeless Tobacco Never Used Tobacco Comment  
 quit smoking 1995 Family History Problem Relation Age of Onset  Stroke Mother  Hypertension Mother  Cancer Father   
     prostate  Diabetes Maternal Uncle ROS: The patient has no difficulty with chest pain or shortness of breath. No fever or chills. Comprehensive review of systems was otherwise unremarkable except as noted above. Physical Exam:  
Visit Vitals /76 Pulse 67 Temp 98.2 °F (36.8 °C) Resp 20 Ht 5' 2\" (1.575 m) Wt 111 lb (50.3 kg) SpO2 98% BMI 20.30 kg/m² Vitals:  
 05/30/19 0126 05/30/19 0131 05/30/19 0235 05/30/19 7639 BP:  132/79 (!) 127/96 147/76 Pulse: 95  (!) 113 67 Resp:   20 20 Temp:   98.5 °F (36.9 °C) 98.2 °F (36.8 °C) SpO2: 95%  96% 98% Weight:   111 lb (50.3 kg) 111 lb (50.3 kg) Height:   5' 2\" (1.575 m) 05/29 1901 - 05/30 0700 In: 1100 [I.V.:1100] Out: - No intake/output data recorded. Constitutional: Alert, oriented, cooperative patient in no acute distress; appears stated age Eyes:Sclera are clear. EOMs intact ENMT: no external lesions gross hearing normal; no obvious neck masses, no ear or lip lesions, nares normal 
CV: RRR. Normal perfusion Resp: No JVD. Breathing is  non-labored; no audible wheezing. GI: mildly distended; no mass, guarding, or rebound Musculoskeletal: unremarkable with normal function. No embolic signs or cyanosis. Neuro:  Oriented; moves all 4; no focal deficits Psychiatric: normal affect and mood, no memory impairment Recent vitals (if inpt): 
Patient Vitals for the past 24 hrs: 
 BP Temp Pulse Resp SpO2 Height Weight 05/30/19 0524 147/76 98.2 °F (36.8 °C) 67 20 98 %  111 lb (50.3 kg) 05/30/19 0235 (!) 127/96 98.5 °F (36.9 °C) (!) 113 20 96 % 5' 2\" (1.575 m) 111 lb (50.3 kg) 05/30/19 0131 132/79        
05/30/19 0126   95  95 %    
05/30/19 0101 116/86  (!) 115  97 %    
05/30/19 0031 112/59  (!) 109  96 %    
05/29/19 2311 (!) 139/100  (!) 101  97 %    
05/29/19 2131 143/71  (!) 105  96 %    
05/29/19 2101 137/86  (!) 101  95 %    
05/29/19 2031 124/87  (!) 113  97 %   05/29/19 2024 (!) 132/96  (!) 101  96 %    
05/29/19 1836 (!) 143/91 98.1 °F (36.7 °C) 99 22 100 % 5' 5\" (1.651 m) 111 lb (50.3 kg) Labs: 
Recent Labs 05/30/19 
5838  05/30/19 
0119  05/29/19 
1845 WBC 10.1   < >  --   --  5.9 HGB 12.0   < >  --   --  13.5 PLT 94*   < >  --   --  135* *  --   --   --  145  
K 3.9  --   --   --  3.0*  
*  --   --   --  110* CO2 21  --   --   --  21 BUN 16  --   --   --  18  
CREA 1.02*  --   --   --  1.24* *  --   --   --  93 PTP 57.6*  --   --    < > 41.4* INR 6.8*  --   --    < > 4.4* APTT  --   --  30.4   < >  --   
TBILI 0.6  --   --   --  0.7 SGOT 26  --   --   --  34 ALT 25  --   --   --  27  
  --   --   --  144* LPSE  --   --   --   --  65*  
 < > = values in this interval not displayed. Lab Results Component Value Date/Time WBC 10.1 05/30/2019 05:27 AM  
 HGB 12.0 05/30/2019 05:27 AM  
 PLATELET 94 (L) 79/75/2172 05:27 AM  
 Sodium 147 (H) 05/30/2019 05:27 AM  
 Potassium 3.9 05/30/2019 05:27 AM  
 Chloride 115 (H) 05/30/2019 05:27 AM  
 CO2 21 05/30/2019 05:27 AM  
 BUN 16 05/30/2019 05:27 AM  
 Creatinine 1.02 (H) 05/30/2019 05:27 AM  
 Glucose 168 (H) 05/30/2019 05:27 AM  
 INR 6.8 (HH) 05/30/2019 05:27 AM  
 aPTT 30.4 05/30/2019 01:19 AM  
 Bilirubin, total 0.6 05/30/2019 05:27 AM  
 Bilirubin, direct 0.1 06/25/2011 06:00 AM  
 AST (SGOT) 26 05/30/2019 05:27 AM  
 ALT (SGPT) 25 05/30/2019 05:27 AM  
 Alk. phosphatase 121 05/30/2019 05:27 AM  
 Amylase 64 06/17/2011 03:53 AM  
 Lipase 65 (L) 05/29/2019 06:45 PM  
 Troponin-I, Qt. 0.03 11/09/2018 09:59 AM  
 
 
CT Results  (Last 48 hours) 05/29/19 2147  CT ABD PELV W CONT Final result Impression:  IMPRESSION:  
   
1. Small bowel obstruction. Multiple fluid-filled dilated small bowel loops with  
transition point in the lower abdomen. Etiology is likely adhesion.  Closed-loop  
obstruction is in the differential.  
   
 2. No evidence of colitis or diverticulitis, although the colon is not well  
visualized. . No hydronephrosis. 3. Severe compression deformity of L4 with associated high density material.  
This may be related to prior intervention such as kyphoplasty. Neoplastic  
involvement is considered less likely. Osteopenia. 4. A 12 mm nodule within the lateral right breast. Correlation with mammogram is  
recommended. Narrative:  CT abdomen and pelvis with contrast   
   
COMPARISON: June 16, 2011. INDICATION: Severe abdominal pain, pain worsening left upper quadrant and left  
lower quadrant. Evaluate for diverticulitis. TECHNIQUE: CT imaging was performed of the abdomen and pelvis following the  
uncomplicated administration of intravenous contrast (Isovue 370, 100 mL). Oral  
contrast was administered. Radiation dose reduction techniques were used for  
this study:  Our CT scanners use one or all of the following: Automated exposure  
control, adjustment of the mA and/or kVp according to patient's size, iterative  
reconstruction. FINDINGS:  
   
There is no pericardial effusion. Heart is enlarged. There is coronary artery  
calcification. There is dependent subsegmental atelectasis. There is a 12 mm nodule within the lateral right breast (series 2, image 5). Abdomen findings: There is subtle micronodular contour of the liver. There is periportal edema. The gallbladder is surgically absent. Pancreas is not well visualized. The  
spleen is normal in contour. Stable small right adrenal nodule. There are tiny nonobstructing renal calculi. No hydronephrosis left kidney is  
small in size. There is prominent atherosclerotic calcification of the abdominal  
aorta. The stomach is normal in contour. Pelvic findings: There are multiple fluid-filled dilated small bowel loops with transition point appearing to be in the lower abdomen. The colon appears normal in caliber. Urinary bladder is normal in contour. There is a pessary. There is no significant free fluid. No free air. Bones are osteopenic. There are  
degenerative changes of the spine. There is severe compression deformity of L4  
with associated high density structure. Right total hip arthroplasty is  
partially seen. chest X-ray I reviewed recent labs, recent radiologic studies, and pertinent records including other doctor notes if needed. I independently reviewed radiology images for studies I described above or studies I have ordered. Admission date (for inpatients): 5/29/2019 * No surgery found *  * No surgery found * ASSESSMENT/PLAN: 
Problem List  Date Reviewed: 6/23/2011 Codes Class Noted Acute renal insufficiency ICD-10-CM: N28.9 ICD-9-CM: 593.9  5/30/2019 Hypernatremia ICD-10-CM: E87.0 ICD-9-CM: 276.0  5/30/2019 Hypomagnesemia ICD-10-CM: I59.35 
ICD-9-CM: 275.2  5/30/2019 * (Principal) SBO (small bowel obstruction) ([facility] ICD-10-CM: E41.724 ICD-9-CM: 560.9  5/29/2019 Atrial fibrillation with RVR ([facility] ICD-10-CM: I48.91 
ICD-9-CM: 427.31  5/29/2019 Chronic systolic dysfunction of left ventricle ICD-10-CM: I51.9 ICD-9-CM: 429.9  5/29/2019 Thyroid cancer [facility]) ICD-10-CM: O25 ICD-9-CM: 786  5/29/2019 Compression fracture of fourth lumbar vertebra (HCC) ICD-10-CM: X18.808I ICD-9-CM: 805.4  5/29/2019 Thromboembolic disorder ([facility] MHY-06-EO: I74.9 ICD-9-CM: 444.9  5/29/2019 Presence of IVC filter ICD-10-CM: G12.228 ICD-9-CM: V45.89  5/29/2019 Breast nodule ICD-10-CM: N63.0 ICD-9-CM: 793.89  5/29/2019 Weight loss ICD-10-CM: R63.4 ICD-9-CM: 783.21  5/29/2019 Acute on chronic systolic (congestive) heart failure (HCC) ICD-10-CM: A83.57 ICD-9-CM: 428.23, 428.0  11/10/2018 CHF (congestive heart failure) (HCC) ICD-10-CM: I50.9 ICD-9-CM: 428.0  11/8/2018 Supratherapeutic INR ICD-10-CM: R79.1 ICD-9-CM: 790.92  11/8/2018 Pleural effusion ICD-10-CM: J90 ICD-9-CM: 511.9  11/8/2018 Overview Signed 11/8/2018  4:18 PM by Sil Partida MD  
  bilateral 
  
  
   
 Edema ICD-10-CM: R60.9 ICD-9-CM: 782.3  11/8/2018 Overview Signed 11/8/2018  4:18 PM by Sil Partida MD  
  Bilateral lower legs Lightheadedness ICD-10-CM: H11 ICD-9-CM: 780.4  11/8/2018 Paroxysmal A-fib (HCC) ICD-10-CM: I48.0 ICD-9-CM: 427.31  11/8/2018 Iron deficiency anemia ICD-10-CM: D50.9 ICD-9-CM: 280.9  8/29/2011 Overview Signed 8/29/2011  3:05 PM by Omkar Can Despite oral iron For infed infusion Nutritional deficiency ICD-10-CM: E63.9 ICD-9-CM: 269.9  6/27/2011 Impaired mobility ICD-10-CM: Z74.09 
ICD-9-CM: 799.89  6/27/2011 Debility ICD-10-CM: R53.81 ICD-9-CM: 799.3  6/27/2011 Colon polyps ICD-10-CM: K63.5 ICD-9-CM: 211.3  6/27/2011 Pancreatitis ICD-10-CM: K85.90 ICD-9-CM: 149.3  6/19/2011 Overview Signed 6/19/2011  4:13 PM by Omkar Can 6-19-11 lipase improving possibly from gallstones Gallstone pancreatitis ICD-10-CM: K85.10 ICD-9-CM: 979.1, 574.20  6/19/2011 Follicular thyroid cancer ([facility] ICD-10-CM: S49 ICD-9-CM: 329  6/19/2011 Overview Signed 6/21/2011  7:12 AM by Omkar Limerick  
  thyroglobulin high > 100 obvious residual thyroid tissue Pericardial effusion ICD-10-CM: I31.3 ICD-9-CM: 423.9  6/17/2011 Acute pancreatitis ICD-10-CM: K85.90 ICD-9-CM: 252.0  6/16/2011 Coagulopathy ([facility] ICD-10-CM: X82.4 ICD-9-CM: 286.9  6/16/2011 HTN (hypertension) (Chronic) ICD-10-CM: I10 
ICD-9-CM: 401.9  6/16/2011 Acquired hypothyroidism (Chronic) ICD-10-CM: E03.9 ICD-9-CM: 244.9  6/16/2011 Overview Signed 6/19/2011  4:15 PM by Jaya Akhtar Post thyroidectomy for thyroid cancer Pulmonary embolism ([address] (Chronic) ICD-10-CM: I26.99 
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 
  SBO (small bowel obstruction) ([address] (5/29/2019) Active Problems: 
  Atrial fibrillation with RVR ([address] (5/29/2019) Chronic systolic dysfunction of left ventricle (5/29/2019) Thyroid cancer ([address] (5/29/2019) Compression fracture of fourth lumbar vertebra ([address] (5/29/2019) Thromboembolic disorder ([address] (3/01/4974) 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) Breast nodule (5/29/2019) Weight loss (5/29/2019) Acute renal insufficiency (5/30/2019) Hypernatremia (5/30/2019) Hypomagnesemia (5/30/2019) SBO 
NGT placement was unsuccessful by nursing and by Dr Harvey Expose on 5/30/19 Pt is uncooperative and emotionally labile when we attempt to place Bowel rest 
IVF PICC and TPN if no resolution in 4-5 days Follow- exam 
Poor surgical candidate secondary to age 80, frailty, debilitation, INR >6 Hopefully large component of bowel distention is from laxative overuse and not from SBO Repeat Abd Xray in am 
 
 
Hypernatremia Hypotonic IVF Coagulopathy Not surgical candidate with INR 6.8 Vit K ordered today Follow daily INR Cardiology wants hep drip when INR <2 Right Breast mass I will follow Outpt imaging and follow-up planned Hypomagnesemia Replace magnesium sulfate 2gm IVPB this am 
 
 
 
 
I have personally performed a face-to-face diagnostic evaluation and management  service on this patient. I have independently seen the patient. I have independently obtained the above history from the patient/family. I have independently examined the patient with above findings. I have independently reviewed data/labs for this patient and developed the above plan of care (MDM). Signed: Matti Jin.  Germania Hammond MD, FACS

## 2019-05-30 NOTE — PROGRESS NOTES
TRANSFER - IN REPORT: 
 
Verbal report received from VALERIE Sabillon(name) on Kenney Thorne  being received from ED(unit) for routine progression of care Report consisted of patients Situation, Background, Assessment and  
Recommendations(SBAR). Information from the following report(s) SBAR, Kardex, Intake/Output, MAR, Recent Results and Cardiac Rhythm A-fib was reviewed with the receiving nurse. Dual skin assessment completed with second RN and reveals the following: Sacrum has some discoloration scaring, and a red part of skin that resembles a hemorrhoid. Sacrum is blanchable. Allevyn placed to prevent skin breakdown. Abdominal scar noted from previous appendectomy. Heels are flaky and dry, but intact. No other abnormalities noted.

## 2019-05-30 NOTE — PROGRESS NOTES
Care Management Interventions PCP Verified by CM: Yes(thinks saw in May 2019) Palliative Care Criteria Met (RRAT>21 & CHF Dx)?: No(RRAT 26 Dx Small bowel obstruction ) Transition of Care Consult (CM Consult): Discharge Planning Discharge Durable Medical Equipment: No(walker) Physical Therapy Consult: No 
Occupational Therapy Consult: No 
Speech Therapy Consult: No 
Current Support Network: Own Home(2 sons live with her) Confirm Follow Up Transport: Family Plan discussed with Pt/Family/Caregiver: Yes Freedom of Choice Offered: Yes Discharge Location Discharge Placement: Unable to determine at this time Met with patient and family for d/c planning. Patient alert and oriented x 3, states she basically \"can do ADL's\"(family can assist as needed). Her 2 sons live with her in one story home. She has 12 children and has assistance as needed. DME includes walker and she has transportation provided by her family. Reportedly her daughter Vera Reach [phone] is her POA. She has [facility] Medicare Part B and Medicaid and is [place] [facility]. [place] d/c plan is home with family pending clinical progress. CM following.

## 2019-05-30 NOTE — H&P
Hospitalist H&P Note Admit Date:  2019  7:03 PM  
Name:  Gale Diaz Age:  80 y.o. 
:  1933 MRN:  [id number] PCP:  Jorge Carty MD 
Treatment Team: Attending Provider: Iqra Blake MD; Primary Nurse: Carlo Armstrong RN 
 
HPI:  
Patient history was obtained from the ER provider prior to seeing the patient. 80 female multiple medical problem on Coumadin and is here with abdominal pain. Symptoms initially started with diarrhea last Thursday which was 6 days ago. Multiple episodes. Diffuse abdominal cramping. Came into the emergency room yesterday. Receive IV fluid improvement in symptoms and was discharged home. Continue to have diarrhea at home. Also associated nausea vomiting with worsening abdominal pain. Initially pain was in the right lower quadrant. Now pain is more diffused. No fever. Portable prior abdominal surgery. She thinks her appendix is also gone. Denies any urinary pain. No chest pain or shortness of breath 19 Cc: abd pain 80 y.o. Female with hx of chronic systolic chf remote lvef by echo 30-35% and pulm htn. Chronic afib on amiodarone and warfarin. Has ivc filter hx prior dvts and pe. Daughter states thyroid cancer diagnosed by lspine biopsy? ?? Treated with radionuclear ablation? ? She has 5-6 day  Hx of abdominal discomfort and loose stool took pepto-bismol and then today abd.pain and abd distension and no bm. In er found to have SBO by xray and ct abd. Incidental finding of L4 comp. fx vs other including possible metast. Disease. Incidental right breast nodule 12mm. No recent mammogram. No dsypnea no chest pain . Heart rate around 100 afib. INR 4.4 and hypokalemia with k 3.0. Incomplete database daughter Vera Reach [phone] Id herself as healthcare poa and is present and attempting to assist with hx.  Patient has lost wt recently unspecified, has temporal wasting and poor oral intake due to poor dentition as her teeth are \"crumbling\" per daughter. 10 systems reviewed and negative except as noted in HPI. Past Medical History:  
Diagnosis Date  Acquired hypothyroidism  Acute pancreatitis  Arthritis  CAD (coronary artery disease) 4/29/11 RCA occluded and fills with collaterals, LAD and Cx nonobstructive CAD, EF 50-55%  Cancer ([facility] THYROID - treated with surgery and radiation  CHF (congestive heart failure) ([facility] 11/8/2018  Chronic pain   
 right hip  Coagulopathy ([facility] 6/16/2011  Colon polyps  Follicular thyroid cancer ([facility]  Gallstone pancreatitis  Glaucoma  Gout   
 hx of gout to right great toe - no problems  Hypertension   
 controlled with medication  Other ill-defined conditions(799.89)  Pancreatitis  Paroxysmal A-fib ([facility] 11/8/2018  PE (pulmonary embolism) 2008  Pulmonary HTN (HCC)   
 moderate  Thromboembolus ([address] 1995  
 right leg - Hemashield graft/filter placed in abdomen  Thyroid disease   
 pt had total thyroidectomy - takes levoxyl daily Past Surgical History:  
Procedure Laterality Date  HX APPENDECTOMY  ? 1990  
 HX CHOLECYSTECTOMY  6/2011 Dr. Lyudmila Fallon - [facility] [address] [place]  
 hemashield graft/filter placed due to blood clot right leg  HX THYROIDECTOMY  2010  
 partial  
 HX TONSILLECTOMY [facility] ARTHROPLASTY  2008 & 2011  
 right hip Allergies Allergen Reactions  Ativan [Lorazepam] Other (comments) Has opposite reaction  Lortab [Hydrocodone-Acetaminophen] Other (comments) Causes disorientation. - denies allergy, states she can take it  Morphine Other (comments) Causes disorientation Social History Tobacco Use  Smoking status: Former Smoker  Smokeless tobacco: Never Used  Tobacco comment: quit smoking 1995 Substance Use Topics  Alcohol use: No  
  
Family History Problem Relation Age of Onset  Stroke Mother  Hypertension Mother  Cancer Father   
     prostate  Diabetes Maternal Uncle Immunization History Administered Date(s) Administered  Tuberculin 2008 PTA Medications: 
Prior to Admission Medications Prescriptions Last Dose Informant Patient Reported? Taking? ALPHAGAN P 0.1 % Drop  Self Yes No  
Sig: Apply 1 Drop to eye two (2) times a day. 1 DROP TO EACH EYE. Pt to use DOS per anesthesia protocol. TRAVATAN 0.004 % Drop  Self Yes No  
Si Drop by Both Eyes route every evening. acetaminophen (TYLENOL EX STR ARTHRITIS PAIN) 500 mg tablet   Yes No  
Sig: Take  by mouth every six (6) hours as needed. colchicine (COLCRYS) 0.6 mg tablet   No No  
Sig: Take 1 Tab by mouth daily as needed. ergocalciferol (ERGOCALCIFEROL) 50,000 unit capsule  Self Yes No  
Sig: Take 50,000 Units by mouth every . folic acid (FOLVITE) 1 mg tablet   Yes No  
Sig: Take 1 mg by mouth daily. gabapentin (NEURONTIN) 300 mg capsule   Yes No  
Sig: Take 300 mg by mouth three (3) times daily. levothyroxine (LEVOXYL) 125 mcg tablet  Self Yes No  
Sig: Take 75 mcg by mouth Daily (before breakfast). Take DOS per anesthesia protocol. lidocaine (SALONPAS/ASPERCREME) 4 % patch   No No  
Sig: Place on area of pain  
ondansetron (ZOFRAN ODT) 4 mg disintegrating tablet   No No  
Sig: Take 1 Tab by mouth every eight (8) hours as needed for Nausea. oxyCODONE IR (ROXICODONE) 5 mg immediate release tablet   No No  
Sig: Take 1 Tab by mouth every six (6) hours as needed for Pain. Max Daily Amount: 20 mg.  
simvastatin (ZOCOR) 20 mg tablet  Self Yes No  
Sig: Take 20 mg by mouth daily. Take DOS per anesthesia protocol. warfarin (COUMADIN) 4 mg tablet   Yes No  
Sig: Take 4 mg by mouth as directed. To hold X 5 days prior to surgery. Facility-Administered Medications: None Objective:  
 
Patient Vitals for the past 24 hrs: 
 Temp Pulse Resp BP SpO2  
19 2311  (!) 101  (!) 139/100 97 % 05/29/19 2131  (!) 105  143/71 96 % 05/29/19 2101  (!) 101  137/86 95 % 05/29/19 2031  (!) 113  124/87 97 % 05/29/19 2024  (!) 101  (!) 132/96 96 % 05/29/19 1836 98.1 °F (36.7 °C) 99 22 (!) 143/91 100 % Oxygen Therapy O2 Sat (%): 97 % (05/29/19 2311) Pulse via Oximetry: 101 beats per minute (05/29/19 2311) O2 Device: Room air (05/29/19 1836) Intake/Output Summary (Last 24 hours) at 5/30/2019 0013 Last data filed at 5/29/2019 2153 Gross per 24 hour Intake 1100 ml Output  Net 1100 ml Physical Exam: 
Physical Exam  
Constitutional: She is oriented to person, place, and time. No distress. cachectic HENT:  
Temporal wasting Eyes: Pupils are equal, round, and reactive to light. Conjunctivae are normal. No scleral icterus. Neck: Neck supple. No JVD present. Cardiovascular: Exam reveals no gallop. Irregular around 100 rate Pulmonary/Chest: No stridor. No respiratory distress. She has no wheezes. Abdominal: She exhibits distension. She exhibits no mass. There is tenderness. Musculoskeletal:  
Leg length discrepancy lower ext. Neurological: She is alert and oriented to person, place, and time. Skin: Skin is dry. No rash noted. She is not diaphoretic. Psychiatric: Affect normal.  
Poor recall I reviewed the labs, imaging, EKGs, telemetry, and other studies done this admission. Data Review:  
Recent Results (from the past 24 hour(s)) EKG, 12 LEAD, INITIAL Collection Time: 05/29/19  6:37 PM  
Result Value Ref Range Ventricular Rate 111 BPM  
 Atrial Rate 66 BPM  
 QRS Duration 144 ms Q-T Interval 470 ms QTC Calculation (Bezet) 639 ms Calculated R Axis -75 degrees Calculated T Axis 86 degrees Diagnosis Atrial fibrillation with rapid ventricular response with premature  
ventricular or aberrantly conducted complexes Left axis deviation Right bundle branch block Minimal voltage criteria for LVH, may be normal variant Anterior infarct (cited on or before 23-JUL-2018) T wave abnormality, consider lateral ischemia Abnormal ECG Confirmed by ST MILLY LESLIE MD (), TAYLOR FISH ([id number]) on 5/29/2019 8:04:04 PM 
  
CBC WITH AUTOMATED DIFF Collection Time: 05/29/19  6:45 PM  
Result Value Ref Range WBC 5.9 4.3 - 11.1 K/uL  
 RBC 5.24 (H) 4.05 - 5.2 M/uL  
 HGB 13.5 11.7 - 15.4 g/dL HCT 42.0 35.8 - 46.3 % MCV 80.2 79.6 - 97.8 FL  
 MCH 25.8 (L) 26.1 - 32.9 PG  
 MCHC 32.1 31.4 - 35.0 g/dL  
 RDW 16.8 (H) 11.9 - 14.6 % PLATELET 758 (L) 076 - 450 K/uL MPV 12.5 (H) 9.4 - 12.3 FL ABSOLUTE NRBC 0.00 0.0 - 0.2 K/uL  
 DF AUTOMATED NEUTROPHILS 66 43 - 78 % LYMPHOCYTES 26 13 - 44 % MONOCYTES 7 4.0 - 12.0 % EOSINOPHILS 1 0.5 - 7.8 % BASOPHILS 1 0.0 - 2.0 % IMMATURE GRANULOCYTES 0 0.0 - 5.0 %  
 ABS. NEUTROPHILS 3.9 1.7 - 8.2 K/UL  
 ABS. LYMPHOCYTES 1.5 0.5 - 4.6 K/UL  
 ABS. MONOCYTES 0.4 0.1 - 1.3 K/UL  
 ABS. EOSINOPHILS 0.1 0.0 - 0.8 K/UL  
 ABS. BASOPHILS 0.0 0.0 - 0.2 K/UL  
 ABS. IMM. GRANS. 0.0 0.0 - 0.5 K/UL METABOLIC PANEL, COMPREHENSIVE Collection Time: 05/29/19  6:45 PM  
Result Value Ref Range Sodium 145 136 - 145 mmol/L Potassium 3.0 (L) 3.5 - 5.1 mmol/L Chloride 110 (H) 98 - 107 mmol/L  
 CO2 21 21 - 32 mmol/L Anion gap 14 7 - 16 mmol/L Glucose 93 65 - 100 mg/dL BUN 18 8 - 23 MG/DL Creatinine 1.24 (H) 0.6 - 1.0 MG/DL  
 GFR est AA 53 (L) >60 ml/min/1.73m2 GFR est non-AA 44 (L) >60 ml/min/1.73m2 Calcium 10.0 8.3 - 10.4 MG/DL Bilirubin, total 0.7 0.2 - 1.1 MG/DL  
 ALT (SGPT) 27 12 - 65 U/L  
 AST (SGOT) 34 15 - 37 U/L Alk. phosphatase 144 (H) 50 - 136 U/L Protein, total 8.1 6.3 - 8.2 g/dL Albumin 3.8 3.2 - 4.6 g/dL Globulin 4.3 (H) 2.3 - 3.5 g/dL A-G Ratio 0.9 (L) 1.2 - 3.5 LIPASE Collection Time: 05/29/19  6:45 PM  
Result Value Ref Range Lipase 65 (L) 73 - 393 U/L  
PROTHROMBIN TIME + INR  Collection Time: 05/29/19  6:45 PM  
 Result Value Ref Range Prothrombin time 41.4 (H) 11.7 - 14.5 sec INR 4.4 (HH) POC LACTIC ACID Collection Time: 05/29/19  6:51 PM  
Result Value Ref Range Lactic Acid (POC) 2.03 (H) 0.5 - 1.9 mmol/L All Micro Results None Other Studies: Xr Abd (ap And Erect Or Decub) Result Date: 5/29/2019 Two view abdomen INDICATION:  Right lower quadrant pain. Vomiting. COMPARISON:None Flat and upright views the abdomen were obtained. FINDINGS: There are multiple dilated small bowel loops. No evidence of free air. Bones are osteopenic. There is a right total hip arthroplasty. IMPRESSION: 1. Multiple dilated small bowel loops, suspicious for bowel obstruction. Ct Abd Pelv W Cont Result Date: 5/29/2019 CT abdomen and pelvis with contrast COMPARISON: June 16, 2011. INDICATION: Severe abdominal pain, pain worsening left upper quadrant and left lower quadrant. Evaluate for diverticulitis. TECHNIQUE: CT imaging was performed of the abdomen and pelvis following the uncomplicated administration of intravenous contrast (Isovue 370, 100 mL). Oral contrast was administered. Radiation dose reduction techniques were used for this study:  Our CT scanners use one or all of the following: Automated exposure control, adjustment of the mA and/or kVp according to patient's size, iterative reconstruction. FINDINGS: There is no pericardial effusion. Heart is enlarged. There is coronary artery calcification. There is dependent subsegmental atelectasis. There is a 12 mm nodule within the lateral right breast (series 2, image 5). Abdomen findings: There is subtle micronodular contour of the liver. There is periportal edema. The gallbladder is surgically absent. Pancreas is not well visualized. The spleen is normal in contour. Stable small right adrenal nodule. There are tiny nonobstructing renal calculi.  No hydronephrosis left kidney is small in size. There is prominent atherosclerotic calcification of the abdominal aorta. The stomach is normal in contour. Pelvic findings: There are multiple fluid-filled dilated small bowel loops with transition point appearing to be in the lower abdomen. The colon appears normal in caliber. Urinary bladder is normal in contour. There is a pessary. There is no significant free fluid. No free air. Bones are osteopenic. There are degenerative changes of the spine. There is severe compression deformity of L4 with associated high density structure. Right total hip arthroplasty is partially seen. IMPRESSION: 1. Small bowel obstruction. Multiple fluid-filled dilated small bowel loops with transition point in the lower abdomen. Etiology is likely adhesion. Closed-loop obstruction is in the differential. 2. No evidence of colitis or diverticulitis, although the colon is not well visualized. . No hydronephrosis. 3. Severe compression deformity of L4 with associated high density material. This may be related to prior intervention such as kyphoplasty. Neoplastic involvement is considered less likely. Osteopenia. 4. A 12 mm nodule within the lateral right breast. Correlation with mammogram is recommended. Assessment and Plan:  
 
Hospital Problems as of 5/30/2019 Date Reviewed: 6/23/2011 Codes Class Noted - Resolved POA Acute renal insufficiency ICD-10-CM: N28.9 ICD-9-CM: 593.9  5/30/2019 - Present Unknown * (Principal) SBO (small bowel obstruction) ([facility] ICD-10-CM: N42.464 ICD-9-CM: 560.9  5/29/2019 - Present Unknown Atrial fibrillation with RVR (HCC) ICD-10-CM: I48.91 
ICD-9-CM: 427.31  5/29/2019 - Present Unknown Chronic systolic dysfunction of left ventricle ICD-10-CM: I51.9 ICD-9-CM: 429.9  5/29/2019 - Present Unknown Thyroid cancer [facility]) ICD-10-CM: J94 ICD-9-CM: 193  5/29/2019 - Present Unknown Compression fracture of fourth lumbar vertebra (HCC) ICD-10-CM: B14.314Z ICD-9-CM: 805.4  5/29/2019 - Present Unknown Thromboembolic disorder ([facility] ORG-55-QK: I74.9 ICD-9-CM: 444.9  5/29/2019 - Present Unknown Presence of IVC filter ICD-10-CM: R61.776 ICD-9-CM: V45.89  5/29/2019 - Present Unknown Breast nodule ICD-10-CM: N63.0 ICD-9-CM: 793.89  5/29/2019 - Present Unknown Weight loss ICD-10-CM: R63.4 ICD-9-CM: 783.21  5/29/2019 - Present Unknown Pulmonary embolism (HCC) (Chronic) ICD-10-CM: I26.99 
ICD-9-CM: 415.19  5/2/2011 - Present PLAN: 
--SBO -- npo , ngt lis, surgical consult - all meds iv /patch/ IV heparin re: thromboembolic disorder and afib and remote possibility o.r.  
 
--Hypokalemia Iv replace, check mg 
 
--Acute renal insuff 
 ivf and follow 
 
--AFIB RVR Rate improved around 100, tele- iv lopressor prn. Cardiology consult. Hold oral amiodarone. High risk rvr And suspect she would tolerate rvr poorly 
 
--COUMADIN COAGULOPATHY Hold - follow inr , heparin drip no bolus for now 
 
--CHRONIC SYSTOLIC DYSFXN. Cardiology opinion ? Iv ace inh. , no sx or sx of acute chf 
 
--wt loss, abnormal L4 compr. fx vs other, 12mm breast nodule May need workup when bowel obst resolved. Check tsh 
 ?? Unclear if accurate hx of thryoid malignancy vs other thyroid issue Discharge planning:  Home home health DVT ppx: on warfarin pre hospital 
 
Code status:  full Decision Maker:daughter Syed Model  [id number] Admit status:inpt Estimated LOS:  greater than 2 midnights Risk:  high Signed: 
Juan Linda DO

## 2019-05-30 NOTE — PROGRESS NOTES
Bedside shift report received from Marylen Savage, [facility]. Reported NG insertion attempted 4x and per surgeon, will get xray in morning and decide if she still needs one.

## 2019-05-30 NOTE — PROGRESS NOTES
Bedside and Verbal shift change report given to Sanjeev Faust RN (oncoming nurse) by self, RN (offgoing nurse). Report included the following information SBAR, Kardex, Intake/Output, MAR and Recent Results.

## 2019-05-30 NOTE — ED NOTES
Dr Carri Julian made aware that pt could not tolerate NG tube, order was received and pt to have xray at 0600

## 2019-05-30 NOTE — ED PROVIDER NOTES
HPI: 
80 female multiple medical problem on Coumadin and is here with abdominal pain. Symptoms initially started with diarrhea last Thursday which was 6 days ago. Multiple episodes. Diffuse abdominal cramping. Came into the emergency room yesterday. Receive IV fluid improvement in symptoms and was discharged home. Continue to have diarrhea at home. Also associated nausea vomiting with worsening abdominal pain. Initially pain was in the right lower quadrant. Now pain is more diffused. No fever. Portable prior abdominal surgery. She thinks her appendix is also gone. Denies any urinary pain. No chest pain or shortness of breath ROS Constitutional: No fever, no chills Skin: no rash Eye: No vision changes ENMT: No sore throat Respiratory: No shortness of breath, no cough Cardiovascular: No chest pain, no palpitations Gastrointestinal: + vomiting, + nausea, + diarrhea, Positive abdominal pain : No dysuria MSK: No back pain, no muscle pain, no joint pain Neuro: No headache, no change in mental status, no numbness, no tingling, no weakness Psych:  
Endocrine:  
All other review of systems positive per history of present illness and the above otherwise negative or noncontributory. Visit Vitals BP (!) 143/91 (BP 1 Location: Right arm, BP Patient Position: At rest) Pulse 99 Temp 98.1 °F (36.7 °C) Resp 22 Ht 5' 5\" (1.651 m) Wt 50.3 kg (111 lb) SpO2 100% BMI 18.47 kg/m² Past Medical History:  
Diagnosis Date  Acquired hypothyroidism  Acute pancreatitis  Arthritis  CAD (coronary artery disease) 4/29/11 RCA occluded and fills with collaterals, LAD and Cx nonobstructive CAD, EF 50-55%  Cancer ([address] THYROID - treated with surgery and radiation  CHF (congestive heart failure) ([address] 11/8/2018  Chronic pain   
 right hip  Coagulopathy ([address] 6/16/2011  Colon polyps  Follicular thyroid cancer ([address]  Gallstone pancreatitis  Glaucoma  Gout   
 hx of gout to right great toe - no problems  Hypertension   
 controlled with medication  Other ill-defined conditions(799.89)  Pancreatitis  Paroxysmal A-fib ([address] 2018  PE (pulmonary embolism)   Pulmonary HTN (HCC)   
 moderate  Thromboembolus ([address]   
 right leg - Hemashield graft/filter placed in abdomen  Thyroid disease   
 pt had total thyroidectomy - takes levoxyl daily Past Surgical History:  
Procedure Laterality Date  HX APPENDECTOMY  ?   
 HX CHOLECYSTECTOMY  2011 Dr. Yessy Rincon [facility] [address] [place]  
 hemashield graft/filter placed due to blood clot right leg  HX THYROIDECTOMY    
 partial  
 HX TONSILLECTOMY [address] [facility] ARTHROPLASTY   &   
 right hip Prior to Admission Medications Prescriptions Last Dose Informant Patient Reported? Taking? ALPHAGAN P 0.1 % Drop  Self Yes No  
Sig: Apply 1 Drop to eye two (2) times a day. 1 DROP TO EACH EYE. Pt to use DOS per anesthesia protocol. TRAVATAN 0.004 % Drop  Self Yes No  
Si Drop by Both Eyes route every evening. acetaminophen (TYLENOL EX STR ARTHRITIS PAIN) 500 mg tablet   Yes No  
Sig: Take  by mouth every six (6) hours as needed. colchicine (COLCRYS) 0.6 mg tablet   No No  
Sig: Take 1 Tab by mouth daily as needed. ergocalciferol (ERGOCALCIFEROL) 50,000 unit capsule  Self Yes No  
Sig: Take 50,000 Units by mouth every . folic acid (FOLVITE) 1 mg tablet   Yes No  
Sig: Take 1 mg by mouth daily. gabapentin (NEURONTIN) 300 mg capsule   Yes No  
Sig: Take 300 mg by mouth three (3) times daily. levothyroxine (LEVOXYL) 125 mcg tablet  Self Yes No  
Sig: Take 75 mcg by mouth Daily (before breakfast). Take DOS per anesthesia protocol.    
lidocaine (SALONPAS/ASPERCREME) 4 % patch   No No  
Sig: Place on area of pain  
ondansetron (ZOFRAN ODT) 4 mg disintegrating tablet   No No  
 Sig: Take 1 Tab by mouth every eight (8) hours as needed for Nausea. oxyCODONE IR (ROXICODONE) 5 mg immediate release tablet   No No  
Sig: Take 1 Tab by mouth every six (6) hours as needed for Pain. Max Daily Amount: 20 mg.  
simvastatin (ZOCOR) 20 mg tablet  Self Yes No  
Sig: Take 20 mg by mouth daily. Take DOS per anesthesia protocol. warfarin (COUMADIN) 4 mg tablet   Yes No  
Sig: Take 4 mg by mouth as directed. To hold X 5 days prior to surgery. Facility-Administered Medications: None Adult Exam  
General: alert, appear uncomfortable. Holding her abdomen. Head: normocephalic, atraumatic ENT: moist mucous membranes Neck: supple, non-tender; full range of motion Cardiovascular: regular rate and rhythm, normal peripheral perfusion, no edema Respiratory:  normal respirations; no wheezing, rales or rhonchi Gastrointestinal: Soft, nondistended or tympanitic, diffuse pain but localizing over the left abdomen. No guarding or rebound. Hyperactive bowel sounds Back: non-tender, full range of motion Musculoskeletal: normal range of motion, normal strength, no gross deformities Neurological: alert and oriented x 4, no gross focal deficits; normal speech Psychiatric: cooperative; appropriate mood and affect MDM: 
Differential diagnoses include pancreatitis, colitis, diverticulitis, Bowel obstruction, pancreatitis Lactic acid is mildly elevated at 2.03. Leukocytosis remained unchanged. We'll give IV fluid. We'll obtain a KUB for any obvious signs of bowel obstruction. I suspect she will need a CT scan. She is very skinny. Will likely need oral contrast however multiple episodes vomiting today I'm unsure if she can tolerate oral contrast. 
No pulsatile abdominal mass. Low suspicion for dissection. She is on anticoagulant. 11:06 PM 
She was able to tolerate some oral contrast.  CT obtained. Small bowel junctions. Likely secondary to adhesion. Spoke with general surgery. Would like hospitalist admit the patient andsurgery as consult. Does not feel patient is a good surgical candidate at this time given her age and her INR of 4.4. I spoke with the hospitalist.  Aware of patient and findings. CT Results  (Last 48 hours) 05/29/19 2147  CT ABD PELV W CONT Final result Impression:  IMPRESSION:  
   
1. Small bowel obstruction. Multiple fluid-filled dilated small bowel loops with  
transition point in the lower abdomen. Etiology is likely adhesion. Closed-loop  
obstruction is in the differential.  
   
2. No evidence of colitis or diverticulitis, although the colon is not well  
visualized. . No hydronephrosis. 3. Severe compression deformity of L4 with associated high density material.  
This may be related to prior intervention such as kyphoplasty. Neoplastic  
involvement is considered less likely. Osteopenia. 4. A 12 mm nodule within the lateral right breast. Correlation with mammogram is  
recommended. Narrative:  CT abdomen and pelvis with contrast   
   
COMPARISON: June 16, 2011. INDICATION: Severe abdominal pain, pain worsening left upper quadrant and left  
lower quadrant. Evaluate for diverticulitis. TECHNIQUE: CT imaging was performed of the abdomen and pelvis following the  
uncomplicated administration of intravenous contrast (Isovue 370, 100 mL). Oral  
contrast was administered. Radiation dose reduction techniques were used for  
this study:  Our CT scanners use one or all of the following: Automated exposure  
control, adjustment of the mA and/or kVp according to patient's size, iterative  
reconstruction. FINDINGS:  
   
There is no pericardial effusion. Heart is enlarged. There is coronary artery  
calcification. There is dependent subsegmental atelectasis. There is a 12 mm nodule within the lateral right breast (series 2, image 5). Abdomen findings: There is subtle micronodular contour of the liver. There is periportal edema. The gallbladder is surgically absent. Pancreas is not well visualized. The  
spleen is normal in contour. Stable small right adrenal nodule. There are tiny nonobstructing renal calculi. No hydronephrosis left kidney is  
small in size. There is prominent atherosclerotic calcification of the abdominal  
aorta. The stomach is normal in contour. Pelvic findings: There are multiple fluid-filled dilated small bowel loops with transition point  
appearing to be in the lower abdomen. The colon appears normal in caliber. Urinary bladder is normal in contour. There is a pessary. There is no significant free fluid. No free air. Bones are osteopenic. There are  
degenerative changes of the spine. There is severe compression deformity of L4  
with associated high density structure. Right total hip arthroplasty is  
partially seen. No results found. Recent Results (from the past 24 hour(s)) EKG, 12 LEAD, INITIAL Collection Time: 05/29/19  6:37 PM  
Result Value Ref Range Ventricular Rate 111 BPM  
 Atrial Rate 66 BPM  
 QRS Duration 144 ms Q-T Interval 470 ms QTC Calculation (Bezet) 639 ms Calculated R Axis -75 degrees Calculated T Axis 86 degrees Diagnosis Atrial fibrillation with rapid ventricular response with premature  
ventricular or aberrantly conducted complexes Left axis deviation Right bundle branch block Minimal voltage criteria for LVH, may be normal variant Anterior infarct (cited on or before 23-JUL-2018) T wave abnormality, consider lateral ischemia Abnormal ECG Confirmed by ST MILLY LESLIE MD (), TYALOR FISH ([id number]) on 5/29/2019 8:04:04 PM 
  
CBC WITH AUTOMATED DIFF Collection Time: 05/29/19  6:45 PM  
Result Value Ref Range WBC 5.9 4.3 - 11.1 K/uL  
 RBC 5.24 (H) 4.05 - 5.2 M/uL  
 HGB 13.5 11.7 - 15.4 g/dL HCT 42.0 35.8 - 46.3 % MCV 80.2 79.6 - 97.8 FL  
 MCH 25.8 (L) 26.1 - 32.9 PG  
 MCHC 32.1 31.4 - 35.0 g/dL  
 RDW 16.8 (H) 11.9 - 14.6 % PLATELET 975 (L) 206 - 450 K/uL MPV 12.5 (H) 9.4 - 12.3 FL ABSOLUTE NRBC 0.00 0.0 - 0.2 K/uL  
 DF AUTOMATED NEUTROPHILS 66 43 - 78 % LYMPHOCYTES 26 13 - 44 % MONOCYTES 7 4.0 - 12.0 % EOSINOPHILS 1 0.5 - 7.8 % BASOPHILS 1 0.0 - 2.0 % IMMATURE GRANULOCYTES 0 0.0 - 5.0 %  
 ABS. NEUTROPHILS 3.9 1.7 - 8.2 K/UL  
 ABS. LYMPHOCYTES 1.5 0.5 - 4.6 K/UL  
 ABS. MONOCYTES 0.4 0.1 - 1.3 K/UL  
 ABS. EOSINOPHILS 0.1 0.0 - 0.8 K/UL  
 ABS. BASOPHILS 0.0 0.0 - 0.2 K/UL  
 ABS. IMM. GRANS. 0.0 0.0 - 0.5 K/UL METABOLIC PANEL, COMPREHENSIVE Collection Time: 05/29/19  6:45 PM  
Result Value Ref Range Sodium 145 136 - 145 mmol/L Potassium 3.0 (L) 3.5 - 5.1 mmol/L Chloride 110 (H) 98 - 107 mmol/L  
 CO2 21 21 - 32 mmol/L Anion gap 14 7 - 16 mmol/L Glucose 93 65 - 100 mg/dL BUN 18 8 - 23 MG/DL Creatinine 1.24 (H) 0.6 - 1.0 MG/DL  
 GFR est AA 53 (L) >60 ml/min/1.73m2 GFR est non-AA 44 (L) >60 ml/min/1.73m2 Calcium 10.0 8.3 - 10.4 MG/DL Bilirubin, total 0.7 0.2 - 1.1 MG/DL  
 ALT (SGPT) 27 12 - 65 U/L  
 AST (SGOT) 34 15 - 37 U/L Alk. phosphatase 144 (H) 50 - 136 U/L Protein, total 8.1 6.3 - 8.2 g/dL Albumin 3.8 3.2 - 4.6 g/dL Globulin 4.3 (H) 2.3 - 3.5 g/dL A-G Ratio 0.9 (L) 1.2 - 3.5 LIPASE Collection Time: 05/29/19  6:45 PM  
Result Value Ref Range Lipase 65 (L) 73 - 393 U/L  
POC LACTIC ACID Collection Time: 05/29/19  6:51 PM  
Result Value Ref Range Lactic Acid (POC) 2.03 (H) 0.5 - 1.9 mmol/L Dragon voice recognition software was used to create this note. Although the note has been reviewed and corrected where necessary, additional errors may have been overlooked and remain in the text.

## 2019-05-30 NOTE — CONSULTS
[address] Cardiology Consult Date of  Admission: 5/29/2019  7:03 PM  
 
Primary Care Physician: Dr Alexa Engel Primary Cardiologist: [facility] Referring Physician: [facility] Consulting Physician: Dr Malena Osorio 
  
CC/Reason for consult: a fib, surgical consult 
  
Andreia Keith is a 80 y.o. female with a h/o AF (on coumadin),HFrEF, CAD w/  of RCA, colon polyps, follicular thyroid CA, HTN, PE, pulmonary HTN, and thyroidectomy, who presented to the ED with abdominal pain. Per patient's daughter, she started having diarrhea on Thursday of last week until Tuesday. She has had N/V with inability to keep any food down. Yesterday she developed severe abdominal pain, prompting the visit to the ED. In the ED, abd x ray showed likely SBO. CT of the abdomen revealed 1. Small bowel obstruction. Multiple fluid-filled dilated small bowel loops with 
transition point in the lower abdomen. Etiology is likely adhesion. Closed-loop 
obstruction is in the differential. 
  
2. No evidence of colitis or diverticulitis, although the colon is not well 
visualized. . No hydronephrosis. 
  
3. Severe compression deformity of L4 with associated high density material. 
This may be related to prior intervention such as kyphoplasty. Neoplastic 
involvement is considered less likely. Osteopenia. 
  
4. A 12 mm nodule within the lateral right breast. Correlation with mammogram is recommended. Labs showed WBC 5.9, H&H 13.5/42, plt 135, Na 145, K 3, BUN 18, creatinine 1.26, INR 4.4 repeat this am 6.8. Patient was admitted and surgery was consulted. Patient is felt to be a poor surgical candidate due to frailty, debility and INR > 6. Surgery recommended bowel rest and IVF, with NGT to LIS. Patient Active Problem List  
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  
  Acute pancreatitis K85.90  
 Coagulopathy (HCC) D68.9  
 HTN (hypertension) I10  
 Acquired hypothyroidism E03.9  Pericardial effusion I31.3  Pancreatitis K85.90  Gallstone pancreatitis K85.10  Follicular thyroid cancer ([facility] C73  
 Nutritional deficiency E63.9  Impaired mobility Z74.09  
 Debility R53.81  
 Colon polyps K63.5  Iron deficiency anemia D50.9  CHF (congestive heart failure) (HCC) I50.9  Supratherapeutic INR R79.1  Pleural effusion J90  
 Edema R60.9 [address]  Paroxysmal A-fib (HCC) I48.0  Acute on chronic systolic (congestive) heart failure (HCC) I50.23  
 SBO (small bowel obstruction) ([facility] K56.609  Atrial fibrillation with RVR (HCC) I48.91  
 Chronic systolic dysfunction of left ventricle I51.9  Thyroid cancer ([facility] C73  Compression fracture of fourth lumbar vertebra (HCC) S32.040A  Thromboembolic disorder (HCC) H65.7  Presence of IVC filter [id number]  Breast nodule N63.0  Weight loss R63.4  Acute renal insufficiency N28.9  Hypernatremia E87.0  Hypomagnesemia E83.42 Past Medical History:  
Diagnosis Date  Acquired hypothyroidism  Acute pancreatitis  Arthritis  CAD (coronary artery disease) 4/29/11 RCA occluded and fills with collaterals, LAD and Cx nonobstructive CAD, EF 50-55%  Cancer ([facility] THYROID - treated with surgery and radiation  CHF (congestive heart failure) ([facility] 11/8/2018  Chronic pain   
 right hip  Coagulopathy ([address] 6/16/2011  Colon polyps  Follicular thyroid cancer ([facility]  Gallstone pancreatitis  Glaucoma  Gout   
 hx of gout to right great toe - no problems  Hypertension   
 controlled with medication  Other ill-defined conditions(799.89)  Pancreatitis  Paroxysmal A-fib ([address] 11/8/2018  PE (pulmonary embolism) 2008  Pulmonary HTN (HCC)   
 moderate  Thromboembolus ([address] 1995 right leg - Hemashield graft/filter placed in abdomen  Thyroid disease   
 pt had total thyroidectomy - takes levoxyl daily Past Surgical History:  
Procedure Laterality Date  HX APPENDECTOMY  ? 1990  
 HX CHOLECYSTECTOMY  6/2011 Dr. Alessandro Pineda [facility] [address] [place]  
 hemashield graft/filter placed due to blood clot right leg  HX THYROIDECTOMY  2010  
 partial  
 HX TONSILLECTOMY [address] [facility] ARTHROPLASTY  2008 & 2011  
 right hip Allergies Allergen Reactions  Ativan [Lorazepam] Other (comments) Has opposite reaction  Lortab [Hydrocodone-Acetaminophen] Other (comments) Causes disorientation. - denies allergy, states she can take it  Morphine Other (comments) Causes disorientation Family History Problem Relation Age of Onset  Stroke Mother  Hypertension Mother  Cancer Father   
     prostate  Diabetes Maternal Uncle Current Facility-Administered Medications Medication Dose Route Frequency  dextrose 5% - 0.45% NaCl with KCl 20 mEq/L infusion  100 mL/hr IntraVENous CONTINUOUS  
 magnesium sulfate 2 g/50 ml IVPB (premix or compounded)  2 g IntraVENous ONCE  
 sodium chloride (NS) flush 5-40 mL  5-40 mL IntraVENous Q8H  
 sodium chloride (NS) flush 5-40 mL  5-40 mL IntraVENous PRN  
 tuberculin injection 5 Units  5 Units IntraDERMal ONCE  
 morphine injection 2 mg  2 mg IntraVENous Q3H PRN  
 naloxone (NARCAN) injection 0.4 mg  0.4 mg IntraVENous PRN  
 ondansetron (ZOFRAN) injection 4 mg  4 mg IntraVENous Q4H PRN  
 LORazepam (ATIVAN) injection 1 mg  1 mg IntraVENous Q6H PRN  
 heparin 25,000 units in dextrose 500 mL infusion  12-25 Units/kg/hr IntraVENous TITRATE  latanoprost (XALATAN) 0.005 % ophthalmic solution 1 Drop  1 Drop Both Eyes QPM  
 metoprolol (LOPRESSOR) injection 5 mg  5 mg IntraVENous Q4H PRN Review of Systems Constitutional: Positive for malaise/fatigue. HENT: Negative. Eyes: Negative. Respiratory: Negative. Cardiovascular: Negative. Gastrointestinal: Positive for abdominal pain, diarrhea, nausea and vomiting. Genitourinary: Negative. Musculoskeletal: Negative. Skin: Negative. Neurological: Negative. Endo/Heme/Allergies: Negative. Psychiatric/Behavioral: Negative. Physical Exam 
Vitals:  
 05/30/19 0131 05/30/19 0235 05/30/19 0628 05/30/19 2809 BP: 132/79 (!) 127/96 147/76 145/66 Pulse:  (!) 113 67 62 Resp:  20 20 19 Temp:  98.5 °F (36.9 °C) 98.2 °F (36.8 °C) 98.2 °F (36.8 °C) SpO2:  96% 98% 98% Weight:  50.3 kg (111 lb) 50.3 kg (111 lb) Height:  5' 2\" (1.575 m) Physical Exam: 
Physical Exam 
 
Cardiographics Telemetry: AFIB 
ECG: atrial fibrillation, rate 111 Labs:  
Recent Labs 05/30/19 
8265 05/30/19 
5732 05/30/19 
0053 05/29/19 
1845 *  --   --  145  
K 3.9  --   --  3.0*  
MG 1.5*  --   --   --   
BUN 16  --   --  18  
CREA 1.02*  --   --  1.24* *  --   --  93 WBC 10.1 7.2  --  5.9 HGB 12.0 12.4  --  13.5 HCT 37.7 39.4  --  42.0 PLT 94* 102*  --  135* INR 6.8*  --  4.6* 4.4* Assessment/Plan: 
 
 Assessment:  
  
Principal Problem: 
  SBO (small bowel obstruction) -- bowel rest with NGT to LIS. Surgery following. Abdominal pain improved. Active Problems: 
  Atrial fibrillation with RVR -- rate improved, IV lopressor for HR>120. Heparin drip stopped with supra theraputic INR. If patient continues to be NPO, when INR<2, resume heparin drip. Monitor INR Chronic systolic dysfunction of left ventricle-- looks dry Thyroid cancer Compression fracture of fourth lumbar vertebra Thromboembolic disorder -- INR currently 6.8 Presence of IVC filter Breast nodule-- surgery will monitor as OP Weight loss (5/29/2019) Acute renal insufficiency -- monitor closely Hypernatremia -- IVF Hypomagnesemia -- mag replacement ordered Thank you very much for this referral. We appreciate the opportunity to participate in this patient's care. We will follow along with above stated plan. Shai Beck NP Consulting MD: Thania Montesinos

## 2019-05-30 NOTE — PROGRESS NOTES
Bedside and Verbal shift change report given to self, RN (oncoming nurse) by Rylee Baker RN (offgoing nurse). Report included the following information SBAR, Kardex, Intake/Output, MAR and Recent Results.

## 2019-05-30 NOTE — PROGRESS NOTES
Spoke with surgery about results of xray. Said it obstruction has improved and to hold off on NG until patient is passing gas or has a bowel movement. Said can have ice chips.

## 2019-05-31 NOTE — PROGRESS NOTES
H&P/Consult Note/Progress Note/Office Note:   Conner Herrera  MRN: [id number]  NVK:7/96/9870  Age:86 y.o.    HPI: Conner Herrera is a 80 y.o. female on coumadin for afib with INR>4 at presentation who came to the ER   with 6 day h/o diarrhea for which she took several anti-diarrheal meds (pepto-bismol and lomotil among them)  This was followed by diffuse, moderate, progressive, non-radiating abd pain and cramping. Nothing made symptoms better or worse. She was seen in ER day before admission and given IVF and released. She has associated N/V. No fever or leukocytosis on admission    She has mult medical problems including CHF with EF 30-35%, pulm HTN, chronic afib on coumadin, s/p IVC filter for DVT/PE (1995), thyroid carcinoma treated with ablation. She is s/p appy, rhonda, right hip surgery, and back surgery    She had the below CT performed in ER with SBO, right breast mass, and abnormal L4 spine      5/29/19 CT abd/pelvis with oral and IV contrast  There is no pericardial effusion. Heart is enlarged. There is coronary artery calcification. There is dependent subsegmental atelectasis.     There is a 12 mm nodule within the lateral right breast (series 2, image 5). There is subtle micronodular contour of the liver. There is periportal edema. The gallbladder is surgically absent. Pancreas is not well visualized. The  spleen is normal in contour. Stable small right adrenal nodule (since 2011).    There are tiny nonobstructing renal calculi. No hydronephrosis left kidney is  small in size. There is prominent atherosclerotic calcification of the abdominal aorta.     The stomach is normal in contour.     Pelvic findings: There are multiple fluid-filled dilated small bowel loops with transition point  appearing to be in the lower abdomen. The colon appears normal in caliber. Urinary bladder is normal in contour. There is a pessary.     There is no significant free fluid. No free air. Bones are osteopenic. There are  degenerative changes of the spine. There is severe compression deformity of L4  with associated high density structure. Right total hip arthroplasty is partially seen.        IMPRESSION:     1. Small bowel obstruction. Multiple fluid-filled dilated small bowel loops with  transition point in the lower abdomen. Etiology is likely adhesion. Closed-loop  obstruction is in the differential.     2. No evidence of colitis or diverticulitis, although the colon is not well  visualized. . No hydronephrosis.     3. Severe compression deformity of L4 with associated high density material.  This may be related to prior intervention such as kyphoplasty. Neoplastic  involvement is considered less likely. Osteopenia.     4. A 12 mm nodule within the lateral right breast. Correlation with mammogram is  recommended.       Additional hx:  5/31/19 feels better, no N/V/F/C; no flatus overnight; less abd discomfort to deeper palpation        Past Medical History:   Diagnosis Date    Acquired hypothyroidism     Acute pancreatitis     Arthritis     CAD (coronary artery disease) 4/29/11    RCA occluded and fills with collaterals, LAD and Cx nonobstructive CAD, EF 50-55%    Cancer ([address]     THYROID - treated with surgery and radiation    CHF (congestive heart failure) ([address] 11/8/2018    Chronic pain     right hip    Coagulopathy ([address] 6/16/2011    Colon polyps     Follicular thyroid cancer ([address]     Gallstone pancreatitis     Glaucoma     Gout     hx of gout to right great toe - no problems    Hypertension     controlled with medication    Other ill-defined conditions(799.89)     Pancreatitis     Paroxysmal A-fib ([address] 11/8/2018    PE (pulmonary embolism) 2008    Pulmonary HTN (HCC)     moderate    Thromboembolus ([address] 1995    right leg - Hemashield graft/filter placed in abdomen    Thyroid disease     pt had total thyroidectomy - takes levoxyl daily     Past Surgical History:   Procedure Laterality Date    HX APPENDECTOMY  ? [address]  6/2011    Dr. Ochoa Lowes - D    [address] 85    hemashield graft/filter placed due to blood clot right leg    HX THYROIDECTOMY  2010    partial    HX TONSILLECTOMY      TOTAL HIP ARTHROPLASTY  2008 & 2011    right hip     Current Facility-Administered Medications   Medication Dose Route Frequency    dextrose 5% - 0.45% NaCl with KCl 20 mEq/L infusion  100 mL/hr IntraVENous CONTINUOUS    LORazepam (ATIVAN) injection 0.5 mg  0.5 mg IntraVENous Q4H PRN    famotidine (PF) (PEPCID) 20 mg in sodium chloride 0.9% 10 mL injection  20 mg IntraVENous QPM    sodium chloride (NS) flush 5-40 mL  5-40 mL IntraVENous Q8H    sodium chloride (NS) flush 5-40 mL  5-40 mL IntraVENous PRN    morphine injection 2 mg  2 mg IntraVENous Q3H PRN    naloxone (NARCAN) injection 0.4 mg  0.4 mg IntraVENous PRN    ondansetron (ZOFRAN) injection 4 mg  4 mg IntraVENous Q4H PRN    LORazepam (ATIVAN) injection 1 mg  1 mg IntraVENous Q6H PRN    latanoprost (XALATAN) 0.005 % ophthalmic solution 1 Drop  1 Drop Both Eyes QPM    metoprolol (LOPRESSOR) injection 5 mg  5 mg IntraVENous Q4H PRN     Ativan [lorazepam];  Lortab [hydrocodone-acetaminophen]; and Morphine  Social History     Socioeconomic History    Marital status:      Spouse name: Not on file    Number of children: Not on file    Years of education: Not on file    Highest education level: Not on file   Tobacco Use    Smoking status: Former Smoker    Smokeless tobacco: Never Used    Tobacco comment: quit smoking 1995   Substance and Sexual Activity    Alcohol use: No    Drug use: No     Social History     Tobacco Use   Smoking Status Former Smoker   Smokeless Tobacco Never Used   Tobacco Comment    quit smoking 1995     Family History   Problem Relation Age of Onset    Stroke Mother     Hypertension Mother     Cancer Father         prostate    Diabetes Maternal Uncle      ROS: The patient has no difficulty with chest pain or shortness of breath. No fever or chills. Comprehensive review of systems was otherwise unremarkable except as noted above. Physical Exam:   Visit Vitals  /74 (BP 1 Location: Left arm, BP Patient Position: At rest)   Pulse 67   Temp 98 °F (36.7 °C)   Resp 16   Ht 5' 2\" (1.575 m)   Wt 113 lb 10.4 oz (51.6 kg)   SpO2 99%   BMI 20.79 kg/m²     Vitals:    05/30/19 1703 05/30/19 2040 05/31/19 0053 05/31/19 0408   BP: 136/71 129/81 131/72 132/74   Pulse: 63 72 70 67   Resp: 16 16 16 16   Temp: 98.3 °F (36.8 °C) 98.5 °F (36.9 °C) 98 °F (36.7 °C) 98 °F (36.7 °C)   SpO2: 95% 98% 100% 99%   Weight:    113 lb 10.4 oz (51.6 kg)   Height:         No intake/output data recorded. 05/29 1901 - 05/31 0700  In: 1100 [I.V.:1100]  Out: 0     Constitutional: Alert, oriented, cooperative patient in no acute distress; appears stated age    Eyes:Sclera are clear. EOMs intact  ENMT: no external lesions gross hearing normal; no obvious neck masses, no ear or lip lesions, nares normal  CV: RRR. Normal perfusion  Resp: No JVD. Breathing is  non-labored; no audible wheezing. GI: less distended; no mass,  No guarding or rebound     Musculoskeletal: unremarkable with normal function. No embolic signs or cyanosis.    Neuro:  Oriented; moves all 4; no focal deficits  Psychiatric: normal affect and mood, no memory impairment    Recent vitals (if inpt):  Patient Vitals for the past 24 hrs:   BP Temp Pulse Resp SpO2 Weight   05/31/19 0408 132/74 98 °F (36.7 °C) 67 16 99 % 113 lb 10.4 oz (51.6 kg)   05/31/19 0053 131/72 98 °F (36.7 °C) 70 16 100 %    05/30/19 2040 129/81 98.5 °F (36.9 °C) 72 16 98 %    05/30/19 1703 136/71 98.3 °F (36.8 °C) 63 16 95 %    05/30/19 1152 134/73 98.6 °F (37 °C) 63 19 98 %    05/30/19 1008 129/67  61      05/30/19 0750 145/66 98.2 °F (36.8 °C) 62 19 98 %        Labs:  Recent Labs     05/31/19  0415  05/30/19  0119  05/29/19  1845   WBC 10.3   < >  --   --  5.9   HGB 11.0*   < >  -- --  13.5   PLT 88*   < >  --   --  135*   *   < >  --   --  145   K 4.2   < >  --   --  3.0*   *   < >  --   --  110*   CO2 21   < >  --   --  21   BUN 15   < >  --   --  18   CREA 0.89   < >  --   --  1.24*   *   < >  --   --  93   PTP 18.9*   < >  --    < > 41.4*   INR 1.6   < >  --    < > 4.4*   APTT  --   --  30.4   < >  --    TBILI 0.6   < >  --   --  0.7   SGOT 20   < >  --   --  34   ALT 22   < >  --   --  27      < >  --   --  144*   LPSE  --   --   --   --  65*    < > = values in this interval not displayed. Lab Results   Component Value Date/Time    WBC 10.3 05/31/2019 04:15 AM    HGB 11.0 (L) 05/31/2019 04:15 AM    PLATELET 88 (L) 92/50/6007 04:15 AM    Sodium 146 (H) 05/31/2019 04:15 AM    Potassium 4.2 05/31/2019 04:15 AM    Chloride 117 (H) 05/31/2019 04:15 AM    CO2 21 05/31/2019 04:15 AM    BUN 15 05/31/2019 04:15 AM    Creatinine 0.89 05/31/2019 04:15 AM    Glucose 123 (H) 05/31/2019 04:15 AM    INR 1.6 05/31/2019 04:15 AM    aPTT 30.4 05/30/2019 01:19 AM    Bilirubin, total 0.6 05/31/2019 04:15 AM    Bilirubin, direct 0.1 06/25/2011 06:00 AM    AST (SGOT) 20 05/31/2019 04:15 AM    ALT (SGPT) 22 05/31/2019 04:15 AM    Alk. phosphatase 100 05/31/2019 04:15 AM    Amylase 64 06/17/2011 03:53 AM    Lipase 65 (L) 05/29/2019 06:45 PM    Troponin-I, Qt. 0.03 11/09/2018 09:59 AM       CT Results  (Last 48 hours)    None        chest X-ray      I reviewed recent labs, recent radiologic studies, and pertinent records including other doctor notes if needed. I independently reviewed radiology images for studies I described above or studies I have ordered.    Admission date (for inpatients): 5/29/2019   * No surgery found *  * No surgery found *    ASSESSMENT/PLAN:  Problem List  Date Reviewed: 6/23/2011          Codes Class Noted    Acute renal insufficiency ICD-10-CM: N28.9  ICD-9-CM: 593.9  5/30/2019        Hypernatremia ICD-10-CM: E87.0  ICD-9-CM: 276.0  5/30/2019 Hypomagnesemia ICD-10-CM: E83.42  ICD-9-CM: 275.2  5/30/2019        * (Principal) SBO (small bowel obstruction) ([place] [address] ICD-10-CM: L20.493  ICD-9-CM: 560.9  5/29/2019        Atrial fibrillation with RVR ([place] [address] ICD-10-CM: I48.91  ICD-9-CM: 427.31  5/29/2019        Chronic systolic dysfunction of left ventricle ICD-10-CM: I51.9  ICD-9-CM: 429.9  5/29/2019        Thyroid cancer ([place] [address] ICD-10-CM: C73  ICD-9-CM: 811  5/29/2019        Compression fracture of fourth lumbar vertebra ([place] [address] ICD-10-CM: S32.040A  ICD-9-CM: 805.4  5/29/2019        Thromboembolic disorder ([place] [address] RKX-23-ZK: I74.9  ICD-9-CM: 444.9  5/29/2019        Presence of IVC filter ICD-10-CM: J10.203  ICD-9-CM: V45.89  5/29/2019        Breast nodule ICD-10-CM: N63.0  ICD-9-CM: 793.89  5/29/2019        Weight loss ICD-10-CM: R63.4  ICD-9-CM: 783.21  5/29/2019        Acute on chronic systolic (congestive) heart failure ([place] [address] ICD-10-CM: I50.23  ICD-9-CM: 428.23, 428.0  11/10/2018        CHF (congestive heart failure) ([place] [address] ICD-10-CM: I50.9  ICD-9-CM: 428.0  11/8/2018        Supratherapeutic INR ICD-10-CM: R79.1  ICD-9-CM: 790.92  11/8/2018        Pleural effusion ICD-10-CM: J90  ICD-9-CM: 511.9  11/8/2018    Overview Signed 11/8/2018  4:18 PM by Jordi Carey MD     bilateral             Edema ICD-10-CM: R60.9  ICD-9-CM: 782.3  11/8/2018    Overview Signed 11/8/2018  4:18 PM by Jordi Carey MD     Bilateral lower legs             Lightheadedness ICD-10-CM: R42  ICD-9-CM: 780.4  11/8/2018        Paroxysmal A-fib ([address] ICD-10-CM: I48.0  ICD-9-CM: 427.31  11/8/2018        Iron deficiency anemia ICD-10-CM: D50.9  ICD-9-CM: 280.9  8/29/2011    Overview Signed 8/29/2011  3:05 PM by Aaron Starr     Despite oral iron  For infed infusion               Nutritional deficiency ICD-10-CM: E63.9  ICD-9-CM: 269.9  6/27/2011        Impaired mobility ICD-10-CM: Z74.09  ICD-9-CM: 799.89  6/27/2011        Debility ICD-10-CM: R53.81  ICD-9-CM: 799.3  6/27/2011 Colon polyps ICD-10-CM: K63.5  ICD-9-CM: 211.3  6/27/2011        Pancreatitis ICD-10-CM: K85.90  ICD-9-CM: 803.8  6/19/2011    Overview Signed 6/19/2011  4:13 PM by Janise Bence     6-19-11 lipase improving possibly from gallstones             Gallstone pancreatitis ICD-10-CM: K85.10  ICD-9-CM: 875.4, 574.20  6/08/0149        Follicular thyroid cancer ([facility] ICD-10-CM: C73  ICD-9-CM: 193  6/19/2011    Overview Signed 6/21/2011  7:12 AM by Janise Bence     thyroglobulin high > 100 obvious residual thyroid tissue              Pericardial effusion ICD-10-CM: I31.3  ICD-9-CM: 423.9  6/17/2011        Acute pancreatitis ICD-10-CM: K85.90  ICD-9-CM: 577.0  6/16/2011        Coagulopathy ([facility] ICD-10-CM: D68.9  ICD-9-CM: 286.9  6/16/2011        HTN (hypertension) (Chronic) ICD-10-CM: I10  ICD-9-CM: 401.9  6/16/2011        Acquired hypothyroidism (Chronic) ICD-10-CM: E03.9  ICD-9-CM: 244.9  6/16/2011    Overview Signed 6/19/2011  4:15 PM by Jason Chase thyroidectomy for thyroid cancer             Pulmonary embolism ([facility] (Chronic) ICD-10-CM: I26.99  ICD-9-CM: 415.19  5/2/2011        Unstable angina ([facility] ICD-10-CM: I20.0  ICD-9-CM: 411.1  4/28/2011        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) ICD-10-CM: T84.018A, Z96.649  ICD-9-CM: 996.47, V43.64  4/26/2011        KENNEDY (total hip arthroplasty) ICD-9-CM: V43.64  4/26/2011            Principal Problem:    SBO (small bowel obstruction) ([facility] (5/29/2019)    Active Problems:    Coagulopathy ([address] (6/16/2011)      Atrial fibrillation with RVR (HCC) (5/29/2019)      Chronic systolic dysfunction of left ventricle (5/29/2019)      Thyroid cancer ([address] (5/29/2019)      Compression fracture of fourth lumbar vertebra ([address] (5/29/2019)      Thromboembolic disorder ([address] (3/04/0405)      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)      Breast nodule (5/29/2019)      Weight loss (5/29/2019)      Acute renal insufficiency (5/30/2019)      Hypernatremia (5/30/2019) Hypomagnesemia (5/30/2019)           SBO  NGT placement was unsuccessful by nursing and by Dr Kitty Jeffrey on 5/30/19  Pt is uncooperative and emotionally labile when we attempt to place     Bowel rest  IVF  PICC and TPN if no resolution of SBO in 4-5 days  Follow- exam  Poor surgical candidate secondary to age 80, frailty and debilitation  Hopefully large component of bowel distention is from laxative overuse and not from SBO  Repeat Abd Xray in am      Hypernatremia  Hypotonic IVF    Coagulopathy  INR 6.8 (5/30/19)-->now 1.6 after Vit K  Hep drip started per cardiology  Plt ct low  Follow daily INR      Right Breast mass  I will follow  Outpt imaging and follow-up planned    Hypomagnesemia  Replace prn    L4 abnormality on CT  Follow-up with spine surgeon for further workup as outpt          I have personally performed a face-to-face diagnostic evaluation and management  service on this patient. I have independently seen the patient. I have independently obtained the above history from the patient/family. I have independently examined the patient with above findings. I have independently reviewed data/labs for this patient and developed the above plan of care (MDM). Signed: Chao Reno.  Kitty Jeffrey MD, FACS

## 2019-05-31 NOTE — PROGRESS NOTES
Care Management Interventions  PCP Verified by CM: Yes(thinks saw in May 2019)  Palliative Care Criteria Met (RRAT>21 & CHF Dx)?: No(RRAT 26 Dx Small bowel obstruction )  Transition of Care Consult (CM Consult): Discharge Planning  Discharge Durable Medical Equipment: No(walker)  Physical Therapy Consult: No  Occupational Therapy Consult: No  Speech Therapy Consult: No  Current Support Network: Own Home(2 sons live with her)  Confirm Follow Up Transport: Family  Plan discussed with Pt/Family/Caregiver: Yes  Freedom of Choice Offered: Yes  Discharge Location  Discharge Placement: Unable to determine at this time  Patient remains NPO, on IV fluids and heparin started for INR 1.6. Continue to monitor for d/c. Her current plan is home with family. CM following.

## 2019-05-31 NOTE — PROGRESS NOTES
Problem: Falls - Risk of  Goal: *Absence of Falls  Description  Document Yoel Castañeda Fall Risk and appropriate interventions in the flowsheet. Outcome: Progressing Towards Goal     Problem: Pressure Injury - Risk of  Goal: *Prevention of pressure injury  Description  Document Sid Scale and appropriate interventions in the flowsheet.   Outcome: Progressing Towards Goal     Problem: Small Bowel Obstruction: Day 2  Goal: *Hemodynamically stable  Outcome: Progressing Towards Goal

## 2019-05-31 NOTE — PROGRESS NOTES
purewik placed for accurate urine measurement. Will attempt herman if purwik does not accurately collect urine as pt was \"uncooperative and emotionally labile\" with NG tube insertion per Dr Tr Albarran.

## 2019-05-31 NOTE — PROGRESS NOTES
Bedside and Verbal shift change report given to Phillip Alves RN (oncoming nurse) by self, RN (offgoing nurse). Report included the following information SBAR, Kardex, Intake/Output, MAR and Recent Results.

## 2019-05-31 NOTE — PROGRESS NOTES
Bedside and Verbal shift change report given received from [facility], [facility], [facility]. Report included the following information SBAR, Kardex, Procedure Summary, Intake/Output, MAR, Recent Results and Cardiac Rhythm  AFIB.

## 2019-05-31 NOTE — PROGRESS NOTES
General surgery paged at request of Dr Payal Montoya to ask if clear liquid diet can be initiated.     KENAN Kirkpatrick returned page and this RN was informed not to advance diet- continue bowel rest per Dr Germania Hammond

## 2019-05-31 NOTE — PROGRESS NOTES
Progress Note      Patient: Natasha Bhagat               Sex: female          MRN: [id number]           YOB: 1933      Age:  80 y.o. PCP:  Jose Armstrong MD  Treatment Team: Attending Provider: Alva Parish DO; Consulting Provider: Tatiana Schwab, MD; Utilization Review: Kaylee Gonzalez RN; Care Manager: Teresa Valenzuela RN    Admission HPI:      Subjective:     Patient was seen this AM, she had one BM last night, Abd pain is improved, No vomiting,  denies any chest pain or shortness of breath,   KUB improved        Objective:   Physical Exam:   Visit Vitals  /76 (BP 1 Location: Left arm, BP Patient Position: At rest;Supine)   Pulse 94   Temp 97.2 °F (36.2 °C)   Resp 16   Ht 5' 2\" (1.575 m)   Wt 51.6 kg (113 lb 10.4 oz)   SpO2 94%   BMI 20.79 kg/m²      Temp (24hrs), Av.1 °F (36.7 °C), Min:97.2 °F (36.2 °C), Max:98.6 °F (37 °C)    Oxygen Therapy  O2 Sat (%): 94 % (19 0818)  Pulse via Oximetry: 95 beats per minute (19 0126)  O2 Device: Room air (19 0818)    Intake/Output Summary (Last 24 hours) at 2019 0934  Last data filed at 2019 1530  Gross per 24 hour   Intake 0 ml   Output 0 ml   Net 0 ml          General:- Conscious, No acute distress   Neck:- Supple, No JVD  Lungs- CTA Bilaterally, No significant wheezing  Heart:- S1 S2 regular  Abdomen:- Soft, Decreased bowel sounds, NTND, No guarding/rigidity/rebound tend.   Extremities:-No pedal edema  Neurologic: - AOX3, No acute FND,  Musculoskeletal: No Acute findings  Psych: - Appropriate mood      LAB  Recent Results (from the past 24 hour(s))   PROTHROMBIN TIME + INR    Collection Time: 19  2:45 PM   Result Value Ref Range    Prothrombin time 56.1 (H) 11.7 - 14.5 sec    INR 6.5 (HH)     PROTHROMBIN TIME + INR    Collection Time: 19  4:15 AM   Result Value Ref Range    Prothrombin time 18.9 (H) 11.7 - 14.5 sec    INR 1.6     TSH 3RD GENERATION    Collection Time: 19 4:15 AM   Result Value Ref Range    TSH <0.005 (L) 0.358 - 3.740 uIU/mL   CBC WITH AUTOMATED DIFF    Collection Time: 05/31/19  4:15 AM   Result Value Ref Range    WBC 10.3 4.3 - 11.1 K/uL    RBC 4.23 4.05 - 5.2 M/uL    HGB 11.0 (L) 11.7 - 15.4 g/dL    HCT 34.7 (L) 35.8 - 46.3 %    MCV 82.0 79.6 - 97.8 FL    MCH 26.0 (L) 26.1 - 32.9 PG    MCHC 31.7 31.4 - 35.0 g/dL    RDW 17.3 (H) 11.9 - 14.6 %    PLATELET 88 (L) 579 - 450 K/uL    MPV Unable to calculate. Recommend adding IPF. 9.4 - 12.3 FL    ABSOLUTE NRBC 0.00 0.0 - 0.2 K/uL    DF AUTOMATED      NEUTROPHILS 83 (H) 43 - 78 %    LYMPHOCYTES 10 (L) 13 - 44 %    MONOCYTES 7 4.0 - 12.0 %    EOSINOPHILS 0 (L) 0.5 - 7.8 %    BASOPHILS 0 0.0 - 2.0 %    IMMATURE GRANULOCYTES 1 0.0 - 5.0 %    ABS. NEUTROPHILS 8.5 (H) 1.7 - 8.2 K/UL    ABS. LYMPHOCYTES 1.0 0.5 - 4.6 K/UL    ABS. MONOCYTES 0.7 0.1 - 1.3 K/UL    ABS. EOSINOPHILS 0.0 0.0 - 0.8 K/UL    ABS. BASOPHILS 0.0 0.0 - 0.2 K/UL    ABS. IMM. GRANS. 0.1 0.0 - 0.5 K/UL   METABOLIC PANEL, COMPREHENSIVE    Collection Time: 05/31/19  4:15 AM   Result Value Ref Range    Sodium 146 (H) 136 - 145 mmol/L    Potassium 4.2 3.5 - 5.1 mmol/L    Chloride 117 (H) 98 - 107 mmol/L    CO2 21 21 - 32 mmol/L    Anion gap 8 7 - 16 mmol/L    Glucose 123 (H) 65 - 100 mg/dL    BUN 15 8 - 23 MG/DL    Creatinine 0.89 0.6 - 1.0 MG/DL    GFR est AA >60 >60 ml/min/1.73m2    GFR est non-AA >60 >60 ml/min/1.73m2    Calcium 9.2 8.3 - 10.4 MG/DL    Bilirubin, total 0.6 0.2 - 1.1 MG/DL    ALT (SGPT) 22 12 - 65 U/L    AST (SGOT) 20 15 - 37 U/L    Alk.  phosphatase 100 50 - 136 U/L    Protein, total 6.4 6.3 - 8.2 g/dL    Albumin 2.7 (L) 3.2 - 4.6 g/dL    Globulin 3.7 (H) 2.3 - 3.5 g/dL    A-G Ratio 0.7 (L) 1.2 - 3.5     MAGNESIUM    Collection Time: 05/31/19  4:15 AM   Result Value Ref Range    Magnesium 2.2 1.8 - 2.4 mg/dL   PHOSPHORUS    Collection Time: 05/31/19  4:15 AM   Result Value Ref Range    Phosphorus 2.4 2.3 - 3.7 MG/DL       Xr Abd (kub)    Result Date: 5/31/2019  KUB Clinical indications: Small bowel obstruction, follow-up exam FINDINGS: Single AP view of the abdomen and pelvis submitted and compared to a similar exam from the previous day shows significant decrease in the distended air-filled bowel loops. No findings present to suspect bowel obstruction. There is contrast within an opacified bladder. IMPRESSION: Marked interval decrease in the distention of air-filled bowel loops throughout the abdomen and pelvis. No findings noted to suspect bowel obstruction. Xr Abd (kub)    Result Date: 5/31/2019  IMPRESSION: Marked interval decrease in the distention of air-filled bowel loops throughout the abdomen and pelvis. No findings noted to suspect bowel obstruction.       All Micro Results     None          Current Medications Reviewed    Current Facility-Administered Medications:     dextrose 5% - 0.45% NaCl with KCl 20 mEq/L infusion, 100 mL/hr, IntraVENous, CONTINUOUS, Mateo Wells MD, Last Rate: 100 mL/hr at 05/31/19 0550, 100 mL/hr at 05/31/19 0550    LORazepam (ATIVAN) injection 0.5 mg, 0.5 mg, IntraVENous, Q4H PRN, Juan Jose Romero MD    famotidine (PF) (PEPCID) 20 mg in sodium chloride 0.9% 10 mL injection, 20 mg, IntraVENous, QPM, Mateo Wells MD, 20 mg at 05/30/19 1715    sodium chloride (NS) flush 5-40 mL, 5-40 mL, IntraVENous, Q8H, Jordan Laine Ligas, DO, 5 mL at 05/31/19 0550    sodium chloride (NS) flush 5-40 mL, 5-40 mL, IntraVENous, PRN, Jordan Laine Ligas, DO    morphine injection 2 mg, 2 mg, IntraVENous, Q3H PRN, [place] Richters, DO, 2 mg at 05/30/19 2052    naloxone [facility]) injection 0.4 mg, 0.4 mg, IntraVENous, PRN, Ortega Laine Ligas, DO    ondansetron [facility]) injection 4 mg, 4 mg, IntraVENous, Q4H PRN, Jordan Laine Ligas, DO, 4 mg at 05/30/19 0252    LORazepam (ATIVAN) injection 1 mg, 1 mg, IntraVENous, Q6H PRN, Laine Ortega DO    latanoprost (XALATAN) 0.005 % ophthalmic solution 1 Drop, 1 Drop, Both Eyes, QPM, Alejandra Ortega DO, 1 Drop at 05/30/19 1939    metoprolol (LOPRESSOR) injection 5 mg, 5 mg, IntraVENous, Q4H PRN, Alejandra Ortega DO      Assessment/Plan     Principal Problem:    SBO (small bowel obstruction) ([address] (5/29/2019)    Active Problems:    Coagulopathy ([address] (6/16/2011)      Atrial fibrillation with RVR (HCC) (5/29/2019)      Chronic systolic dysfunction of left ventricle (5/29/2019)      Thyroid cancer ([address] (5/29/2019)      Compression fracture of fourth lumbar vertebra ([address] (5/29/2019)      Thromboembolic disorder ([address] (3/18/8713)      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)      Breast nodule (5/29/2019)      Weight loss (5/29/2019)      Acute renal insufficiency (5/30/2019)      Hypernatremia (5/30/2019)      Hypomagnesemia (5/30/2019)        #--SBO --   npo , general surgery was consulted,   Repeat abdominal x-ray shows improvement in the dilated loops, she did had a small bowel movement yesterday, will check with general surgery okay to start on clear liquid diet   will continue with supportive treatment with IV fluids, bowel rest she is a poor surgical candidate     --Hypokalemia              Improved    --Acute renal insuff              Continue with IV fluids, creatinine is improved    -Hyponatremia-on hypotonic fluids     --AFIB RVR              Heart rate is controlled, using Lopressor as needed, once she started on a diet will resume oral medications which she takes at home, cardiology was consulted,  Her INR level is 1.6 today, will start on heparin drip for now                   --COUMADIN COAGULOPATHY              Resolved    --CHRONIC SYSTOLIC DYSFXN. Currently stable. , no sx or sx of acute chf     --wt loss, abnormal L4 compr. fx vs other, 12mm breast nodule              Will  need workup as an OP. TSH low, On synthroid at home              ? ? Unclear if accurate hx of thryoid malignancy vs other thyroid issue      DC plan- Once her SBO is resolved      Aruna Iqbal MD  May 31, 2019

## 2019-06-01 NOTE — PROGRESS NOTES
Verbal bedside report given to [facility], oncoming RN. Patient's situation, background, assessment and recommendations provided. Opportunity for questions provided. Oncoming RN assumed care of patient. Heparin IV drip verified at bedside with oncoming RN.

## 2019-06-01 NOTE — PROGRESS NOTES
Bedside and Verbal shift change report received from [address]. Report included the following information SBAR, Kardex, Procedure Summary, Intake/Output, MAR, Recent Results and Cardiac Rhythm A FIB rates 110-120.

## 2019-06-01 NOTE — PROGRESS NOTES
Progress Note      Patient: Lubna Mg               Sex: female          MRN: [id number]           YOB: 1933      Age:  80 y.o. PCP:  Mony Brown MD  Treatment Team: Attending Provider: Morgan Doty DO; Consulting Provider: Luis Miguel German MD; Utilization Review: Alicia Khoury RN; Care Manager: Jannet Woodward RN    Admission HPI:  Pt is 80years old, currently being managed for SBO and A.fib with RVR. Subjective:   Pt seen at bedside, resting comfortably in bed  Pt's daughter present at bedside. Pt is passing gas, had a large BM this AM.  Pt denies any abdominal pain, nausea/vomiting. ROS: 10 point ROS negative except what mentioned above. Objective:   Physical Exam:   Visit Vitals  /87 (BP 1 Location: Right arm, BP Patient Position: At rest)   Pulse 94   Temp 98 °F (36.7 °C)   Resp 18   Ht 5' 2\" (1.575 m)   Wt 51.8 kg (114 lb 4.8 oz)   SpO2 99%   BMI 20.91 kg/m²      Temp (24hrs), Av °F (36.7 °C), Min:97.2 °F (36.2 °C), Max:98.5 °F (36.9 °C)    Oxygen Therapy  O2 Sat (%): 99 % (19 0515)  Pulse via Oximetry: 95 beats per minute (19 0126)  O2 Device: Room air (19 0425)    Intake/Output Summary (Last 24 hours) at 2019 0741  Last data filed at 2019 0425  Gross per 24 hour   Intake 4476 ml   Output 200 ml   Net 4276 ml          General:- Conscious, No acute distress   Neck:- Supple, No JVD  Lungs- CTA Bilaterally, No significant wheezing  Heart:- S1 S2 regular  Abdomen:- Soft, hypoactive bowel sounds, NTND, No guarding/rigidity/rebound tend.   Extremities:-No pedal edema  Neurologic: - AOX3, No acute FND,  Musculoskeletal: No Acute findings  Psych: - Appropriate mood      LAB  Recent Results (from the past 24 hour(s))   CBC WITH AUTOMATED DIFF    Collection Time: 19 10:44 AM   Result Value Ref Range    WBC 10.3 4.3 - 11.1 K/uL    RBC 4.41 4.05 - 5.2 M/uL    HGB 11.3 (L) 11.7 - 15.4 g/dL    HCT 35.7 (L) 35.8 - 46.3 %    MCV 81.0 79.6 - 97.8 FL    MCH 25.6 (L) 26.1 - 32.9 PG    MCHC 31.7 31.4 - 35.0 g/dL    RDW 17.4 (H) 11.9 - 14.6 %    PLATELET 82 (L) 550 - 450 K/uL    MPV Unable to calculate. Recommend adding IPF. 9.4 - 12.3 FL    ABSOLUTE NRBC 0.00 0.0 - 0.2 K/uL    DF AUTOMATED      NEUTROPHILS 79 (H) 43 - 78 %    LYMPHOCYTES 13 13 - 44 %    MONOCYTES 8 4.0 - 12.0 %    EOSINOPHILS 0 (L) 0.5 - 7.8 %    BASOPHILS 0 0.0 - 2.0 %    IMMATURE GRANULOCYTES 1 0.0 - 5.0 %    ABS. NEUTROPHILS 8.1 1.7 - 8.2 K/UL    ABS. LYMPHOCYTES 1.3 0.5 - 4.6 K/UL    ABS. MONOCYTES 0.8 0.1 - 1.3 K/UL    ABS. EOSINOPHILS 0.0 0.0 - 0.8 K/UL    ABS. BASOPHILS 0.0 0.0 - 0.2 K/UL    ABS. IMM. GRANS. 0.1 0.0 - 0.5 K/UL   PTT    Collection Time: 05/31/19 10:44 AM   Result Value Ref Range    aPTT 28.0 24.7 - 39.8 SEC   PTT    Collection Time: 05/31/19  5:52 PM   Result Value Ref Range    aPTT 47.9 (H) 24.7 - 39.8 SEC   PLEASE READ & DOCUMENT PPD TEST IN 48 HRS    Collection Time: 05/31/19 11:44 PM   Result Value Ref Range    PPD  Negative    mm Induration  0 - 5 mm   PROTHROMBIN TIME + INR    Collection Time: 06/01/19  1:38 AM   Result Value Ref Range    Prothrombin time 16.0 (H) 11.7 - 14.5 sec    INR 1.3     CBC WITH AUTOMATED DIFF    Collection Time: 06/01/19  1:38 AM   Result Value Ref Range    WBC 7.5 4.3 - 11.1 K/uL    RBC 4.45 4.05 - 5.2 M/uL    HGB 11.5 (L) 11.7 - 15.4 g/dL    HCT 36.0 35.8 - 46.3 %    MCV 80.9 79.6 - 97.8 FL    MCH 25.8 (L) 26.1 - 32.9 PG    MCHC 31.9 31.4 - 35.0 g/dL    RDW 17.3 (H) 11.9 - 14.6 %    PLATELET 84 (L) 312 - 450 K/uL    MPV Unable to calculate. Recommend adding IPF. 9.4 - 12.3 FL    ABSOLUTE NRBC 0.00 0.0 - 0.2 K/uL    DF AUTOMATED      NEUTROPHILS 72 43 - 78 %    LYMPHOCYTES 17 13 - 44 %    MONOCYTES 9 4.0 - 12.0 %    EOSINOPHILS 1 0.5 - 7.8 %    BASOPHILS 0 0.0 - 2.0 %    IMMATURE GRANULOCYTES 1 0.0 - 5.0 %    ABS. NEUTROPHILS 5.4 1.7 - 8.2 K/UL    ABS. LYMPHOCYTES 1.3 0.5 - 4.6 K/UL    ABS.  MONOCYTES 0.7 0.1 - 1.3 K/UL    ABS. EOSINOPHILS 0.1 0.0 - 0.8 K/UL    ABS. BASOPHILS 0.0 0.0 - 0.2 K/UL    ABS. IMM. GRANS. 0.0 0.0 - 0.5 K/UL   METABOLIC PANEL, COMPREHENSIVE    Collection Time: 06/01/19  1:38 AM   Result Value Ref Range    Sodium 144 136 - 145 mmol/L    Potassium 4.7 3.5 - 5.1 mmol/L    Chloride 116 (H) 98 - 107 mmol/L    CO2 22 21 - 32 mmol/L    Anion gap 6 (L) 7 - 16 mmol/L    Glucose 99 65 - 100 mg/dL    BUN 13 8 - 23 MG/DL    Creatinine 0.81 0.6 - 1.0 MG/DL    GFR est AA >60 >60 ml/min/1.73m2    GFR est non-AA >60 >60 ml/min/1.73m2    Calcium 9.2 8.3 - 10.4 MG/DL    Bilirubin, total 0.6 0.2 - 1.1 MG/DL    ALT (SGPT) 25 12 - 65 U/L    AST (SGOT) 29 15 - 37 U/L    Alk. phosphatase 94 50 - 136 U/L    Protein, total 6.2 (L) 6.3 - 8.2 g/dL    Albumin 2.6 (L) 3.2 - 4.6 g/dL    Globulin 3.6 (H) 2.3 - 3.5 g/dL    A-G Ratio 0.7 (L) 1.2 - 3.5     MAGNESIUM    Collection Time: 06/01/19  1:38 AM   Result Value Ref Range    Magnesium 1.9 1.8 - 2.4 mg/dL   PHOSPHORUS    Collection Time: 06/01/19  1:38 AM   Result Value Ref Range    Phosphorus 1.5 (L) 2.3 - 3.7 MG/DL   PTT    Collection Time: 06/01/19  1:38 AM   Result Value Ref Range    aPTT 105.6 (H) 24.7 - 39.8 SEC       No results found. No results found.     All Micro Results     None          Current Medications Reviewed    Current Facility-Administered Medications:     lactated ringers bolus infusion 500 mL, 500 mL, IntraVENous, ONCE, Paris Wells MD    dextrose 5 % - 0.45% NaCl infusion, 125 mL/hr, IntraVENous, CONTINUOUS, Paris Wells MD    magnesium sulfate 2 g/50 ml IVPB (premix or compounded), 2 g, IntraVENous, ONCE, Paris Wells MD    heparin 25,000 units in dextrose 500 mL infusion, 12-25 Units/kg/hr, IntraVENous, TITRATE, Juan Jose Romero MD, Last Rate: 16.5 mL/hr at 06/01/19 0703, 16 Units/kg/hr at 06/01/19 0703    LORazepam (ATIVAN) injection 0.5 mg, 0.5 mg, IntraVENous, Q4H PRN, Juan Jose Romero MD    famotidine (PF) (PEPCID) 20 mg in sodium chloride 0.9% 10 mL injection, 20 mg, IntraVENous, QPM, Ridge Salcedo MD, 20 mg at 05/31/19 1725    sodium chloride (NS) flush 5-40 mL, 5-40 mL, IntraVENous, Q8H, Joan Ortega, , 10 mL at 06/01/19 0603    sodium chloride (NS) flush 5-40 mL, 5-40 mL, IntraVENous, PRN, Joan Ortegaipes, DO    morphine injection 2 mg, 2 mg, IntraVENous, Q3H PRN, Lynder Rad, DO, 2 mg at 05/30/19 2052    naloxone [facility]) injection 0.4 mg, 0.4 mg, IntraVENous, PRN, Joan Ortega, DO    ondansetron [facility]) injection 4 mg, 4 mg, IntraVENous, Q4H PRN, Lynder Rad, DO, 4 mg at 05/30/19 0252    LORazepam (ATIVAN) injection 1 mg, 1 mg, IntraVENous, Q6H PRN, Joan Ortega, DO    latanoprost (XALATAN) 0.005 % ophthalmic solution 1 Drop, 1 Drop, Both Eyes, QPM, Joan Ortega, DO, 1 Drop at 05/31/19 1725    metoprolol (LOPRESSOR) injection 5 mg, 5 mg, IntraVENous, Q4H PRN, Lynder Rad, DO      Assessment/Plan     Principal Problem:    SBO (small bowel obstruction) ([address] (5/29/2019)    Active Problems:    Coagulopathy ([address] (6/16/2011)      Atrial fibrillation with RVR ([address] (5/29/2019)      Chronic systolic dysfunction of left ventricle (5/29/2019)      Thyroid cancer ([address] (5/29/2019)      Compression fracture of fourth lumbar vertebra ([address] (5/29/2019)      Thromboembolic disorder ([address] (3/95/3254)      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)      Breast nodule (5/29/2019)      Weight loss (5/29/2019)      Acute renal insufficiency (5/30/2019)      Hypernatremia (5/30/2019)      Hypomagnesemia (5/30/2019)        #--SBO --   Improving  Pt had a large BM today, passing gas  Pt allowed to have ice chips, will start clears tomorrow, 6/2/19  KUB from 6/1 showed significant improvement  Will consider repeating tomorrow again, 6/2  Surgery on board, conservative treatment to be continued    --Hypokalemia              4.7 today    --Acute renal insuff              Creatinine normalized, 0. 8    -Hyponatremia- sodium is 144, normalized     --AFIB RVR              Heart rate is controlled, using Lopressor as needed, once she started on a diet will resume oral medications which she takes at home, cardiology was consulted,  Her INR level is 1.6 today, continue Heparin drip. Started on warfarin on 6/1                   --COUMADIN COAGULOPATHY              Resolved    --CHRONIC SYSTOLIC DYSFXN. Currently stable. , no sx or sx of acute chf     --wt loss, abnormal L4 compr. fx vs other, 12mm breast nodule              Will  need workup as an OP. TSH low, On synthroid at home              ? ? Unclear if accurate hx of thryoid malignancy vs other thyroid issue      DC plan- likely to be discharged early next week.       Mino Elizondo MD  June 1, 2019

## 2019-06-01 NOTE — PROGRESS NOTES
Warfarin dosing per pharmacist    Gagan Villagomez is a 80 y.o. female. Height: 5' 2\" (157.5 cm)    Weight: 51.8 kg (114 lb 4.8 oz)    Indication:  A fib    Goal INR:  2-3    Home dose:  2 mg hs per last AC visit in March    Risk factors or significant drug interactions:  --    Other anticoagulants:  Heparin drip    Daily Monitoring  Date  INR     Warfarin dose HGB              Notes  5/29  4.4  Hold  13.5  5/30  4.6/6.8  Hold  12.4  Vit K 10 mg IV x 1  5/31  1.6  Hold  11.3  6/1  1.3  2 mg  11.5    Per outpatient notes, patient was sub therapeutic on 2 mg hs a few months ago. INR down to 1.3 today after being supra therapeutic on admission and receiving one dose of vitamin k. Will start 2 mg tonight give INR on admission.     Thank you,  Vandana Christine, PharmD  Clinical Pharmacist  [phone]

## 2019-06-02 NOTE — PROGRESS NOTES
Bedside and Verbal shift change report given to self (oncoming nurse) by Geri Huddleston RN (offgoing nurse). Report included the following information SBAR, Kardex, MAR and Recent Results.

## 2019-06-02 NOTE — PROGRESS NOTES
Patient wanting to get out of bed due to right hip and leg pain. Son assisted patient to chair. Pure wick became displaced. Repositioned pure wick and asked patient if she could void in order for RN to obtain urine culture. Patient states she doesn't have to go. Dr. Sher Martell wrote a nursing miscellaneous order to place herman if needed for accurate output totals. Placed herman via sterile technique for accurate measurements per Dr. Belkis Norris written order. Patient has cloudy, tan, malodorous, thick discharge coming from vaginal/urethral area. Collected urine culture from Herman tube.

## 2019-06-02 NOTE — PROGRESS NOTES
Bedside and Verbal shift change report received from Marlon Ashford, [address] [place] [address]. Report included the following information SBAR, Kardex, Intake/Output, MAR, Recent Results and Cardiac Rhythm A FIB.

## 2019-06-02 NOTE — PROGRESS NOTES
IV heparin rate has been adjusted based on the most recent PTT results. Lab Results   Component Value Date/Time    aPTT 45.5 (H) 06/01/2019 11:46 PM     Gave 35 units/kg bolus, increased drip by 4 units.  Drip now infusing at 20 units/kg/hr

## 2019-06-02 NOTE — PROGRESS NOTES
Progress Note      Patient: Jose Roasles               Sex: female          MRN: [id number]           YOB: 1933      Age:  80 y.o. PCP:  Leisa Mortimer, MD  Treatment Team: Attending Provider: Ivet Pradhan DO; Consulting Provider: Gunnar Jones MD; Utilization Review: Claudene Pebbles, VALERIE; Care Manager: Alejandro Pérez RN    Admission HPI:  Pt is 80years old, currently being managed for SBO and A.fib with RVR. Subjective:   Pt seen resting in bed  Had multiple BM's overnight. Denies chest pain, palpitations, abdominal pain, nausea/vomiting. Pt's daughter reports pt being confused after getting norco last night. No fever, or other acute medical issues overnight. ROS: 10 point ROS negative except what mentioned above. Objective:   Physical Exam:   Visit Vitals  /77   Pulse (!) 115   Temp 98 °F (36.7 °C)   Resp 19   Ht 5' 2\" (1.575 m)   Wt 52.3 kg (115 lb 4.8 oz)   SpO2 98%   BMI 21.09 kg/m²      Temp (24hrs), Av.1 °F (36.7 °C), Min:97.4 °F (36.3 °C), Max:98.5 °F (36.9 °C)    Oxygen Therapy  O2 Sat (%): 98 % (19 0510)  Pulse via Oximetry: 95 beats per minute (19 0126)  O2 Device: Room air (19 0452)    Intake/Output Summary (Last 24 hours) at 2019 0725  Last data filed at 2019 0452  Gross per 24 hour   Intake 1461.56 ml   Output 225 ml   Net 1236.56 ml          General:- Conscious, No acute distress   Neck:- Supple, No JVD  Lungs- CTA Bilaterally, No significant wheezing  Heart:- S1 S2 regular  Abdomen:- Soft, hypoactive bowel sounds, NTND, No guarding/rigidity/rebound tend.   Extremities:-No pedal edema  Neurologic: - GCS 15, no motor or sensory deficits  Musculoskeletal: No Acute findings  Psych: - Appropriate mood      LAB  Recent Results (from the past 24 hour(s))   PTT    Collection Time: 19  9:16 AM   Result Value Ref Range    aPTT 132.8 (H) 24.7 - 39.8 SEC   URINALYSIS W/ RFLX MICROSCOPIC    Collection Time: 06/01/19 10:21 AM   Result Value Ref Range    Color YELLOW      Appearance TURBID      Specific gravity 1.027 (H) 1.001 - 1.023      pH (UA) 6.0 5.0 - 9.0      Protein 100 (A) NEG mg/dL    Glucose NEGATIVE  mg/dL    Ketone TRACE (A) NEG mg/dL    Bilirubin MODERATE (A) NEG      Blood LARGE (A) NEG      Urobilinogen 4.0 (H) 0.2 - 1.0 EU/dL    Nitrites POSITIVE (A) NEG      Leukocyte Esterase LARGE (A) NEG      WBC 20-50 0 /hpf    RBC 20-50 0 /hpf    Epithelial cells 3-5 0 /hpf    Bacteria 4+ (H) 0 /hpf    Casts RED CELL CAST 0 /lpf    Crystals, urine CA OXALATE 0 /LPF    Amorphous Crystals 2+ (H) 0   PTT    Collection Time: 06/01/19  5:01 PM   Result Value Ref Range    aPTT 69.1 (H) 24.7 - 39.8 SEC   PLEASE READ & DOCUMENT PPD TEST IN 72 HRS    Collection Time: 06/01/19 11:30 PM   Result Value Ref Range    PPD  Negative    mm Induration  0 - 5 mm   PTT    Collection Time: 06/01/19 11:46 PM   Result Value Ref Range    aPTT 45.5 (H) 24.7 - 39.8 SEC   PROTHROMBIN TIME + INR    Collection Time: 06/02/19  4:42 AM   Result Value Ref Range    Prothrombin time 17.3 (H) 11.7 - 14.5 sec    INR 1.4     MAGNESIUM    Collection Time: 06/02/19  4:42 AM   Result Value Ref Range    Magnesium 2.3 1.8 - 2.4 mg/dL   PHOSPHORUS    Collection Time: 06/02/19  4:42 AM   Result Value Ref Range    Phosphorus 2.0 (L) 2.3 - 3.7 MG/DL   CBC WITH AUTOMATED DIFF    Collection Time: 06/02/19  4:42 AM   Result Value Ref Range    WBC 6.9 4.3 - 11.1 K/uL    RBC 4.22 4.05 - 5.2 M/uL    HGB 10.9 (L) 11.7 - 15.4 g/dL    HCT 34.2 (L) 35.8 - 46.3 %    MCV 81.0 79.6 - 97.8 FL    MCH 25.8 (L) 26.1 - 32.9 PG    MCHC 31.9 31.4 - 35.0 g/dL    RDW 17.4 (H) 11.9 - 14.6 %    PLATELET 86 (L) 204 - 450 K/uL    MPV Unable to calculate. Recommend adding IPF.  9.4 - 12.3 FL    ABSOLUTE NRBC 0.00 0.0 - 0.2 K/uL    DF AUTOMATED      NEUTROPHILS 62 43 - 78 %    LYMPHOCYTES 24 13 - 44 %    MONOCYTES 10 4.0 - 12.0 %    EOSINOPHILS 3 0.5 - 7.8 %    BASOPHILS 1 0.0 - 2.0 % IMMATURE GRANULOCYTES 1 0.0 - 5.0 %    ABS. NEUTROPHILS 4.3 1.7 - 8.2 K/UL    ABS. LYMPHOCYTES 1.6 0.5 - 4.6 K/UL    ABS. MONOCYTES 0.7 0.1 - 1.3 K/UL    ABS. EOSINOPHILS 0.2 0.0 - 0.8 K/UL    ABS. BASOPHILS 0.0 0.0 - 0.2 K/UL    ABS. IMM. GRANS. 0.0 0.0 - 0.5 K/UL   METABOLIC PANEL, COMPREHENSIVE    Collection Time: 06/02/19  4:42 AM   Result Value Ref Range    Sodium 143 136 - 145 mmol/L    Potassium 4.1 3.5 - 5.1 mmol/L    Chloride 113 (H) 98 - 107 mmol/L    CO2 20 (L) 21 - 32 mmol/L    Anion gap 10 7 - 16 mmol/L    Glucose 100 65 - 100 mg/dL    BUN 9 8 - 23 MG/DL    Creatinine 0.76 0.6 - 1.0 MG/DL    GFR est AA >60 >60 ml/min/1.73m2    GFR est non-AA >60 >60 ml/min/1.73m2    Calcium 9.3 8.3 - 10.4 MG/DL    Bilirubin, total 0.6 0.2 - 1.1 MG/DL    ALT (SGPT) 22 12 - 65 U/L    AST (SGOT) 18 15 - 37 U/L    Alk. phosphatase 87 50 - 136 U/L    Protein, total 5.8 (L) 6.3 - 8.2 g/dL    Albumin 2.6 (L) 3.2 - 4.6 g/dL    Globulin 3.2 2.3 - 3.5 g/dL    A-G Ratio 0.8 (L) 1.2 - 3.5         No results found. No results found.     All Micro Results     Procedure Component Value Units Date/Time    CULTURE, URINE [[id number]] Collected:  06/01/19 2856    Order Status:  Completed Specimen:  Voided Urine Updated:  06/01/19 6963          Current Medications Reviewed    Current Facility-Administered Medications:     heparin (porcine) 5,000 unit/mL injection, , , ,     dextrose 5 % - 0.45% NaCl infusion, 50 mL/hr, IntraVENous, CONTINUOUS, Rosaura Corona MD, Last Rate: 50 mL/hr at 06/02/19 0315, 50 mL/hr at 06/02/19 0315    warfarin (COUMADIN) tablet 2 mg, 2 mg, Oral, QPM, Rosaura Corona MD, 2 mg at 06/01/19 1733    cefTRIAXone (ROCEPHIN) 1 g in 0.9% sodium chloride (MBP/ADV) 50 mL, 1 g, IntraVENous, Q24H, Annalisa Ortega DO, Stopped at 06/01/19 2134    HYDROcodone-acetaminophen (NORCO) 5-325 mg per tablet 1 Tab, 1 Tab, Oral, Q6H PRN, Alva Parish DO, 0.5 Tab at 06/01/19 2320    heparin 25,000 units in dextrose 500 mL infusion, 12-25 Units/kg/hr, IntraVENous, TITRATE, Juan Jose Romero MD, Last Rate: 20.6 mL/hr at 06/02/19 0707, 20 Units/kg/hr at 06/02/19 0707    LORazepam (ATIVAN) injection 0.5 mg, 0.5 mg, IntraVENous, Q4H PRN, Juan Jose Romero MD    famotidine (PF) (PEPCID) 20 mg in sodium chloride 0.9% 10 mL injection, 20 mg, IntraVENous, QPM, Sue Wells MD, 20 mg at 06/01/19 1733    sodium chloride (NS) flush 5-40 mL, 5-40 mL, IntraVENous, Q8H, Davin Ortegaarch, DO, 5 mL at 06/02/19 0441    sodium chloride (NS) flush 5-40 mL, 5-40 mL, IntraVENous, PRN, Jordan Castle [place], DO    morphine injection 2 mg, 2 mg, IntraVENous, Q3H PRN, Mor Pinta, DO, 2 mg at 05/30/19 2052    naloxone [facility]) injection 0.4 mg, 0.4 mg, IntraVENous, PRN, Jordan Castle [place], DO    ondansetron [facility]) injection 4 mg, 4 mg, IntraVENous, Q4H PRN, Jordan Castle [place], DO, 4 mg at 05/30/19 0252    LORazepam (ATIVAN) injection 1 mg, 1 mg, IntraVENous, Q6H PRN, Jordan Castle [place], DO    latanoprost (XALATAN) 0.005 % ophthalmic solution 1 Drop, 1 Drop, Both Eyes, QPM, Jordan Castle [place], DO, 1 Drop at 06/01/19 1745    metoprolol (LOPRESSOR) injection 5 mg, 5 mg, IntraVENous, Q4H PRN, Mor Pinta, DO      Assessment/Plan     Principal Problem:    SBO (small bowel obstruction) ([facility] (5/29/2019)    Active Problems:    Coagulopathy ([facility] (6/16/2011)      Atrial fibrillation with RVR ([facility] (5/29/2019)      Chronic systolic dysfunction of left ventricle (5/29/2019)      Thyroid cancer ([address] (5/29/2019)      Compression fracture of fourth lumbar vertebra ([facility] (5/29/2019)      Thromboembolic disorder ([facility] (5/47/5692)      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)      Breast nodule (5/29/2019)      Weight loss (5/29/2019)      Acute renal insufficiency (5/30/2019)      Hypernatremia (5/30/2019)      Hypomagnesemia (5/30/2019)        #--SBO --   Improving  Multiple BM's last night, passing gas.   Abdomen is benign  Sips of clear liquid and ice chips today  Repeat KUB today  Surgery on board, conservative treatment to be continued    --Hypokalemia: resolved              4.1 today    --Acute renal insuff: resolved              Creatinine normalized, 0.8    -Hyponatremia- sodium is 143, normalized     --AFIB RVR              Adding oral cardiazem 30 mg q6h, pt is still tachycardic. Started on warfarin on 6/1, continue heparin until INR is therapeutic range. INR 1.4                   --COUMADIN COAGULOPATHY              Resolved    --CHRONIC SYSTOLIC DYSFXN. Currently stable. , no sx or sx of acute chf     --wt loss, abnormal L4 compr. fx vs other, 12mm breast nodule              Will  need workup as an OP. TSH low, On synthroid at home              ? ? Unclear if accurate hx of thryoid malignancy vs other thyroid issue      DC plan- likely to be discharged early next week.       Victorino Obrien MD  June 2, 2019

## 2019-06-02 NOTE — PROGRESS NOTES
Warfarin dosing per pharmacist    Rex Leong is a 80 y.o. female. Height: 5' 2\" (157.5 cm)    Weight: 52.3 kg (115 lb 4.8 oz)    Indication:  A fib    Goal INR:  2-3    Home dose:  2 mg hs per last AC visit in March    Risk factors or significant drug interactions:  --    Other anticoagulants:  Heparin drip    Daily Monitoring  Date  INR     Warfarin dose HGB              Notes  5/29  4.4  Hold  13.5  5/30  4.6/6.8  Hold  12.4  Vit K 10 mg IV x 1  5/31  1.6  Hold  11.3  6/1  1.3  2 mg  11.5  6/2  1.4  2 mg  10.9    Per outpatient notes, patient was sub therapeutic on 2 mg hs a few months ago. INR down to 1.3 today after being supra therapeutic on admission and receiving one dose of vitamin k. Will continue 2 mg tonight as it may take a few days to see increase due to vit k resistance.     Thank you,  Tj Perez, PharmD  Clinical Pharmacist  [phone]

## 2019-06-02 NOTE — PROGRESS NOTES
Verbal bedside report given to Hanh Keane oncoming RN. Patient's situation, background, assessment and recommendations provided. Opportunity for questions provided. Oncoming RN assumed care of patient. Heparin IV drip verified at bedside with oncoming RN.

## 2019-06-02 NOTE — PROGRESS NOTES
Patient has only had 175 ml of urine output in herman bag since catheter was placed. D5 1/2 NS infusing at 50 ml/hr. Patient has only had approximately 10mls of water with taking her pain meds at 2330. No BMs on this shift. Patient also has thick yellow drainage coming from both eyes. Daughter states sometimes her right eye drains but not this bad. Wiped eyes with wet warm clean washcloth. Will closely monitor.

## 2019-06-02 NOTE — PROGRESS NOTES
H&P/Consult Note/Progress Note/Office Note:   Gale Diaz  MRN: [id number]  EWJ:0[birth year]06/[birth year]85  Age:86 y.o.    HPI: Gale Diaz is a 80 y.o. female on coumadin for afib with INR>4 at presentation who came to the ER   with 6 day h/o diarrhea for which she took several anti-diarrheal meds (pepto-bismol and lomotil among them)  This was followed by diffuse, moderate, progressive, non-radiating abd pain and cramping. Nothing made symptoms better or worse. She was seen in ER day before admission and given IVF and released. She has associated N/V. No fever or leukocytosis on admission    She has mult medical problems including CHF with EF 30-35%, pulm HTN, chronic afib on coumadin, s/p IVC filter for DVT/PE (1995), thyroid carcinoma treated with ablation. She is s/p appy, rhonda, right hip surgery, and back surgery    She had the below CT performed in ER with SBO, right breast mass, and abnormal L4 spine      5/29/19 CT abd/pelvis with oral and IV contrast  There is no pericardial effusion. Heart is enlarged. There is coronary artery calcification. There is dependent subsegmental atelectasis.     There is a 12 mm nodule within the lateral right breast (series 2, image 5). There is subtle micronodular contour of the liver. There is periportal edema. The gallbladder is surgically absent. Pancreas is not well visualized. The  spleen is normal in contour. Stable small right adrenal nodule (since 2011).    There are tiny nonobstructing renal calculi. No hydronephrosis left kidney is  small in size. There is prominent atherosclerotic calcification of the abdominal aorta.     The stomach is normal in contour.     Pelvic findings: There are multiple fluid-filled dilated small bowel loops with transition point  appearing to be in the lower abdomen. The colon appears normal in caliber. Urinary bladder is normal in contour. There is a pessary.     There is no significant free fluid. No free air. Bones are osteopenic. There are  degenerative changes of the spine. There is severe compression deformity of L4  with associated high density structure. Right total hip arthroplasty is partially seen.        IMPRESSION:     1. Small bowel obstruction. Multiple fluid-filled dilated small bowel loops with  transition point in the lower abdomen. Etiology is likely adhesion. Closed-loop  obstruction is in the differential.     2. No evidence of colitis or diverticulitis, although the colon is not well  visualized. . No hydronephrosis.     3. Severe compression deformity of L4 with associated high density material.  This may be related to prior intervention such as kyphoplasty. Neoplastic  involvement is considered less likely. Osteopenia.     4. A 12 mm nodule within the lateral right breast. Correlation with mammogram is  recommended. Additional hx:  5/31/19 feels better, no N/V/F/C; no flatus overnight; less abd discomfort to deeper palpation    6/2/19 Awake in bed. No N/V/F/C. Had multiple BM's overnight. AF, NAD.        Past Medical History:   Diagnosis Date    Acquired hypothyroidism     Acute pancreatitis     Arthritis     CAD (coronary artery disease) 4/29/11    RCA occluded and fills with collaterals, LAD and Cx nonobstructive CAD, EF 50-55%    Cancer ([address]     THYROID - treated with surgery and radiation    CHF (congestive heart failure) ([address] 11/8/2018    Chronic pain     right hip    Coagulopathy ([address] 6/16/2011    Colon polyps     Follicular thyroid cancer ([address]     Gallstone pancreatitis     Glaucoma     Gout     hx of gout to right great toe - no problems    Hypertension     controlled with medication    Other ill-defined conditions(799.89)     Pancreatitis     Paroxysmal A-fib ([address] 11/8/2018    PE (pulmonary embolism) 2008    Pulmonary HTN ([address]     moderate    Thromboembolus ([address] 1995    right leg - Hemashield graft/filter placed in abdomen    Thyroid disease     pt had total thyroidectomy - takes levoxyl daily     Past Surgical History:   Procedure Laterality Date    HX APPENDECTOMY  ? [address]  6/2011    Dr. Field - [facility]    [address]    hemashield graft/filter placed due to blood clot right leg    HX THYROIDECTOMY  2010    partial    HX TONSILLECTOMY      TOTAL HIP ARTHROPLASTY  2008 & 2011    right hip     Current Facility-Administered Medications   Medication Dose Route Frequency    heparin (porcine) 5,000 unit/mL injection        dilTIAZem (CARDIZEM) IR tablet 30 mg  30 mg Oral Q6H    dextrose 5 % - 0.45% NaCl infusion  125 mL/hr IntraVENous CONTINUOUS    warfarin (COUMADIN) tablet 2 mg  2 mg Oral QPM    cefTRIAXone (ROCEPHIN) 1 g in 0.9% sodium chloride (MBP/ADV) 50 mL  1 g IntraVENous Q24H    HYDROcodone-acetaminophen (NORCO) 5-325 mg per tablet 1 Tab  1 Tab Oral Q6H PRN    heparin 25,000 units in dextrose 500 mL infusion  12-25 Units/kg/hr IntraVENous TITRATE    LORazepam (ATIVAN) injection 0.5 mg  0.5 mg IntraVENous Q4H PRN    famotidine (PF) (PEPCID) 20 mg in sodium chloride 0.9% 10 mL injection  20 mg IntraVENous QPM    sodium chloride (NS) flush 5-40 mL  5-40 mL IntraVENous Q8H    sodium chloride (NS) flush 5-40 mL  5-40 mL IntraVENous PRN    morphine injection 2 mg  2 mg IntraVENous Q3H PRN    naloxone (NARCAN) injection 0.4 mg  0.4 mg IntraVENous PRN    ondansetron (ZOFRAN) injection 4 mg  4 mg IntraVENous Q4H PRN    LORazepam (ATIVAN) injection 1 mg  1 mg IntraVENous Q6H PRN    latanoprost (XALATAN) 0.005 % ophthalmic solution 1 Drop  1 Drop Both Eyes QPM    metoprolol (LOPRESSOR) injection 5 mg  5 mg IntraVENous Q4H PRN     Ativan [lorazepam];  Lortab [hydrocodone-acetaminophen]; and Morphine  Social History     Socioeconomic History    Marital status:      Spouse name: Not on file    Number of children: Not on file    Years of education: Not on file    Highest education level: Not on file   Tobacco Use    Smoking status: Former Smoker    Smokeless tobacco: Never Used    Tobacco comment: quit smoking 1995   Substance and Sexual Activity    Alcohol use: No    Drug use: No     Social History     Tobacco Use   Smoking Status Former Smoker   Smokeless Tobacco Never Used   Tobacco Comment    quit smoking 1995     Family History   Problem Relation Age of Onset    Stroke Mother     Hypertension Mother     Cancer Father         prostate    Diabetes Maternal Uncle      ROS: The patient has no difficulty with chest pain or shortness of breath. No fever or chills. Comprehensive review of systems was otherwise unremarkable except as noted above. Physical Exam:   Visit Vitals  /80 (BP 1 Location: Right arm, BP Patient Position: At rest)   Pulse (!) 106   Temp 97.6 °F (36.4 °C)   Resp 16   Ht 5' 2\" (1.575 m)   Wt 115 lb 4.8 oz (52.3 kg)   SpO2 99%   BMI 21.09 kg/m²     Vitals:    06/01/19 2107 06/02/19 0110 06/02/19 0510 06/02/19 0853   BP: 114/84 129/76 110/77 123/80   Pulse: (!) 110 (!) 111 (!) 115 (!) 106   Resp: 20 20 19 16   Temp: 98.5 °F (36.9 °C) 98.5 °F (36.9 °C) 98 °F (36.7 °C) 97.6 °F (36.4 °C)   SpO2: 99% 97% 98% 99%   Weight:   115 lb 4.8 oz (52.3 kg)    Height:         No intake/output data recorded. 05/31 1901 - 06/02 0700  In: 5937.6 [P.O.:10; I.V.:5927.6]  Out: 425 [Urine:425]    Constitutional: Alert, oriented, cooperative patient in no acute distress; appears stated age    Eyes: are clear. EOMs intact  ENMT: no external lesions gross hearing normal; no obvious neck masses, no ear or lip lesions, nares normal  CV: RRR. Normal perfusion  Resp: No JVD. Breathing is  non-labored; no audible wheezing. GI: Soft; no mass,  No guarding or rebound     Musculoskeletal: unremarkable with normal function. No embolic signs or cyanosis.    Neuro:  Oriented; moves all 4; no focal deficits  Psychiatric: normal affect and mood, no memory impairment    Recent vitals (if inpt):  Patient Vitals for the past 24 hrs:   BP Temp Pulse Resp SpO2 Weight   06/02/19 0853 123/80 97.6 °F (36.4 °C) (!) 106 16 99 %    06/02/19 0510 110/77 98 °F (36.7 °C) (!) 115 19 98 % 115 lb 4.8 oz (52.3 kg)   06/02/19 0110 129/76 98.5 °F (36.9 °C) (!) 111 20 97 %    06/01/19 2107 114/84 98.5 °F (36.9 °C) (!) 110 20 99 %    06/01/19 1647 127/74 97.4 °F (36.3 °C) (!) 119 20 98 %    06/01/19 1302 116/74 98.1 °F (36.7 °C) (!) 105 16 97 %        Labs:  Recent Labs     06/02/19  0831 06/02/19  0442   WBC  --  6.9   HGB  --  10.9*   PLT  --  86*   NA  --  143   K  --  4.1   CL  --  113*   CO2  --  20*   BUN  --  9   CREA  --  0.76   GLU  --  100   PTP  --  17.3*   INR  --  1.4   APTT 166.9*  --    TBILI  --  0.6   SGOT  --  18   ALT  --  22   AP  --  87       Lab Results   Component Value Date/Time    WBC 6.9 06/02/2019 04:42 AM    HGB 10.9 (L) 06/02/2019 04:42 AM    PLATELET 86 (L) 46/27/4039 04:42 AM    Sodium 143 06/02/2019 04:42 AM    Potassium 4.1 06/02/2019 04:42 AM    Chloride 113 (H) 06/02/2019 04:42 AM    CO2 20 (L) 06/02/2019 04:42 AM    BUN 9 06/02/2019 04:42 AM    Creatinine 0.76 06/02/2019 04:42 AM    Glucose 100 06/02/2019 04:42 AM    INR 1.4 06/02/2019 04:42 AM    aPTT 166.9 (HH) 06/02/2019 08:31 AM    Bilirubin, total 0.6 06/02/2019 04:42 AM    Bilirubin, direct 0.1 06/25/2011 06:00 AM    AST (SGOT) 18 06/02/2019 04:42 AM    ALT (SGPT) 22 06/02/2019 04:42 AM    Alk. phosphatase 87 06/02/2019 04:42 AM    Amylase 64 06/17/2011 03:53 AM    Lipase 65 (L) 05/29/2019 06:45 PM    Troponin-I, Qt. 0.03 11/09/2018 09:59 AM       CT Results  (Last 48 hours)    None        chest X-ray      I reviewed recent labs, recent radiologic studies, and pertinent records including other doctor notes if needed. I independently reviewed radiology images for studies I described above or studies I have ordered.    Admission date (for inpatients): 5/29/2019   * No surgery found *  * No surgery found *    ASSESSMENT/PLAN:  Problem List  Date Reviewed: 6/23/2011          Codes Class Noted    Acute renal insufficiency ICD-10-CM: N28.9  ICD-9-CM: 593.9  5/30/2019        Hypernatremia ICD-10-CM: E87.0  ICD-9-CM: 276.0  5/30/2019        Hypomagnesemia ICD-10-CM: E83.42  ICD-9-CM: 275.2  5/30/2019        * (Principal) SBO (small bowel obstruction) ([facility] ICD-10-CM: R34.950  ICD-9-CM: 560.9  5/29/2019        Atrial fibrillation with RVR ([facility] ICD-10-CM: I48.91  ICD-9-CM: 427.31  5/29/2019        Chronic systolic dysfunction of left ventricle ICD-10-CM: I51.9  ICD-9-CM: 429.9  5/29/2019        Thyroid cancer ([facility] ICD-10-CM: C73  ICD-9-CM: 521  5/29/2019        Compression fracture of fourth lumbar vertebra ([facility] ICD-10-CM: S32.040A  ICD-9-CM: 805.4  5/29/2019        Thromboembolic disorder ([facility] JB-45-: I74.9  ICD-9-CM: 444.9  5/29/2019        Presence of IVC filter ICD-10-CM: G00.654  ICD-9-CM: V45.89  5/29/2019        Breast nodule ICD-10-CM: N63.0  ICD-9-CM: 793.89  5/29/2019        Weight loss ICD-10-CM: R63.4  ICD-9-CM: 783.21  5/29/2019        Acute on chronic systolic (congestive) heart failure ([facility] ICD-10-CM: I50.23  ICD-9-CM: 428.23, 428.0  11/10/2018        CHF (congestive heart failure) ([facility] ICD-10-CM: I50.9  ICD-9-CM: 428.0  11/8/2018        Supratherapeutic INR ICD-10-CM: R79.1  ICD-9-CM: 790.92  11/8/2018        Pleural effusion ICD-10-CM: J90  ICD-9-CM: 511.9  11/8/2018    Overview Signed 11/8/2018  4:18 PM by Heidy Varner MD     bilateral             Edema ICD-10-CM: R60.9  ICD-9-CM: 782.3  11/8/2018    Overview Signed 11/8/2018  4:18 PM by Heidy Varner MD     Bilateral lower legs             Lightheadedness ICD-10-CM: R42  ICD-9-CM: 780.4  11/8/2018        Paroxysmal A-fib ([address] ICD-10-CM: I48.0  ICD-9-CM: 427.31  11/8/2018        Iron deficiency anemia ICD-10-CM: D50.9  ICD-9-CM: 280.9  8/29/2011    Overview Signed 8/29/2011  3:05 PM by Lowry Bence     Despite oral iron  For infed infusion               Nutritional deficiency ICD-10-CM: E63.9  ICD-9-CM: 269.9  6/27/2011        Impaired mobility ICD-10-CM: Z74.09  ICD-9-CM: 799.89  6/27/2011        Debility ICD-10-CM: R53.81  ICD-9-CM: 799.3  6/27/2011        Colon polyps ICD-10-CM: K63.5  ICD-9-CM: 211.3  6/27/2011        Pancreatitis ICD-10-CM: K85.90  ICD-9-CM: 577.0  6/19/2011    Overview Signed 6/19/2011  4:13 PM by Cliff Wheatley     6-19-11 lipase improving possibly from gallstones             Gallstone pancreatitis ICD-10-CM: K85.10  ICD-9-CM: 461.7, 574.20  6/66/8055        Follicular thyroid cancer ([facility] ICD-10-CM: C73  ICD-9-CM: 193  6/19/2011    Overview Signed 6/21/2011  7:12 AM by Cliff Wheatley     thyroglobulin high > 100 obvious residual thyroid tissue              Pericardial effusion ICD-10-CM: I31.3  ICD-9-CM: 423.9  6/17/2011        Acute pancreatitis ICD-10-CM: K85.90  ICD-9-CM: 577.0  6/16/2011        Coagulopathy ([facility] ICD-10-CM: D68.9  ICD-9-CM: 286.9  6/16/2011        HTN (hypertension) (Chronic) ICD-10-CM: I10  ICD-9-CM: 401.9  6/16/2011        Acquired hypothyroidism (Chronic) ICD-10-CM: E03.9  ICD-9-CM: 244.9  6/16/2011    Overview Signed 6/19/2011  4:15 PM by Uli Zaragoza thyroidectomy for thyroid cancer             Pulmonary embolism ([facility] (Chronic) ICD-10-CM: I26.99  ICD-9-CM: 415.19  5/2/2011        Unstable angina ([facility] ICD-10-CM: I20.0  ICD-9-CM: 411.1  4/28/2011        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) ICD-10-CM: T84.018A, Z96.649  ICD-9-CM: 996.47, V43.64  4/26/2011        KENNEDY (total hip arthroplasty) ICD-9-CM: V43.64  4/26/2011            Principal Problem:    SBO (small bowel obstruction) ([facility] (5/29/2019)    Active Problems:    Coagulopathy ([facility] (6/16/2011)      Atrial fibrillation with RVR ([facility] (5/29/2019)      Chronic systolic dysfunction of left ventricle (5/29/2019)      Thyroid cancer ([facility] (5/29/2019)      Compression fracture of fourth lumbar vertebra ([facility] (5/29/2019)      Thromboembolic disorder ([facility] (9/74/1018)      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)      Breast nodule (5/29/2019)      Weight loss (5/29/2019)      Acute renal insufficiency (5/30/2019)      Hypernatremia (5/30/2019)      Hypomagnesemia (5/30/2019)           SBO  NGT placement was unsuccessful by nursing and by Dr Beryle Simpler on 5/30/19  Pt is uncooperative and emotionally labile when we attempted to place  She is responding without it   +flatus and BM by 6/1/19  Still minimal discomfort to deeper palpation in lower abdomen    Bowel rest until possible non-contrasted CT Monday  IVF  PICC and TPN if no resolution of SBO in 3-4 days  Follow- exam  Poor surgical candidate secondary to age 80, frailty and debilitation  Hopefully large component of bowel distention is from laxative overuse and not from SBO      Oliguria  Urine described as concentrated and cloudy  Check urinalysis  Bolus with 500cc LR; repeat bolus later today if needed; Increase IVF to 125cc/hr    Hypophosphatemia  Defer to Hospitalists    Hypernatremia  Hypotonic IVF    Hypomagnesemia  Replace prn      Coagulopathy  INR 6.8 (5/30/19)-->1.6-->1.3 (6/1/19) after Vit K  Hep drip started per cardiology  Plt ct low      Right Breast mass  I will follow  Outpt imaging and follow-up planned    L4 abnormality on CT  Follow-up with spine surgeon for further workup as outpt          Peter Diaz NP    I have personally performed a face-to-face diagnostic evaluation and management  service on this patient. I have independently seen the patient. I have independently obtained the above history from the patient/family. I have independently examined the patient with above findings. I have independently reviewed data/labs for this patient and developed the above plan of care.     [place] [id number] [facility]

## 2019-06-02 NOTE — PROGRESS NOTES
Bedside report received from MARISSA Watt. Day shift RN reports that patient has voided at least 50 ml of dark yellow cloudy urine that she sent down for a UA. According to day shift staff, patient had multiple BMs last occurrence at 1630 with a saturated brief with urine at the same time. Unable to measure the urine at that time. Patient received a 500 ml bolus of LR this morning. Pure wick in place however, patient is very modest and holds her urine because she doesn't want to \"go in the bed\". UA was positive; called on-call hospitalist Dr. Alice Morales to report results. Called also to clarify conflicting orders between hospitalist and general surgeon. Will follow last set of orders by hospitalist who is the attending physician. Bowel sounds remain hypoactive. Patient is in atrial fib rates 110-120. Patient received a dose of PO coumadin this evening. Bladder scanned patient at 2040, result 206 ml. Patient states she does not have to void. D5 1/2NS infusing at 50 ml/hr. Ordered additional bolus of 500 ml LR per Dr. Cannon Mealing orders this morning on rounds since urine output has remained low.

## 2019-06-02 NOTE — PROGRESS NOTES
Patient asking for pain meds for right hip and leg pain. She states she takes norco at home but only a half of a tablet. Lortab is listed as an allergy with no reaction listed. Patient's son called patient's daughter to ask with reaction patient has with Adelaida Osborne. She states that it makes her more confused if she takes a whole tablet. Dr. Tuyet Pennington gave orders for Norco 5mg every 6 hours PRN.

## 2019-06-03 PROBLEM — E87.0 HYPERNATREMIA: Status: RESOLVED | Noted: 2019-01-01 | Resolved: 2019-01-01

## 2019-06-03 PROBLEM — E83.42 HYPOMAGNESEMIA: Status: RESOLVED | Noted: 2019-01-01 | Resolved: 2019-01-01

## 2019-06-03 PROBLEM — S32.040A COMPRESSION FRACTURE OF FOURTH LUMBAR VERTEBRA (HCC): Chronic | Status: ACTIVE | Noted: 2019-01-01

## 2019-06-03 PROBLEM — I51.9 CHRONIC SYSTOLIC DYSFUNCTION OF LEFT VENTRICLE: Chronic | Status: ACTIVE | Noted: 2019-01-01

## 2019-06-03 PROBLEM — N39.0 UTI (URINARY TRACT INFECTION): Status: ACTIVE | Noted: 2019-01-01

## 2019-06-03 PROBLEM — Z95.828 PRESENCE OF IVC FILTER: Chronic | Status: ACTIVE | Noted: 2019-01-01

## 2019-06-03 PROBLEM — N17.9 AKI (ACUTE KIDNEY INJURY) (HCC): Status: RESOLVED | Noted: 2019-01-01 | Resolved: 2019-01-01

## 2019-06-03 PROBLEM — N17.9 AKI (ACUTE KIDNEY INJURY) (HCC): Status: ACTIVE | Noted: 2019-01-01

## 2019-06-03 NOTE — PROGRESS NOTES
Progress Note      Patient: Bebe Torres               Sex: female          MRN: [id number]           YOB: 1933      Age:  80 y.o. PCP:  Cody Vargas MD  Treatment Team: Attending Provider: Evleia Henriquez DO; Consulting Provider: Isaac Castillo MD; Utilization Review: Liana Givens RN; Care Manager: Nadia Grimaldo RN    Subjective:   Pt is 80years old admitted for SBO, UTI, and A.fib with RVR.    6/3 - pt having BMs, passing gas per nursing. CT scan pending by surgery. Family has no other concerns. Pt confused this morning so cannot obtain history from her      Objective:   Physical Exam:   Patient Vitals for the past 24 hrs:   Temp Pulse Resp BP SpO2   19 0504 97.7 °F (36.5 °C) 91 16 111/70 99 %   19 0140 98.5 °F (36.9 °C) (!) 113 16 126/88 100 %   19 2104 98.3 °F (36.8 °C) 87 16 117/75 99 %   19 1726 96 °F (35.6 °C) (!) 105 16 125/89 98 %   19 1259 97.8 °F (36.6 °C) (!) 107 16 121/84 98 %   19 0853 97.6 °F (36.4 °C) (!) 106 16 123/80 99 %       Temp (24hrs), Av.7 °F (36.5 °C), Min:96 °F (35.6 °C), Max:98.5 °F (36.9 °C)    Oxygen Therapy  O2 Sat (%): 99 % (19 0504)  Pulse via Oximetry: 95 beats per minute (19 0126)  O2 Device: Room air (19 0410)    Intake/Output Summary (Last 24 hours) at 6/3/2019 0806  Last data filed at 6/3/2019 0410  Gross per 24 hour   Intake 3019.67 ml   Output 1000 ml   Net 2019.67 ml          General:- Conscious, No acute distress   Lungs- CTA Bilaterally, No significant wheezing  Heart:- S1 S2 regular  Abdomen:- Soft,  NTND, No guarding/rigidity/rebound tend.   Extremities:-No pedal edema      LAB  Recent Results (from the past 24 hour(s))   PTT    Collection Time: 19  8:31 AM   Result Value Ref Range    aPTT 166.9 (HH) 24.7 - 39.8 SEC   PTT    Collection Time: 19  6:23 PM   Result Value Ref Range    aPTT 78.7 (H) 24.7 - 39.8 SEC   CBC WITH AUTOMATED DIFF Collection Time: 06/03/19  5:51 AM   Result Value Ref Range    WBC 5.4 4.3 - 11.1 K/uL    RBC 4.06 4.05 - 5.2 M/uL    HGB 10.2 (L) 11.7 - 15.4 g/dL    HCT 32.8 (L) 35.8 - 46.3 %    MCV 80.8 79.6 - 97.8 FL    MCH 25.1 (L) 26.1 - 32.9 PG    MCHC 31.1 (L) 31.4 - 35.0 g/dL    RDW 17.3 (H) 11.9 - 14.6 %    PLATELET 95 (L) 378 - 450 K/uL    MPV Unable to calculate. Recommend adding IPF. 9.4 - 12.3 FL    ABSOLUTE NRBC 0.00 0.0 - 0.2 K/uL    DF AUTOMATED      NEUTROPHILS 60 43 - 78 %    LYMPHOCYTES 25 13 - 44 %    MONOCYTES 11 4.0 - 12.0 %    EOSINOPHILS 4 0.5 - 7.8 %    BASOPHILS 0 0.0 - 2.0 %    IMMATURE GRANULOCYTES 0 0.0 - 5.0 %    ABS. NEUTROPHILS 3.3 1.7 - 8.2 K/UL    ABS. LYMPHOCYTES 1.4 0.5 - 4.6 K/UL    ABS. MONOCYTES 0.6 0.1 - 1.3 K/UL    ABS. EOSINOPHILS 0.2 0.0 - 0.8 K/UL    ABS. BASOPHILS 0.0 0.0 - 0.2 K/UL    ABS. IMM. GRANS. 0.0 0.0 - 0.5 K/UL   METABOLIC PANEL, COMPREHENSIVE    Collection Time: 06/03/19  5:51 AM   Result Value Ref Range    Sodium 143 136 - 145 mmol/L    Potassium 4.0 3.5 - 5.1 mmol/L    Chloride 111 (H) 98 - 107 mmol/L    CO2 25 21 - 32 mmol/L    Anion gap 7 7 - 16 mmol/L    Glucose 107 (H) 65 - 100 mg/dL    BUN 6 (L) 8 - 23 MG/DL    Creatinine 0.80 0.6 - 1.0 MG/DL    GFR est AA >60 >60 ml/min/1.73m2    GFR est non-AA >60 >60 ml/min/1.73m2    Calcium 9.1 8.3 - 10.4 MG/DL    Bilirubin, total 0.4 0.2 - 1.1 MG/DL    ALT (SGPT) 20 12 - 65 U/L    AST (SGOT) 14 (L) 15 - 37 U/L    Alk.  phosphatase 87 50 - 136 U/L    Protein, total 5.8 (L) 6.3 - 8.2 g/dL    Albumin 2.5 (L) 3.2 - 4.6 g/dL    Globulin 3.3 2.3 - 3.5 g/dL    A-G Ratio 0.8 (L) 1.2 - 3.5     PTT    Collection Time: 06/03/19  5:51 AM   Result Value Ref Range    aPTT 78.9 (H) 24.7 - 39.8 SEC   PROTHROMBIN TIME + INR    Collection Time: 06/03/19  5:51 AM   Result Value Ref Range    Prothrombin time 18.2 (H) 11.7 - 14.5 sec    INR 1.5         Xr Abd (kub)    Result Date: 6/2/2019  [facility] CLINICAL INDICATION:  Small bowel obstruction, abdominal distention COMPARISON: 5/31/2019 TECHNIQUE: AP view of the abdomen 9:09 AM portable supine FINDINGS:  Since previous exam the contrast has passed. There is mild persisting gaseous distention of several large bowel loops. No evidence of small bowel obstruction. Diverticulosis noted. IMPRESSION:  No evidence of bowel obstruction. Xr Abd (kub)    Result Date: 6/2/2019  IMPRESSION:  No evidence of bowel obstruction. All Micro Results     Procedure Component Value Units Date/Time    CULTURE, URINE [[id number]] Collected:  06/01/19 2245    Order Status:  Completed Specimen:  Urine from [place] Specimen Updated:  06/02/19 0843     Special Requests: NO SPECIAL REQUESTS        Culture result:       NO GROWTH AFTER SHORT PERIOD OF INCUBATION. FURTHER RESULTS TO FOLLOW AFTER OVERNIGHT INCUBATION.                 Current Medications Reviewed    Current Facility-Administered Medications:     dilTIAZem (CARDIZEM) IR tablet 30 mg, 30 mg, Oral, Q6H, Alex Puentes MD, 30 mg at 06/02/19 2358    dextrose 5 % - 0.45% NaCl infusion, 75 mL/hr, IntraVENous, CONTINUOUS, Nasir Beyer MD, Last Rate: 125 mL/hr at 06/02/19 2230, 125 mL/hr at 06/02/19 2230    warfarin (COUMADIN) tablet 2 mg, 2 mg, Oral, QPM, Alex Puentes MD, 2 mg at 06/02/19 1754    cefTRIAXone (ROCEPHIN) 1 g in 0.9% sodium chloride (MBP/ADV) 50 mL, 1 g, IntraVENous, Q24H, Yancy Ortega DO, Last Rate: 100 mL/hr at 06/02/19 2034, 1 g at 06/02/19 2034    HYDROcodone-acetaminophen (NORCO) 5-325 mg per tablet 1 Tab, 1 Tab, Oral, Q6H PRN, Avril Arellanot, DO, 0.5 Tab at 06/01/19 2329    heparin 25,000 units in dextrose 500 mL infusion, 12-25 Units/kg/hr, IntraVENous, TITRATE, Juan Jose Romero MD, Last Rate: 17.5 mL/hr at 06/03/19 0655, 17 Units/kg/hr at 06/03/19 0655    LORazepam (ATIVAN) injection 0.5 mg, 0.5 mg, IntraVENous, Q4H PRN, Juan Jose Romero MD    famotidine (PF) (PEPCID) 20 mg in sodium chloride 0.9% 10 mL injection, 20 mg, IntraVENous, QPM, Gunnar Jones MD, 20 mg at 06/02/19 1754    sodium chloride (NS) flush 5-40 mL, 5-40 mL, IntraVENous, Q8H, Ortega, Lonza Beagle, DO, 5 mL at 06/02/19 2035    sodium chloride (NS) flush 5-40 mL, 5-40 mL, IntraVENous, PRN, Ortega, Lonza Beagle, DO    morphine injection 2 mg, 2 mg, IntraVENous, Q3H PRN, Ivet Files, DO, 2 mg at 05/30/19 2052    naloxone [facility]) injection 0.4 mg, 0.4 mg, IntraVENous, PRN, Jordan, Lonza Beagle, DO    ondansetron [facility]) injection 4 mg, 4 mg, IntraVENous, Q4H PRN, Ivet Files, DO, 4 mg at 05/30/19 0252    LORazepam (ATIVAN) injection 1 mg, 1 mg, IntraVENous, Q6H PRN, Jordan, Lonza Beagle, DO    latanoprost (XALATAN) 0.005 % ophthalmic solution 1 Drop, 1 Drop, Both Eyes, QPM, Jordan, Lonza Beagle, DO, 1 Drop at 06/02/19 1754    metoprolol (LOPRESSOR) injection 5 mg, 5 mg, IntraVENous, Q4H PRN, Ivet Files, DO      Assessment/Plan     Hospital Problems as of 6/3/2019 Date Reviewed: 6/23/2011          Codes Class Noted - Resolved POA    UTI (urinary tract infection) ICD-10-CM: N39.0  ICD-9-CM: 599.0  6/3/2019 - Present Yes        * (Principal) SBO (small bowel obstruction) ([facility] ICD-10-CM: B62.134  ICD-9-CM: 560.9  5/29/2019 - Present Yes        Atrial fibrillation with RVR (HCC) ICD-10-CM: I48.91  ICD-9-CM: 427.31  5/29/2019 - Present Yes        Chronic systolic dysfunction of left ventricle (Chronic) ICD-10-CM: I51.9  ICD-9-CM: 429.9  5/29/2019 - Present Yes        Thyroid cancer ([facility] ICD-10-CM: C73  ICD-9-CM: 193  5/29/2019 - Present Yes        Compression fracture of fourth lumbar vertebra ([facility] ICD-10-CM: S32.040A  ICD-9-CM: 805.4  5/29/2019 - Present Yes        Thromboembolic disorder ([facility] OIS-63-NZ: I74.9  ICD-9-CM: 444.9  5/29/2019 - Present Yes        Presence of IVC filter (Chronic) ICD-10-CM: J26.088  ICD-9-CM: V45.89  5/29/2019 - Present Yes        Breast nodule ICD-10-CM: N63.0  ICD-9-CM: 793.89  5/29/2019 - Present Yes        Weight loss ICD-10-CM: R63.4  ICD-9-CM: 783.21  5/29/2019 - Present Yes        Pulmonary embolism (HCC) (Chronic) ICD-10-CM: I26.99  ICD-9-CM: 415.19  5/2/2011 - Present         RESOLVED: KENISHA (acute kidney injury) ([facility] ICD-10-CM: N17.9  ICD-9-CM: 584.9  5/30/2019 - 6/3/2019 Yes        RESOLVED: Hypernatremia ICD-10-CM: E87.0  ICD-9-CM: 276.0  5/30/2019 - 6/3/2019 Yes        RESOLVED: Hypomagnesemia ICD-10-CM: E83.42  ICD-9-CM: 275.2  5/30/2019 - 6/3/2019 Yes        RESOLVED: Coagulopathy ([facility] ICD-10-CM: D68.9  ICD-9-CM: 286.9  6/16/2011 - 6/3/2019 Yes              #--SBO --   Multiple BM's, passing gas. Abdomen is benign  CT scan today  Surgery following     --AFIB RVR  Cont cardizem, warfarin. Heparin bridging    UTI  -rocephin             --CHRONIC SYSTOLIC DYSFXN. Currently stable. , no sx or sx of acute chf     --wt loss, abnormal L4 compr. fx vs other, 12mm breast nodule  Will  need workup as an OP      DC plan- likely to be discharged next few days. Per CM, home with family.   PPD was done      Ayleen Montalvo MD  Janie 3, 2019

## 2019-06-03 NOTE — PROGRESS NOTES
Bedside and Verbal report given to self by Jean De La Torre, VALERIE. Report included SBAR, Kardex, ED Summary, Procedure Summary, Intake and Output and Cardiac Rhythm A-fib. Heparin gtt verified at bedside on [facility] with off-going RN.

## 2019-06-03 NOTE — PROGRESS NOTES
Warfarin dosing per pharmacist    Melanie Pereyra is a 80 y.o. female. Height: 5' 2\" (157.5 cm)    Weight: 52.9 kg (116 lb 9.6 oz)    Indication:  A fib    Goal INR:  2-3    Home dose:  2 mg hs per last AC visit in March    Risk factors or significant drug interactions:  --    Other anticoagulants:  Heparin drip    Daily Monitoring  Date  INR     Warfarin dose HGB              Notes  5/29  4.4  Hold  13.5  5/30  4.6/6.8  Hold  12.4  Vit K 10 mg IV x 1  5/31  1.6  Hold  11.3  6/1  1.3  2 mg  11.5  6/2  1.4  2 mg  10.9  6/3  1.5  2 mg  10.2    Per outpatient notes, patient was sub therapeutic on 2 mg hs a few months ago. INR 1.5 today after being supra therapeutic on admission and receiving one dose of vitamin k. Will continue 2 mg tonight as it may take a few days to see increase due to vit k resistance.     Thank you,  Oneal Christina, PharmD  Clinical Pharmacist  [phone]

## 2019-06-03 NOTE — PROGRESS NOTES
Verbal bedside report given to Drenda Canavan, oncoming RN. Patient's situation, background, assessment and recommendations provided. Opportunity for questions provided. Oncoming RN assumed care of patient. Heparin IV drip verified at bedside with oncoming RN.

## 2019-06-03 NOTE — PROGRESS NOTES
Care Management Interventions  PCP Verified by CM: Yes(thinks saw in May 2019)  Palliative Care Criteria Met (RRAT>21 & CHF Dx)?: No(RRAT 26 Dx Small bowel obstruction )  Transition of Care Consult (CM Consult): Discharge Planning  Discharge Durable Medical Equipment: No(walker)  Physical Therapy Consult: No  Occupational Therapy Consult: No  Speech Therapy Consult: No  Current Support Network: Own Home(2 sons live with her)  Confirm Follow Up Transport: Family  Plan discussed with Pt/Family/Caregiver: Yes  Freedom of Choice Offered: Yes  Discharge Location  Discharge Placement: Home with home health  Follow up with patient and son for d/c plans. Per son they plan on taking her home when medically stable. Agreeable to home health at d/c. Received message from daughter Asiya Malhotra that they are interested in hospital bed at d/c. Explained would need to see if qualifies for bed and speak with physician. Patient has Medicare Part B and Medicaid. She also asking about her niece being able to be reimbursed for taking care of her. Explained that she could see if patient qualifies for CLTC with Medicaid and Garett Moreau states she has spoken with someone about CLTC and is working on seeing about the services. Daughter  plan is to return home with home health when medically stable. CM following.

## 2019-06-03 NOTE — PROGRESS NOTES
Consult Note/Progress Note:   Claudia Cleveland  MRN: [id number]  EDV:0/08/4193  Age:86 y.o.    HPI: Claudia Cleveland is a 80 y.o. female on coumadin for afib with INR>4 at presentation who came to the ER   with 6 day h/o diarrhea for which she took several anti-diarrheal meds (pepto-bismol and lomotil among them)  This was followed by diffuse, moderate, progressive, non-radiating abd pain and cramping. Nothing made symptoms better or worse. She was seen in ER day before admission and given IVF and released. She has associated N/V. No fever or leukocytosis on admission    She has mult medical problems including CHF with EF 30-35%, pulm HTN, chronic afib on coumadin, s/p IVC filter for DVT/PE (1995), thyroid carcinoma treated with ablation. She is s/p appy, rhonda, right hip surgery, and back surgery    She had the below CT performed in ER with SBO, right breast mass, and abnormal L4 spine      5/29/19 CT abd/pelvis with oral and IV contrast  There is no pericardial effusion. Heart is enlarged. There is coronary artery calcification. There is dependent subsegmental atelectasis.     There is a 12 mm nodule within the lateral right breast (series 2, image 5). There is subtle micronodular contour of the liver. There is periportal edema. The gallbladder is surgically absent. Pancreas is not well visualized. The  spleen is normal in contour. Stable small right adrenal nodule (since 2011).    There are tiny nonobstructing renal calculi. No hydronephrosis left kidney is  small in size. There is prominent atherosclerotic calcification of the abdominal aorta.     The stomach is normal in contour.     Pelvic findings: There are multiple fluid-filled dilated small bowel loops with transition point  appearing to be in the lower abdomen. The colon appears normal in caliber. Urinary bladder is normal in contour. There is a pessary.     There is no significant free fluid. No free air. Bones are osteopenic.  There are  degenerative changes of the spine. There is severe compression deformity of L4  with associated high density structure. Right total hip arthroplasty is partially seen.        IMPRESSION:     1. Small bowel obstruction. Multiple fluid-filled dilated small bowel loops with  transition point in the lower abdomen. Etiology is likely adhesion. Closed-loop  obstruction is in the differential.     2. No evidence of colitis or diverticulitis, although the colon is not well  visualized. . No hydronephrosis.     3. Severe compression deformity of L4 with associated high density material.  This may be related to prior intervention such as kyphoplasty. Neoplastic  involvement is considered less likely. Osteopenia.     4. A 12 mm nodule within the lateral right breast. Correlation with mammogram is  recommended. 6/3/19 CTAP  IMPRESSION:     1. Previously noted dilated small bowel loops have resolved. No evidence of  bowel obstruction on today's study.     2. Moderate right pleural effusion and small left pleural effusion. Mild  dependent densities in the lower lobes, likely due to atelectasis or aspiration.     3. Sigmoid diverticulosis. No evidence of diverticulitis.     4. Lytic lesion of L4 and associated compression fracture. Additional hx:  5/31/19 feels better, no N/V/F/C; no flatus overnight; less abd discomfort to deeper palpation    6/2/19 Awake in bed. No N/V/F/C. Had multiple BM's overnight. AF, NAD.   6/3/19 Awake in bed. No N/V/F/C. + BM overnight. AF, NAD.  CT with resolution of dilated bowel loops    Past Medical History:   Diagnosis Date    Acquired hypothyroidism     Acute pancreatitis     Arthritis     CAD (coronary artery disease) 4/29/11    RCA occluded and fills with collaterals, LAD and Cx nonobstructive CAD, EF 50-55%    Cancer ([address]     THYROID - treated with surgery and radiation    CHF (congestive heart failure) ([address] 11/8/2018    Chronic pain     right hip    Coagulopathy ([address] 6/16/2011    Colon polyps     [facility]lic[facility]     Gallstone pancreatitis     Glaucoma     Gout     hx of gout to right great toe - no problems    Hypertension     controlled with medication    Other ill-defined conditions(799.89)     Pancreatitis     Paroxysmal A-fib ([facility] 11/8/2018    PE (pulmonary embolism) 2008    Pulmonary HTN ([facility]     moderate    Thromboembolus ([facility] 1995    right leg - Hemashield graft/filter placed in abdomen    Thyroid disease     pt had total thyroidectomy - takes levoxyl daily    UTI (urinary tract infection) 6/3/2019     Past Surgical History:   Procedure Laterality Date    HX APPENDECTOMY  ? [id number]  6/2011    Dr. Tung Dolan - [facility]    [address]    hemashield graft/filter placed due to blood clot right leg    HX THYROIDECTOMY  2010    partial    HX TONSILLECTOMY      TOTAL HIP ARTHROPLASTY  2008 & 2011    right hip     Current Facility-Administered Medications   Medication Dose Route Frequency    dilTIAZem (CARDIZEM) IR tablet 30 mg  30 mg Oral Q6H    dextrose 5 % - 0.45% NaCl infusion  75 mL/hr IntraVENous CONTINUOUS    warfarin (COUMADIN) tablet 2 mg  2 mg Oral QPM    cefTRIAXone (ROCEPHIN) 1 g in 0.9% sodium chloride (MBP/ADV) 50 mL  1 g IntraVENous Q24H    HYDROcodone-acetaminophen (NORCO) 5-325 mg per tablet 1 Tab  1 Tab Oral Q6H PRN    heparin 25,000 units in dextrose 500 mL infusion  12-25 Units/kg/hr IntraVENous TITRATE    LORazepam (ATIVAN) injection 0.5 mg  0.5 mg IntraVENous Q4H PRN    famotidine (PF) (PEPCID) 20 mg in sodium chloride 0.9% 10 mL injection  20 mg IntraVENous QPM    sodium chloride (NS) flush 5-40 mL  5-40 mL IntraVENous Q8H    sodium chloride (NS) flush 5-40 mL  5-40 mL IntraVENous PRN    morphine injection 2 mg  2 mg IntraVENous Q3H PRN    naloxone (NARCAN) injection 0.4 mg  0.4 mg IntraVENous PRN    ondansetron (ZOFRAN) injection 4 mg  4 mg IntraVENous Q4H PRN    LORazepam (ATIVAN) injection 1 mg  1 mg IntraVENous Q6H PRN    latanoprost (XALATAN) 0.005 % ophthalmic solution 1 Drop  1 Drop Both Eyes QPM    metoprolol (LOPRESSOR) injection 5 mg  5 mg IntraVENous Q4H PRN     Ativan [lorazepam]; Lortab [hydrocodone-acetaminophen]; and Morphine  Social History     Socioeconomic History    Marital status:      Spouse name: Not on file    Number of children: Not on file    Years of education: Not on file    Highest education level: Not on file   Tobacco Use    Smoking status: Former Smoker    Smokeless tobacco: Never Used    Tobacco comment: quit smoking 1995   Substance and Sexual Activity    Alcohol use: No    Drug use: No     Social History     Tobacco Use   Smoking Status Former Smoker   Smokeless Tobacco Never Used   Tobacco Comment    quit smoking 1995     Family History   Problem Relation Age of Onset    Stroke Mother     Hypertension Mother     Cancer Father         prostate    Diabetes Maternal Uncle      ROS: The patient has no difficulty with chest pain or shortness of breath. No fever or chills. Comprehensive review of systems was otherwise unremarkable except as noted above. Physical Exam:   Visit Vitals  /70 (BP 1 Location: Left arm, BP Patient Position: At rest)   Pulse 91   Temp 97.7 °F (36.5 °C)   Resp 16   Ht 5' 2\" (1.575 m)   Wt 116 lb 9.6 oz (52.9 kg)   SpO2 99%   BMI 21.33 kg/m²     Vitals:    06/02/19 1726 06/02/19 2104 06/03/19 0140 06/03/19 0504   BP: 125/89 117/75 126/88 111/70   Pulse: (!) 105 87 (!) 113 91   Resp: 16 16 16 16   Temp: 96 °F (35.6 °C) 98.3 °F (36.8 °C) 98.5 °F (36.9 °C) 97.7 °F (36.5 °C)   SpO2: 98% 99% 100% 99%   Weight:    116 lb 9.6 oz (52.9 kg)   Height:         No intake/output data recorded. 06/01 1901 - 06/03 0700  In: 4481.2 [P.O.:10; I.V.:4471.2]  Out: 1175 [Urine:1175]    Constitutional: Alert, oriented, cooperative patient in no acute distress; appears stated age    Eyes: are clear.  EOMs intact  ENMT: no external lesions gross hearing normal; no obvious neck masses, no ear or lip lesions, nares normal  CV: RRR. Normal perfusion  Resp: No JVD. Breathing is  non-labored; no audible wheezing. GI: Soft; no mass,  No guarding or rebound     Musculoskeletal: unremarkable with normal function. No embolic signs or cyanosis. Neuro:  Oriented; moves all 4; no focal deficits  Psychiatric: normal affect and mood, no memory impairment    Recent vitals (if inpt):  Patient Vitals for the past 24 hrs:   BP Temp Pulse Resp SpO2 Weight   06/03/19 0504 111/70 97.7 °F (36.5 °C) 91 16 99 % 116 lb 9.6 oz (52.9 kg)   06/03/19 0140 126/88 98.5 °F (36.9 °C) (!) 113 16 100 %    06/02/19 2104 117/75 98.3 °F (36.8 °C) 87 16 99 %    06/02/19 1726 125/89 96 °F (35.6 °C) (!) 105 16 98 %    06/02/19 1259 121/84 97.8 °F (36.6 °C) (!) 107 16 98 %    06/02/19 0853 123/80 97.6 °F (36.4 °C) (!) 106 16 99 %        Labs:  Recent Labs     06/03/19  0551   WBC 5.4   HGB 10.2*   PLT 95*      K 4.0   *   CO2 25   BUN 6*   CREA 0.80   *   PTP 18.2*   INR 1.5   APTT 78.9*   TBILI 0.4   SGOT 14*   ALT 20   AP 87       Lab Results   Component Value Date/Time    WBC 5.4 06/03/2019 05:51 AM    HGB 10.2 (L) 06/03/2019 05:51 AM    PLATELET 95 (L) 87/56/5755 05:51 AM    Sodium 143 06/03/2019 05:51 AM    Potassium 4.0 06/03/2019 05:51 AM    Chloride 111 (H) 06/03/2019 05:51 AM    CO2 25 06/03/2019 05:51 AM    BUN 6 (L) 06/03/2019 05:51 AM    Creatinine 0.80 06/03/2019 05:51 AM    Glucose 107 (H) 06/03/2019 05:51 AM    INR 1.5 06/03/2019 05:51 AM    aPTT 78.9 (H) 06/03/2019 05:51 AM    Bilirubin, total 0.4 06/03/2019 05:51 AM    Bilirubin, direct 0.1 06/25/2011 06:00 AM    AST (SGOT) 14 (L) 06/03/2019 05:51 AM    ALT (SGPT) 20 06/03/2019 05:51 AM    Alk.  phosphatase 87 06/03/2019 05:51 AM    Amylase 64 06/17/2011 03:53 AM    Lipase 65 (L) 05/29/2019 06:45 PM    Troponin-I, Qt. 0.03 11/09/2018 09:59 AM       CT Results  (Last 48 hours) 06/03/19 0745  CT ABD PELV WO CONT Final result    Impression:  IMPRESSION:       1. Previously noted dilated small bowel loops have resolved. No evidence of   bowel obstruction on today's study. 2. Moderate right pleural effusion and small left pleural effusion. Mild   dependent densities in the lower lobes, likely due to atelectasis or aspiration. 3. Sigmoid diverticulosis. No evidence of diverticulitis. 4. Lytic lesion of L4 and associated compression fracture. Narrative:  CT abdomen and pelvis without contrast        COMPARISON: May 29, 2019. INDICATION: Follow-up bowel distention. TECHNIQUE: CT imaging of the abdomen and pelvis was performed without   intravenous contrast. Radiation dose reduction techniques were used for this   study:  Our CT scanners use one or all of the following: Automated exposure   control, adjustment of the mA and/or kVp according to patient's size, iterative   reconstruction. FINDINGS:       There is moderate right pleural effusion. There is small left pleural effusion. Dependent densities in the lower lobes, likely due to aspiration or atelectasis. The heart is enlarged. There is coronary artery calcification. There is trace   pericardial effusion. Abdomen findings:       Absence of IV contrast limits sensitivity. There is subtle micronodular contour of the liver. The spleen is normal in   contour. Gallbladder is surgically absent. Pancreas and adrenal glands are not   well visualized. There are small nonobstructing renal calculi. There is no hydronephrosis. There   is severe atherosclerotic calcification of the abdominal aorta. The stomach is   under distended. Small bowel loops are normal in caliber. There is no small   bowel obstruction. Pelvic findings: There is a pessary. Uterus is not well evaluated. Urinary bladder is   decompressed by Ornelas catheter.  There is sigmoid diverticulosis without diverticulitis. Colon is normal in caliber. There is small free fluid. There is no free air. Bones are diffusely osteopenic. There are degenerative changes of the spine. There is lytic lesion involving the   L4. There is associated compression fracture. chest X-ray      I reviewed recent labs, recent radiologic studies, and pertinent records including other doctor notes if needed. I independently reviewed radiology images for studies I described above or studies I have ordered.    Admission date (for inpatients): 5/29/2019   * No surgery found *  * No surgery found *    ASSESSMENT/PLAN:  Problem List  Date Reviewed: 6/23/2011          Codes Class Noted    UTI (urinary tract infection) ICD-10-CM: N39.0  ICD-9-CM: 599.0  6/3/2019        * (Principal) SBO (small bowel obstruction) ([facility] ICD-10-CM: Q17.171  ICD-9-CM: 560.9  5/29/2019        Atrial fibrillation with RVR ([facility] ICD-10-CM: I48.91  ICD-9-CM: 427.31  5/29/2019        Chronic systolic dysfunction of left ventricle (Chronic) ICD-10-CM: I51.9  ICD-9-CM: 429.9  5/29/2019        Thyroid cancer ([facility] ICD-10-CM: C73  ICD-9-CM: 193  5/29/2019        Compression fracture of fourth lumbar vertebra ([facility] ICD-10-CM: J47.447K  ICD-9-CM: 805.4  5/29/2019        Thromboembolic disorder ([facility] UVR-34-QE: I74.9  ICD-9-CM: 444.9  5/29/2019        Presence of IVC filter (Chronic) ICD-10-CM: V00.778  ICD-9-CM: V45.89  5/29/2019        Breast nodule ICD-10-CM: N63.0  ICD-9-CM: 793.89  5/29/2019        Weight loss ICD-10-CM: R63.4  ICD-9-CM: 783.21  5/29/2019        Acute on chronic systolic (congestive) heart failure ([facility] ICD-10-CM: I50.23  ICD-9-CM: 428.23, 428.0  11/10/2018        CHF (congestive heart failure) ([facility] ICD-10-CM: I50.9  ICD-9-CM: 428.0  11/8/2018        Supratherapeutic INR ICD-10-CM: R79.1  ICD-9-CM: 790.92  11/8/2018        Pleural effusion ICD-10-CM: J90  ICD-9-CM: 511.9  11/8/2018    Overview Signed 11/8/2018  4:18 PM by Fidel Melendez MD     bilateral             Edema ICD-10-CM: R60.9  ICD-9-CM: 782.3  11/8/2018    Overview Signed 11/8/2018  4:18 PM by Fidel Melendez MD     Bilateral lower legs             Lightheadedness ICD-10-CM: R42  ICD-9-CM: 780.4  11/8/2018        Paroxysmal A-fib ([facility] ICD-10-CM: I48.0  ICD-9-CM: 427.31  11/8/2018        Iron deficiency anemia ICD-10-CM: D50.9  ICD-9-CM: 280.9  8/29/2011    Overview Signed 8/29/2011  3:05 PM by Desirae Loyd     Despite oral iron  For infed infusion               Nutritional deficiency ICD-10-CM: E63.9  ICD-9-CM: 269.9  6/27/2011        Impaired mobility ICD-10-CM: Z74.09  ICD-9-CM: 799.89  6/27/2011        Debility ICD-10-CM: R53.81  ICD-9-CM: 799.3  6/27/2011        Colon polyps ICD-10-CM: K63.5  ICD-9-CM: 211.3  6/27/2011        Pancreatitis ICD-10-CM: K85.90  ICD-9-CM: 571.4  6/19/2011    Overview Signed 6/19/2011  4:13 PM by Desirae Loyd     6-19-11 lipase improving possibly from gallstones             Gallstone pancreatitis ICD-10-CM: K85.10  ICD-9-CM: 447.3, 574.20  4/82/2609        Follicular thyroid cancer ([facility] ICD-10-CM: C73  ICD-9-CM: 193  6/19/2011    Overview Signed 6/21/2011  7:12 AM by Desirae Loyd     thyroglobulin high > 100 obvious residual thyroid tissue              Pericardial effusion ICD-10-CM: I31.3  ICD-9-CM: 423.9  6/17/2011        Acute pancreatitis ICD-10-CM: K85.90  ICD-9-CM: 577.0  6/16/2011        HTN (hypertension) (Chronic) ICD-10-CM: I10  ICD-9-CM: 401.9  6/16/2011        Acquired hypothyroidism (Chronic) ICD-10-CM: E03.9  ICD-9-CM: 244.9  6/16/2011    Overview Signed 6/19/2011  4:15 PM by Zay Falk thyroidectomy for thyroid cancer             Pulmonary embolism ([facility] (Chronic) ICD-10-CM: I26.99  ICD-9-CM: 415.19  5/2/2011        Unstable angina ([facility] ICD-10-CM: I20.0  ICD-9-CM: 411.1  4/28/2011        Prosthetic hip implant failure (HCC) (Chronic) ICD-10-CM: T84.018A, Z96.649  ICD-9-CM: 996.47, V43.64 4/26/2011        KENNEDY (total hip arthroplasty) ICD-9-CM: V43.64  4/26/2011            Principal Problem:    SBO (small bowel obstruction) ([facility] (5/29/2019)    Active Problems:    Atrial fibrillation with RVR (HCC) (5/29/2019)      Chronic systolic dysfunction of left ventricle (5/29/2019)      Thyroid cancer ([facility] (5/29/2019)      Compression fracture of fourth lumbar vertebra ([facility] (5/29/2019)      Thromboembolic disorder ([facility] (2/63/0344)      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)      Breast nodule (5/29/2019)      Weight loss (5/29/2019)      UTI (urinary tract infection) (6/3/2019)           SBO  NGT placement was unsuccessful by nursing and by Dr Harriett Fulton on 5/30/19  Pt is uncooperative and emotionally labile when we attempted to place  She is responding without it   +flatus and BM by 6/1/19  Resolution of discomfort to deeper palpation in lower abdomen    Bowel obstruction resolved on CT 6/3/19  IVF  Start CLD  Poor surgical candidate secondary to age 80, frailty and debilitation    Oliguria  Resolved  U/A negative     Hypophosphatemia  Defer to Hospitalists    Hypernatremia  Hypotonic IVF    Hypomagnesemia  Replace prn      Coagulopathy  INR 6.8 (5/30/19)-->1.6-->1.3 (6/1/19) after Vit K  Hep drip started per cardiology  Plt ct low      Right Breast mass  I will follow  Outpt imaging and follow-up planned    L4 abnormality on CT  Follow-up with spine surgeon for further workup as outpt      Maureen Gallo NP    I have personally performed a face-to-face diagnostic evaluation and management  service on this patient. I have independently seen the patient. I have independently obtained the above history from the patient/family. I have independently examined the patient with above findings. I have independently reviewed data/labs for this patient and developed the above plan of care (MDM).   Signed: Fede Prater MD, FACS

## 2019-06-04 NOTE — PROGRESS NOTES
Warfarin dosing per pharmacist    Conner Herrera is a 80 y.o. female. Height: 5' 2\" (157.5 cm)    Weight: 52.1 kg (114 lb 12.8 oz)    Indication:  A fib    Goal INR:  2-3    Home dose:  2 mg hs per last AC visit in March    Risk factors or significant drug interactions:  --    Other anticoagulants:  Heparin drip    Daily Monitoring  Date  INR     Warfarin dose HGB              Notes  5/29  4.4  Hold  13.5  5/30  4.6/6.8  Hold  12.4  Vit K 10 mg IV x 1  5/31  1.6  Hold  11.3  6/1  1.3  2 mg  11.5  6/2  1.4  2 mg  10.9  6/3  1.5  2 mg  10.2  6/4  1.8  2 mg  9.6    Per outpatient notes, patient was sub therapeutic on 2 mg hs a few months ago. INR 1.8 today after being supra therapeutic on admission and receiving one dose of vitamin k. Will continue 2 mg tonight as it may take a few days to see increase due to vit k resistance.     Thank you,  Ashley Macias, PharmD  Clinical Pharmacist  [phone]

## 2019-06-04 NOTE — PROGRESS NOTES
In accordance with Medicare guidelines, a copy of the Important Letter from Medicare was presented to the patient/family in anticipation of expected discharge. One copy was given to the patient/family, and a signed copy was placed in the patients chart.

## 2019-06-04 NOTE — PROGRESS NOTES
Discharge instructions were reviewed with patient. An opportunity was given for questions. All medications were reviewed, and information was given on the new medications. Patient verbalized understanding, and has no questions at this time. IV and telemetry monitor removed by primary RN.

## 2019-06-04 NOTE — PROGRESS NOTES
Verbal bedside report received from L[place], [facility]. Assumed care of patient. Heparin IV drip verified at bedside with outgoing RN.

## 2019-06-04 NOTE — DISCHARGE SUMMARY
Hospitalist Discharge Summary     Admit Date:  2019  7:03 PM   Name:  Franchesca Uribe   Age:  80 y.o.  :  1933   MRN:  [id number]   PCP:  Kirstie Fernandez MD  Treatment Team: Attending Provider: Roverto Stratton DO; Consulting Provider: Michele Uriarte MD; Utilization Review: Yung Perez RN; Care Manager: Tobias Bingham RN    Problem List for this Hospitalization:  Hospital Problems as of 2019 Date Reviewed: 2011          Codes Class Noted - Resolved POA    UTI (urinary tract infection) ICD-10-CM: N39.0  ICD-9-CM: 599.0  6/3/2019 - Present Yes        * (Principal) SBO (small bowel obstruction) ([facility] ICD-10-CM: T40.803  ICD-9-CM: 560.9  2019 - Present Yes        Atrial fibrillation with RVR ([facility] ICD-10-CM: I48.91  ICD-9-CM: 427.31  2019 - Present Yes        Chronic systolic dysfunction of left ventricle (Chronic) ICD-10-CM: I51.9  ICD-9-CM: 429.9  2019 - Present Yes        Thyroid cancer ([facility] ICD-10-CM: C73  ICD-9-CM: 193  2019 - Present Yes        Compression fracture of fourth lumbar vertebra ([facility] ICD-10-CM: S32.040A  ICD-9-CM: 805.4  2019 - Present Yes        Thromboembolic disorder ([facility] TJA-62-DESTINEY: I74.9  ICD-9-CM: 444.9  2019 - Present Yes        Presence of IVC filter (Chronic) ICD-10-CM: E76.096  ICD-9-CM: V45.89  2019 - Present Yes        Breast nodule ICD-10-CM: N63.0  ICD-9-CM: 793.89  2019 - Present Yes        Weight loss ICD-10-CM: R63.4  ICD-9-CM: 783.21  2019 - Present Yes        Pulmonary embolism ([address] (Chronic) ICD-10-CM: I26.99  ICD-9-CM: 415.19  2011 - Present         RESOLVED: KENISHA (acute kidney injury) ([facility] ICD-10-CM: N17.9  ICD-9-CM: 584.9  2019 - 6/3/2019 Yes        RESOLVED: Hypernatremia ICD-10-CM: E87.0  ICD-9-CM: 276.0  2019 - 6/3/2019 Yes        RESOLVED: Hypomagnesemia ICD-10-CM: E83.42  ICD-9-CM: 275.2  2019 - 6/3/2019 Yes        RESOLVED: Coagulopathy ([facility] ICD-10-CM: D68.9  ICD-9-CM: 286.9  6/16/2011 - 6/3/2019 Yes                Admission HPI from 5/29/2019:    \" Patient history was obtained from the ER provider prior to seeing the patient. 80 female multiple medical problem on Coumadin and is here with abdominal pain.  Symptoms initially started with diarrhea last Thursday which was 6 days ago. Multiple episodes.  Diffuse abdominal cramping.  Came into the emergency room yesterday.  Receive IV fluid improvement in symptoms and was discharged home.  Continue to have diarrhea at home. Amina Fuelling associated nausea vomiting with worsening abdominal pain.  Initially pain was in the right lower quadrant.  Now pain is more diffused. No fever.  Portable prior abdominal surgery.  She thinks her appendix is also gone. Denies any urinary pain.  No chest pain or shortness of breath     Cc: abd pain  80 y.o. Female with hx of chronic systolic chf remote lvef by echo 30-35% and pulm htn. Chronic afib on amiodarone and warfarin. Has ivc filter hx prior dvts and pe. Daughter states thyroid cancer diagnosed by lspine biopsy? ?? Treated with radionuclear ablation? ? She has 5-6 day  Hx of abdominal discomfort and loose stool took pepto-bismol and then today abd.pain and abd distension and no bm. In er found to have SBO by xray and ct abd. Incidental finding of L4 comp. fx vs other including possible metast. Disease. Incidental right breast nodule 12mm. No recent mammogram. No dsypnea no chest pain . Heart rate around 100 afib. INR 4.4 and hypokalemia with k 3.0. Incomplete database daughter Itzel Narvaez [phone] Id herself as healthcare poa and is present and attempting to assist with hx. Patient has lost wt recently unspecified, has temporal wasting and poor oral intake due to poor dentition as her teeth are \"crumbling\" per daughter. \"    Hospital Course:  Pt was admitted for SBO, UTI, and afib RVR. She was started on cardizem, made NPO, given IVF and rocephin. She slowly improved.   CT scan yesterday showed resolution of SBO. She is tolerating PO and abd pain resolved. She can continue to advance her diet. Continue keflex for 2 more days for UTI.  cardizem IR converted to long acting dose; needs follow up to ensure afib still controlled. Surgery wants follow up in 2 weeks with Dr Miguel Zuluaga per their discharge recs, to re-evaluate following small bowel obstruction, proceed with breast mass workup, and discuss referral for L4 (lumbar spine) abnormality on CT imaging. Pt denies pain in her lower back, may be able to be managed conservatively on that front. CT scan noted R pleural effusion but pt asymptomatic, on RA. Probably due to IVF given here which was stopped. Reported history of CHF but no peripheral edema, not on CHF meds; will defer this to outpatient PCP. [place] [facility] [place]: PT/OT per Home Health  Diet: DIET FULL LIQUID    Follow up instructions, discharge meds at bottom of this note. Plan was discussed with pt, family. All questions answered. Patient was stable at time of discharge. Patient will call a physician or return if any concerns. Diagnostic Imaging/Tests:   Xr Abd (ap And Erect Or Decub)    Result Date: 5/30/2019  Flat and upright abdomen CLINICAL INDICATION: Small bowel obstruction follow-up exam, abdominal pain FINDINGS: Two views of the abdomen and pelvis submitted and compared to a similar exam from the previous day shows decrease in the distention of the small bowel loops. The remaining prominent large bowel loops throughout the upper abdomen. Contrast does distend the bladder. No free air appreciated. IMPRESSION: Interval decrease in caliber of air-filled loops small bowel in the lower abdomen. However there is no definite contrast in the large bowel on this exam. Recommend continued imaging follow-up and serial exams.  Small bowel obstruction is thought to be less likely on this exam.    Xr Abd (ap And Erect Or Decub)    Result Date: 5/29/2019  Two view abdomen INDICATION:  Right lower quadrant pain. Vomiting. COMPARISON:None Flat and upright views the abdomen were obtained. FINDINGS: There are multiple dilated small bowel loops. No evidence of free air. Bones are osteopenic. There is a right total hip arthroplasty. IMPRESSION: 1. Multiple dilated small bowel loops, suspicious for bowel obstruction. Ct Abd Pelv W Cont    Result Date: 5/30/2019  CT abdomen and pelvis with contrast COMPARISON: June 16, 2011. INDICATION: Severe abdominal pain, pain worsening left upper quadrant and left lower quadrant. Evaluate for diverticulitis. TECHNIQUE: CT imaging was performed of the abdomen and pelvis following the uncomplicated administration of intravenous contrast (Isovue 370, 100 mL). Oral contrast was administered. Radiation dose reduction techniques were used for this study:  Our CT scanners use one or all of the following: Automated exposure control, adjustment of the mA and/or kVp according to patient's size, iterative reconstruction. FINDINGS: There is no pericardial effusion. Heart is enlarged. There is coronary artery calcification. There is dependent subsegmental atelectasis. There is a 12 mm nodule within the lateral right breast (series 2, image 5). Abdomen findings: There is subtle micronodular contour of the liver. There is periportal edema. The gallbladder is surgically absent. Pancreas is not well visualized. The spleen is normal in contour. Stable small right adrenal nodule. There are tiny nonobstructing renal calculi. No hydronephrosis left kidney is small in size. There is prominent atherosclerotic calcification of the abdominal aorta. The stomach is normal in contour. Pelvic findings: There are multiple fluid-filled dilated small bowel loops with transition point appearing to be in the lower abdomen. The colon appears normal in caliber. Urinary bladder is normal in contour. There is a pessary. There is no significant free fluid. No free air. Bones are osteopenic. There are degenerative changes of the spine. There is severe compression deformity of L4 with associated high density structure. Right total hip arthroplasty is partially seen. IMPRESSION: 1. Small bowel obstruction. Multiple fluid-filled dilated small bowel loops with transition point in the lower abdomen. Etiology is likely adhesion. Closed-loop obstruction is in the differential. 2. No evidence of colitis or diverticulitis, although the colon is not well visualized. . No hydronephrosis. 3. Severe compression deformity of L4 with associated high density material. This may be related to prior intervention such as kyphoplasty. Neoplastic involvement is considered less likely. Osteopenia. 4. A 12 mm nodule within the lateral right breast. Correlation with mammogram is recommended. CT abdomen and pelvis without contrast      COMPARISON: May 29, 2019.     INDICATION: Follow-up bowel distention.     TECHNIQUE: CT imaging of the abdomen and pelvis was performed without  intravenous contrast. Radiation dose reduction techniques were used for this  study:  Our CT scanners use one or all of the following: Automated exposure  control, adjustment of the mA and/or kVp according to patient's size, iterative  reconstruction.     FINDINGS:     There is moderate right pleural effusion. There is small left pleural effusion. Dependent densities in the lower lobes, likely due to aspiration or atelectasis.     The heart is enlarged. There is coronary artery calcification. There is trace  pericardial effusion.     Abdomen findings:     Absence of IV contrast limits sensitivity.     There is subtle micronodular contour of the liver. The spleen is normal in  contour. Gallbladder is surgically absent. Pancreas and adrenal glands are not  well visualized.     There are small nonobstructing renal calculi. There is no hydronephrosis.  There  is severe atherosclerotic calcification of the abdominal aorta. The stomach is  under distended. Small bowel loops are normal in caliber. There is no small  bowel obstruction.     Pelvic findings:     There is a pessary. Uterus is not well evaluated. Urinary bladder is  decompressed by Ornelas catheter. There is sigmoid diverticulosis without  diverticulitis. Colon is normal in caliber.     There is small free fluid. There is no free air. Bones are diffusely osteopenic. There are degenerative changes of the spine. There is lytic lesion involving the  L4. There is associated compression fracture.           IMPRESSION  IMPRESSION:     1. Previously noted dilated small bowel loops have resolved. No evidence of  bowel obstruction on today's study.     2. Moderate right pleural effusion and small left pleural effusion. Mild  dependent densities in the lower lobes, likely due to atelectasis or aspiration.     3. Sigmoid diverticulosis. No evidence of diverticulitis.     4. Lytic lesion of L4 and associated compression fracture. Echocardiogram results:  No results found for this visit on 05/29/19.     Procedures done this admission:  * No surgery found *    All Micro Results     Procedure Component Value Units Date/Time    CULTURE, URINE [[id number]] Collected:  06/01/19 2245    Order Status:  Completed Specimen:  Urine from Ornelas Specimen Updated:  06/04/19 0751     Special Requests: NO SPECIAL REQUESTS        Culture result: NO GROWTH 2 DAYS             Labs: Results:       BMP, Mg, Phos Recent Labs     06/04/19 0458 06/03/19  0551 06/02/19  0442    143 143   K 3.4* 4.0 4.1   * 111* 113*   CO2 22 25 20*   AGAP 8 7 10   BUN 6* 6* 9   CREA 0.75 0.80 0.76   CA 9.2 9.1 9.3   GLU 89 107* 100   MG  --   --  2.3   PHOS  --   --  2.0*      CBC Recent Labs     06/04/19 0458 06/03/19  0551 06/02/19  0442   WBC 5.2 5.4 6.9   RBC 3.72* 4.06 4.22   HGB 9.6* 10.2* 10.9*   HCT 29.4* 32.8* 34.2*   PLT 89* 95* 86*   GRANS 58 60 62   LYMPH 26 25 24   EOS 4 4 3   MONOS 12 11 10   BASOS 0 0 1   IG 0 0 1   ANEU 3.0 3.3 4.3   ABL 1.4 1.4 1.6   KENTON 0.2 0.2 0.2   ABM 0.6 0.6 0.7   ABB 0.0 0.0 0.0   AIG 0.0 0.0 0.0      LFT Recent Labs     06/03/19  0551 06/02/19  0442   SGOT 14* 18   ALT 20 22   AP 87 87   TP 5.8* 5.8*   ALB 2.5* 2.6*   GLOB 3.3 3.2   AGRAT 0.8* 0.8*      Cardiac Testing Lab Results   Component Value Date/Time    BNP 1,247 11/08/2018 01:00 PM    BNP  04/27/2008 12:15 PM     29  <80 pg/mL     CHF not indicated   pg/mL  CHF risk elevated  >100 pg/mL    Indicative of CHF     (H) 04/28/2011 06:10 PM     (H) 04/29/2008 12:46 PM     04/29/2008 07:57 AM    CK Total 296 (H) 04/28/2011 12:56 PM    CK- 04/28/2011 12:56 PM    CK-MB 0 04/28/2011 12:56 PM    CK-BB 0 04/28/2011 12:56 PM    CK - MB 2.8 04/28/2011 06:10 PM    CK - MB 5.1 (H) 04/29/2008 12:46 PM    CK - MB 3.9 (H) 04/29/2008 07:57 AM    CK-MB Index 1.2 04/28/2011 06:10 PM    CK-MB Index 2.2 04/29/2008 12:46 PM    CK-MB Index 2.7 04/29/2008 07:57 AM    Troponin-I, Qt. 0.03 11/09/2018 09:59 AM    Troponin-I, Qt. 0.04 11/09/2018 05:22 AM    Troponin-I, Qt. 0.05 11/08/2018 11:52 PM      Coagulation Tests Lab Results   Component Value Date/Time    Prothrombin time 20.6 (H) 06/04/2019 04:58 AM    Prothrombin time 18.2 (H) 06/03/2019 05:51 AM    Prothrombin time 17.3 (H) 06/02/2019 04:42 AM    INR 1.8 06/04/2019 04:58 AM    INR 1.5 06/03/2019 05:51 AM    INR 1.4 06/02/2019 04:42 AM    aPTT 62.1 (H) 06/04/2019 04:58 AM    aPTT 78.9 (H) 06/03/2019 05:51 AM    aPTT 78.7 (H) 06/02/2019 06:23 PM      A1c Lab Results   Component Value Date/Time    Hemoglobin A1c 6.0 04/21/2011 04:40 PM      Lipid Panel Lab Results   Component Value Date/Time    Cholesterol, total 80 04/28/2011 11:45 PM    HDL Cholesterol 24 (L) 04/28/2011 11:45 PM    LDL, calculated 37.8 04/28/2011 11:45 PM    VLDL, calculated 18.2 04/28/2011 11:45 PM    Triglyceride 93 06/20/2011 05:00 AM    CHOL/HDL Ratio 3.3 04/28/2011 11:45 PM Thyroid Panel Lab Results   Component Value Date/Time    TSH <0.005 (L) 05/31/2019 04:15 AM    TSH 0.014 (L) 09/26/2011 01:37 PM    T4, Free 1.8 (H) 09/26/2011 01:37 PM        Most Recent UA Lab Results   Component Value Date/Time    Color YELLOW 06/01/2019 10:21 AM    Appearance TURBID 06/01/2019 10:21 AM    Specific gravity 1.027 (H) 06/01/2019 10:21 AM    pH (UA) 6.0 06/01/2019 10:21 AM    Protein 100 (A) 06/01/2019 10:21 AM    Glucose NEGATIVE  06/01/2019 10:21 AM    Ketone TRACE (A) 06/01/2019 10:21 AM    Bilirubin MODERATE (A) 06/01/2019 10:21 AM    Blood LARGE (A) 06/01/2019 10:21 AM    Urobilinogen 4.0 (H) 06/01/2019 10:21 AM    Nitrites POSITIVE (A) 06/01/2019 10:21 AM    Leukocyte Esterase LARGE (A) 06/01/2019 10:21 AM    WBC 20-50 06/01/2019 10:21 AM    RBC 20-50 06/01/2019 10:21 AM    Epithelial cells 3-5 06/01/2019 10:21 AM    Bacteria 4+ (H) 06/01/2019 10:21 AM    Casts RED CELL CAST 06/01/2019 10:21 AM    Crystals, urine CA OXALATE 06/01/2019 10:21 AM    Mucus 0 07/23/2018 08:04 PM    Other observations RESULTS VERIFIED MANUALLY 07/23/2018 08:04 PM        Allergies   Allergen Reactions    Ativan [Lorazepam] Other (comments)     Has opposite reaction    Lortab [Hydrocodone-Acetaminophen] Other (comments)     Causes disorientation. - denies allergy, states she can take it    Morphine Other (comments)     Patient not allergic per daughter Elisa Abdirizak. Morphine interacts with patient's amiodarone per daughter.       Immunization History   Administered Date(s) Administered    TB Skin Test (PPD) Intradermal 05/29/2019    Tuberculin 12/12/2008       All Labs from Last 24 Hrs:  Recent Results (from the past 24 hour(s))   PROTHROMBIN TIME + INR    Collection Time: 06/04/19  4:58 AM   Result Value Ref Range    Prothrombin time 20.6 (H) 11.7 - 14.5 sec    INR 1.8     PTT    Collection Time: 06/04/19  4:58 AM   Result Value Ref Range    aPTT 62.1 (H) 24.7 - 10.8 SEC   METABOLIC PANEL, BASIC    Collection Time: 06/04/19  4:58 AM   Result Value Ref Range    Sodium 144 136 - 145 mmol/L    Potassium 3.4 (L) 3.5 - 5.1 mmol/L    Chloride 114 (H) 98 - 107 mmol/L    CO2 22 21 - 32 mmol/L    Anion gap 8 7 - 16 mmol/L    Glucose 89 65 - 100 mg/dL    BUN 6 (L) 8 - 23 MG/DL    Creatinine 0.75 0.6 - 1.0 MG/DL    GFR est AA >60 >60 ml/min/1.73m2    GFR est non-AA >60 >60 ml/min/1.73m2    Calcium 9.2 8.3 - 10.4 MG/DL   CBC WITH AUTOMATED DIFF    Collection Time: 06/04/19  4:58 AM   Result Value Ref Range    WBC 5.2 4.3 - 11.1 K/uL    RBC 3.72 (L) 4.05 - 5.2 M/uL    HGB 9.6 (L) 11.7 - 15.4 g/dL    HCT 29.4 (L) 35.8 - 46.3 %    MCV 79.0 (L) 79.6 - 97.8 FL    MCH 25.8 (L) 26.1 - 32.9 PG    MCHC 32.7 31.4 - 35.0 g/dL    RDW 17.4 (H) 11.9 - 14.6 %    PLATELET 89 (L) 752 - 450 K/uL    MPV Unable to calculate. Recommend adding IPF. 9.4 - 12.3 FL    ABSOLUTE NRBC 0.02 0.0 - 0.2 K/uL    DF AUTOMATED      NEUTROPHILS 58 43 - 78 %    LYMPHOCYTES 26 13 - 44 %    MONOCYTES 12 4.0 - 12.0 %    EOSINOPHILS 4 0.5 - 7.8 %    BASOPHILS 0 0.0 - 2.0 %    IMMATURE GRANULOCYTES 0 0.0 - 5.0 %    ABS. NEUTROPHILS 3.0 1.7 - 8.2 K/UL    ABS. LYMPHOCYTES 1.4 0.5 - 4.6 K/UL    ABS. MONOCYTES 0.6 0.1 - 1.3 K/UL    ABS. EOSINOPHILS 0.2 0.0 - 0.8 K/UL    ABS. BASOPHILS 0.0 0.0 - 0.2 K/UL    ABS. IMM.  GRANS. 0.0 0.0 - 0.5 K/UL       Current Med List in Hospital:   Current Facility-Administered Medications   Medication Dose Route Frequency    dilTIAZem CD (CARDIZEM CD) capsule 120 mg  120 mg Oral DAILY    warfarin (COUMADIN) tablet 2 mg  2 mg Oral QPM    cefTRIAXone (ROCEPHIN) 1 g in 0.9% sodium chloride (MBP/ADV) 50 mL  1 g IntraVENous Q24H    HYDROcodone-acetaminophen (NORCO) 5-325 mg per tablet 1 Tab  1 Tab Oral Q6H PRN    heparin 25,000 units in dextrose 500 mL infusion  12-25 Units/kg/hr IntraVENous TITRATE    LORazepam (ATIVAN) injection 0.5 mg  0.5 mg IntraVENous Q4H PRN    famotidine (PF) (PEPCID) 20 mg in sodium chloride 0.9% 10 mL injection  20 mg IntraVENous QPM    sodium chloride (NS) flush 5-40 mL  5-40 mL IntraVENous Q8H    sodium chloride (NS) flush 5-40 mL  5-40 mL IntraVENous PRN    morphine injection 2 mg  2 mg IntraVENous Q3H PRN    naloxone (NARCAN) injection 0.4 mg  0.4 mg IntraVENous PRN    ondansetron (ZOFRAN) injection 4 mg  4 mg IntraVENous Q4H PRN    LORazepam (ATIVAN) injection 1 mg  1 mg IntraVENous Q6H PRN    latanoprost (XALATAN) 0.005 % ophthalmic solution 1 Drop  1 Drop Both Eyes QPM    metoprolol (LOPRESSOR) injection 5 mg  5 mg IntraVENous Q4H PRN       Discharge Exam:  Patient Vitals for the past 24 hrs:   Temp Pulse Resp BP SpO2   06/04/19 0810  62 18  94 %   06/04/19 0534 98.5 °F (36.9 °C) 64      06/04/19 0530 98.5 °F (36.9 °C) 65 18 133/74 99 %   06/04/19 0030 98.7 °F (37.1 °C) 65 16 139/83 99 %   06/03/19 2105 99 °F (37.2 °C) (!) 59 16 130/77 99 %   06/03/19 1645 98.1 °F (36.7 °C) 82 16 121/64 98 %   06/03/19 1201 98.2 °F (36.8 °C) (!) 117 16 115/80 94 %     Oxygen Therapy  O2 Sat (%): 94 % (06/04/19 0810)  Pulse via Oximetry: 95 beats per minute (05/30/19 0126)  O2 Device: Room air (06/04/19 0810)    Intake/Output Summary (Last 24 hours) at 6/4/2019 0855  Last data filed at 6/4/2019 0733  Gross per 24 hour   Intake 410 ml   Output 1875 ml   Net -1465 ml       *Note that automatically entered I/Os may not be accurate; dependent on patient compliance with collection and accurate  by assistants. General:    Well nourished. Alert. Eyes:   Normal sclerae. Extraocular movements intact. ENT:  Normocephalic, atraumatic. Moist mucous membranes  CV:   Regular rate and rhythm. No murmur, rub, or gallop. Lungs:  Clear to auscultation bilaterally. No wheezing, rhonchi, or rales. Abdomen: Soft, nontender, nondistended. Bowel sounds normal.   Extremities: Warm and dry. No cyanosis or edema. Neurologic: CN II-XII grossly intact. Sensation intact. Skin:     No rashes or jaundice.     Psych:  Normal mood and affect. Discharge Info:   Current Discharge Medication List      START taking these medications    Details   dilTIAZem CD (CARDIZEM CD) 120 mg ER capsule Take 1 Cap by mouth daily. Indications: Ventricular Rate Control in Atrial Fibrillation  Qty: 30 Cap, Refills: 2      cephALEXin (KEFLEX) 500 mg capsule Take 1 Cap by mouth two (2) times a day for 2 days. Indications: Bacterial Urinary Tract Infection  Qty: 4 Cap, Refills: 0      polyethylene glycol (MIRALAX) 17 gram packet Take 1 Packet by mouth daily. Reduce or hold for a day if more than 1 bowel movement  Indications: prevent constipation  Qty: 30 Packet, Refills: 2         CONTINUE these medications which have CHANGED    Details   warfarin (COUMADIN) 2 mg tablet Take 1 Tab by mouth every evening. Follow up with PCP for INR checks  Qty: 30 Tab, Refills: 2         CONTINUE these medications which have NOT CHANGED    Details   colchicine (COLCRYS) 0.6 mg tablet Take 1 Tab by mouth daily as needed. Qty: 30 Tab, Refills: 0      lidocaine (SALONPAS/ASPERCREME) 4 % patch Place on area of pain  Qty: 1 Patch, Refills: 0      acetaminophen (TYLENOL EX STR ARTHRITIS PAIN) 500 mg tablet Take  by mouth every six (6) hours as needed. folic acid (FOLVITE) 1 mg tablet Take 1 mg by mouth daily. gabapentin (NEURONTIN) 300 mg capsule Take 300 mg by mouth two (2) times a day. Indications: also takes 3rd dose as needed      levothyroxine (LEVOXYL) 125 mcg tablet Take 75 mcg by mouth Daily (before breakfast). Take DOS per anesthesia protocol. simvastatin (ZOCOR) 20 mg tablet Take 20 mg by mouth daily. Take DOS per anesthesia protocol. ALPHAGAN P 0.1 % Drop Apply 1 Drop to eye two (2) times a day. 1 DROP TO EACH EYE. Pt to use DOS per anesthesia protocol. TRAVATAN 0.004 % Drop 1 Drop by Both Eyes route every evening. oxyCODONE IR (ROXICODONE) 5 mg immediate release tablet Take 1 Tab by mouth every six (6) hours as needed for Pain.  Max Daily Amount: 20 mg.  Qty: 13 Tab, Refills: 0      ondansetron (ZOFRAN ODT) 4 mg disintegrating tablet Take 1 Tab by mouth every eight (8) hours as needed for Nausea. Qty: 15 Tab, Refills: 0      ergocalciferol (ERGOCALCIFEROL) 50,000 unit capsule Take 50,000 Units by mouth every Sunday. Follow Up Orders: Follow-up Appointments   Procedures    FOLLOW UP VISIT Appointment in: One Week PCP for follow up hospital stay     PCP for follow up hospital stay     Standing Status:   Standing     Number of Occurrences:   1     Order Specific Question:   Appointment in     Answer: One Week    FOLLOW UP VISIT Appointment in: Two Weeks Dr faustin To re-evaluate following recent small bowel obstruction, proceed with breast mass workup, and discuss referral for L4 (lumbar spine) abnormality on CT imaging     Dr faustin To re-evaluate following recent small bowel obstruction, proceed with breast mass workup, and discuss referral for L4 (lumbar spine) abnormality on CT imaging     Standing Status:   Standing     Number of Occurrences:   1     Order Specific Question:   Appointment in     Answer: Two Weeks       Follow-up Information     Follow up With Specialties Details Why Contact Info    Mony Brown MD Internal Medicine In 1 week hospital follow up [address]  [phone]      Luis Miguel German MD General Surgery In 2 weeks To re-evaluate following recent small bowel obstruction, proceed with breast mass workup, and discuss referral for L4 (lumbar spine) abnormality on CT imaging [address]  [phone]            Time spent in patient discharge planning and coordination 35 minutes.     Signed:  Lisa Agosto MD

## 2019-06-04 NOTE — DISCHARGE INSTRUCTIONS
Diet  Soups, Juices, and Liquids for 2 days  Then soft diet until follow-up. Avoid uncooked vegetables for now. Follow-up with Dr Jian Yan approx 2 weeks after discharge in the office  To re-evaluate following recent small bowel obstruction, proceed with breast mass workup, and discuss referral for L4 (lumbar spine) abnormality on CT imaging  [address], [address]  (Call for an appt time-->[phone]-->option 1)       Bowel Blockage (Intestinal Obstruction): Care Instructions  Your Care Instructions  A bowel blockage, also called an intestinal obstruction, can prevent gas, fluids, or solids from moving through the intestines normally. It can cause constipation and, rarely, diarrhea. You may have pain, nausea, vomiting, and cramping. Most of the time, complete blockages require a stay in the hospital and possibly surgery. But if your bowel is only partly blocked, your doctor may tell you to wait until it clears on its own and you are able to pass gas and stool. If so, there are things you can do at home to help make you feel better. If you have had surgery for a bowel blockage, there are things you can do at home to make sure you heal well. You can also make some changes to keep your bowel from becoming blocked again. Follow-up care is a key part of your treatment and safety. Be sure to make and go to all appointments, and call your doctor if you are having problems. It's also a good idea to know your test results and keep a list of the medicines you take. How can you care for yourself at home? If your doctor has told you to wait at home for a blockage to clear on its own:  · Follow your doctor's instructions. These may include eating a liquid diet to avoid complete blockage. · Be safe with medicines. Take your medicines exactly as prescribed. Call your doctor if you think you are having a problem with your medicine.   · Put a heating pad set on low on your belly to relieve mild cramps and pain. To prevent another blockage  · Try to eat smaller amounts of food more often. For example, have 5 or 6 small meals throughout the day instead of 2 or 3 large meals. · Chew your food very well. Try to chew each bite about 20 times or until it is liquid. · Avoid high-fiber foods and raw fruits and vegetables with skins, husks, strings, or seeds. These can form a ball of undigested material that can cause a blockage if a part of your bowel is scarred or narrowed. · Check with your doctor before you eat whole-grain products or use a fiber supplement such as Citrucel or Metamucil. · To help you have regular bowel movements, eat at regular times, do not strain during a bowel movement, and drink at least 8 to 10 glasses of water each day. If you have kidney, heart, or liver disease and have to limit fluids, talk with your doctor or before you increase the amount of fluids you drink. · Drink high-calorie liquid formulas if your doctor says to. Severe symptoms may make it hard for your body to take in vitamins and minerals. · Get regular exercise. It helps you digest your food better. Get at least 30 minutes of physical activity on most days of the week. Walking is a good choice. When should you call for help? Call your doctor now or seek immediate medical care if:    · You have a fever.     · You are vomiting.     · You have new or worse belly pain.     · You cannot pass stools or gas.    Watch closely for changes in your health, and be sure to contact your doctor if you have any problems. Where can you learn more? Go to http://denise-ana m.info/. Enter M232 in the search box to learn more about \"Bowel Blockage (Intestinal Obstruction): Care Instructions. \"  Current as of: March 27, 2018  Content Version: 11.9  © 7248-9948 [employer].  Care instructions adapted under license by [employer] (which disclaims liability or warranty for this information). If you have questions about a medical condition or this instruction, always ask your healthcare professional. [address] any warranty or liability for your use of this information. Urinary Tract Infection in Women: Care Instructions  Your Care Instructions    A urinary tract infection, or UTI, is a general term for an infection anywhere between the kidneys and the urethra (where urine comes out). Most UTIs are bladder infections. They often cause pain or burning when you urinate. UTIs are caused by bacteria and can be cured with antibiotics. Be sure to complete your treatment so that the infection goes away. Follow-up care is a key part of your treatment and safety. Be sure to make and go to all appointments, and call your doctor if you are having problems. It's also a good idea to know your test results and keep a list of the medicines you take. How can you care for yourself at home? · Take your antibiotics as directed. Do not stop taking them just because you feel better. You need to take the full course of antibiotics. · Drink extra water and other fluids for the next day or two. This may help wash out the bacteria that are causing the infection. (If you have kidney, heart, or liver disease and have to limit fluids, talk with your doctor before you increase your fluid intake.)  · Avoid drinks that are carbonated or have caffeine. They can irritate the bladder. · Urinate often. Try to empty your bladder each time. · To relieve pain, take a hot bath or lay a heating pad set on low over your lower belly or genital area. Never go to sleep with a heating pad in place. To prevent UTIs  · Drink plenty of water each day. This helps you urinate often, which clears bacteria from your system. (If you have kidney, heart, or liver disease and have to limit fluids, talk with your doctor before you increase your fluid intake.)  · Urinate when you need to.   · Urinate right after you have sex. · Change sanitary pads often. · Avoid douches, bubble baths, feminine hygiene sprays, and other feminine hygiene products that have deodorants. · After going to the bathroom, wipe from front to back. When should you call for help? Call your doctor now or seek immediate medical care if:    · Symptoms such as fever, chills, nausea, or vomiting get worse or appear for the first time.     · You have new pain in your back just below your rib cage. This is called flank pain.     · There is new blood or pus in your urine.     · You have any problems with your antibiotic medicine.    Watch closely for changes in your health, and be sure to contact your doctor if:    · You are not getting better after taking an antibiotic for 2 days.     · Your symptoms go away but then come back. Where can you learn more? Go to http://denise-ana m.info/. Enter L879 in the search box to learn more about \"Urinary Tract Infection in Women: Care Instructions. \"  Current as of: March 20, 2018  Content Version: 11.9  © 1621-3285 [employer]. Care instructions adapted under license by [employer] (which disclaims liability or warranty for this information). If you have questions about a medical condition or this instruction, always ask your healthcare professional. [address] any warranty or liability for your use of this information. Atrial Fibrillation: Care Instructions  Your Care Instructions    Atrial fibrillation is an irregular and often fast heartbeat. Treating this condition is important for several reasons. It can cause blood clots, which can travel from your heart to your brain and cause a stroke. If you have a fast heartbeat, you may feel lightheaded, dizzy, and weak. An irregular heartbeat can also increase your risk for heart failure.   Atrial fibrillation is often the result of another heart condition, such as high blood pressure or coronary artery disease. Making changes to improve your heart condition will help you stay healthy and active. Follow-up care is a key part of your treatment and safety. Be sure to make and go to all appointments, and call your doctor if you are having problems. It's also a good idea to know your test results and keep a list of the medicines you take. How can you care for yourself at home? Medicines    · Take your medicines exactly as prescribed. Call your doctor if you think you are having a problem with your medicine. You will get more details on the specific medicines your doctor prescribes.     · If your doctor has given you a blood thinner to prevent a stroke, be sure you get instructions about how to take your medicine safely. Blood thinners can cause serious bleeding problems.     · Do not take any vitamins, over-the-counter drugs, or herbal products without talking to your doctor first.    Lifestyle changes    · Do not smoke. Smoking can increase your chance of a stroke and heart attack. If you need help quitting, talk to your doctor about stop-smoking programs and medicines. These can increase your chances of quitting for good.     · Eat a heart-healthy diet.     · Stay at a healthy weight. Lose weight if you need to.     · Limit alcohol to 2 drinks a day for men and 1 drink a day for women. Too much alcohol can cause health problems.     · Avoid colds and flu. Get a pneumococcal vaccine shot. If you have had one before, ask your doctor whether you need another dose. Get a flu shot every year. If you must be around people with colds or flu, wash your hands often. Activity    · If your doctor recommends it, get more exercise. Walking is a good choice. Bit by bit, increase the amount you walk every day. Try for at least 30 minutes on most days of the week. You also may want to swim, bike, or do other activities.  Your doctor may suggest that you join a cardiac rehabilitation program so that you can have help increasing your physical activity safely.     · Start light exercise if your doctor says it is okay. Even a small amount will help you get stronger, have more energy, and manage stress. Walking is an easy way to get exercise. Start out by walking a little more than you did in the hospital. Gradually increase the amount you walk.     · When you exercise, watch for signs that your heart is working too hard. You are pushing too hard if you cannot talk while you are exercising. If you become short of breath or dizzy or have chest pain, sit down and rest immediately.     · Check your pulse regularly. Place two fingers on the artery at the palm side of your wrist, in line with your thumb. If your heartbeat seems uneven or fast, talk to your doctor. When should you call for help? Call 911 anytime you think you may need emergency care. For example, call if:    · You have symptoms of a heart attack. These may include:  ? Chest pain or pressure, or a strange feeling in the chest.  ? Sweating. ? Shortness of breath. ? Nausea or vomiting. ? Pain, pressure, or a strange feeling in the back, neck, jaw, or upper belly or in one or both shoulders or arms. ? Lightheadedness or sudden weakness. ? A fast or irregular heartbeat. After you call 911, the  may tell you to chew 1 adult-strength or 2 to 4 low-dose aspirin. Wait for an ambulance. Do not try to drive yourself.     · You have symptoms of a stroke. These may include:  ? Sudden numbness, tingling, weakness, or loss of movement in your face, arm, or leg, especially on only one side of your body. ? Sudden vision changes. ? Sudden trouble speaking. ? Sudden confusion or trouble understanding simple statements. ? Sudden problems with walking or balance.   ? A sudden, severe headache that is different from past headaches.     · You passed out (lost consciousness).    Call your doctor now or seek immediate medical care if:    · You have new or increased shortness of breath.     · You feel dizzy or lightheaded, or you feel like you may faint.     · Your heart rate becomes irregular.     · You can feel your heart flutter in your chest or skip heartbeats. Tell your doctor if these symptoms are new or worse.    Watch closely for changes in your health, and be sure to contact your doctor if you have any problems. Where can you learn more? Go to http://denise-an am.info/. Enter U020 in the search box to learn more about \"Atrial Fibrillation: Care Instructions. \"  Current as of: July 22, 2018  Content Version: 11.9  © 1453-6642 [employer]. Care instructions adapted under license by [employer] (which disclaims liability or warranty for this information). If you have questions about a medical condition or this instruction, always ask your healthcare professional. [address] any warranty or liability for your use of this information. DISCHARGE SUMMARY from Nurse    PATIENT INSTRUCTIONS:    After general anesthesia or intravenous sedation, for 24 hours or while taking prescription Narcotics:  · Limit your activities  · Do not drive and operate hazardous machinery  · Do not make important personal or business decisions  · Do  not drink alcoholic beverages  · If you have not urinated within 8 hours after discharge, please contact your surgeon on call. Report the following to your surgeon:  · Excessive pain, swelling, redness or odor of or around the surgical area  · Temperature over 100.5  · Nausea and vomiting lasting longer than 4 hours or if unable to take medications  · Any signs of decreased circulation or nerve impairment to extremity: change in color, persistent  numbness, tingling, coldness or increase pain  · Any questions    What to do at Home:    *  Please give a list of your current medications to your Primary Care Provider.     *  Please update this list whenever your medications are discontinued, doses are      changed, or new medications (including over-the-counter products) are added. *  Please carry medication information at all times in case of emergency situations. These are general instructions for a healthy lifestyle:    No smoking/ No tobacco products/ Avoid exposure to second hand smoke  Surgeon General's Warning:  Quitting smoking now greatly reduces serious risk to your health. Obesity, smoking, and sedentary lifestyle greatly increases your risk for illness    A healthy diet, regular physical exercise & weight monitoring are important for maintaining a healthy lifestyle    You may be retaining fluid if you have a history of heart failure or if you experience any of the following symptoms:  Weight gain of 3 pounds or more overnight or 5 pounds in a week, increased swelling in our hands or feet or shortness of breath while lying flat in bed. Please call your doctor as soon as you notice any of these symptoms; do not wait until your next office visit. Recognize signs and symptoms of STROKE:    F-face looks uneven    A-arms unable to move or move unevenly    S-speech slurred or non-existent    T-time-call 911 as soon as signs and symptoms begin-DO NOT go       Back to bed or wait to see if you get better-TIME IS BRAIN. Warning Signs of HEART ATTACK     Call 911 if you have these symptoms:   Chest discomfort. Most heart attacks involve discomfort in the center of the chest that lasts more than a few minutes, or that goes away and comes back. It can feel like uncomfortable pressure, squeezing, fullness, or pain.  Discomfort in other areas of the upper body. Symptoms can include pain or discomfort in one or both arms, the back, neck, jaw, or stomach.  Shortness of breath with or without chest discomfort.  Other signs may include breaking out in a cold sweat, nausea, or lightheadedness. Don't wait more than five minutes to call 911 - MINUTES MATTER!  Fast action can save your life. Calling 911 is almost always the fastest way to get lifesaving treatment. Emergency Medical Services staff can begin treatment when they arrive -- up to an hour sooner than if someone gets to the hospital by car. The discharge information has been reviewed with the patient. The patient verbalized understanding. Discharge medications reviewed with the patient and appropriate educational materials and side effects teaching were provided.   ___________________________________________________________________________________________________________________________________

## 2019-06-04 NOTE — PROGRESS NOTES
Problem: Pressure Injury - Risk of  Goal: *Prevention of pressure injury  Description  Document Sid Scale and appropriate interventions in the flowsheet. Outcome: Progressing Towards Goal     Problem: Small Bowel Obstruction: Day 4 to Discharge  Goal: Nutrition/Diet  Outcome: Progressing Towards Goal  Note:   Patient was able to begin tolerating a clear liquid diet. Goal: Discharge Planning  Outcome: Progressing Towards Goal     Problem: Small Bowel Obstruction - Non Surgical: Discharge Outcomes  Goal: *Anxiety reduced or absent  Outcome: Progressing Towards Goal  Goal: *Active bowel function  Outcome: Progressing Towards Goal     Problem: Small Bowel Obstruction - Non Surgical: Discharge Outcomes  Goal: *Verbalizes name, dosage, time, side effects, and number of days to continue medications  Outcome: Not Progressing Towards Goal  Note:   Patient has baseline difficulty stating appropriate answers to all orientation questions, and is not able to provide self medication regimen. One of patient's 12 children is always present at the bedside and is the co-learner and care provider for the patient. Patient's children are alert and oriented. Problem: Falls - Risk of  Goal: *Absence of Falls  Description  Document Benito Hartman Fall Risk and appropriate interventions in the flowsheet.   6/4/2019 0342 by Artemio AGRAWAL  Outcome: Progressing Towards Goal  Note:   Fall Risk Interventions:  Mobility Interventions: Bed/chair exit alarm, Communicate number of staff needed for ambulation/transfer, Patient to call before getting OOB    Mentation Interventions: Bed/chair exit alarm, Door open when patient unattended, Increase mobility, More frequent rounding, Reorient patient, Room close to nurse's station, Update white board    Medication Interventions: Bed/chair exit alarm, Patient to call before getting OOB, Teach patient to arise slowly    Elimination Interventions: Bed/chair exit alarm, Call light in reach, Patient to call for help with toileting needs           6/4/2019 0333 by Mainor Graham  Note:   Fall Risk Interventions:  Mobility Interventions: Bed/chair exit alarm, Communicate number of staff needed for ambulation/transfer, Patient to call before getting OOB    Mentation Interventions: Bed/chair exit alarm, Door open when patient unattended, Increase mobility, More frequent rounding, Reorient patient, Room close to nurse's station, Update white board    Medication Interventions: Bed/chair exit alarm, Patient to call before getting OOB, Teach patient to arise slowly    Elimination Interventions: Bed/chair exit alarm, Call light in reach, Patient to call for help with toileting needs       Patient progressing towards goal without fall. Patient has not been OOB on current shift. Family at bedside. Bed in low and locked position with side rails x3. [place] Bed alarm in place and active.

## 2019-06-04 NOTE — PROGRESS NOTES
Care Management Interventions  PCP Verified by CM: Yes(thinks saw in May 2019)  Palliative Care Criteria Met (RRAT>21 & CHF Dx)?: No(RRAT 26 Dx Small bowel obstruction )  Mode of Transport at Discharge: Other (see comment)(family to provide transportation home)  Transition of Care Consult (CM Consult): [address]: No  Reason Outside [place]: Patient already serviced by other home care/hospice agency(has had [facility] in past and request it again)  Discharge Durable Medical Equipment: No(walker)  Physical Therapy Consult: No  Occupational Therapy Consult: No  Speech Therapy Consult: No  Current Support Network: Own Home(2 sons live with her)  Confirm Follow Up Transport: Family  Plan discussed with Pt/Family/Caregiver: Yes  Freedom of Choice Offered: Yes  Discharge Location  Discharge Placement: Home with home health  Patient ready for d/c home today. Family request home health at d/c and chose [facility]. Provided son with [facility] brochure for assistance, [facility], and [facility] for assistance resources. Spoke with daughter Akiko Costa and updated on d/c and she was asking about [facility] explained not home health for RN/PT but for sitters. She states they are interested in sitters and explained insurance does not cover unless she is approved for [facility]. Per daughter she has spoken with someone about [facility] and is working on getting it approved. Discussed that Dr. Christopher Navarrete deferred hospital bed to the primary care physician. Daughter voices understanding of plan and in agreement with d/c today. Obtained orders for home health RN/PT/OT/aide/SW and sent to [facility] with confirmation faxed received. Patient to d/c home with sons and [facility].

## 2019-06-04 NOTE — PROGRESS NOTES
Bedside and Verbal report given to Vanessa Davidson RN by self. Report included SBAR, Kardex, ED summary, procedure summary, recent results and cardiac rhythm a-fib 60s-80s. Heparin gtt verified at bedside with on-coming RN.

## 2019-06-05 NOTE — PROGRESS NOTES
This note will not be viewable in [address] Second [facility] outreach attempt made to patient's home/cell number was unsuccessful. Left message to return call. Will attempt third outreach within 5 business days.

## 2019-06-05 NOTE — PROGRESS NOTES
This note will not be viewable in [address] Initial [facility] outreach attempt to patient's home/cell number was unsuccessful. Left message to return call. Will attempt second outreach within 24 hours.

## 2019-06-06 NOTE — PROGRESS NOTES
This note will not be viewable in [address] Third [facility] outreach attempt made to home numbers. Left message to return calls. Unable to reach for [facility] program, will close case. Will reopen if call is returned.

## 2019-06-13 PROBLEM — R19.7 DIARRHEA: Status: ACTIVE | Noted: 2019-01-01

## 2019-06-13 NOTE — H&P
HOSPITALIST H&P/CONSULTNAME:  Venus Shows Age:  80 y.o. 
:   1933 MRN:   [id number] PCP: Osiris Camarillo MD 
Consulting MD: Treatment Team: Primary Nurse: Mary Chadwick RN 
HPI:  
Pt is a 80 y.o. female with CHF (EF 30-35%), pulm HTN, Afib (on warfarin), h/o PE/DVT, thyroid CA s/p ESCALERA (now with iatrogenic hypothyroidism), who was admitted several weeks ago for diarrhea, abd pain, N/V. Found to have SBO which resolved with conservative measures. During this admit, imaging also incidentally found moderate R pleural effusion, 12mm breast mass, and L4 lytic lesion with compression fracture. She was discharged home to f/u as outpt to eval these additional issues. D/c'd on Keflex for UTI. She returns now due to persistent diarrhea, at least 4-5 watery stools/day, which has been ongoing for at least 6-8 weeks (was persistent for 3-4 weeks prior to admit for SBO). Nonbloody. She also endorses poor PO intake, no appetite, weight loss of approx 30 lbs over past 6 months, no energy. She also endorses pain to R hip with standing which is worsening. In ED, VSS, afebrile. No leukocytosis, K 2.5, Cr 1.71. CT abd/pelv (without contrast) showing possible early colitis, new moderate to large pericardial effusion, and large lytic lesion and pathological fracture at L4. Complete ROS done and is as stated in HPI or otherwise negative Past Medical History:  
Diagnosis Date  Acquired hypothyroidism  Acute pancreatitis  Arthritis  CAD (coronary artery disease) 11 RCA occluded and fills with collaterals, LAD and Cx nonobstructive CAD, EF 50-55%  Cancer ([address] THYROID - treated with surgery and radiation  CHF (congestive heart failure) ([address] 2018  Chronic pain   
 right hip  Coagulopathy ([address] 2011  Colon polyps  Follicular thyroid cancer ([address]  Gallstone pancreatitis  Glaucoma  Gout   
 hx of gout to right great toe - no problems  Hypertension   
 controlled with medication  Other ill-defined conditions(799.89)  Pancreatitis  Paroxysmal A-fib ([facility] 2018  PE (pulmonary embolism)   Pulmonary HTN (HCC)   
 moderate  Thromboembolus ([address]   
 right leg - Hemashield graft/filter placed in abdomen  Thyroid disease   
 pt had total thyroidectomy - takes levoxyl daily  UTI (urinary tract infection) 6/3/2019 Past Surgical History:  
Procedure Laterality Date  HX APPENDECTOMY  ?   
 HX CHOLECYSTECTOMY  2011 Dr. Chery Barbour - [facility] [address]  
 hemashield graft/filter placed due to blood clot right leg  HX THYROIDECTOMY    
 partial  
 HX TONSILLECTOMY [facility] ARTHROPLASTY   &   
 right hip Prior to Admission Medications Prescriptions Last Dose Informant Patient Reported? Taking? ALPHAGAN P 0.1 % Drop  Self Yes No  
Sig: Apply 1 Drop to eye two (2) times a day. 1 DROP TO EACH EYE. Pt to use DOS per anesthesia protocol. TRAVATAN 0.004 % Drop  Self Yes No  
Si Drop by Both Eyes route every evening. acetaminophen (TYLENOL EX STR ARTHRITIS PAIN) 500 mg tablet   Yes No  
Sig: Take  by mouth every six (6) hours as needed. colchicine (COLCRYS) 0.6 mg tablet   No No  
Sig: Take 1 Tab by mouth daily as needed. dilTIAZem CD (CARDIZEM CD) 120 mg ER capsule   No No  
Sig: Take 1 Cap by mouth daily. Indications: Ventricular Rate Control in Atrial Fibrillation  
ergocalciferol (ERGOCALCIFEROL) 50,000 unit capsule  Self Yes No  
Sig: Take 50,000 Units by mouth every . folic acid (FOLVITE) 1 mg tablet   Yes No  
Sig: Take 1 mg by mouth daily. gabapentin (NEURONTIN) 300 mg capsule   Yes No  
Sig: Take 300 mg by mouth two (2) times a day. Indications: also takes 3rd dose as needed  
levothyroxine (LEVOXYL) 125 mcg tablet  Self Yes No  
Sig: Take 75 mcg by mouth Daily (before breakfast). Take DOS per anesthesia protocol. lidocaine (SALONPAS/ASPERCREME) 4 % patch   No No  
Sig: Place on area of pain  
ondansetron (ZOFRAN ODT) 4 mg disintegrating tablet   No No  
Sig: Take 1 Tab by mouth every eight (8) hours as needed for Nausea. oxyCODONE IR (ROXICODONE) 5 mg immediate release tablet   No No  
Sig: Take 1 Tab by mouth every six (6) hours as needed for Pain. Max Daily Amount: 20 mg.  
polyethylene glycol (MIRALAX) 17 gram packet   No No  
Sig: Take 1 Packet by mouth daily. Reduce or hold for a day if more than 1 bowel movement  Indications: prevent constipation  
simvastatin (ZOCOR) 20 mg tablet  Self Yes No  
Sig: Take 20 mg by mouth daily. Take DOS per anesthesia protocol. warfarin (COUMADIN) 2 mg tablet   No No  
Sig: Take 1 Tab by mouth every evening. Follow up with PCP for INR checks Facility-Administered Medications: None Allergies Allergen Reactions  Ativan [Lorazepam] Other (comments) Has opposite reaction  Lortab [Hydrocodone-Acetaminophen] Other (comments) Causes disorientation. - denies allergy, states she can take it  Morphine Other (comments) Patient not allergic per daughter Tam Appl. Morphine interacts with patient's amiodarone per daughter. Social History Tobacco Use  Smoking status: Former Smoker  Smokeless tobacco: Never Used  Tobacco comment: quit smoking 1995 Substance Use Topics  Alcohol use: No  
  
Family History Problem Relation Age of Onset  Stroke Mother  Hypertension Mother  Cancer Father   
     prostate  Diabetes Maternal Uncle All history personally reviewed Objective:  
 
Patient Vitals for the past 24 hrs: 
 Temp Pulse Resp BP SpO2  
06/13/19 1635  (!) 58 20  100 % 06/13/19 1501  (!) 56 (!) 32 102/57 98 % 06/13/19 1430  (!) 58 23 119/62 98 % 06/13/19 1330  (!) 59 17 130/71 99 % 06/13/19 1300  (!) 55  118/65 99 % 06/13/19 1100 97.9 °F (36.6 °C) 61 16 103/66 97 % Oxygen Therapy O2 Sat (%): 100 % (06/13/19 1635) Pulse via Oximetry: 59 beats per minute (06/13/19 1635) O2 Device: Room air (06/13/19 1100) Physical Exam: 
General:    Alert, cooperative, no distress, appears stated age, thin Head:   Normocephalic, without obvious abnormality, atraumatic. Nose:  Nares normal. No drainage or sinus tenderness. LAD:  No axillary or supraclavicular HELENE Lungs:   Decreased BS R base, L clear Heart:   Distant HS, Regular rate and rhythm,  no murmur, rub or gallop. Abdomen:   Soft, non-tender. Not distended. Bowel sounds normal.  
Extremities: No cyanosis. No edema. No clubbing Skin:     Texture, turgor normal. No rashes or lesions. Not Jaundiced Neurologic: Alert and oriented x 3, no focal deficits Data Review:  
Recent Results (from the past 24 hour(s)) CBC WITH AUTOMATED DIFF Collection Time: 06/13/19 11:04 AM  
Result Value Ref Range WBC 5.6 4.3 - 11.1 K/uL  
 RBC 4.95 4.05 - 5.2 M/uL  
 HGB 12.8 11.7 - 15.4 g/dL HCT 39.2 35.8 - 46.3 % MCV 79.2 (L) 79.6 - 97.8 FL  
 MCH 25.9 (L) 26.1 - 32.9 PG  
 MCHC 32.7 31.4 - 35.0 g/dL  
 RDW 17.5 (H) 11.9 - 14.6 % PLATELET 475 (L) 518 - 450 K/uL MPV Unable to calculate. Recommend adding IPF. 9.4 - 12.3 FL ABSOLUTE NRBC 0.00 0.0 - 0.2 K/uL  
 DF AUTOMATED NEUTROPHILS 64 43 - 78 % LYMPHOCYTES 25 13 - 44 % MONOCYTES 8 4.0 - 12.0 % EOSINOPHILS 3 0.5 - 7.8 % BASOPHILS 0 0.0 - 2.0 % IMMATURE GRANULOCYTES 0 0.0 - 5.0 %  
 ABS. NEUTROPHILS 3.5 1.7 - 8.2 K/UL  
 ABS. LYMPHOCYTES 1.4 0.5 - 4.6 K/UL  
 ABS. MONOCYTES 0.4 0.1 - 1.3 K/UL  
 ABS. EOSINOPHILS 0.2 0.0 - 0.8 K/UL  
 ABS. BASOPHILS 0.0 0.0 - 0.2 K/UL  
 ABS. IMM. GRANS. 0.0 0.0 - 0.5 K/UL METABOLIC PANEL, COMPREHENSIVE Collection Time: 06/13/19 11:04 AM  
Result Value Ref Range Sodium 138 136 - 145 mmol/L Potassium 2.5 (LL) 3.5 - 5.1 mmol/L Chloride 100 98 - 107 mmol/L  
 CO2 28 21 - 32 mmol/L  Anion gap 10 7 - 16 mmol/L  
 Glucose 83 65 - 100 mg/dL BUN 14 8 - 23 MG/DL Creatinine 1.71 (H) 0.6 - 1.0 MG/DL  
 GFR est AA 36 (L) >60 ml/min/1.73m2 GFR est non-AA 30 (L) >60 ml/min/1.73m2 Calcium 8.5 8.3 - 10.4 MG/DL Bilirubin, total 0.6 0.2 - 1.1 MG/DL  
 ALT (SGPT) 18 12 - 65 U/L  
 AST (SGOT) 34 15 - 37 U/L Alk. phosphatase 127 50 - 136 U/L Protein, total 7.1 6.3 - 8.2 g/dL Albumin 3.0 (L) 3.2 - 4.6 g/dL Globulin 4.1 (H) 2.3 - 3.5 g/dL A-G Ratio 0.7 (L) 1.2 - 3.5 BNP Collection Time: 06/13/19 11:04 AM  
Result Value Ref Range  (H) 0 pg/mL Imaging [place] [facility] Ct Abd Pelv Wo Cont Result Date: 6/13/2019 IMPRESSION:  1. No abnormal bowel dilation or transition point to suggest bowel obstruction. No clearly acute bowel changes are otherwise seen. It is only noted that the colon is fluid-filled consistent with the history of diarrhea. The possibility of a mild or early colitis cannot be excluded. 2. New moderate to large pericardial effusion. 3. Large lytic lesion and pathologic fracture of the L4 vertebral body as previously described which is not felt to be significantly changed. Assessment and Plan: Active Hospital Problems Diagnosis Date Noted  Diarrhea 06/13/2019 Chronic diarrhea Recently on Keflex but diarrhea started prior to that. + weight loss. Endorses colonoscopy in the last 5 years was normal. ? Colitis on CT - denies abd pain, afebrile, no leukocytosis. - Cdiff 
- stool culture, O&P 
- GI consulted - ? Need for colonoscopy 
- TSH Pericardial effusion Read as mod-large on CT, likely overread. BP stable. Denies CP, SOB. No physical exam findings to suggest tamponade. 
- check stat EKG  
- ECHO 
- cards consulted - tele - BRIGITTE, TSH 
- concern for possible malignant effusion given other findings Weight loss L4 lytic lesion 12mm breast mass seen on previous imaging Concern for malignancy given constellation of symptoms and findings. Reports she is UTD on MMG, colonoscopy. - check CXR (R effusion seen during last admit) - if present, could start with thoracentesis with cytology 
- would ideally like to obtain contrasted CT chest (40-50 PYH tobacco - now quit) once Cr improves - GI consulted for possible colonoscopy - pelvic XR due to c/o R hip pain Hypokalemia - check Mg 
- replace KENISHA Likely prerenal in setting of diarrhea, decreased PO. 
- IVFs 
- follow BMP H/o PE/DVT 
- holding warfarin given need for possible endoscopy with biopsy/thoracentesis - Lovenox 50mg q12 Afib Rate controlled currently 
- cont cardizem - Lovenox for now Iatrogenic hypothyroidism H/o thyroid CA s/p ESCALERA 
- cont levothyroxine - check TSH Proph: Lovenox Code status: full code, daughter Young Vasquez is surrogate decision maker Dispo: > 2 midnights Risk: high Signed By: Brent Blackwood MD   
 June 13, 2019

## 2019-06-13 NOTE — ED NOTES
TRANSFER - OUT REPORT: 
 
Verbal report given to Desiree(name) on Ayala Bennett  being transferred to 633(unit) for routine progression of care Report consisted of patients Situation, Background, Assessment and  
Recommendations(SBAR). Information from the following report(s) ED Summary was reviewed with the receiving nurse. Lines:  
Peripheral IV 06/13/19 Left Antecubital (Active) Opportunity for questions and clarification was provided. Patient transported with: 
 [employer] \

## 2019-06-13 NOTE — ED TRIAGE NOTES
Patient arrives pov with family complaining of diarrhea. Patient recently admitted to hospital for small bowel obstruction. Family reports profuse diarrhea since discharge. Family reports everything she eats runs out. Diarrhea with standing, uncontrolled. Patient presents alert, with family, lives with son. Denies vomiting.

## 2019-06-13 NOTE — ED PROVIDER NOTES
Patient is a 66-year-old female who presents with nausea, diarrhea, abdominal pain, recently discharged after being observed for a bowel obstruction. Patient states that today she went home the diarrhea returned and states that anything she eats causes diarrhea which she cannot control. No chest pain, shortness of breath, no fevers or chills, no further complaints at this time. Past Medical History:  
Diagnosis Date  Acquired hypothyroidism  Acute pancreatitis  Arthritis  CAD (coronary artery disease) 4/29/11 RCA occluded and fills with collaterals, LAD and Cx nonobstructive CAD, EF 50-55%  Cancer ([address] THYROID - treated with surgery and radiation  CHF (congestive heart failure) ([address] 11/8/2018  Chronic pain   
 right hip  Coagulopathy ([address] 6/16/2011  Colon polyps  Follicular thyroid cancer ([address]  Gallstone pancreatitis  Glaucoma  Gout   
 hx of gout to right great toe - no problems  Hypertension   
 controlled with medication  Other ill-defined conditions(799.89)  Pancreatitis  Paroxysmal A-fib ([address] 11/8/2018  PE (pulmonary embolism) 2008  Pulmonary HTN (HCC)   
 moderate  Thromboembolus ([address] 1995  
 right leg - Hemashield graft/filter placed in abdomen  Thyroid disease   
 pt had total thyroidectomy - takes levoxyl daily  UTI (urinary tract infection) 6/3/2019 Past Surgical History:  
Procedure Laterality Date  HX APPENDECTOMY  ? 1990  
 HX CHOLECYSTECTOMY  6/2011 Dr. [facility]rig[facility] [address] [place]  
 hemashield graft/filter placed due to blood clot right leg  HX THYROIDECTOMY  2010  
 partial  
 HX TONSILLECTOMY [facility] ARTHROPLASTY  2008 & 2011  
 right hip Family History:  
Problem Relation Age of Onset  Stroke Mother  Hypertension Mother  Cancer Father   
     prostate  Diabetes Maternal Uncle Social History Socioeconomic History  Marital status:  Spouse name: Not on file  Number of children: Not on file  Years of education: Not on file  Highest education level: Not on file Occupational History  Not on file Social Needs  Financial resource strain: Not on file  Food insecurity:  
  Worry: Not on file Inability: Not on file  Transportation needs:  
  Medical: Not on file Non-medical: Not on file Tobacco Use  Smoking status: Former Smoker  Smokeless tobacco: Never Used  Tobacco comment: quit smoking 1995 Substance and Sexual Activity  Alcohol use: No  
 Drug use: No  
 Sexual activity: Not on file Lifestyle  Physical activity:  
  Days per week: Not on file Minutes per session: Not on file  Stress: Not on file Relationships  Social connections:  
  Talks on phone: Not on file Gets together: Not on file Attends Confucianist service: Not on file Active member of club or organization: Not on file Attends meetings of clubs or organizations: Not on file Relationship status: Not on file  Intimate partner violence:  
  Fear of current or ex partner: Not on file Emotionally abused: Not on file Physically abused: Not on file Forced sexual activity: Not on file Other Topics Concern  Not on file Social History Narrative  Not on file ALLERGIES: Ativan [lorazepam]; Lortab [hydrocodone-acetaminophen]; and Morphine Review of Systems Constitutional: Negative for chills and fever. HENT: Negative for rhinorrhea and sore throat. Eyes: Negative for visual disturbance. Respiratory: Negative for cough and shortness of breath. Cardiovascular: Negative for chest pain and leg swelling. Gastrointestinal: Positive for abdominal pain, diarrhea and nausea. Negative for vomiting. Genitourinary: Negative for dysuria. Musculoskeletal: Negative for back pain and neck pain. Skin: Negative for rash. Neurological: Negative for weakness and headaches. Psychiatric/Behavioral: The patient is not nervous/anxious. Vitals:  
 06/13/19 1100 BP: 103/66 Pulse: 61 Resp: 16 Temp: 97.9 °F (36.6 °C) SpO2: 97% Weight: 51.7 kg (114 lb) Height: 5' 2\" (1.575 m) Physical Exam  
Constitutional: She is oriented to person, place, and time. She appears well-developed and well-nourished. HENT:  
Head: Normocephalic. Right Ear: External ear normal.  
Left Ear: External ear normal.  
Eyes: Pupils are equal, round, and reactive to light. Conjunctivae and EOM are normal.  
Neck: Normal range of motion. Neck supple. No tracheal deviation present. Cardiovascular: Regular rhythm, normal heart sounds and intact distal pulses. No murmur heard. Pulmonary/Chest: Effort normal and breath sounds normal. No respiratory distress. Abdominal: Soft. There is no tenderness. Musculoskeletal: Normal range of motion. Neurological: She is alert and oriented to person, place, and time. No cranial nerve deficit. Skin: No rash noted. Nursing note and vitals reviewed. MDM Procedures Recent Results (from the past 12 hour(s)) CBC WITH AUTOMATED DIFF Collection Time: 06/13/19 11:04 AM  
Result Value Ref Range WBC 5.6 4.3 - 11.1 K/uL  
 RBC 4.95 4.05 - 5.2 M/uL  
 HGB 12.8 11.7 - 15.4 g/dL HCT 39.2 35.8 - 46.3 % MCV 79.2 (L) 79.6 - 97.8 FL  
 MCH 25.9 (L) 26.1 - 32.9 PG  
 MCHC 32.7 31.4 - 35.0 g/dL  
 RDW 17.5 (H) 11.9 - 14.6 % PLATELET 662 (L) 616 - 450 K/uL MPV Unable to calculate. Recommend adding IPF. 9.4 - 12.3 FL ABSOLUTE NRBC 0.00 0.0 - 0.2 K/uL  
 DF AUTOMATED NEUTROPHILS 64 43 - 78 % LYMPHOCYTES 25 13 - 44 % MONOCYTES 8 4.0 - 12.0 % EOSINOPHILS 3 0.5 - 7.8 % BASOPHILS 0 0.0 - 2.0 % IMMATURE GRANULOCYTES 0 0.0 - 5.0 %  
 ABS. NEUTROPHILS 3.5 1.7 - 8.2 K/UL  
 ABS. LYMPHOCYTES 1.4 0.5 - 4.6 K/UL  
 ABS. MONOCYTES 0.4 0.1 - 1.3 K/UL ABS. EOSINOPHILS 0.2 0.0 - 0.8 K/UL  
 ABS. BASOPHILS 0.0 0.0 - 0.2 K/UL  
 ABS. IMM. GRANS. 0.0 0.0 - 0.5 K/UL METABOLIC PANEL, COMPREHENSIVE Collection Time: 06/13/19 11:04 AM  
Result Value Ref Range Sodium 138 136 - 145 mmol/L Potassium 2.5 (LL) 3.5 - 5.1 mmol/L Chloride 100 98 - 107 mmol/L  
 CO2 28 21 - 32 mmol/L Anion gap 10 7 - 16 mmol/L Glucose 83 65 - 100 mg/dL BUN 14 8 - 23 MG/DL Creatinine 1.71 (H) 0.6 - 1.0 MG/DL  
 GFR est AA 36 (L) >60 ml/min/1.73m2 GFR est non-AA 30 (L) >60 ml/min/1.73m2 Calcium 8.5 8.3 - 10.4 MG/DL Bilirubin, total 0.6 0.2 - 1.1 MG/DL  
 ALT (SGPT) 18 12 - 65 U/L  
 AST (SGOT) 34 15 - 37 U/L Alk. phosphatase 127 50 - 136 U/L Protein, total 7.1 6.3 - 8.2 g/dL Albumin 3.0 (L) 3.2 - 4.6 g/dL Globulin 4.1 (H) 2.3 - 3.5 g/dL A-G Ratio 0.7 (L) 1.2 - 3.5 BNP Collection Time: 06/13/19 11:04 AM  
Result Value Ref Range  (H) 0 pg/mL EKG, 12 LEAD, INITIAL Collection Time: 06/13/19  5:14 PM  
Result Value Ref Range Ventricular Rate 54 BPM  
 Atrial Rate 74 BPM  
 QRS Duration 146 ms  
 Q-T Interval 468 ms QTC Calculation (Bezet) 443 ms Calculated R Axis -66 degrees Calculated T Axis 108 degrees Diagnosis    
  !! AGE AND GENDER SPECIFIC ECG ANALYSIS !! Atrial fibrillation with slow ventricular response Left axis deviation Right bundle branch block Left ventricular hypertrophy with QRS widening Anterior infarct (cited on or before 23-JUL-2018) Abnormal ECG When compared with ECG of 29-MAY-2019 18:37, 
Vent. rate has decreased BY  57 BPM 
Questionable change in initial forces of Anterior leads Nonspecific T wave abnormality now evident in Inferior leads Xr Hip Rt W Or Wo Pelv 2-3 Vws Result Date: 5/28/2019 EXAM: 2 views of the right hip. INDICATION: Leg pain. COMPARISON: Right hip films dated June 17, 2012.  FINDINGS: Osteopenia limits evaluation for nondisplaced fractures. Right hip ORIF of the long femoral stem without evidence of hardware loosening or hardware fracture. There is stress shielding about the proximal femur on the right. Surgical clips are noted in the right groin. Vascular calcifications are evident. Chronic heterotopic ossification about the right hip joint region of the lesser trochanter. IMPRESSION: 1. Right hip ORIF without evidence of acute complication. Prominent heterotopic bone formation about the right hip joint. 2. Advanced diffuse osteopenia without clear acute fracture. Xr Abd (kub) Result Date: 6/2/2019 KUB CLINICAL INDICATION:  Small bowel obstruction, abdominal distention COMPARISON: 5/31/2019 TECHNIQUE: AP view of the abdomen 9:09 AM portable supine FINDINGS:  Since previous exam the contrast has passed. There is mild persisting gaseous distention of several large bowel loops. No evidence of small bowel obstruction. Diverticulosis noted. IMPRESSION:  No evidence of bowel obstruction. Xr Abd (kub) Result Date: 5/31/2019 KUB Clinical indications: Small bowel obstruction, follow-up exam FINDINGS: Single AP view of the abdomen and pelvis submitted and compared to a similar exam from the previous day shows significant decrease in the distended air-filled bowel loops. No findings present to suspect bowel obstruction. There is contrast within an opacified bladder. IMPRESSION: Marked interval decrease in the distention of air-filled bowel loops throughout the abdomen and pelvis. No findings noted to suspect bowel obstruction. Xr Abd (ap And Erect Or Decub) Result Date: 5/30/2019 Flat and upright abdomen CLINICAL INDICATION: Small bowel obstruction follow-up exam, abdominal pain FINDINGS: Two views of the abdomen and pelvis submitted and compared to a similar exam from the previous day shows decrease in the distention of the small bowel loops.  The remaining prominent large bowel loops throughout the upper abdomen. Contrast does distend the bladder. No free air appreciated. IMPRESSION: Interval decrease in caliber of air-filled loops small bowel in the lower abdomen. However there is no definite contrast in the large bowel on this exam. Recommend continued imaging follow-up and serial exams. Small bowel obstruction is thought to be less likely on this exam. 
 
Xr Abd (ap And Erect Or Decub) Result Date: 5/29/2019 Two view abdomen INDICATION:  Right lower quadrant pain. Vomiting. COMPARISON:None Flat and upright views the abdomen were obtained. FINDINGS: There are multiple dilated small bowel loops. No evidence of free air. Bones are osteopenic. There is a right total hip arthroplasty. IMPRESSION: 1. Multiple dilated small bowel loops, suspicious for bowel obstruction. Ct Abd Pelv Wo Cont Result Date: 6/13/2019 CT ABDOMEN AND PELVIS WITHOUT INTRAVENOUS CONTRAST DATED 6/13/2019. History: Diarrhea. Recently admitted for small bowel obstruction. Comparison: CT abdomen and pelvis without contrast 6/3/2019 Technique:   Multiple contiguous helical CT images reconstructed at 5 mm intervals were obtained from above the diaphragms through the ischial tuberosities following oral contrast only. All CT scans performed at this facility use one or all of the following: Automated exposure control, adjustment of the mA and/or kVp according to patient's size, iterative reconstruction. Findings: CT ABDOMEN:  Limited evaluation of the lung bases and base of the mediastinum demonstrates a new moderate to large pericardial effusion. Irregular basilar densities are seen likely representing mild to moderate scarring versus atelectasis. Assessment of the solid organs is limited by the lack of administered intravenous contrast. A subtle abnormality could be missed. The Liver is homogeneous in attenuation.   The spleen is homogeneous in attenuation. No contour deforming mass lesions are seen of the pancreas or adrenal glands. The gallbladder has been removed. Multiple bilateral renal calcifications are seen felt to represent renal stones. No hydronephrosis is seen of either kidney to suggest obstruction. No abnormal small bowel dilation is seen with small bowel loops measuring up to 2.7 cm in diameter. In addition, no abnormal abrupt transition point is seen of small bowel. Gas and fluid are seen throughout the colon. Fluid throughout the colon can be seen with diarrhea. No clear abnormal inflammatory wall thickening of small bowel or colon is seen. The mesentery appears diffusely edematous. No free air is seen. No abnormal fluid collection is identified. .  No adenopathy is seen. The abdominal aorta moderate to advanced atherosclerotic calcification is seen of the abdominal aorta. The patient appears to be status post aortic bifemoral bypass graft. Once again, there is the suggestion of a large lytic lesion destroying much of the L4 vertebral body with a likely secondary mild to moderate pathologic compression fracture. The appearance is not felt to be significantly changed when compared to the prior study. CT PELVIS: A right total hip replacement is present. This produces scatter artifact which obscures portions of the right pelvis. No abnormal pelvic fluid collections or inflammatory changes are present. No pelvic adenopathy is seen. The urinary bladder is unremarkable. IMPRESSION:  1. No abnormal bowel dilation or transition point to suggest bowel obstruction. No clearly acute bowel changes are otherwise seen. It is only noted that the colon is fluid-filled consistent with the history of diarrhea. The possibility of a mild or early colitis cannot be excluded. 2. New moderate to large pericardial effusion.  3. Large lytic lesion and pathologic fracture of the L4 vertebral body as previously described which is not felt to be significantly changed. Ct Abd Pelv Wo Cont Result Date: 6/3/2019 CT abdomen and pelvis without contrast COMPARISON: May 29, 2019. INDICATION: Follow-up bowel distention. TECHNIQUE: CT imaging of the abdomen and pelvis was performed without intravenous contrast. Radiation dose reduction techniques were used for this study:  Our CT scanners use one or all of the following: Automated exposure control, adjustment of the mA and/or kVp according to patient's size, iterative reconstruction. FINDINGS: There is moderate right pleural effusion. There is small left pleural effusion. Dependent densities in the lower lobes, likely due to aspiration or atelectasis. The heart is enlarged. There is coronary artery calcification. There is trace pericardial effusion. Abdomen findings: Absence of IV contrast limits sensitivity. There is subtle micronodular contour of the liver. The spleen is normal in contour. Gallbladder is surgically absent. Pancreas and adrenal glands are not well visualized. There are small nonobstructing renal calculi. There is no hydronephrosis. There is severe atherosclerotic calcification of the abdominal aorta. The stomach is under distended. Small bowel loops are normal in caliber. There is no small bowel obstruction. Pelvic findings: There is a pessary. Uterus is not well evaluated. Urinary bladder is decompressed by Ornelas catheter. There is sigmoid diverticulosis without diverticulitis. Colon is normal in caliber. There is small free fluid. There is no free air. Bones are diffusely osteopenic. There are degenerative changes of the spine. There is lytic lesion involving the L4. There is associated compression fracture. IMPRESSION: 1. Previously noted dilated small bowel loops have resolved. No evidence of bowel obstruction on today's study. 2. Moderate right pleural effusion and small left pleural effusion.  Mild dependent densities in the lower lobes, likely due to atelectasis or aspiration. 3. Sigmoid diverticulosis. No evidence of diverticulitis. 4. Lytic lesion of L4 and associated compression fracture. Ct Abd Pelv W Cont Result Date: 5/30/2019 CT abdomen and pelvis with contrast COMPARISON: June 16, 2011. INDICATION: Severe abdominal pain, pain worsening left upper quadrant and left lower quadrant. Evaluate for diverticulitis. TECHNIQUE: CT imaging was performed of the abdomen and pelvis following the uncomplicated administration of intravenous contrast (Isovue 370, 100 mL). Oral contrast was administered. Radiation dose reduction techniques were used for this study:  Our CT scanners use one or all of the following: Automated exposure control, adjustment of the mA and/or kVp according to patient's size, iterative reconstruction. FINDINGS: There is no pericardial effusion. Heart is enlarged. There is coronary artery calcification. There is dependent subsegmental atelectasis. There is a 12 mm nodule within the lateral right breast (series 2, image 5). Abdomen findings: There is subtle micronodular contour of the liver. There is periportal edema. The gallbladder is surgically absent. Pancreas is not well visualized. The spleen is normal in contour. Stable small right adrenal nodule. There are tiny nonobstructing renal calculi. No hydronephrosis left kidney is small in size. There is prominent atherosclerotic calcification of the abdominal aorta. The stomach is normal in contour. Pelvic findings: There are multiple fluid-filled dilated small bowel loops with transition point appearing to be in the lower abdomen. The colon appears normal in caliber. Urinary bladder is normal in contour. There is a pessary. There is no significant free fluid. No free air. Bones are osteopenic. There are degenerative changes of the spine. There is severe compression deformity of L4 with associated high density structure.  Right total hip arthroplasty is partially seen. IMPRESSION: 1. Small bowel obstruction. Multiple fluid-filled dilated small bowel loops with transition point in the lower abdomen. Etiology is likely adhesion. Closed-loop obstruction is in the differential. 2. No evidence of colitis or diverticulitis, although the colon is not well visualized. . No hydronephrosis. 3. Severe compression deformity of L4 with associated high density material. This may be related to prior intervention such as kyphoplasty. Neoplastic involvement is considered less likely. Osteopenia. 4. A 12 mm nodule within the lateral right breast. Correlation with mammogram is recommended. 79 yo female with diarrhea: 
 
 
 patient has no obstruction at this time, however does appear to have a colitis, and also has acute kidney injury as well as a hyperkalemia as well as a new pericardial effusion which needs further workup. Discussed with hospitalist for admission for hydration, electrolyte repletion, and further workup and management.

## 2019-06-14 PROBLEM — K52.9 COLITIS: Status: ACTIVE | Noted: 2019-01-01

## 2019-06-14 PROBLEM — N63.0 BREAST MASS: Status: ACTIVE | Noted: 2019-01-01

## 2019-06-14 PROBLEM — I48.0 PAF (PAROXYSMAL ATRIAL FIBRILLATION) (HCC): Status: ACTIVE | Noted: 2019-01-01

## 2019-06-14 NOTE — PROGRESS NOTES
Pt unsure of all medications and doses of home medications. Son reported he would bring a list of pt's home medication tomorrow.

## 2019-06-14 NOTE — PROGRESS NOTES
Hospitalist Progress Note Admit Date:  2019 11:06 AM  
Name:  Daphney Presley Age:  80 y.o. 
:  1933 MRN:  [id number] PCP:  Jamey Cerna MD 
Treatment Team: Attending Provider: Jonathan Jeffrey MD; Consulting Provider: Candis Dotson MD; Consulting Provider: Rick Jackson MD; Care Manager: Flako Harris RN; Utilization Review: Azalia Michael RN; Physical Therapist: Sherryle Bollard, DPT; Occupational Therapist: Georgia Grimaldo OT Subjective: HPI and or CC: 
 Cc: diarrhea Now >5-6 weeks watery diarrhea. Was to have outpt follow up wt loss, breast lesion, and Lytic lesion L4.  30 pound wt loss and diarrhea not better and of course has developed acute renal insuff and hypokalemia. tsh low last hospital stay and remains low. 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 Primary complaint is her persist.loose stool. Incidental pericadial effusion seen on ct abd which suggested possible colitis ? - no significant thickening bowel wall- see report. Objective:  
 
Patient Vitals for the past 24 hrs: 
 Temp Pulse Resp BP SpO2  
19 1129 97.8 °F (36.6 °C) 61 18 118/70 99 % 19 0820 97.6 °F (36.4 °C) (!) 55 18 120/75 99 % 19 0450 97.4 °F (36.3 °C) (!) 59 18 123/74 98 % 19 2306 97.7 °F (36.5 °C) 61 18 129/71 99 % 19 1924 98.5 °F (36.9 °C) (!) 52 18 109/57 96 % 19 1840  60 21 119/63 95 % 19 1820  (!) 52 (!) 52 113/58 98 % 19 1635  (!) 58 20  100 % 19 1501  (!) 56 (!) 32 102/57 98 % 19 1430  (!) 58 23 119/62 98 % 19 1330  (!) 59 17 130/71 99 % 19 1300  (!) 55  118/65 99 % Oxygen Therapy O2 Sat (%): 99 % (19 1129) Pulse via Oximetry: 61 beats per minute (19 1840) O2 Device: Room air (19 1100) Intake/Output Summary (Last 24 hours) at 2019 1205 Last data filed at 2019 5828 Gross per 24 hour Intake 2075 ml Output  Net 2075 ml REVIEW OF SYSTEMS: Comprehensive ROS performed and negative except as stated in HPI. Physical Examination: 
General:    Lean body mass No gross distress. Head:  Normocephalic, atraumatic, nares patent Eyes:  Extraocular movements intact, normal sclera CV:   RRR. No  Murmurs, clicks, or gallops, distal pulses intact Lungs:   Unlabored, no cyanosis, no wheeze Abdomen:   Soft, nondistended, nontender. Extremities: Warm and dry. No cyanosis or edema. Skin:     No rashes or jaundice. Neuro:  No gross focal deficits, no tremor Psych:  Mood and affect appropriate Data Review: 
I have reviewed all labs, meds, telemetry events, and studies from the last 24 hours. Recent Results (from the past 24 hour(s)) EKG, 12 LEAD, INITIAL Collection Time: 06/13/19  5:14 PM  
Result Value Ref Range Ventricular Rate 54 BPM  
 Atrial Rate 74 BPM  
 QRS Duration 146 ms  
 Q-T Interval 468 ms QTC Calculation (Bezet) 443 ms Calculated R Axis -66 degrees Calculated T Axis 108 degrees Diagnosis    
  !! AGE AND GENDER SPECIFIC ECG ANALYSIS !! Sinus rhythm with  Premature atrial complexes Left axis deviation Right bundle branch block Left ventricular hypertrophy with QRS widening Anterior infarct (cited on or before 23-JUL-2018) Abnormal ECG Confirmed by ST MILLY LESLIE MD (), TAYLOR FISH ([id number]) on 6/13/2019 9:13:39 PM 
  
C. DIFFICILE AG & TOXIN A/B Collection Time: 06/13/19  9:14 PM  
Result Value Ref Range GDH ANTIGEN C. DIFFICILE GDH ANTIGEN-NEGATIVE    
 C. difficile toxin C. DIFFICILE TOXIN-NEGATIVE    
 PCR Reflex NOT APPLICABLE INTERPRETATION NEGATIVE FOR TOXIGENIC C. DIFFICILE NTXCD Clinical Consideration NEGATIVE FOR TOXIGENIC C. DIFFICILE    
CULTURE, STOOL Collection Time: 06/13/19  9:14 PM  
Result Value Ref Range Special Requests: NO SPECIAL REQUESTS Culture result: NO GROWTH AFTER SHORT PERIOD OF INCUBATION. FURTHER RESULTS TO FOLLOW AFTER OVERNIGHT INCUBATION. MAGNESIUM Collection Time: 06/13/19 10:21 PM  
Result Value Ref Range Magnesium 1.3 (LL) 1.8 - 2.4 mg/dL TSH 3RD GENERATION Collection Time: 06/13/19 10:21 PM  
Result Value Ref Range TSH <0.005 (L) 0.358 - 3.740 uIU/mL URINALYSIS W/ RFLX MICROSCOPIC Collection Time: 06/13/19 11:02 PM  
Result Value Ref Range Color ROBERT Appearance TURBID Specific gravity 1.011 1.001 - 1.023    
 pH (UA) 8.5 5.0 - 9.0 Protein 30 (A) NEG mg/dL Glucose NEGATIVE  mg/dL Ketone NEGATIVE  NEG mg/dL Bilirubin SMALL (A) NEG Blood MODERATE (A) NEG Urobilinogen 2.0 (H) 0.2 - 1.0 EU/dL Nitrites NEGATIVE  NEG Leukocyte Esterase LARGE (A) NEG    
 WBC 10-20 0 /hpf  
 RBC 5-10 0 /hpf Bacteria 4+ (H) 0 /hpf Casts 0-3 0 /lpf Amorphous Crystals 3+ (H) 0  
METABOLIC PANEL, BASIC Collection Time: 06/14/19  4:10 AM  
Result Value Ref Range Sodium 144 136 - 145 mmol/L Potassium 3.7 3.5 - 5.1 mmol/L Chloride 110 (H) 98 - 107 mmol/L  
 CO2 21 21 - 32 mmol/L Anion gap 13 7 - 16 mmol/L Glucose 64 (L) 65 - 100 mg/dL BUN 13 8 - 23 MG/DL Creatinine 1.11 (H) 0.6 - 1.0 MG/DL  
 GFR est AA 60 (L) >60 ml/min/1.73m2 GFR est non-AA 50 (L) >60 ml/min/1.73m2 Calcium 8.3 8.3 - 10.4 MG/DL  
CBC WITH AUTOMATED DIFF Collection Time: 06/14/19  4:10 AM  
Result Value Ref Range WBC 4.5 4.3 - 11.1 K/uL  
 RBC 4.36 4.05 - 5.2 M/uL  
 HGB 11.1 (L) 11.7 - 15.4 g/dL HCT 34.9 (L) 35.8 - 46.3 % MCV 80.0 79.6 - 97.8 FL  
 MCH 25.5 (L) 26.1 - 32.9 PG  
 MCHC 31.8 31.4 - 35.0 g/dL  
 RDW 17.6 (H) 11.9 - 14.6 % PLATELET 794 (L) 292 - 450 K/uL MPV 12.2 9.4 - 12.3 FL ABSOLUTE NRBC 0.00 0.0 - 0.2 K/uL  
 DF AUTOMATED NEUTROPHILS 59 43 - 78 % LYMPHOCYTES 26 13 - 44 % MONOCYTES 9 4.0 - 12.0 % EOSINOPHILS 5 0.5 - 7.8 %  BASOPHILS 0 0.0 - 2.0 %  
 IMMATURE GRANULOCYTES 0 0.0 - 5.0 %  
 ABS. NEUTROPHILS 2.6 1.7 - 8.2 K/UL  
 ABS. LYMPHOCYTES 1.2 0.5 - 4.6 K/UL  
 ABS. MONOCYTES 0.4 0.1 - 1.3 K/UL  
 ABS. EOSINOPHILS 0.2 0.0 - 0.8 K/UL  
 ABS. BASOPHILS 0.0 0.0 - 0.2 K/UL  
 ABS. IMM. GRANS. 0.0 0.0 - 0.5 K/UL All Micro Results Procedure Component Value Units Date/Time C. DIFFICILE AG & TOXIN A/B [[id number]] Collected:  06/13/19 2114 Order Status:  Completed Specimen:  Stool Updated:  06/14/19 1116 [address] ANTIGEN    
  C. DIFFICILE GDH ANTIGEN-NEGATIVE  
     
  C. difficile toxin C. DIFFICILE TOXIN-NEGATIVE  
     
  PCR Reflex NOT APPLICABLE INTERPRETATION    
  NEGATIVE FOR TOXIGENIC C. DIFFICILE Clinical Consideration NEGATIVE FOR TOXIGENIC C. DIFFICILE  
     
 CULTURE, URINE [[id number]] Collected:  06/14/19 0817 Order Status:  Completed Specimen:  Cath Urine Updated:  06/14/19 1020 Jenna Cesar [[id number]] Collected:  06/13/19 2114 Order Status:  Completed Specimen:  Stool Updated:  06/14/19 0946 Special Requests: NO SPECIAL REQUESTS Culture result:    
  NO GROWTH AFTER SHORT PERIOD OF INCUBATION. FURTHER RESULTS TO FOLLOW AFTER OVERNIGHT INCUBATION. [address] [employer] [[id number]] Collected:  06/13/19 2114 Order Status:  Completed Specimen:  Stool Updated:  06/13/19 2137 Current Meds: 
Current Facility-Administered Medications Medication Dose Route Frequency  tuberculin injection 5 Units  5 Units IntraDERMal ONCE  
 metroNIDAZOLE (FLAGYL) IVPB premix 500 mg  500 mg IntraVENous Q8H  
 levoFLOXacin (LEVAQUIN) 500 mg in D5W IVPB  500 mg IntraVENous Q24H  
 dilTIAZem CD (CARDIZEM CD) capsule 120 mg  120 mg Oral DAILY  gabapentin (NEURONTIN) capsule 600 mg  600 mg Oral DAILY  levothyroxine (SYNTHROID) tablet 75 mcg  75 mcg Oral ACB  ondansetron (ZOFRAN ODT) tablet 4 mg  4 mg Oral Q8H PRN  
 oxyCODONE IR (ROXICODONE) tablet 5 mg  5 mg Oral Q6H PRN  
  simvastatin (ZOCOR) tablet 20 mg  20 mg Oral QHS  [Held by provider] warfarin (COUMADIN) tablet 2 mg  2 mg Oral QPM  
 acetaminophen (TYLENOL) tablet 650 mg  650 mg Oral Q4H PRN  
 naloxone (NARCAN) injection 0.4 mg  0.4 mg IntraVENous PRN  
 diphenhydrAMINE (BENADRYL) capsule 25 mg  25 mg Oral Q4H PRN  
 ondansetron (ZOFRAN) injection 4 mg  4 mg IntraVENous Q4H PRN  
 bisacodyl (DULCOLAX) tablet 10 mg  10 mg Oral DAILY PRN  
 zolpidem (AMBIEN) tablet 5 mg  5 mg Oral QHS PRN  
 sodium chloride (NS) flush 5-40 mL  5-40 mL IntraVENous Q8H  
 sodium chloride (NS) flush 5-40 mL  5-40 mL IntraVENous PRN  
 0.9% sodium chloride with KCl 20 mEq/L infusion   IntraVENous CONTINUOUS  
 enoxaparin (LOVENOX) injection 50 mg  50 mg SubCUTAneous Q24H Diet: DIET GI SOFT 
DIET NUTRITIONAL SUPPLEMENTS Other Studies (last 24 hours): Xr Chest Sngl V Result Date: 6/13/2019 Clinical History: The patient is a 80years year old Female presenting with symptoms of eval for effusion Comparison:  Chest x-ray 11/10/2018 Findings:  Frontal view of the chest was obtained. There is minimal scarring at the left lung base. The right lung is clear. No pleural effusions are demonstrated. The cardiomediastinal silhouette is upper limits of normal in size with aortic atherosclerotic calcification. There are no acute osseous abnormalities. Surgical clips are seen throughout the lower neck soft tissues Impression:  Chronic scarring at left lung base with mild cardiomegaly. CPT code(s) 70380 Ct Abd Pelv Wo Cont Result Date: 6/13/2019 CT ABDOMEN AND PELVIS WITHOUT INTRAVENOUS CONTRAST DATED 6/13/2019. History: Diarrhea. Recently admitted for small bowel obstruction.  Comparison: CT abdomen and pelvis without contrast 6/3/2019 Technique:   Multiple contiguous helical CT images reconstructed at 5 mm intervals were obtained from above the diaphragms through the ischial tuberosities following oral contrast only. All CT scans performed at this facility use one or all of the following: Automated exposure control, adjustment of the mA and/or kVp according to patient's size, iterative reconstruction. Findings: CT ABDOMEN:  Limited evaluation of the lung bases and base of the mediastinum demonstrates a new moderate to large pericardial effusion. Irregular basilar densities are seen likely representing mild to moderate scarring versus atelectasis. Assessment of the solid organs is limited by the lack of administered intravenous contrast. A subtle abnormality could be missed. The Liver is homogeneous in attenuation. The spleen is homogeneous in attenuation. No contour deforming mass lesions are seen of the pancreas or adrenal glands. The gallbladder has been removed. Multiple bilateral renal calcifications are seen felt to represent renal stones. No hydronephrosis is seen of either kidney to suggest obstruction. No abnormal small bowel dilation is seen with small bowel loops measuring up to 2.7 cm in diameter. In addition, no abnormal abrupt transition point is seen of small bowel. Gas and fluid are seen throughout the colon. Fluid throughout the colon can be seen with diarrhea. No clear abnormal inflammatory wall thickening of small bowel or colon is seen. The mesentery appears diffusely edematous. No free air is seen. No abnormal fluid collection is identified. .  No adenopathy is seen. The abdominal aorta moderate to advanced atherosclerotic calcification is seen of the abdominal aorta. The patient appears to be status post aortic bifemoral bypass graft. Once again, there is the suggestion of a large lytic lesion destroying much of the L4 vertebral body with a likely secondary mild to moderate pathologic compression fracture. The appearance is not felt to be significantly changed when compared to the prior study.  CT PELVIS: A right total hip replacement is present. This produces scatter artifact which obscures portions of the right pelvis. No abnormal pelvic fluid collections or inflammatory changes are present. No pelvic adenopathy is seen. The urinary bladder is unremarkable. IMPRESSION:  1. No abnormal bowel dilation or transition point to suggest bowel obstruction. No clearly acute bowel changes are otherwise seen. It is only noted that the colon is fluid-filled consistent with the history of diarrhea. The possibility of a mild or early colitis cannot be excluded. 2. New moderate to large pericardial effusion. 3. Large lytic lesion and pathologic fracture of the L4 vertebral body as previously described which is not felt to be significantly changed. Xr Pelv 1 Or 2 V Result Date: 6/13/2019 Clinical History: The patient is a 80years year old Female presenting with symptoms of concern for malignancy, c/o pelvic/hip pain with standing. Comparison: Pelvic film 10/27/2011 Findings: AP view of the pelvis and oblique view of the right hip demonstrate an intact pelvic ring. A total right hip prosthesis remains in place and is unchanged in position or configuration. No gross soft tissue defect or opaque foreign body is seen. Surgical clips have been removed from the overlying soft tissues. There are vascular clips in the right groin region with bilateral vascular calcification noted. Impression: 1. No acute radiographic abnormality. Assessment and Plan:  
 
Hospital Problems as of 6/14/2019 Date Reviewed: 6/23/2011 Codes Class Noted - Resolved POA Breast mass ICD-10-CM: N63.0 ICD-9-CM: 611.72  6/14/2019 - Present Unknown PAF (paroxysmal atrial fibrillation) (HCC) ICD-10-CM: I48.0 ICD-9-CM: 427.31  6/14/2019 - Present Unknown Colitis ICD-10-CM: K52.9 ICD-9-CM: 558.9  6/14/2019 - Present Unknown * (Principal) Diarrhea ICD-10-CM: R19.7 ICD-9-CM: 787.91  6/13/2019 - Present Unknown Chronic systolic dysfunction of left ventricle (Chronic) ICD-10-CM: I51.9 ICD-9-CM: 429.9  5/29/2019 - Present Yes Compression fracture of fourth lumbar vertebra (HCC) ICD-10-CM: Z99.862N ICD-9-CM: 805.4  5/29/2019 - Present Yes Thromboembolic disorder ([facility] [id number]: I74.9 ICD-9-CM: 444.9  5/29/2019 - Present Yes Follicular thyroid cancer ([facility] ICD-10-CM: H29 ICD-9-CM: 781  6/19/2011 - Present Yes Overview Signed 6/21/2011  7:12 AM by Angie Cassidy  
  thyroglobulin high > 100 obvious residual thyroid tissue Pericardial effusion ICD-10-CM: I31.3 ICD-9-CM: 423.9  6/17/2011 - Present Yes A/P:   
-- colitis? and chronic watery diarrhea C.diff eval negative, tsh LOW-iatrogenic vs autonamous hyerthyr. Empiric quinolone flagyl pending culture results. (also persist UTI)failed oral keflex. Appreciate GI opinion and follow up Avoid immodium for now if able re: recent SBO 
 
-- recent sbo- responded to NGT suction 
 
-- Wt loss, Breast [id number] prior imaging, L4 lytic lesion If pleural effusion on cxr consider thoracentesis, ct chest if creat improves. xr pelvis pend. --clara improving ivf,  
--hypok- supplement to k 4.0 
 
-- hypomagnesemia Iv  correction - oral supplement if need but avoid for now re: diarrhea 
 
-- low tsh , hx of follicular thryoid cancer and Iatrogenic hypothyroidism following ESCALERA ablation---  Suppressive therapy vs. overtreatment - Check free t4 and t3 total.--could contribute to diarrhea but not colitis HOLD LEVOTHYROXINE FOR NOW GIVEN HER DIARRHEA (NOW CHRONIC PENDING WORKUP) --asymptomatic pericardial effusion - echo for estimate size and cardiac fxn. Cardiology consultation appreciated. -- Hx of significant thromboembolic disease per daughter \"clotted around filter\" in past. Currently off warfarin with lovenox rx- no endoscopy planned but clear liq.  
 
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 DC planning/Dispo: home health DVT ppx:  Anticoagulated Code status:full Medical decision maker:self- daughter Tenisha Buddle # on file Signed: 
Jesus SAMS

## 2019-06-14 NOTE — PROGRESS NOTES
Nutrition Reason for assessment: Referral received from nursing admission Malnutrition Screening Tool Recently Lost Weight Without Trying: Yes If Yes, How Much Weight Loss: 2-13 lbs Eating Poorly Due to Decreased Appetite: Yes Assessment:  
Diet: DIET GI SOFT No options chosen DIET NUTRITIONAL SUPPLEMENTS Breakfast, Dinner, Lunch, All Meals; Ensure [employer] PMH: 
Past Medical History:  
Diagnosis Date  Acquired hypothyroidism  Acute pancreatitis  Arthritis  CAD (coronary artery disease) 4/29/11  Cancer ([facility] THYROID - treated with surgery and radiation  CHF (congestive heart failure) ([facility] 11/8/2018  Follicular thyroid cancer ([facility]  Hypertension  Paroxysmal A-fib ([facility] 11/8/2018  PE (pulmonary embolism) 2008  Pulmonary HTN ([facility]  Thyroid disease Food/Nutrition Patient History:  The patient is currently admitted due to ongoing diarrhea with no improvements and weight loss. GI has evaluated patient. CT scan shows possible colitis. TSH was abnormal and noted per GI a potential cause of her diarrhea and recent weight loss. The patient denies any issues with nausea or vomiting. She states that her appetite overall is fairly decent. She has no questions or concerns regarding nutrition at this time other than how she can stop the diarrhea. Weight history in the EMR cannot be verified as accurate due to unknown weight source (pt stated vs estimated vs measured). Weight Loss Metrics 6/14/2019 6/4/2019 5/28/2019 11/11/2018 Today's Wt 103 lb 4.8 oz 114 lb 12.8 oz 111 lb 111 lb 8 oz BMI 18.89 kg/m2 21 kg/m2 18.47 kg/m2 18.55 kg/m2 According to the EMR the patient has potentially lost ~11 lbs over the past ~10 days. This is a clinically significant weight loss of 9.6% over the past ~10 days. Anthropometrics:Height: 5' 2\" (157.5 cm),  Weight: 46.9 kg (103 lb 4.8 oz), Weight Source: Bed, Body mass index is 18.89 kg/m². BMI class of underweight for age >71. Macronutrient needs: EER:  9915-7497 kcal /day (30-35 kcal/kg listed BW of 46.9 kg) EPR:  47-59 grams protein/day (1-1.25 grams/kg listed BW) Intake/Comparative Standards: Too be determined-no po intakes have been identified at this time. Nutrition Diagnosis: Unintended weight loss related to altered gi function as evidenced by patient with ongoing diarrhea for past several months with clinically significant weight loss of 9.6% over the past ~10 days. Intervention: 
Meals and snacks: Continue current diet. Nutrition Supplement Therapy: Add Ensure Clear TID (pt does not want regular milk based ONS to contribute to diarrhea further. Nutrition Discharge Plan: Too soon to be determined Yari Rodas [address], [address],  [phone]

## 2019-06-14 NOTE — PROGRESS NOTES
Rounding done every hour and PRN while patient in room. Patient resting in room. No signs or symptoms of distress. No changes in status. Patient denies any further needs or pain at this time. Family at bedside.

## 2019-06-14 NOTE — PROGRESS NOTES
06/13/19 1930 Dual Skin Pressure Injury Assessment Dual Skin Pressure Injury Assessment [facility] Second Care Provider (Based on [address] [place] [address]) Gracie Sung RN  
 
Pt arrived to floor via stretcher with transport. Pt is alert and oriented x4. Five family members at bedside. Dual skin assessment complete. Scaring noted to sacrum and anal area. Allevyn and heel protectors placed for prevention place. No skin breakdown noted.

## 2019-06-14 NOTE — PROGRESS NOTES
Problem: Risk for Spread of Infection Goal: Prevent transmission of infectious organism to others Description Prevent the transmission of infectious organisms to other patients, staff members, and visitors. Outcome: Progressing Towards Goal 
 Enteric Precautions

## 2019-06-14 NOTE — PROGRESS NOTES
[facility] Progress Note Admit Date:  6/13/2019 Today's Date:  6/14/2019 CC:  Diarrhea Subjective:  
 
Patient: Patient had one loose stool today. Multiple yesterday. Persistent weight loss, but denies any abdominal pain, N&V. Medications:  
Current Facility-Administered Medications Medication Dose Route Frequency  tuberculin injection 5 Units  5 Units IntraDERMal ONCE  perflutren lipid microspheres (DEFINITY) in NS bolus IV  1 mL IntraVENous PRN  
 dilTIAZem CD (CARDIZEM CD) capsule 120 mg  120 mg Oral DAILY  gabapentin (NEURONTIN) capsule 600 mg  600 mg Oral DAILY  levothyroxine (SYNTHROID) tablet 75 mcg  75 mcg Oral ACB  ondansetron (ZOFRAN ODT) tablet 4 mg  4 mg Oral Q8H PRN  
 oxyCODONE IR (ROXICODONE) tablet 5 mg  5 mg Oral Q6H PRN  
 simvastatin (ZOCOR) tablet 20 mg  20 mg Oral QHS  [Held by provider] warfarin (COUMADIN) tablet 2 mg  2 mg Oral QPM  
 acetaminophen (TYLENOL) tablet 650 mg  650 mg Oral Q4H PRN  
 naloxone (NARCAN) injection 0.4 mg  0.4 mg IntraVENous PRN  
 diphenhydrAMINE (BENADRYL) capsule 25 mg  25 mg Oral Q4H PRN  
 ondansetron (ZOFRAN) injection 4 mg  4 mg IntraVENous Q4H PRN  
 bisacodyl (DULCOLAX) tablet 10 mg  10 mg Oral DAILY PRN  
 zolpidem (AMBIEN) tablet 5 mg  5 mg Oral QHS PRN  
 sodium chloride (NS) flush 5-40 mL  5-40 mL IntraVENous Q8H  
 sodium chloride (NS) flush 5-40 mL  5-40 mL IntraVENous PRN  
 0.9% sodium chloride with KCl 20 mEq/L infusion   IntraVENous CONTINUOUS  
 enoxaparin (LOVENOX) injection 50 mg  50 mg SubCUTAneous Q24H Review of Systems: ROS was obtained, with pertinent positives as listed above. No chest pain or SOB. Diet:  GI soft diet Objective:  
Vitals: 
Visit Vitals /75 Pulse (!) 55 Temp 97.6 °F (36.4 °C) Resp 18 Ht 5' 2\" (1.575 m) Wt 46.9 kg (103 lb 4.8 oz) SpO2 99% BMI 18.89 kg/m² Intake/Output: 
No intake/output data recorded. 06/12 1901 - 06/14 0700 In: 2075 [I.V.:2075] Out: - Exam: 
General appearance: alert, cooperative, no distress DAUGHTER AND NURSING AT BEDSIDE; CACHETIC Lungs: clear to auscultation bilaterally anteriorly Heart: regular rate and rhythm Abdomen: soft, non-tender. Bowel sounds normal. No masses, no organomegaly Extremities: extremities normal, atraumatic, no cyanosis or edema Neuro:  alert and oriented X4 Data Review (Labs):   
Recent Labs  
  06/14/19 
0410 06/13/19 
2221 06/13/19 
1104 WBC 4.5  --  5.6 HGB 11.1*  --  12.8 HCT 34.9*  --  39.2 *  --  149* MCV 80.0  --  79.2*   --  138  
K 3.7  --  2.5*  
*  --  100 CO2 21  --  28 BUN 13  --  14  
CREA 1.11*  --  1.71* CA 8.3  --  8.5 MG  --  1.3*  --   
GLU 64*  --  83  
AP  --   --  127 SGOT  --   --  34 ALT  --   --  18  
TBILI  --   --  0.6 ALB  --   --  3.0*  
TP  --   --  7.1 Assessment:  
 
Principal Problem: 
  Diarrhea (6/13/2019) Active Problems: 
  Pericardial effusion (6/17/2011) Chronic systolic dysfunction of left ventricle (5/29/2019) Compression fracture of fourth lumbar vertebra ([facility] (5/29/2019) Breast mass (6/14/2019) PAF (paroxysmal atrial fibrillation) ([facility] (6/14/2019) 80year old female with hx of thyroid cancer, CHF and pulmonary HTN, breast mass who we are following for diarrhea. Her TSH is abnormal at <.0005 and could be contributing to her current diarrhea and weight loss. Defer management to primary team. Stool studies are pending. Plan: 1. Await C. Difficile 2. Await stool studies 3.will defer abnormal TSH to primary team 
4. No plans for colonoscopy at this time Edison Sicard. Quenton Cleaves in collaboration with Dr. Saadia Brandon 
[facility]

## 2019-06-14 NOTE — PROGRESS NOTES
PTA medlist updated. MD aware that the following medications were added to medication list Amiodarone, Metoprolol, and Lasix.

## 2019-06-14 NOTE — CONSULTS
[facility] Card[facility] Consult Date of  Admission: 6/13/2019 11:06 AM  
 
Primary Care Physician: Dr Latanya Art Primary Cardiologist: [facility] Referring Physician: Dr Shira Collins Consulting Physician: Dr Candie Zhang 
 
CC/Reason for consult: pericardial effusion Christofer Torrez is a 80 y.o. female admitted for Diarrhea [R19.7]. She has a h/o h/o AF (on coumadin),HFrEF, CAD w/  of RCA, colon polyps, follicular thyroid CA, HTN, PE, pulmonary HTN, and thyroidectomy. Echo  w EF 30-35% w marked LAE, mild-mod MR/AR, mod TR. She was recently admitted w N/V/D and found to have a SBO. We saw her for a fib w RVR and recommended IV lopressor prn HR greater than 120. She was d/c home 6-4. She was readmitted 6-13 w diarrhea, CT showed questionable colitis, mod-large pericardial effusion, breast mass and lytic pathologic lesion. Pt denies CP, SOB, dizziness, palpitations, syncope, LE edema. She has lost weight. , CXR chronic scarring and mild cardiomegaly, EKG NSR w rate 54 w pAC and NSST/T wave changes, INR 1.8, HR 55, /75. Patient Active Problem List  
Diagnosis Code  Prost[facility] hip [facility] failure ([facility] T84.018A, X70.882  KENNEDY (total hip arthroplasty)  Unstable angina (HCC) I20.0  Pulmonary embolism (HCC) I26.99  
 Acute pancreatitis K85.90  
 HTN (hypertension) I10  
 Acquired hypothyroidism E03.9  Pericardial effusion I31.3  Pancreatitis K85.90  Gallstone pancreatitis K85.10  Follicular thyroid cancer ([facility] C73  
 Nutritional deficiency E63.9  Impaired mobility Z74.09  
 Debility R53.81  
 Colon polyps K63.5  Iron deficiency anemia D50.9  CHF (congestive heart failure) (HCC) I50.9  Supratherapeutic INR R79.1  Pleural effusion J90  
 Edema R60.9 [address]  Paroxysmal A-fib (HCC) I48.0  Acute on chronic systolic (congestive) heart failure (HCC) I50.23  
 SBO (small bowel obstruction) ([facility] K56.438  Atrial fibrillation with RVR (HCC) I48.91  
 Chronic systolic dysfunction of left ventricle I51.9  Thyroid cancer ([address] C73  Compression fracture of fourth lumbar vertebra (HCC) S32.040A  Thromboembolic disorder (HCC) B36.3  Presence of IVC filter [id number]  Breast nodule N63.0  Weight loss R63.4  
 UTI (urinary tract infection) N39.0  Diarrhea R19.7 Past Medical History:  
Diagnosis Date  Acquired hypothyroidism  Acute pancreatitis  Arthritis  CAD (coronary artery disease) 4/29/11 RCA occluded and fills with collaterals, LAD and Cx nonobstructive CAD, EF 50-55%  Cancer ([address] THYROID - treated with surgery and radiation  CHF (congestive heart failure) ([address] 11/8/2018  Chronic pain   
 right hip  Coagulopathy ([address] 6/16/2011  Colon polyps  Follicular thyroid cancer ([address]  Gallstone pancreatitis  Glaucoma  Gout   
 hx of gout to right great toe - no problems  Hypertension   
 controlled with medication  Other ill-defined conditions(799.89)  Pancreatitis  Paroxysmal A-fib ([address] 11/8/2018  PE (pulmonary embolism) 2008  Pulmonary HTN (HCC)   
 moderate  Thromboembolus ([address] 1995  
 right leg - Hemashield graft/filter placed in abdomen  Thyroid disease   
 pt had total thyroidectomy - takes levoxyl daily  UTI (urinary tract infection) 6/3/2019 Past Surgical History:  
Procedure Laterality Date  HX APPENDECTOMY  ? 1990  
 HX CHOLECYSTECTOMY  6/2011 Dr. Chery Barbour - [place] [address]  
 hemashield graft/filter placed due to blood clot right leg  HX THYROIDECTOMY  2010  
 partial  
 HX TONSILLECTOMY [address] [facility] ARTHROPLASTY  2008 & 2011  
 right hip Allergies Allergen Reactions  Ativan [Lorazepam] Other (comments) Has opposite reaction  Lortab [Hydrocodone-Acetaminophen] Other (comments) Causes disorientation. - denies allergy, states she can take it  Morphine Other (comments) Patient not allergic per daughter Yong Less. Morphine interacts with patient's amiodarone per daughter. Family History Problem Relation Age of Onset  Stroke Mother  Hypertension Mother  Cancer Father   
     prostate  Diabetes Maternal Uncle Social History Tobacco Use  Smoking status: Former Smoker  Smokeless tobacco: Never Used  Tobacco comment: quit smoking 1995 Substance Use Topics  Alcohol use: No  
  
 
Current Facility-Administered Medications Medication Dose Route Frequency  magnesium sulfate 2 g/50 ml IVPB (premix or compounded)  2 g IntraVENous ONCE  
 dilTIAZem CD (CARDIZEM CD) capsule 120 mg  120 mg Oral DAILY  gabapentin (NEURONTIN) capsule 600 mg  600 mg Oral DAILY  levothyroxine (SYNTHROID) tablet 75 mcg  75 mcg Oral ACB  ondansetron (ZOFRAN ODT) tablet 4 mg  4 mg Oral Q8H PRN  
 oxyCODONE IR (ROXICODONE) tablet 5 mg  5 mg Oral Q6H PRN  
 simvastatin (ZOCOR) tablet 20 mg  20 mg Oral QHS  [Held by provider] warfarin (COUMADIN) tablet 2 mg  2 mg Oral QPM  
 acetaminophen (TYLENOL) tablet 650 mg  650 mg Oral Q4H PRN  
 naloxone (NARCAN) injection 0.4 mg  0.4 mg IntraVENous PRN  
 diphenhydrAMINE (BENADRYL) capsule 25 mg  25 mg Oral Q4H PRN  
 ondansetron (ZOFRAN) injection 4 mg  4 mg IntraVENous Q4H PRN  
 bisacodyl (DULCOLAX) tablet 10 mg  10 mg Oral DAILY PRN  
 zolpidem (AMBIEN) tablet 5 mg  5 mg Oral QHS PRN  
 sodium chloride (NS) flush 5-40 mL  5-40 mL IntraVENous Q8H  
 sodium chloride (NS) flush 5-40 mL  5-40 mL IntraVENous PRN  
 0.9% sodium chloride with KCl 20 mEq/L infusion   IntraVENous CONTINUOUS  
 enoxaparin (LOVENOX) injection 50 mg  50 mg SubCUTAneous Q24H Review of Symptoms: 
General: + weight loss,  no weakness, fever or chills Skin: no rashes, lumps, or other skin changes HEENT: no headache, dizziness, lightheadedness, vision changes, hearing changes, tinnitus, vertigo, sinus pressure/pain, bleeding gums, sore throat, or hoarseness Neck: no swollen glands, goiter, pain or stiffness Respiratory: no cough, sputum, hemoptysis, no dyspnea, wheezing Cardiovascular: + as per HPI Gastrointestinal: + anorexia, weight loss, diarrhea Urinary: no frequency, urgency , hematuria, burning/pain with urination, recent flank pain, polyuria, nocturia, or difficulty urinating Peripheral Vascular: no claudication, leg cramps, prior DVTs, swelling of calves, legs, or feet, color change, or swelling with redness or tenderness Musculoskeletal: no muscle or joint pain/stiffness, joint swelling, erythema of joints, or back pain Psychiatric: no depression or excessive stress Neurological: no sensory or motor loss, seizures, syncope, tremors, numbness, no dementia Hematologic: no anemia, easy bruising or bleeding Endocrine: + thyroid problems, heat or cold intolerance, excessive sweating, polyuria, polydipsia, no  diabetes. Physical Exam 
Vitals:  
 06/13/19 2306 06/14/19 3535 06/14/19 0450 06/14/19 0820 BP: 129/71  123/74 120/75 Pulse: 61  (!) 59 (!) 55 Resp: 18  18 18 Temp: 97.7 °F (36.5 °C)  97.4 °F (36.3 °C) 97.6 °F (36.4 °C) SpO2: 99%  98% 99% Weight:  46.9 kg (103 lb 4.8 oz) Height:      
 
 
Physical Exam: 
General: Well Developed, Well Nourished, No Acute Distress, thin but appears younger than stated age HEENT: pupils equal and round, no abnormalities noted Neck: supple, no JVD, no carotid bruits Heart: S1S2 with RRR without murmurs or gallops or friction rub Lungs: Decreased Abd: soft, nontender, nondistended, with good bowel sounds Ext: warm, no edema Skin: warm and dry Psychiatric: Normal mood and affect Neurologic: Alert and oriented X 3 Labs:  
Recent Labs  
  06/14/19 
0410 06/13/19 
2221 06/13/19 
1104   --  138  
K 3.7  --  2.5* MG  --  1.3*  --   
BUN 13 --  14  
CREA 1.11*  --  1.71* GLU 64*  --  83 WBC 4.5  --  5.6 HGB 11.1*  --  12.8 HCT 34.9*  --  39.2 *  --  149* Assessment/Plan: 
 
 Assessment:  
Diarrhea (6/13/2019)- GI following, stool studies pending Pericardial effusion (6/17/2011)- noted on CT, small to medium effusion on echo , check repeat echo, asymptomatic Chronic systolic dysfunction of left ventricle (5/29/2019)- EF 30-35%, no ACE/ARB or BB due to hypotension and need for CCB for rate control Compression fracture of fourth lumbar vertebra (HCC) (5/29/2019)- pain mangement Breast mass (6/14/2019)- Per primary PAF (paroxysmal atrial fibrillation) ([facility] (6/14/2019)- Currently NSR on cardizem po, coumadin Thank you very much for this referral. We appreciate the opportunity to participate in this patient's care. We will follow along with above stated plan. Nahum Artis PA-C Consulting MD: Ijeoma Hernandez

## 2019-06-14 NOTE — PROGRESS NOTES
Hourly rounding completed on this shift. No new complaints at this time. All needs met. 4 episodes of loose and watery stool on this shift. Pt is currently resting in bed. Will continue to monitor and give report to oncoming nurse.

## 2019-06-14 NOTE — CONSULTS
[facility] Consult Note Referring Provider:  Ashley Napier Consult Date:  6/13/2019 Admit Date:  6/13/2019 Chief Complaint: Diarrhea Subjective:  
 
History of Present Illness:  Patient is a 80 y.o. female with PMH of CHF, pulm HTN, SBO and recently diagnosed breast mass with L4 lytic lesion, who is seen in consultation at the request of Dr. Ernesto Dominique for diarrhea. She was admitted last month for an episode of SBO that resolved with conservative management,  Additionally she was treated with ABX for UTI. Stool is described as green and liquid. There is no blood or melena. Stool occurs 8-10x daily. There are rare nocturnal symptoms PMH: 
Past Medical History:  
Diagnosis Date  Acquired hypothyroidism  Acute pancreatitis  Arthritis  CAD (coronary artery disease) 4/29/11 RCA occluded and fills with collaterals, LAD and Cx nonobstructive CAD, EF 50-55%  Cancer ([address] THYROID - treated with surgery and radiation  CHF (congestive heart failure) ([address] 11/8/2018  Chronic pain   
 right hip  Coagulopathy ([address] 6/16/2011  Colon polyps  Follicular thyroid cancer ([address]  Gallstone pancreatitis  Glaucoma  Gout   
 hx of gout to right great toe - no problems  Hypertension   
 controlled with medication  Other ill-defined conditions(799.89)  Pancreatitis  Paroxysmal A-fib ([address] 11/8/2018  PE (pulmonary embolism) 2008  Pulmonary HTN (HCC)   
 moderate  Thromboembolus ([address] 1995  
 right leg - Hemashield graft/filter placed in abdomen  Thyroid disease   
 pt had total thyroidectomy - takes levoxyl daily  UTI (urinary tract infection) 6/3/2019 PSH: 
Past Surgical History:  
Procedure Laterality Date  HX APPENDECTOMY  ? 1990  
 HX CHOLECYSTECTOMY  6/2011 Dr. Pascual Flynn [facility] [address] [place]  
 hemashield graft/filter placed due to blood clot right leg  HX THYROIDECTOMY  2010  
 partial  
  HX TONSILLECTOMY [address] [facility] ARTHROPLASTY  2008 & 2011  
 right hip Allergies: Allergies Allergen Reactions  Ativan [Lorazepam] Other (comments) Has opposite reaction  Lortab [Hydrocodone-Acetaminophen] Other (comments) Causes disorientation. - denies allergy, states she can take it  Morphine Other (comments) Patient not allergic per daughter Fidel Izaguirre. Morphine interacts with patient's amiodarone per daughter. Home Medications: 
Prior to Admission medications Medication Sig Start Date End Date Taking? Authorizing Provider  
warfarin (COUMADIN) 2 mg tablet Take 1 Tab by mouth every evening. Follow up with PCP for INR checks 6/4/19   Zacarias Vasquez MD  
dilTIAZem CD (CARDIZEM CD) 120 mg ER capsule Take 1 Cap by mouth daily. Indications: Ventricular Rate Control in Atrial Fibrillation 6/4/19   Zacarias Vasquez MD  
polyethylene glycol [facility]) 17 gram packet Take 1 Packet by mouth daily. Reduce or hold for a day if more than 1 bowel movement  Indications: prevent constipation 6/4/19   Zacarias Vasquez MD  
colchicine (COLCRYS) 0.6 mg tablet Take 1 Tab by mouth daily as needed. 11/11/18   Stewart Garcia MD  
lidocaine (SALONPAS/ASPERCREME) 4 % patch Place on area of pain 7/23/18   Shantel Mix MD  
oxyCODONE IR (ROXICODONE) 5 mg immediate release tablet Take 1 Tab by mouth every six (6) hours as needed for Pain. Max Daily Amount: 20 mg. 1/17/16   Curtis Loyd MD  
ondansetron (ZOFRAN ODT) 4 mg disintegrating tablet Take 1 Tab by mouth every eight (8) hours as needed for Nausea. 6/17/12   Tanesha Tabares MD  
acetaminophen (TYLENOL EX STR ARTHRITIS PAIN) 500 mg tablet Take  by mouth every six (6) hours as needed. Provider, Historical  
folic acid (FOLVITE) 1 mg tablet Take 1 mg by mouth daily.     Provider, Historical  
gabapentin (NEURONTIN) 300 mg capsule Take 300 mg by mouth two (2) times a day. Indications: also takes 3rd dose as needed    Provider, Historical  
ergocalciferol (ERGOCALCIFEROL) 50,000 unit capsule Take 50,000 Units by mouth every Sunday. 6/19/11   OtherEmi MD  
levothyroxine (LEVOXYL) 125 mcg tablet Take 75 mcg by mouth Daily (before breakfast). Take DOS per anesthesia protocol. Provider, Historical  
simvastatin (ZOCOR) 20 mg tablet Take 20 mg by mouth daily. Take DOS per anesthesia protocol. 4/29/11   Lady Guicho MD  
ALPHAGAN P 0.1 % Drop Apply 1 Drop to eye two (2) times a day. 1 DROP TO EACH EYE. Pt to use DOS per anesthesia protocol. 4/21/11   Provider, Historical  
TRAVATAN 0.004 % Drop 1 Drop by Both Eyes route every evening. Provider, Historical  
 
 
Hospital Medications: 
Current Facility-Administered Medications Medication Dose Route Frequency  [START ON 6/14/2019] dilTIAZem CD (CARDIZEM CD) capsule 120 mg  120 mg Oral DAILY  [START ON 6/14/2019] gabapentin (NEURONTIN) capsule 600 mg  600 mg Oral DAILY  [START ON 6/14/2019] levothyroxine (SYNTHROID) tablet 75 mcg  75 mcg Oral ACB  ondansetron (ZOFRAN ODT) tablet 4 mg  4 mg Oral Q8H PRN  
 oxyCODONE IR (ROXICODONE) tablet 5 mg  5 mg Oral Q6H PRN  
 simvastatin (ZOCOR) tablet 20 mg  20 mg Oral QHS  [Held by provider] warfarin (COUMADIN) tablet 2 mg  2 mg Oral QPM  
 acetaminophen (TYLENOL) tablet 650 mg  650 mg Oral Q4H PRN  
 naloxone (NARCAN) injection 0.4 mg  0.4 mg IntraVENous PRN  
 diphenhydrAMINE (BENADRYL) capsule 25 mg  25 mg Oral Q4H PRN  
 ondansetron (ZOFRAN) injection 4 mg  4 mg IntraVENous Q4H PRN  
 bisacodyl (DULCOLAX) tablet 10 mg  10 mg Oral DAILY PRN  
 zolpidem (AMBIEN) tablet 5 mg  5 mg Oral QHS PRN  
 sodium chloride (NS) flush 5-40 mL  5-40 mL IntraVENous Q8H  
 sodium chloride (NS) flush 5-40 mL  5-40 mL IntraVENous PRN  
 0.9% sodium chloride with KCl 20 mEq/L infusion   IntraVENous CONTINUOUS  
  enoxaparin (LOVENOX) injection 50 mg  50 mg SubCUTAneous Q24H Social History: 
Social History Tobacco Use  Smoking status: Former Smoker  Smokeless tobacco: Never Used  Tobacco comment: quit smoking 1995 Substance Use Topics  Alcohol use: No  
 
 
Pt denies any history of drug use, blood transfusions, or tattoos. Family History: 
Family History Problem Relation Age of Onset  Stroke Mother  Hypertension Mother  Cancer Father   
     prostate  Diabetes Maternal Uncle Review of Systems: A detailed 10 system ROS is obtained, with pertinent positives as listed above. All others are negative. Diet:   
 
Objective:  
 
Physical Exam: 
Vitals: 
Visit Vitals /57 (BP 1 Location: Right arm, BP Patient Position: At rest) Pulse (!) 52 Temp 98.5 °F (36.9 °C) Resp 18 Ht 5' 2\" (1.575 m) Wt 51.7 kg (114 lb) SpO2 96% BMI 20.85 kg/m² Gen:  Pt is alert, cooperative, no acute distress, thin wasted Skin:  Extremities and face reveal no rashes. HEENT: Sclerae anicteric. Extra-occular muscles are intact. No oral ulcers. No abnormal pigmentation of the lips. The neck is supple. Cardiovascular: Regular rate and rhythm. No murmurs, gallops, or rubs. Respiratory:  Comfortable breathing with no accessory muscle use. Clear breath sounds anteriorly with no wheezes, rales, or rhonchi. GI:  Abdomen nondistended, soft, and nontender. Normal active bowel sounds. No enlargement of the liver or spleen. No masses palpable. Rectal:  Deferred Musculoskeletal:  No pitting edema of the lower legs. Neurological:  Gross memory appears intact. Patient is alert and oriented. Psychiatric:  Mood appears appropriate with judgement intact. Lymphatic:  No cervical or supraclavicular adenopathy. Laboratory:   
Recent Labs  
  06/13/19 
1104 WBC 5.6 HGB 12.8 HCT 39.2 * MCV 79.2*   
K 2.5*  
 CO2 28 BUN 14  
CREA 1.71* CA 8.5 GLU 83  
 SGOT 34 ALT 18 TBILI 0.6 ALB 3.0*  
TP 7.1 Assessment:  
 
Principal Problem: 
  Diarrhea (6/13/2019) Plan:  
 
Diarrhea-  Progressive over the past 3 weeks. She is a poor candidate for endoscopy and sedation with her CHF and pulm HTN. Also new pericardial effusion is worrisome. We will check stool for fat, culture calpro, c diff as she received ABX last week.   Check tsh given hx thyroid CA

## 2019-06-14 NOTE — PROGRESS NOTES
TRANSFER - IN REPORT: 
 
Verbal report received from [facility] on Aquilino Lloyd  being received from ED for routine progression of care Report consisted of patients Situation, Background, Assessment and  
Recommendations(SBAR). Information from the following report(s) SBAR, Kardex and ED Summary was reviewed with the receiving nurse. Opportunity for questions and clarification was provided. Assessment completed upon patients arrival to unit and care assumed.

## 2019-06-14 NOTE — PROGRESS NOTES
Problem: Risk for Spread of Infection Goal: Prevent transmission of infectious organism to others Description Prevent the transmission of infectious organisms to other patients, staff members, and visitors. Outcome: Progressing Towards Goal 
  
Problem: Patient Education:  Go to Education Activity Goal: Patient/Family Education Outcome: Progressing Towards Goal 
  
Problem: Falls - Risk of 
Goal: *Absence of Falls Description Document [facility] Fall Risk and appropriate interventions in the flowsheet. Outcome: Progressing Towards Goal 
  
Problem: Patient Education: Go to Patient Education Activity Goal: Patient/Family Education Outcome: Progressing Towards Goal 
  
Problem: Pressure Injury - Risk of 
Goal: *Prevention of pressure injury Description Document Sid Scale and appropriate interventions in the flowsheet. Outcome: Progressing Towards Goal 
  
Problem: Patient Education: Go to Patient Education Activity Goal: Patient/Family Education Outcome: Progressing Towards Goal

## 2019-06-14 NOTE — PROGRESS NOTES
Problem: Mobility Impaired (Adult and Pediatric) Goal: *Acute Goals and Plan of Care (Insert Text) Description LTG: 
(1.)Ms. Bella Betancourt will move from supine to sit and sit to supine , scoot up and down and roll side to side with MODERATE ASSIST of 1 within 7 treatment day(s). (2.)Ms. Bella Betancourt will perform seated activities and exercises at edge of bed for 10+ minutes to improve strength and prepare for transfers within 7 days. (3.)Ms. Bella Betancourt will transfer from bed to chair and chair to bed with MAXIMAL ASSIST using the least restrictive device within 7 treatment day(s). ________________________________________________________________________________________________ Outcome: Progressing Towards Goal 
  
PHYSICAL THERAPY: Initial Assessment and PM 6/14/2019 INPATIENT: PT Visit Days : 1 Payor: [place] [facility]ICARE / Plan: [address] / Product Type: [employer] Care Medicare /   
  
NAME/AGE/GENDER: Daphney Presley is a 80 y.o. female PRIMARY DIAGNOSIS: Diarrhea [R19.7] Diarrhea Diarrhea ICD-10: Treatment Diagnosis:  
 Generalized Muscle Weakness (M62.81) Difficulty in walking, Not elsewhere classified (R26.2) Other abnormalities of gait and mobility (R26.89) Precaution/Allergies: 
Ativan [lorazepam]; Lortab [hydrocodone-acetaminophen]; and Morphine ASSESSMENT:  
 
Ms. Bella Betancourt is an 80year old female admitted from home for diarrhea. She lives with family who provides 24/7 assistance. 1-2 months ago she was ambulating with mod I using one crutch due to right hip surgery when she was a teenager. Has leg length discrepancy with shorter R LE from surgery and wears built up shoe at all times. Will not try to transfer today due to not having shoe. Was agreeable to bed mobility and sitting, two stand attempts. Mod-max assist of 2 for transfer to sitting edge of bed with fair to poor balance noted and constant posterior trunk lean.  Pt keeps right LE extended and was unable to flex at knee, but then states this is baseline. Worked on seated balance, positioning, and anterior weight shifts to improve trunk control and independence. Max-total assist of 2 for sit-stand transfer with poor standing balance needing constant support/assist. Performed twice, then returned to supine with total assist of 2-3 for sit to supine. Laurel Pantoja appears to be functioning well below baseline with strength, mobility, balance, transfers, and activity tolerance and will need continued PT during hospital stay to address deficits/maximize independence with mobility. Will likely need rehab at discharge. This section established at most recent assessment PROBLEM LIST (Impairments causing functional limitations): 
Decreased Strength Decreased Transfer Abilities Decreased Ambulation Ability/Technique Decreased Balance Decreased Activity Tolerance Decreased Flexibility/Joint Mobility Decreased Cognition INTERVENTIONS PLANNED: (Benefits and precautions of physical therapy have been discussed with the patient.) Balance Exercise Bed Mobility Gait Training Home Exercise Program (HEP) Therapeutic Activites Therapeutic Exercise/Strengthening Transfer Training TREATMENT PLAN: Frequency/Duration: 3 times a week for duration of hospital stay Rehabilitation Potential For Stated Goals: Good REHAB RECOMMENDATIONS (at time of discharge pending progress):   
Placement: It is my opinion, based on this patient's performance to date, that Ms. David Ramos may benefit from intensive therapy at a [address] [place] [address] after discharge due to the functional deficits listed above that are likely to improve with skilled rehabilitation and concerns that he/she may be unsafe to be unsupervised at home due to weakness, fall risk, currently functioning far below baseline with mobility . Equipment:  
Tbd pending progress HISTORY:  
 History of Present Injury/Illness (Reason for Referral): 
Per H&P, \"Pt is a 80 y.o. female with CHF (EF 30-35%), pulm HTN, Afib (on warfarin), h/o PE/DVT, thyroid CA s/p ESCALERA (now with iatrogenic hypothyroidism), who was admitted several weeks ago for diarrhea, abd pain, N/V. Found to have SBO which resolved with conservative measures. During this admit, imaging also incidentally found moderate R pleural effusion, 12mm breast mass, and L4 lytic lesion with compression fracture. She was discharged home to f/u as outpt to eval these additional issues. D/c'd on Keflex for UTI. She returns now due to persistent diarrhea, at least 4-5 watery stools/day, which has been ongoing for at least 6-8 weeks (was persistent for 3-4 weeks prior to admit for SBO). Nonbloody. She also endorses poor PO intake, no appetite, weight loss of approx 30 lbs over past 6 months, no energy. She also endorses pain to R hip with standing which is worsening. In ED, VSS, afebrile. No leukocytosis, K 2.5, Cr 1.71. CT abd/pelv (without contrast) showing possible early colitis, new moderate to large pericardial effusion, and large lytic lesion and pathological fracture at L4\" Past Medical History/Comorbidities: Ms. Agustín Uribe  has a past medical history of Acquired hypothyroidism, Acute pancreatitis, Arthritis, CAD (coronary artery disease) (4/29/11), Cancer ([address] CHF (congestive heart failure) ([address] (11/8/2018), Chronic pain, Coagulopathy ([address] (6/16/2011), Colon polyps, Follicular thyroid cancer ([address] Gallstone pancreatitis, Glaucoma, Gout, Hypertension, Other ill-defined conditions(799.89), Pancreatitis, Paroxysmal A-fib ([address] (11/8/2018), PE (pulmonary embolism) (2008), Pulmonary HTN ([address] Thromboembolus ([address] (1995), Thyroid disease, and UTI (urinary tract infection) (6/3/2019).  She also has no past medical history of Aneurysm ([facility] Asthma, Autoimmune disease ([facility] Chronic kidney disease, COPD, Dementia, Diabetes ([facility] Difficult intubation, Liver disease, Malignant hyperthermia due to anesthesia, Nausea & vomiting, Neurological disorder, Pseudocholinesterase deficiency, Psychiatric disorder, PUD (peptic ulcer disease), Seizures ([facility] Sleep disorder, Stroke ([facility] Unspecified adverse effect of anesthesia, or Unspecified sleep apnea. Ms. Agustín Uribe  has a past surgical history that includes hx thyroidectomy (2010); hx appendectomy (?1990); hx tonsillectomy; hx other surgical (1995); pr total hip arthroplasty (2008 & 2011); and hx cholecystectomy (6/2011). Social History/Living Environment:  
Home Environment: Private residence # Steps to Enter: 2 One/Two Story Residence: One story Living Alone: No 
Support Systems: Child(chavo), Family member(s) Patient Expects to be Discharged to[de-identified] Private residence Current DME Used/Available at Home: Walker, rolling, Wheelchair, Crutches, Commode, bedside Prior Level of Function/Work/Activity: 
Pt has 24/7 supervision and assistance at home from her sons. 1-2 months ago, pt was ambulatory around household with one crutch. Was cooking and performing all activities without assist. Since being sick, she is needing much more assistance per family and needs 1-2 people to help with bedside commode transfer. No falls. Number of Personal Factors/Comorbidities that affect the Plan of Care: 1-2: MODERATE COMPLEXITY EXAMINATION:  
Most Recent Physical Functioning:  
Gross Assessment: 
AROM: Generally decreased, functional 
Strength: Generally decreased, functional 
Coordination: Generally decreased, functional 
         
  
Posture: 
Posture (WDL): Exceptions to [facility] Posture Assessment: Forward head, Rounded shoulders, Trunk flexion Balance: 
Sitting: Impaired Sitting - Static: Poor (constant support) Sitting - Dynamic: Poor (constant support) Standing: Impaired Standing - Static: Poor Standing - Dynamic : Poor Bed Mobility: Supine to Sit: Moderate assistance;Assist x2 Sit to Supine: Total assistance Scooting: Maximum assistance;Assist x2 Wheelchair Mobility: 
  
Transfers: 
Sit to Stand: Maximum assistance;Assist x2 Stand to Sit: Maximum assistance;Assist x2 Interventions: Verbal cues; Safety awareness training; Tactile cues;Manual cues Duration: 9 Minutes Gait: 
  
   
  
Body Structures Involved: Joints Muscles Body Functions Affected: 
Mental 
Movement Related Digestive Activities and Participation Affected: 
General Tasks and Demands Mobility Self Care Domestic Life Community, Social and [place] Number of elements that affect the Plan of Care: 4+: HIGH COMPLEXITY CLINICAL PRESENTATION:  
Presentation: Evolving clinical presentation with changing clinical characteristics: MODERATE COMPLEXITY CLINICAL DECISION MAKING:  
[facility] AM-PAC? ?6 Clicks? Basic Mobility Inpatient Short Form How much difficulty does the patient currently have. .. Unable A Lot A Little None 1. Turning over in bed (including adjusting bedclothes, sheets and blankets)? ? 1   ? 2   ? 3   ? 4  
2. Sitting down on and standing up from a chair with arms ( e.g., wheelchair, bedside commode, etc.)   ? 1   ? 2   ? 3   ? 4  
3. Moving from lying on back to sitting on the side of the bed?   ? 1   ? 2   ? 3   ? 4 How much help from another person does the patient currently need. .. Total A Lot A Little None 4. Moving to and from a bed to a chair (including a wheelchair)? ? 1   ? 2   ? 3   ? 4  
5. Need to walk in hospital room? ? 1   ? 2   ? 3   ? 4  
6. Climbing 3-5 steps with a railing? ? 1   ? 2   ? 3   ? 4  
© 2007, Trustees of [facility], under license to [employer]. All rights reserved Score:  Initial: 9 Most Recent: X (Date: -- ) Interpretation of Tool:  Represents activities that are increasingly more difficult (i.e. Bed mobility, Transfers, Gait). Medical Necessity: Patient demonstrates good 
 rehab potential due to higher previous functional level. Reason for Services/Other Comments: 
Patient continues to demonstrate capacity to improve strength, mobility, transfers, balance which will increase independence and increase safety Thuy Anaya Use of outcome tool(s) and clinical judgement create a POC that gives a: Questionable prediction of patient's progress: MODERATE COMPLEXITY  
  
 
 
 
TREATMENT:  
(In addition to Assessment/Re-Assessment sessions the following treatments were rendered) Pre-treatment Symptoms/Complaints:  \"thank you\" Pain: Initial:  
Pain Intensity 1: 0  Post Session:  0/10 Therapeutic Activity: (  9 Minutes ):  Therapeutic activities including Bed mobility (supine <->sit), seated balance, sit-stand transfers x2 to improve mobility, strength and balance. Required moderate to total assist of 2 and heavy cueing (manual, tactile, verbal, and visual)   to promote static and dynamic balance in standing and promote motor control of bilateral, lower extremity(s). Braces/Orthotics/Lines/Etc:  
IV 
O2 Device: Room air Treatment/Session Assessment:   
Response to Treatment:  pt requires mod-max A for bed mobility, max-total A x2 sit-stand Interdisciplinary Collaboration:  
Physical Therapist 
Registered Nurse After treatment position/precautions:  
Supine in bed Bed/Chair-wheels locked Bed in low position Call light within reach Family at bedside Compliance with Program/Exercises: Will assess as treatment progresses Recommendations/Intent for next treatment session: \"Next visit will focus on advancements to more challenging activities and reduction in assistance provided\". Total Treatment Duration: PT Patient Time In/Time Out Time In: 1310 Time Out: 1335 Tiffanie Cisneros DPT

## 2019-06-14 NOTE — PROGRESS NOTES
Problem: Self Care Deficits Care Plan (Adult) Goal: *Acute Goals and Plan of Care (Insert Text) Description 1. Pt will complete bed mobility with mod assistance in prep for ADLs 2. Pt will maintain sitting balance for ADLs with CGA 3. Pt will complete UB bathing and dressing with min assistance 4. Pt will complete HEP with min cues to promote increased BUE strength and functional use for ADLs 5. Pt will complete grooming and hygiene with set up seated EOB 6. Pt will attempt standing to promote improved mobility for ADLs Timeframe: 7 days Outcome: Progressing Towards Goal 
  
OCCUPATIONAL THERAPY: Initial Assessment 6/14/2019 INPATIENT:   
Payor: Richardson Keith / Plan: [address] / Product Type: Managed Care Medicare /  
  
NAME/AGE/GENDER: Paula Vaca is a 80 y.o. female PRIMARY DIAGNOSIS:  Diarrhea [R19.7] Diarrhea Diarrhea ICD-10: Treatment Diagnosis:  
 Generalized Muscle Weakness (M62.81) Precautions/Allergies: 
  falls Ativan [lorazepam]; Lortab [hydrocodone-acetaminophen]; and Morphine ASSESSMENT:  
Ms. Talia Tejeda was admitted with weakness, diarrhea. Pt is s/p recent admission for breast mass and L5 compression fracture. Pt is independent with ADLs at baseline, uses a single crutch for mobility with lift shoe d/t leg length discrepancy. Pt reports significant decline over last 3 weeks and now requires assistance for all ADLs and mobility for ADLs. Pt has 24/7 assistance from family. This session, pt presented generally weak and deconditioned with deficits in mobility, balance, cognition, and strength impacting ADLs. Pt required max assistance for transfer from supine to sitting, max X2 to return to bed, total assistance to scoot towards HOB. Poor sitting balance with posterior lean, required max A to maintain.  Pt currently requires max-total assistance for bathing, dressing, and toileting. BUE strength is grossly 4/5. Pt is below her functional baseline and would benefit from skilled OT services to address deficits. Rec d/c to SNF. This section established at most recent assessment PROBLEM LIST (Impairments causing functional limitations): 
Decreased Strength Decreased ADL/Functional Activities Decreased Transfer Abilities Decreased Balance Decreased Activity Tolerance Increased Fatigue Decreased Cognition INTERVENTIONS PLANNED: (Benefits and precautions of occupational therapy have been discussed with the patient.) Activities of daily living training Adaptive equipment training Balance training Cognitive training Therapeutic activity Therapeutic exercise TREATMENT PLAN: Frequency/Duration: Follow patient 3 times/ week to address above goals. Rehabilitation Potential For Stated Goals: Fair REHAB RECOMMENDATIONS (at time of discharge pending progress):   
Placement: It is my opinion, based on this patient's performance to date, that Ms. Pham Jones may benefit from d/c to SNF Equipment:  
None at this time OCCUPATIONAL PROFILE AND HISTORY:  
History of Present Injury/Illness (Reason for Referral): 
See H&P Past Medical History/Comorbidities: Ms. Pham Jones  has a past medical history of Acquired hypothyroidism, Acute pancreatitis, Arthritis, CAD (coronary artery disease) (4/29/11), Cancer ([address] CHF (congestive heart failure) ([address] (11/8/2018), Chronic pain, Coagulopathy ([address] (6/16/2011), Colon polyps, Follicular thyroid cancer ([address] Gallstone pancreatitis, Glaucoma, Gout, Hypertension, Other ill-defined conditions(799.89), Pancreatitis, Paroxysmal A-fib ([address] (11/8/2018), PE (pulmonary embolism) (2008), Pulmonary HTN ([address] Thromboembolus ([address] (1995), Thyroid disease, and UTI (urinary tract infection) (6/3/2019).  She also has no past medical history of Aneurysm ([address] Asthma, Autoimmune disease ([address] Chronic kidney disease, COPD, Dementia, Diabetes ([facility] Difficult intubation, Liver disease, Malignant hyperthermia due to anesthesia, Nausea & vomiting, Neurological disorder, Pseudocholinesterase deficiency, Psychiatric disorder, PUD (peptic ulcer disease), Seizures ([address] Sleep disorder, Stroke ([facility] Unspecified adverse effect of anesthesia, or Unspecified sleep apnea. Ms. Mehran Hubbard  has a past surgical history that includes hx thyroidectomy (2010); hx appendectomy (?1990); hx tonsillectomy; hx other surgical (1995); pr total hip arthroplasty (2008 & 2011); and hx cholecystectomy (6/2011). Social History/Living Environment:  
Home Environment: Private residence # Steps to Enter: 2 One/Two Story Residence: One story Living Alone: No 
Support Systems: Child(chavo), Family member(s) Patient Expects to be Discharged to[de-identified] Private residence Current DME Used/Available at Home: Cane, straight, Commode, bedside, Walker, rolling, Wheelchair Tub or Shower Type: Shower Prior Level of Function/Work/Activity: 
Pt is independent with ADLs at baseline, uses a single crutch for mobility with lift shoe d/t leg length discrepancy. Pt reports significant decline over last 3 weeks and now requires assistance for all ADLs and mobility for ADLs. Pt has 24/7 assistance from family. Number of Personal Factors/Comorbidities that affect the Plan of Care: Expanded review of therapy/medical records (1-2):  MODERATE COMPLEXITY ASSESSMENT OF OCCUPATIONAL PERFORMANCE[de-identified]  
Activities of Daily Living:  
Basic ADLs (From Assessment) Complex ADLs (From Assessment) Feeding: Setup Oral Facial Hygiene/Grooming: Minimum assistance Bathing: Maximum assistance Upper Body Dressing: Maximum assistance Lower Body Dressing: Total assistance Toileting: Total assistance Instrumental ADL Meal Preparation: Total assistance Homemaking: Total assistance Medication Management: Total assistance Financial Management: Total assistance Grooming/Bathing/Dressing Activities of Daily Living Cognitive Retraining Safety/Judgement: Decreased insight into deficits Bed/Mat Mobility Supine to Sit: Maximum assistance Sit to Supine: Maximum assistance;Assist x2 Sit to Stand: Maximum assistance;Assist x2 Stand to Sit: Maximum assistance;Assist x2 Scooting: Total assistance Most Recent Physical Functioning:  
Gross Assessment: 
AROM: Within functional limits Strength: Generally decreased, functional 
Coordination: Generally decreased, functional 
         
  
Posture: 
Posture (L): Exceptions to [facility] Posture Assessment: Forward head, Rounded shoulders, Trunk flexion Balance: 
Sitting: Impaired Sitting - Static: Poor (constant support) Sitting - Dynamic: Poor (constant support) Standing: Impaired Standing - Static: Poor Standing - Dynamic : Poor Bed Mobility: 
Supine to Sit: Maximum assistance Sit to Supine: Maximum assistance;Assist x2 Scooting: Total assistance Wheelchair Mobility: 
  
Transfers: 
Sit to Stand: Maximum assistance;Assist x2 Stand to Sit: Maximum assistance;Assist x2 Interventions: Verbal cues; Safety awareness training; Tactile cues;Manual cues Duration: 9 Minutes Patient Vitals for the past 6 hrs: 
 BP BP Patient Position SpO2 Pulse 06/14/19 1129 118/70 At rest 99 % 61  
06/14/19 1516 95/47 At rest 99 % (!) 58 Mental Status Neurologic State: Alert Orientation Level: Oriented to person, Oriented to place Cognition: Decreased attention/concentration, Follows commands Perception: Appears intact Perseveration: No perseveration noted Safety/Judgement: Decreased insight into deficits Physical Skills Involved: 
Balance Strength Activity Tolerance Cognitive Skills Affected (resulting in the inability to perform in a timely and safe manner): Executive Function Sustained Attention Divided Attention Comprehension Psychosocial Skills Affected: Habits/Routines Self-Awareness Number of elements that affect the Plan of Care: 3-5:  MODERATE COMPLEXITY CLINICAL DECISION MAKING:  
[facility]? ?6 Clicks? Daily Activity Inpatient Short Form How much help from another person does the patient currently need. .. Total A Lot A Little None 1. Putting on and taking off regular lower body clothing? ? 1   ? 2   ? 3   ? 4  
2. Bathing (including washing, rinsing, drying)? ? 1   ? 2   ? 3   ? 4  
3. Toileting, which includes using toilet, bedpan or urinal?   ? 1   ? 2   ? 3   ? 4  
4. Putting on and taking off regular upper body clothing? ? 1   ? 2   ? 3   ? 4  
5. Taking care of personal grooming such as brushing teeth? ? 1   ? 2   ? 3   ? 4  
6. Eating meals? ? 1   ? 2   ? 3   ? 4  
© 2007, Trustees of [facility], under license to [employer] All rights reserved Score:  Initial: 12 Most Recent: X (Date: -- ) Interpretation of Tool:  Represents activities that are increasingly more difficult (i.e. Bed mobility, Transfers, Gait). Medical Necessity:    
Patient is expected to demonstrate progress in strength, balance and functional technique 
 to decrease assistance required with ADLs Redd Dickerson Reason for Services/Other Comments: 
Patient continues to require present interventions due to patient's inability to complete ADLs Redd Dickerson Use of outcome tool(s) and clinical judgement create a POC that gives a: MODERATE COMPLEXITY  
 
 
 
TREATMENT:  
(In addition to Assessment/Re-Assessment sessions the following treatments were rendered) Pre-treatment Symptoms/Complaints:   
Pain: Initial:  
Pain Intensity 1: 0  Post Session:  0 Assessment/Reassessment only, no treatment provided today Braces/Orthotics/Lines/Etc:  
O2 Device: Room air Treatment/Session Assessment:   
Response to Treatment:  no adverse reaction Interdisciplinary Collaboration: Occupational Therapist 
Registered Nurse After treatment position/precautions:  
Supine in bed Bed/Chair-wheels locked Bed in low position Call light within reach RN notified Family at bedside Compliance with Program/Exercises: Will assess as treatment progresses. Recommendations/Intent for next treatment session: \"Next visit will focus on advancements to more challenging activities and reduction in assistance provided\". Total Treatment Duration: OT Patient Time In/Time Out Time In: 8369 Time Out: 0815 Rhina Mcqueen OT

## 2019-06-14 NOTE — PROGRESS NOTES
Chart reviewed. Patient is current with [facility]. Awaiting pt/ot eval to determine discharge needs. CM will continue to follow. Care Management Interventions PCP Verified by CM: Yes Mode of Transport at Discharge: Other (see comment) Transition of Care Consult (CM Consult): Discharge Planning Discharge Durable Medical Equipment: No 
Physical Therapy Consult: Yes Occupational Therapy Consult: Yes Current Support Network: Own Home Confirm Follow Up Transport: Family Plan discussed with Pt/Family/Caregiver: Yes Freedom of Choice Offered: Yes Discharge Location Discharge Placement: Home with home health

## 2019-06-15 PROBLEM — E05.90 HYPERTHYROIDISM: Status: ACTIVE | Noted: 2019-01-01

## 2019-06-15 PROBLEM — I95.9 HYPOTENSION: Status: ACTIVE | Noted: 2019-01-01

## 2019-06-15 PROBLEM — R62.51 FAILURE TO THRIVE (0-17): Status: ACTIVE | Noted: 2019-01-01

## 2019-06-15 PROBLEM — E44.0 MODERATE PROTEIN MALNUTRITION (HCC): Status: ACTIVE | Noted: 2019-01-01

## 2019-06-15 PROBLEM — I50.43 ACUTE ON CHRONIC COMBINED SYSTOLIC AND DIASTOLIC ACC/AHA STAGE C CONGESTIVE HEART FAILURE (HCC): Status: ACTIVE | Noted: 2018-01-01

## 2019-06-15 NOTE — PROGRESS NOTES
Hourly rounds completed. All needs met. Pt alert and oriented x 3. Pt had one small/smear bm during the shift. Pt c/o shoulder during the middle of the shift. Interventions per MAR. Pt bp is low this morning but no signs or symptoms of distress. Informed pt to report if she feels lightheaded or dizzy. Pt is lying in bed in a low, locked position with the side rails up x 2, call light within reach. Daughter is at the bedside. Will give report to the oncoming day shift nurse.

## 2019-06-15 NOTE — PROGRESS NOTES
GI DAILY PROGRESS NOTE Admit Date:  6/13/2019 Today's Date:  6/15/2019 CC:  Diarrhea Subjective:  
 
Patient seen and examined this morning. No further episodes of diarrhea. Some mild abdominal discomfort. No other complaints at this time. Medications:  
Current Facility-Administered Medications Medication Dose Route Frequency  dilTIAZem (CARDIZEM) IR tablet 30 mg  30 mg Oral Q8H  
 0.9% sodium chloride infusion  150 mL/hr IntraVENous CONTINUOUS  
 tuberculin injection 5 Units  5 Units IntraDERMal ONCE  
 metroNIDAZOLE (FLAGYL) IVPB premix 500 mg  500 mg IntraVENous Q8H  
 levoFLOXacin (LEVAQUIN) 750 mg in D5W IVPB  750 mg IntraVENous Q48H  
 gabapentin (NEURONTIN) capsule 600 mg  600 mg Oral DAILY  ondansetron (ZOFRAN ODT) tablet 4 mg  4 mg Oral Q8H PRN  
 oxyCODONE IR (ROXICODONE) tablet 5 mg  5 mg Oral Q6H PRN  
 simvastatin (ZOCOR) tablet 20 mg  20 mg Oral QHS  [Held by provider] warfarin (COUMADIN) tablet 2 mg  2 mg Oral QPM  
 acetaminophen (TYLENOL) tablet 650 mg  650 mg Oral Q4H PRN  
 naloxone (NARCAN) injection 0.4 mg  0.4 mg IntraVENous PRN  
 diphenhydrAMINE (BENADRYL) capsule 25 mg  25 mg Oral Q4H PRN  
 ondansetron (ZOFRAN) injection 4 mg  4 mg IntraVENous Q4H PRN  
 bisacodyl (DULCOLAX) tablet 10 mg  10 mg Oral DAILY PRN  
 zolpidem (AMBIEN) tablet 5 mg  5 mg Oral QHS PRN  
 sodium chloride (NS) flush 5-40 mL  5-40 mL IntraVENous Q8H  
 sodium chloride (NS) flush 5-40 mL  5-40 mL IntraVENous PRN  
 enoxaparin (LOVENOX) injection 50 mg  50 mg SubCUTAneous Q24H Review of Systems: ROS was obtained, with pertinent positives as listed above. No chest pain or SOB. Diet:   
 
Objective:  
Vitals: 
Visit Vitals /57 Pulse (!) 53 Temp 97.4 °F (36.3 °C) Resp 16 Ht 5' 2\" (1.575 m) Wt 48.3 kg (106 lb 8 oz) SpO2 99% BMI 19.48 kg/m² Intake/Output: 
No intake/output data recorded. 06/13 1901 - 06/15 0700 In: 1413 [I.V.:6090] Out: - Exam: 
General appearance: alert, cooperative, no distress Lungs: clear to auscultation bilaterally anteriorly Heart: regular rate and rhythm Abdomen: soft, non-tender. Bowel sounds normal. No masses, no organomegaly Extremities: extremities normal, atraumatic, no cyanosis or edema Neuro:  alert and oriented Data Review (Labs):   
Recent Labs  
  06/15/19 
0430 06/14/19 
0410 06/13/19 
2221 06/13/19 
1104 WBC 3.3* 4.5  --  5.6 HGB 10.2* 11.1*  --  12.8 HCT 33.1* 34.9*  --  39.2 PLT 93* 106*  --  149* MCV 81.7 80.0  --  79.2*  144  --  138  
K 3.8 3.7  --  2.5*  
* 110*  --  100 CO2 18* 21  --  28  
BUN 9 13  --  14  
CREA 0.92 1.11*  --  1.71* CA 8.0* 8.3  --  8.5 MG 2.5*  --  1.3*  --   
* 64*  --  83  
  --   --  127 SGOT 24  --   --  34 ALB 2.3*  --   --  3.0*  
TP 5.7*  --   --  7.1 Radiology: Xr Chest Sngl V Result Date: 6/15/2019 Impression:  Mild CHF/volume overload. CPT code(s) 12027 Xr Chest Sngl V Result Date: 6/13/2019 Impression:  Chronic scarring at left lung base with mild cardiomegaly. CPT code(s) 05059 Ct Abd Pelv Wo Cont Result Date: 6/13/2019 IMPRESSION:  1. No abnormal bowel dilation or transition point to suggest bowel obstruction. No clearly acute bowel changes are otherwise seen. It is only noted that the colon is fluid-filled consistent with the history of diarrhea. The possibility of a mild or early colitis cannot be excluded. 2. New moderate to large pericardial effusion. 3. Large lytic lesion and pathologic fracture of the L4 vertebral body as previously described which is not felt to be significantly changed. Xr Pelv 1 Or 2 V Result Date: 6/13/2019 Impression: 1. No acute radiographic abnormality. Assessment:  
 
Principal Problem: 
  Diarrhea (6/13/2019) Active Problems: 
  Pericardial effusion (6/17/2011) Follicular thyroid cancer ([facility] [address] (6/19/2011) Overview: thyroglobulin high > 100 obvious residual thyroid tissue Chronic systolic dysfunction of left ventricle (5/29/2019) Compression fracture of fourth lumbar vertebra ([address] (5/29/2019) Thromboembolic disorder ([address] (6/92/9224) Breast mass (6/14/2019) PAF (paroxysmal atrial fibrillation) ([address] (6/14/2019) Colitis (6/14/2019) Plan:  
Awaiting stool studies Symptoms seem to be improving on their own. Viral gastroenteritis?  
 
Alexa Costello MD 
[facility], [place]

## 2019-06-15 NOTE — PROGRESS NOTES
Hospitalist Progress Note Admit Date:  2019 11:06 AM  
Name:  Morris Buckner Age:  80 y.o. 
:  1933 MRN:  [id number] PCP:  Pro Montejo MD 
Treatment Team: Attending Provider: Lucinda Moeller MD; Consulting Provider: Pj Diamond MD; Care Manager: Hilda Trotter RN; Utilization Review: Gerard Billingsley RN Subjective: HPI and or CC: 
6/15 Cc: diarrhea resolving now hypotension Called earlier today re: hypotension and stat cxr and bnp revealed acute chf.admit with acute renal insuff. And hypokalemia likely related to stool volume loss. Better with ivf. GNR ON URINE CULT ID PND. Serum alb 2.3. Following iv lasix today she is improved--BUT BP STILL BORDERLINE . 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 Moderate pericardial effusion no hemodynamic compomise likely due to her poor nutritional status. I spoke with daughter at length with patient about patient frailty, need for workup breast finding and hope that wt loss related to iatrogenic hypothyroidism. She may need restart lower dose levothyroxine if future. At present time with resolution diarrhea -- need titrate cardiac meds- no listed allergy to ace inh and can try arb. With continue toprol. And lasix prn. Discussed plan with family and pt refuses snf placement but would agree to home p.t.  
 
 
 
Objective:  
 
Patient Vitals for the past 24 hrs: 
 Temp Pulse Resp BP SpO2  
06/15/19 1407  (!) 57  91/55   
06/15/19 1133 97.5 °F (36.4 °C) (!) 51 16 100/53 97 % 06/15/19 0831  (!) 53  104/57   
06/15/19 0718 97.4 °F (36.3 °C) (!) 49 16 (!) 83/50 99 % 06/15/19 0549 97.6 °F (36.4 °C) (!) 50 16 (!) 87/59 96 % 19 2330 97.9 °F (36.6 °C) (!) 57 16 99/64 95 % 19 1929 97.5 °F (36.4 °C) (!) 59 16 101/68 97 % Oxygen Therapy O2 Sat (%): 97 % (06/15/19 1133) Pulse via Oximetry: 61 beats per minute (19 1840) O2 Device: Room air (19 1335) Intake/Output Summary (Last 24 hours) at 6/15/2019 1651 Last data filed at 6/15/2019 2040 Gross per 24 hour Intake 4015 ml Output  Net 4015 ml REVIEW OF SYSTEMS: Comprehensive ROS performed and negative except as stated in HPI. Physical Examination: 
Physical Exam  
Constitutional: No distress. HENT:  
Right Ear: External ear normal.  
Left Ear: External ear normal.  
Nose: Nose normal.  
Eyes: Right eye exhibits no discharge. Left eye exhibits no discharge. No scleral icterus. Neck: JVD present. No thyromegaly present. Cardiovascular: Intact distal pulses. Exam reveals no friction rub. Pulmonary/Chest: No respiratory distress. She has no wheezes. Abdominal: Soft. She exhibits no mass. There is no tenderness. Musculoskeletal: She exhibits no tenderness or deformity. Lymphadenopathy:  
  She has no cervical adenopathy. Neurological: She is alert. GCS score is 15. Skin: Skin is dry. No rash noted. She is not diaphoretic. Psychiatric: Affect normal.  
 
 
Data Review: 
I have reviewed all labs, meds, telemetry events, and studies from the last 24 hours. Recent Results (from the past 24 hour(s)) METABOLIC PANEL, COMPREHENSIVE Collection Time: 06/15/19  4:30 AM  
Result Value Ref Range Sodium 141 136 - 145 mmol/L Potassium 3.8 3.5 - 5.1 mmol/L Chloride 112 (H) 98 - 107 mmol/L  
 CO2 18 (L) 21 - 32 mmol/L Anion gap 11 7 - 16 mmol/L Glucose 140 (H) 65 - 100 mg/dL BUN 9 8 - 23 MG/DL Creatinine 0.92 0.6 - 1.0 MG/DL  
 GFR est AA >60 >60 ml/min/1.73m2 GFR est non-AA >60 >60 ml/min/1.73m2 Calcium 8.0 (L) 8.3 - 10.4 MG/DL Bilirubin, total 0.4 0.2 - 1.1 MG/DL  
 ALT (SGPT) 17 12 - 65 U/L  
 AST (SGOT) 24 15 - 37 U/L Alk. phosphatase 106 50 - 136 U/L Protein, total 5.7 (L) 6.3 - 8.2 g/dL Albumin 2.3 (L) 3.2 - 4.6 g/dL Globulin 3.4 2.3 - 3.5 g/dL  A-G Ratio 0.7 (L) 1.2 - 3.5    
CBC WITH AUTOMATED DIFF  
 Collection Time: 06/15/19  4:30 AM  
Result Value Ref Range WBC 3.3 (L) 4.3 - 11.1 K/uL  
 RBC 4.05 4.05 - 5.2 M/uL  
 HGB 10.2 (L) 11.7 - 15.4 g/dL HCT 33.1 (L) 35.8 - 46.3 % MCV 81.7 79.6 - 97.8 FL  
 MCH 25.2 (L) 26.1 - 32.9 PG  
 MCHC 30.8 (L) 31.4 - 35.0 g/dL  
 RDW 18.0 (H) 11.9 - 14.6 % PLATELET 93 (L) 955 - 450 K/uL MPV 11.8 9.4 - 12.3 FL ABSOLUTE NRBC 0.00 0.0 - 0.2 K/uL  
 DF AUTOMATED NEUTROPHILS 56 43 - 78 % LYMPHOCYTES 27 13 - 44 % MONOCYTES 11 4.0 - 12.0 % EOSINOPHILS 5 0.5 - 7.8 % BASOPHILS 0 0.0 - 2.0 % IMMATURE GRANULOCYTES 1 0.0 - 5.0 %  
 ABS. NEUTROPHILS 1.9 1.7 - 8.2 K/UL  
 ABS. LYMPHOCYTES 0.9 0.5 - 4.6 K/UL  
 ABS. MONOCYTES 0.4 0.1 - 1.3 K/UL  
 ABS. EOSINOPHILS 0.2 0.0 - 0.8 K/UL  
 ABS. BASOPHILS 0.0 0.0 - 0.2 K/UL  
 ABS. IMM. GRANS. 0.0 0.0 - 0.5 K/UL MAGNESIUM Collection Time: 06/15/19  4:30 AM  
Result Value Ref Range Magnesium 2.5 (H) 1.8 - 2.4 mg/dL BNP Collection Time: 06/15/19  4:30 AM  
Result Value Ref Range BNP 1,236 (H) 0 pg/mL PLEASE READ & DOCUMENT PPD TEST IN 24 HRS Collection Time: 06/15/19 10:09 AM  
Result Value Ref Range PPD  Negative  
 mm Induration  0 - 5 mm All Micro Results Procedure Component Value Units Date/Time CULTURE, URINE [[id number]]  (Abnormal) Collected:  06/14/19 5269 Order Status:  Completed Specimen:  Urine Updated:  06/15/19 1000 Special Requests: NO SPECIAL REQUESTS Culture result:    
  >100,000 COLONIES/mL GRAM NEGATIVE RODS IDENTIFICATION AND SUSCEPTIBILITY TO FOLLOW Hailee Dupree [[id number]] Collected:  06/13/19 7866 Order Status:  Completed Specimen:  Stool Updated:  06/15/19 6291 Special Requests: NO SPECIAL REQUESTS   Culture result:    
  NO GROWTH OF SALMONELLA OR SHIGELLA IS EVIDENT ON FIRST READING, FINAL REPORT TO FOLLOW AFTER FURTHER INCUBATION OF CULTURE  
     
 C. DIFFICILE AG & TOXIN A/B [[id number]] Collected:  06/13/19 2114 Order Status:  Completed Specimen:  Stool Updated:  06/14/19 1116 7007 Karli Gutiérrez ANTIGEN    
  C. DIFFICILE GDH ANTIGEN-NEGATIVE  
     
  C. difficile toxin C. DIFFICILE TOXIN-NEGATIVE  
     
  PCR Reflex NOT APPLICABLE INTERPRETATION    
  NEGATIVE FOR TOXIGENIC C. DIFFICILE Clinical Consideration NEGATIVE FOR TOXIGENIC C. DIFFICILE  
     
 OVA & [place]elyn [place] [[id number]] Collected:  06/13/19 2114 Order Status:  Completed Specimen:  Stool Updated:  06/13/19 2137 Current Meds: 
Current Facility-Administered Medications Medication Dose Route Frequency  metroNIDAZOLE (FLAGYL) IVPB premix 500 mg  500 mg IntraVENous Q12H  
 enoxaparin (LOVENOX) injection 50 mg  50 mg SubCUTAneous Q12H  furosemide (LASIX) injection 40 mg  40 mg IntraVENous Q12H  potassium chloride (K-DUR, KLOR-CON) SR tablet 20 mEq  20 mEq Oral BID  [START ON 6/16/2019] metoprolol succinate (TOPROL-XL) XL tablet 12.5 mg  12.5 mg Oral DAILY  levoFLOXacin (LEVAQUIN) 750 mg in D5W IVPB  750 mg IntraVENous Q48H  
 gabapentin (NEURONTIN) capsule 600 mg  600 mg Oral DAILY  ondansetron (ZOFRAN ODT) tablet 4 mg  4 mg Oral Q8H PRN  
 oxyCODONE IR (ROXICODONE) tablet 5 mg  5 mg Oral Q6H PRN  
 simvastatin (ZOCOR) tablet 20 mg  20 mg Oral QHS  [Held by provider] warfarin (COUMADIN) tablet 2 mg  2 mg Oral QPM  
 acetaminophen (TYLENOL) tablet 650 mg  650 mg Oral Q4H PRN  
 naloxone (NARCAN) injection 0.4 mg  0.4 mg IntraVENous PRN  
 diphenhydrAMINE (BENADRYL) capsule 25 mg  25 mg Oral Q4H PRN  
 ondansetron (ZOFRAN) injection 4 mg  4 mg IntraVENous Q4H PRN  
 bisacodyl (DULCOLAX) tablet 10 mg  10 mg Oral DAILY PRN  
 zolpidem (AMBIEN) tablet 5 mg  5 mg Oral QHS PRN  
 sodium chloride (NS) flush 5-40 mL  5-40 mL IntraVENous Q8H  
 sodium chloride (NS) flush 5-40 mL  5-40 mL IntraVENous PRN Diet: 
 DIET NUTRITIONAL SUPPLEMENTS 
DIET GI SOFT Other Studies (last 24 hours): Xr Chest Sngl V Result Date: 6/15/2019 Clinical History: The patient is a 80years year old Female presenting with symptoms of hypotension Comparison:  Chest x-ray 6/13/2019 Findings:  Frontal view of the chest was obtained. There is mild pulmonary vascular congestion. No pleural effusions are demonstrated. The cardiomediastinal silhouette is mildly enlarged. There are no acute osseous abnormalities. Impression:  Mild CHF/volume overload. CPT code(s) 56445 Assessment and Plan:  
 
Hospital Problems as of 6/15/2019 Date Reviewed: 6/23/2011 Codes Class Noted - Resolved POA Hypotension ICD-10-CM: I95.9 ICD-9-CM: 458.9  6/15/2019 - Present Unknown Moderate protein malnutrition (HCC) ICD-10-CM: E44.0 ICD-9-CM: 263.0  6/15/2019 - Present Unknown Failure to thrive (0-17) ICD-10-CM: R62.51 
ICD-9-CM: 783.41  6/15/2019 - Present Unknown Hyperthyroidism ICD-10-CM: E05.90 ICD-9-CM: 242.90  6/15/2019 - Present Unknown Breast mass ICD-10-CM: N63.0 ICD-9-CM: 611.72  6/14/2019 - Present Unknown PAF (paroxysmal atrial fibrillation) (HCC) ICD-10-CM: I48.0 ICD-9-CM: 427.31  6/14/2019 - Present Unknown Colitis ICD-10-CM: K52.9 ICD-9-CM: 558.9  6/14/2019 - Present Unknown Diarrhea ICD-10-CM: R19.7 ICD-9-CM: 787.91  6/13/2019 - Present Unknown Chronic systolic dysfunction of left ventricle (Chronic) ICD-10-CM: I51.9 ICD-9-CM: 429.9  5/29/2019 - Present Yes Compression fracture of fourth lumbar vertebra (HCC) ICD-10-CM: R65.515B ICD-9-CM: 805.4  5/29/2019 - Present Yes Thromboembolic disorder ([address] [id number]-QW: I74.9 ICD-9-CM: 444.9  5/29/2019 - Present Yes * (Principal) Acute on chronic combined systolic and diastolic ACC/AHA stage C congestive heart failure (HCC) ICD-10-CM: I50.43 ICD-9-CM: 428.43, 428.0  11/10/2018 - Present Unknown Follicular thyroid cancer ([facility] [address] ICD-10-CM: G00 ICD-9-CM: 077  6/19/2011 - Present Yes Overview Signed 6/21/2011  7:12 AM by Nayely Rojas  
  thyroglobulin high > 100 obvious residual thyroid tissue Pericardial effusion ICD-10-CM: I31.3 ICD-9-CM: 423.9  6/17/2011 - Present Yes A/P:   
-- hypotension Improved with ivlasix for acute s and d chf 
 Continue diuresis if bp normalizes need restart cardiac meds For systolic dysfxn-- doubt septic shock re: uti gnr 
 
-- colitis? and chronic watery diarrhea-  
 Appreciate gi- observe Keep off levothyroxine -- T4 hyperthyroidism Observe off levothyroxine (T4) follow tsh t4 t3 total in few week If not improved may  Need thyroid imaging  
 
 --UTI failed oral keflex. gnr- await ID continue levaquin flagyl for now 
 
-- moderate - severe protein betzaida. Malnutrition Nutrition consult, diet and supplementation 
 
-- acute and chronic systolic and diastolic chf 
 lv lasix ,when bp better add arb low dose , toprol xl titrate prn ? Cardiac cachexia contributing to poor appetite/wt loss? Asympt. Moderate effusion pericardium - apprec. Card. consult Moderate mr AI, - after load reduction add arb - observe -- recent sbo- responded to NGT suction-no recurrence 
 
-- Wt loss, Breast [id number] prior imaging, L4 lytic lesion-cor Need outpt workup if remains stable-if stabilzes consider general surgery consult re: opinion and timing biopsy I need to review old imaging daughter claims thyroid cancer dx by bone bx of lytic spinal lesion? ?? 
 
--clara - resolved 
--hypok- supplement to k 4.0 
 
-- hypomagnesemia 
 corrected --hx of follicular thryoid cancer and Iatrogenic hypothyroidism following ESCALERA ablation--- . -- Hx of significant thromboembolic disease per daughter \"clotted around filter\" in past. Currently pharm manage warfarin - goal inr 2-3 
 
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 DC planning/Dispo: home health DVT ppx:  Anticoagulated Code status:full Medical decision maker:self- daughter Behzad Burgos # on file Signed: 
Jesus SAMS

## 2019-06-15 NOTE — PROGRESS NOTES
Nutrition follow-up: 
6/15 Consult: General nutrition management and supplements (Temo Degroot DO) Reason for initial assessment: Referral received from nursing admission Malnutrition Screening Tool Assessment:  
Diet: DIET GI SOFT No options chosen DIET NUTRITIONAL SUPPLEMENTS All Meals; Ensure Clear PMH: 
    
Past Medical History:  
Diagnosis Date  Acquired hypothyroidism    
 Acute pancreatitis    
 Arthritis    
 CAD (coronary artery disease) 4/29/11  Cancer [facility])    
  THYROID - treated with surgery and radiation  CHF (congestive heart failure) ([facility] [address] 11/8/2018  Follicular thyroid cancer ([facility] [address]    
 Hypertension    
 Paroxysmal A-fib ([facility] [address] 11/8/2018  PE (pulmonary embolism) 2008  [facility]    
 Thyroid disease    
  
Food/Nutrition Patient History:   
Visit with pt, daughter and multiple other family members at bedside. Pt reports poor appetite, consuming primarily bites of food. Daughter associates appetite with diarrhea. Pt is not consuming ensure clear and requesting \"vanilla ensure. \"  Daughter reports her intake initially declined associated with poor dentition and fearful of oral surgery to allow for dentures. Intake declined more with onset of diarrhea. BM times 2 yesterday. Anthropometrics:Height: 5' 2\" (157.5 cm),  Weight: 46.9 kg (103 lb 4.8 oz), Weight Source: Bed, Body mass index is 18.89 kg/m². BMI class of underweight for age >71. According to the EMR the patient has potentially lost ~11 lbs over the past ~10 days. This is a clinically significant weight loss of 9.6% over the past ~10 days Macronutrient needs: EER:  4896-7917 kcal /day (30-35 kcal/kg listed BW of 46.9 kg) EPR:  47-59 grams protein/day (1-1.25 grams/kg listed BW) Intake/Comparative Standards: No recorded po intake. Lunch tray at bedside <25% consumed.   This pattern if sustained will meet <25%estimated needs for kcal and protein.   
  
 Nutrition Diagnosis: Unintended weight loss related to altered gi function as evidenced by patient with ongoing diarrhea for past several months with clinically significant weight loss of 9.6% over the past ~10 days.  
  
Intervention: 
Meals and snacks: Add mechanical soft to current diet secondary poor dentition. Nutrition Supplement Therapy: Discontinue Ensure Clear TID. Provided ensure enlive at visit, initiate TID with trays per pt request re product preference. Nutrition Discharge Plan: Too soon to be determined [place], [address], [place]

## 2019-06-16 PROBLEM — D61.818 PANCYTOPENIA (HCC): Status: ACTIVE | Noted: 2019-01-01

## 2019-06-16 NOTE — PROGRESS NOTES
Hourly rounds performed this shift, all needs have been met. Call light within reach, will continue to manage.

## 2019-06-16 NOTE — PROGRESS NOTES
Hospitalist Progress Note Admit Date:  2019 11:06 AM  
Name:  Naun Marina Age:  80 y.o. 
:  1933 MRN:  [id number] PCP:  Davis Mendoza MD 
Treatment Team: Attending Provider: Karen Campos MD; Consulting Provider: Jn Oleary MD; Care Manager: Venita Villa, VALERIE; Utilization Review: Salvador Eye, RN; Charge Nurse: Lakeisha Baltazar Subjective: HPI and or CC: 
 FROM MY HPI 6/15 NOTE: 
Cc: diarrhea resolving now hypotension Called earlier today re: hypotension and stat cxr and bnp revealed acute chf.admit with acute renal insuff. And hypokalemia likely related to stool volume loss. Better with ivf. GNR ON URINE CULT ID PND. Serum alb 2.3. Following iv lasix today she is improved--BUT BP STILL BORDERLINE . 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 Moderate pericardial effusion no hemodynamic compomise likely due to her poor nutritional status. I spoke with daughter at length with patient about patient frailty, need for workup breast finding and hope that wt loss related to iatrogenic hypothyroidism. She may need restart lower dose levothyroxine if future. At present time with resolution diarrhea -- need titrate cardiac meds- no listed allergy to ace inh and can try arb. With continue toprol. And lasix prn. Discussed plan with family and pt refuses snf placement but would agree to home p.t.  
 
19: 
Cc: poor appetite Discussed coagulopathy has recurred. inr 4.9 - intial order vit k low dose held as not shown to reduce serious bleed risk and pt with severe coagulopathy described by daughter. Reviewed old imaging studies and L4 lesion seen , no reports of right breast 12 mm nodule in past by avail. Studies but daughter reports this is not new. Will need mammogram when dc. 
 uti is morganella morganii and sens. To quinolone Acute chf combined acute and chronic syst and diast. Chf.  With increased bmp today but increased BP with iv diuresis allowing us to start arb and toprol low dose. Discussed plan slow titration with daughter. Continue iv diuresis for now. T4 hyperthyroidism-- levothyroxine (T4) held. 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 -- will need outpt follow up. HOPEFUL this is cause of wt loss and diarrhea. Pancytopenia- new- possible due to total body fluid overload- follow and if not improved with diuresis - hematology consult and PBS. Appetite improved today per daughter, hopeful that passive liver congestion is improving. Resp. Status stable and small mushy bm today. No diarrhea. Objective:  
 
Patient Vitals for the past 24 hrs: 
 Temp Pulse Resp BP SpO2  
06/16/19 1150 97.7 °F (36.5 °C) 87 16 100/46 100 % 06/16/19 0713 98.1 °F (36.7 °C) 88 16 109/72 99 % 06/16/19 0458  75     
06/16/19 0425 98.2 °F (36.8 °C) 87 18 113/72 98 % 06/15/19 2311 98.1 °F (36.7 °C) 86 18 92/54 97 % 06/15/19 2225  85     
06/15/19 1917 98 °F (36.7 °C) 75 18 104/65 97 % 06/15/19 1407  (!) 57  91/55  Oxygen Therapy O2 Sat (%): 100 % (06/16/19 1150) Pulse via Oximetry: 61 beats per minute (06/13/19 1840) O2 Device: Room air (06/15/19 1917) Intake/Output Summary (Last 24 hours) at 6/16/2019 1224 Last data filed at 6/16/2019 1201 Gross per 24 hour Intake 120 ml Output 1600 ml Net -1480 ml REVIEW OF SYSTEMS: Comprehensive ROS performed and negative except as stated in HPI. Physical Examination: 
Physical Exam  
Constitutional: No distress. HENT:  
Mouth/Throat: Oropharynx is clear and moist. No oropharyngeal exudate. Eyes: Pupils are equal, round, and reactive to light. Conjunctivae are normal.  
Neck: Normal range of motion. Neck supple. Cardiovascular: Intact distal pulses. Exam reveals no friction rub. Pulmonary/Chest: Effort normal. She has no wheezes. Abdominal: Bowel sounds are normal. She exhibits no mass. Musculoskeletal: She exhibits edema. She exhibits no deformity. Neurological: She displays normal reflexes. GCS score is 15. Skin: Skin is dry. No rash noted. She is not diaphoretic. Psychiatric: Affect normal.  
 
Data Review: 
I have reviewed all labs, meds, telemetry events, and studies from the last 24 hours. Recent Results (from the past 24 hour(s)) PROTHROMBIN TIME + INR Collection Time: 06/16/19  6:46 AM  
Result Value Ref Range Prothrombin time 45.0 (H) 11.7 - 14.5 sec INR 4.9 (HH)    
CBC WITH AUTOMATED DIFF Collection Time: 06/16/19  6:46 AM  
Result Value Ref Range WBC 3.1 (L) 4.3 - 11.1 K/uL  
 RBC 4.27 4.05 - 5.2 M/uL  
 HGB 11.0 (L) 11.7 - 15.4 g/dL HCT 34.1 (L) 35.8 - 46.3 % MCV 79.9 79.6 - 97.8 FL  
 MCH 25.8 (L) 26.1 - 32.9 PG  
 MCHC 32.3 31.4 - 35.0 g/dL  
 RDW 18.4 (H) 11.9 - 14.6 % PLATELET 91 (L) 195 - 450 K/uL MPV Unable to calculate. Recommend adding IPF. 9.4 - 12.3 FL ABSOLUTE NRBC 0.00 0.0 - 0.2 K/uL  
 DF AUTOMATED NEUTROPHILS 43 43 - 78 % LYMPHOCYTES 39 13 - 44 % MONOCYTES 10 4.0 - 12.0 % EOSINOPHILS 6 0.5 - 7.8 % BASOPHILS 1 0.0 - 2.0 % IMMATURE GRANULOCYTES 1 0.0 - 5.0 %  
 ABS. NEUTROPHILS 1.3 (L) 1.7 - 8.2 K/UL  
 ABS. LYMPHOCYTES 1.2 0.5 - 4.6 K/UL  
 ABS. MONOCYTES 0.3 0.1 - 1.3 K/UL  
 ABS. EOSINOPHILS 0.2 0.0 - 0.8 K/UL  
 ABS. BASOPHILS 0.0 0.0 - 0.2 K/UL  
 ABS. IMM. GRANS. 0.0 0.0 - 0.5 K/UL METABOLIC PANEL, COMPREHENSIVE Collection Time: 06/16/19  6:46 AM  
Result Value Ref Range Sodium 144 136 - 145 mmol/L Potassium 4.4 3.5 - 5.1 mmol/L Chloride 117 (H) 98 - 107 mmol/L  
 CO2 21 21 - 32 mmol/L Anion gap 6 (L) 7 - 16 mmol/L Glucose 89 65 - 100 mg/dL BUN 8 8 - 23 MG/DL Creatinine 0.81 0.6 - 1.0 MG/DL  
 GFR est AA >60 >60 ml/min/1.73m2 GFR est non-AA >60 >60 ml/min/1.73m2  Calcium 8.6 8.3 - 10.4 MG/DL  
 Bilirubin, total 0.5 0.2 - 1.1 MG/DL  
 ALT (SGPT) 17 12 - 65 U/L  
 AST (SGOT) 19 15 - 37 U/L Alk. phosphatase 104 50 - 136 U/L Protein, total 5.9 (L) 6.3 - 8.2 g/dL Albumin 2.4 (L) 3.2 - 4.6 g/dL Globulin 3.5 2.3 - 3.5 g/dL A-G Ratio 0.7 (L) 1.2 - 3.5 BNP Collection Time: 06/16/19  6:46 AM  
Result Value Ref Range BNP 2,142 (H) 0 pg/mL PLEASE READ & DOCUMENT PPD TEST IN 48 HRS Collection Time: 06/16/19 11:09 AM  
Result Value Ref Range PPD Negative Negative  
 mm Induration 0 0 - 5 mm All Micro Results Procedure Component Value Units Date/Time Donavon Garcia [[id number]] Collected:  06/13/19 2114 Order Status:  Completed Specimen:  Stool Updated:  06/16/19 7576 Special Requests: NO SPECIAL REQUESTS Culture result:    
  No Salmonella, Shigella, or Ecoli 0157 isolated. CULTURE, URINE [[id number]]  (Abnormal)  (Susceptibility) Collected:  06/14/19 5747 Order Status:  Completed Specimen:  Urine Updated:  06/16/19 4938 Special Requests: NO SPECIAL REQUESTS Culture result:    
  >100,000 COLONIES/mL MORGANELLA MORGANII  
     
      
  10,000 to 50,000 COLONIES/mL MIXED SKIN OTILIA ISOLATED C. DIFFICILE AG & TOXIN A/B [[id number]] Collected:  06/13/19 2114 Order Status:  Completed Specimen:  Stool Updated:  06/14/19 1116 [address] ANTIGEN    
  C. DIFFICILE GDH ANTIGEN-NEGATIVE  
     
  C. difficile toxin C. DIFFICILE TOXIN-NEGATIVE  
     
  PCR Reflex NOT APPLICABLE INTERPRETATION    
  NEGATIVE FOR TOXIGENIC C. DIFFICILE Clinical Consideration NEGATIVE FOR TOXIGENIC C. DIFFICILE  
     
 PACO & Mohit Ansari [[id number]] Collected:  06/13/19 2114 Order Status:  Completed Specimen:  Stool Updated:  06/13/19 2137 Current Meds: 
Current Facility-Administered Medications Medication Dose Route Frequency  Warfarin on hold - Rx dosing   Other Rx Dosing/Monitoring  metroNIDAZOLE (FLAGYL) IVPB premix 500 mg  500 mg IntraVENous Q12H  furosemide (LASIX) injection 40 mg  40 mg IntraVENous Q12H  potassium chloride (K-DUR, KLOR-CON) SR tablet 20 mEq  20 mEq Oral BID  metoprolol succinate (TOPROL-XL) XL tablet 12.5 mg  12.5 mg Oral DAILY  lisinopril (PRINIVIL, ZESTRIL) tablet 2.5 mg  2.5 mg Oral DAILY  levoFLOXacin (LEVAQUIN) 750 mg in D5W IVPB  750 mg IntraVENous Q48H  
 gabapentin (NEURONTIN) capsule 600 mg  600 mg Oral DAILY  ondansetron (ZOFRAN ODT) tablet 4 mg  4 mg Oral Q8H PRN  
 oxyCODONE IR (ROXICODONE) tablet 5 mg  5 mg Oral Q6H PRN  
 simvastatin (ZOCOR) tablet 20 mg  20 mg Oral QHS  acetaminophen (TYLENOL) tablet 650 mg  650 mg Oral Q4H PRN  
 naloxone (NARCAN) injection 0.4 mg  0.4 mg IntraVENous PRN  
 diphenhydrAMINE (BENADRYL) capsule 25 mg  25 mg Oral Q4H PRN  
 ondansetron (ZOFRAN) injection 4 mg  4 mg IntraVENous Q4H PRN  
 bisacodyl (DULCOLAX) tablet 10 mg  10 mg Oral DAILY PRN  
 zolpidem (AMBIEN) tablet 5 mg  5 mg Oral QHS PRN  
 sodium chloride (NS) flush 5-40 mL  5-40 mL IntraVENous Q8H  
 sodium chloride (NS) flush 5-40 mL  5-40 mL IntraVENous PRN Diet: DIET NUTRITIONAL SUPPLEMENTS 
DIET GI SOFT Other Studies (last 24 hours): No results found. Assessment and Plan:  
 
Hospital Problems as of 6/16/2019 Date Reviewed: 6/23/2011 Codes Class Noted - Resolved POA Hypotension ICD-10-CM: I95.9 ICD-9-CM: 458.9  6/15/2019 - Present Unknown Moderate protein malnutrition (HCC) ICD-10-CM: E44.0 ICD-9-CM: 263.0  6/15/2019 - Present Unknown Failure to thrive (0-17) ICD-10-CM: R62.51 
ICD-9-CM: 783.41  6/15/2019 - Present Unknown Hyperthyroidism ICD-10-CM: E05.90 ICD-9-CM: 242.90  6/15/2019 - Present Unknown Breast mass ICD-10-CM: N63.0 ICD-9-CM: 611.72  6/14/2019 - Present Unknown PAF (paroxysmal atrial fibrillation) (HCC) ICD-10-CM: I48.0 ICD-9-CM: 427.31  6/14/2019 - Present Unknown Colitis ICD-10-CM: K52.9 ICD-9-CM: 558.9  6/14/2019 - Present Unknown Diarrhea ICD-10-CM: R19.7 ICD-9-CM: 787.91  6/13/2019 - Present Unknown Chronic systolic dysfunction of left ventricle (Chronic) ICD-10-CM: I51.9 ICD-9-CM: 429.9  5/29/2019 - Present Yes Compression fracture of fourth lumbar vertebra (HCC) ICD-10-CM: Z66.720Q ICD-9-CM: 805.4  5/29/2019 - Present Yes Thromboembolic disorder ([facility] ZWA-05-DQ: I74.9 ICD-9-CM: 444.9  5/29/2019 - Present Yes * (Principal) 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  
  thyroglobulin high > 100 obvious residual thyroid tissue Pericardial effusion ICD-10-CM: I31.3 ICD-9-CM: 423.9  6/17/2011 - Present Yes A/P:   
-- hypotension--resolved with iv diuresis (cardiogenic) Monitor with med titration for syst. dysfxn. -- colitis? and chronic watery diarrhea-   
 Keep off levothyroxine and observe 
 
-- T4 hyperthyroidism Observe off levothyroxine (T4) follow tsh t4 t3 total in few week If not improved may  Need thyroid imaging No record of suppressive therapy Hx of follicular thyroid cancer treated ESCALERA 
 
 --UTI failed oral keflex. Morganella Morganii Sens. To quinolone 
 
-- moderate - severe protein betzaida. Malnutrition Nutrition consult, diet and supplementation 
 
-- acute and chronic systolic and diastolic chf 
 lv lasix until euvolemia ,when bp better titrate new RESTART ACE IN. low dose , toprol xl ? Cardiac cachexia possible contributing to poor appetite/wt loss? Asympt. Moderate effusion pericardium - apprec. Card. consult Moderate mr AI, - Moderate pericardial effusion 
 
-- pancytopenia Follow with diuresis if worsens will need PBS and hematology opinon -- warfarin coagulopathy Hold , Pharmacy managing-- low dose vit k if worse -- recent sbo- responded to NGT suction-no recurrence 
 
-- Wt loss, Breast [id number] prior imaging, L4 lytic lesion-cor L4 lesion chronic and seen 2011 ct, daughter reports possible prior Report of breast lesion(no records seen by me), and hopeful wt Loss is related to T4 hyperthy. And poor oral intake. --clara - resolved 
--hypok- keep supplement to k 4.0 
 
-- hypomagnesemia 
 corrected --hx of follicular thryoid cancer and history of  Iatrogenic hypothyroidism following ESCALERA ablation--- . -- Hx of significant thromboembolic disease per daughter \"clotted around filter\" in past. Currently pharm manage warfarin - goal inr 2-3 
 
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 DC planning/Dispo: home health DVT ppx:  Anticoagulated Code status:full Medical decision maker:self- daughter Sonia Goff # on file Signed: 
Jesus SAMS

## 2019-06-16 NOTE — PROGRESS NOTES
Hourly rounds done. Pt denies pain, nausea, vomiting. Voiding incontinent briefs, valeria. PM lasix held d/t BP. 
1 Small brown/loose BM. Cardiac monitor running Afib 75-87. Family at bedside. All needs met at this time.

## 2019-06-16 NOTE — PROGRESS NOTES
GI DAILY PROGRESS NOTE Admit Date:  6/13/2019 Today's Date:  6/16/2019 CC:  Diarrhea Subjective:  
 
Patient seen and examined this morning. No acute events overnight. Diarrhea seems to have resolved. No other gi complaints at this time. Medications:  
Current Facility-Administered Medications Medication Dose Route Frequency  Warfarin on hold - Rx dosing   Other Rx Dosing/Monitoring  metroNIDAZOLE (FLAGYL) IVPB premix 500 mg  500 mg IntraVENous Q12H  furosemide (LASIX) injection 40 mg  40 mg IntraVENous Q12H  potassium chloride (K-DUR, KLOR-CON) SR tablet 20 mEq  20 mEq Oral BID  metoprolol succinate (TOPROL-XL) XL tablet 12.5 mg  12.5 mg Oral DAILY  lisinopril (PRINIVIL, ZESTRIL) tablet 2.5 mg  2.5 mg Oral DAILY  levoFLOXacin (LEVAQUIN) 750 mg in D5W IVPB  750 mg IntraVENous Q48H  
 gabapentin (NEURONTIN) capsule 600 mg  600 mg Oral DAILY  ondansetron (ZOFRAN ODT) tablet 4 mg  4 mg Oral Q8H PRN  
 oxyCODONE IR (ROXICODONE) tablet 5 mg  5 mg Oral Q6H PRN  
 simvastatin (ZOCOR) tablet 20 mg  20 mg Oral QHS  acetaminophen (TYLENOL) tablet 650 mg  650 mg Oral Q4H PRN  
 naloxone (NARCAN) injection 0.4 mg  0.4 mg IntraVENous PRN  
 diphenhydrAMINE (BENADRYL) capsule 25 mg  25 mg Oral Q4H PRN  
 ondansetron (ZOFRAN) injection 4 mg  4 mg IntraVENous Q4H PRN  
 bisacodyl (DULCOLAX) tablet 10 mg  10 mg Oral DAILY PRN  
 zolpidem (AMBIEN) tablet 5 mg  5 mg Oral QHS PRN  
 sodium chloride (NS) flush 5-40 mL  5-40 mL IntraVENous Q8H  
 sodium chloride (NS) flush 5-40 mL  5-40 mL IntraVENous PRN Review of Systems: ROS was obtained, with pertinent positives as listed above. No chest pain or SOB. Diet:   
 
Objective:  
Vitals: 
Visit Vitals /72 (BP 1 Location: Left arm, BP Patient Position: At rest) Pulse 88 Temp 98.1 °F (36.7 °C) Resp 16 Ht 5' 2\" (1.575 m) Wt 48.2 kg (106 lb 4.8 oz) SpO2 99% BMI 19.44 kg/m² Intake/Output: 06/16 0701 - 06/16 1900 In: 120 [P.O.:120] Out: -  
06/14 1901 - 06/16 0700 In: 1649 [I.V.:2722] Out: 700 [Urine:700] Exam: 
General appearance: alert, cooperative, no distress Lungs: clear to auscultation bilaterally anteriorly Heart: regular rate and rhythm Abdomen: soft, non-tender. Bowel sounds normal. No masses, no organomegaly Extremities: extremities normal, atraumatic, no cyanosis or edema Neuro:  alert and oriented Data Review (Labs):   
Recent Labs  
  06/16/19 
0646 06/15/19 
0430 06/14/19 
0410 06/13/19 
2221 06/13/19 
1104 WBC 3.1* 3.3* 4.5  --  5.6 HGB 11.0* 10.2* 11.1*  --  12.8 HCT 34.1* 33.1* 34.9*  --  39.2 PLT 91* 93* 106*  --  149* MCV 79.9 81.7 80.0  --  79.2*  141 144  --  138  
K 4.4 3.8 3.7  --  2.5*  
* 112* 110*  --  100 CO2 21 18* 21  --  28  
BUN 8 9 13  --  14  
CREA 0.81 0.92 1.11*  --  1.71* CA 8.6 8.0* 8.3  --  8.5 MG  --  2.5*  --  1.3*  --   
GLU 89 140* 64*  --  83  
 106  --   --  127 SGOT 19 24  --   --  34 ALB 2.4* 2.3*  --   --  3.0*  
TP 5.9* 5.7*  --   --  7.1 PTP 45.0*  --   --   --   --   
INR 4.9*  --   --   --   --   
 
 
Radiology: Xr Chest Sngl V Result Date: 6/15/2019 Impression:  Mild CHF/volume overload. CPT code(s) 37785 Xr Chest Sngl V Result Date: 6/13/2019 Impression:  Chronic scarring at left lung base with mild cardiomegaly. CPT code(s) 51498 Ct Abd Pelv Wo Cont Result Date: 6/13/2019 IMPRESSION:  1. No abnormal bowel dilation or transition point to suggest bowel obstruction. No clearly acute bowel changes are otherwise seen. It is only noted that the colon is fluid-filled consistent with the history of diarrhea. The possibility of a mild or early colitis cannot be excluded. 2. New moderate to large pericardial effusion. 3. Large lytic lesion and pathologic fracture of the L4 vertebral body as previously described which is not felt to be significantly changed.   
 
Xr Pelv 1 Or 2 V 
 
 Result Date: 6/13/2019 Impression: 1. No acute radiographic abnormality. Assessment:  
 
Principal Problem: 
  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 
  Pericardial effusion (6/17/2011) Follicular thyroid cancer ([address] (6/19/2011) Overview: thyroglobulin high > 100 obvious residual thyroid tissue Chronic systolic dysfunction of left ventricle (5/29/2019) Compression fracture of fourth lumbar vertebra ([address] (5/29/2019) Thromboembolic disorder ([address] (1/51/5267) Diarrhea (6/13/2019) Breast mass (6/14/2019) PAF (paroxysmal atrial fibrillation) ([address] (6/14/2019) Colitis (6/14/2019) Hypotension (6/15/2019) Moderate protein malnutrition ([address] (6/15/2019) Failure to thrive (0-17) (6/15/2019) Hyperthyroidism (6/15/2019) Plan:  
 
Patient's diarrhea seems to have resolved Will arrange outpatient follow up Will sign off for now.   
 
Quin Holstein, MD 
[facility], [place]

## 2019-06-16 NOTE — PROGRESS NOTES
Problem: Falls - Risk of 
Goal: *Absence of Falls Description Document Natalie Hernadnez Fall Risk and appropriate interventions in the flowsheet. Outcome: Progressing Towards Goal 
  
Problem: Pressure Injury - Risk of 
Goal: *Prevention of pressure injury Description Document Sid Scale and appropriate interventions in the flowsheet.  
Outcome: Progressing Towards Goal

## 2019-06-16 NOTE — PROGRESS NOTES
Problem: Falls - Risk of 
Goal: *Absence of Falls Description Document Tigre Trevino Fall Risk and appropriate interventions in the flowsheet. Outcome: Progressing Towards Goal 
  
Problem: Pressure Injury - Risk of 
Goal: *Prevention of pressure injury Description Document Sid Scale and appropriate interventions in the flowsheet. Outcome: Progressing Towards Goal 
  
Problem: Nutrition Deficit Goal: *Optimize nutritional status Outcome: Progressing Towards Goal

## 2019-06-16 NOTE — PROGRESS NOTES
Warfarin dosing per pharmacist 
 
Bruno Duff is a 80 y.o. female. Height: 5' 2\" (157.5 cm)    Weight: 48.2 kg (106 lb 4.8 oz) Indication:  Afib, PE, DVT Goal INR:  2-3 Home dose:  2 mg qhs 
 
Risk factors or significant drug interactions:  Levofloxacin, metronidazole Other anticoagulants:  none Daily Monitoring Date  INR     Warfarin dose HGB              Notes 6/16  4.9  Hold  11 Pharmacy is consulted to manage warfarin for Ms. Gibson Navarrete during this admission. Warfarin has been on hold since admission for possible procedure and pt was started on therapeutic enoxaparin. Levofloxacin and metronidazole have been added. INR is supratherapeutic, likely due to DD interaction with metronidazole and levofloxacin. Enoxaparin has been discontinued. Hold warfarin. Follow INR trend. Pharmacy will continue to follow. Please call with any questions. Thank you, Teena Lai, PharmD Clinical Pharmacist 
[phone]

## 2019-06-17 NOTE — PROGRESS NOTES
Hospitalist Progress Note Admit Date:  2019 11:06 AM  
Name:  Ayala Bennett Age:  80 y.o. 
:  1933 MRN:  [id number] PCP:  Lennox Edman, MD 
Treatment Team: Attending Provider: Wendy Solis MD; Care Manager: Sharmaine Richards, RN; Utilization Review: Micah Freeman RN; Charge Nurse: Bill Carreno Charge Nurse: David Long; Physical Therapy Assistant: Juancho Choi PTA Subjective: HPI and or CC: 
 
 FROM MY HPI 6/15 NOTE: 
Cc: diarrhea resolving now hypotension Called earlier today re: hypotension and stat cxr and bnp revealed acute chf.admit with acute renal insuff. And hypokalemia likely related to stool volume loss. Better with ivf. GNR ON URINE CULT ID PND. Serum alb 2.3. Following iv lasix today she is improved--BUT BP STILL BORDERLINE . 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 Moderate pericardial effusion no hemodynamic compomise likely due to her poor nutritional status. I spoke with daughter at length with patient about patient frailty, need for workup breast finding and hope that wt loss related to iatrogenic hypothyroidism. She may need restart lower dose levothyroxine if future. At present time with resolution diarrhea -- need titrate cardiac meds- no listed allergy to ace inh and can try arb. With continue toprol. And lasix prn. Discussed plan with family and pt refuses snf placement but would agree to home p.t.  
 
19: 
Cc: poor appetite is improving. Discussed coagulopathy and bp better and pharmacy will restart warfarin soon (INR 3), bp better with diuresis will allow titration of ace Inh. Heart rate < 60 keep current toprol. Eating better last 24hr and neg negative 2.5liters 24hr. Discussed at least one more day iv lasix. old imaging studies and L4 lesion seen , no reports of right breast 12 mm nodule in past by avail. Studies but daughter reports this is not new. Will need mammogram when dc. 
 uti is morganella morganii and sens. To quinolone- and clinically improved Acute chf combined acute and chronic syst and diast. Chf.- clinically improving T4 hyperthyroidism-- levothyroxine (T4) held. 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 -- will need outpt follow up. HOPEFUL this is cause of wt loss and diarrhea. this is unchanged and she reports several Mushy bm's daily. Pancytopenia- new- possible due to total body fluid overload- and slight improvement with diuresis. Appetite improved , hopeful that passive liver congestion is improving. Resp. Status stable. No diarrhea. Objective:  
 
Patient Vitals for the past 24 hrs: 
 Temp Pulse Resp BP SpO2  
06/17/19 0815 98.1 °F (36.7 °C) (!) 55 18 109/59 99 % 06/17/19 0615  (!) 56     
06/17/19 0522 97.9 °F (36.6 °C) 60 19 139/50 98 % 06/17/19 0050 98.1 °F (36.7 °C) 97 19 97/60 99 % 06/16/19 2050  (!) 101     
06/16/19 2026 97.9 °F (36.6 °C) 92 18 124/60 99 % 06/16/19 1558 97.8 °F (36.6 °C) 96 16 90/56 99 % Oxygen Therapy O2 Sat (%): 99 % (06/17/19 0815) Pulse via Oximetry: 61 beats per minute (06/13/19 1840) O2 Device: Room air (06/15/19 1917) Intake/Output Summary (Last 24 hours) at 6/17/2019 1350 Last data filed at 6/17/2019 5049 Gross per 24 hour Intake  Output 1650 ml Net -1650 ml REVIEW OF SYSTEMS: Comprehensive ROS performed and negative except as stated in HPI. Physical Examination: 
Physical Exam  
Constitutional: She is well-developed, well-nourished, and in no distress. Much more alert HENT:  
Head: Normocephalic. Right Ear: External ear normal.  
Left Ear: External ear normal.  
Eyes: EOM are normal. No scleral icterus. Neck: No tracheal deviation present. Cardiovascular: Normal rate. Exam reveals no gallop. Pulmonary/Chest: Breath sounds normal. She has no rales. Abdominal: Soft. There is no rebound and no guarding. Musculoskeletal: She exhibits no tenderness. Pretibial tender Lymphadenopathy:  
  She has no cervical adenopathy. Neurological: She is alert. She displays normal reflexes. No cranial nerve deficit. Skin: Skin is warm. She is not diaphoretic. No erythema. Psychiatric:  
Pleasant affect Data Review: 
I have reviewed all labs, meds, telemetry events, and studies from the last 24 hours. Recent Results (from the past 24 hour(s)) PROTHROMBIN TIME + INR Collection Time: 06/17/19  6:19 AM  
Result Value Ref Range Prothrombin time 30.6 (H) 11.7 - 14.5 sec INR 3.0    
CBC WITH AUTOMATED DIFF Collection Time: 06/17/19  6:19 AM  
Result Value Ref Range WBC 3.5 (L) 4.3 - 11.1 K/uL  
 RBC 4.34 4.05 - 5.2 M/uL  
 HGB 10.9 (L) 11.7 - 15.4 g/dL HCT 34.7 (L) 35.8 - 46.3 % MCV 80.0 79.6 - 97.8 FL  
 MCH 25.1 (L) 26.1 - 32.9 PG  
 MCHC 31.4 31.4 - 35.0 g/dL  
 RDW 18.4 (H) 11.9 - 14.6 % PLATELET 94 (L) 058 - 450 K/uL MPV Unable to calculate. Recommend adding IPF. 9.4 - 12.3 FL ABSOLUTE NRBC 0.00 0.0 - 0.2 K/uL  
 DF AUTOMATED NEUTROPHILS 46 43 - 78 % LYMPHOCYTES 36 13 - 44 % MONOCYTES 11 4.0 - 12.0 % EOSINOPHILS 6 0.5 - 7.8 % BASOPHILS 1 0.0 - 2.0 % IMMATURE GRANULOCYTES 0 0.0 - 5.0 %  
 ABS. NEUTROPHILS 1.6 (L) 1.7 - 8.2 K/UL  
 ABS. LYMPHOCYTES 1.3 0.5 - 4.6 K/UL  
 ABS. MONOCYTES 0.4 0.1 - 1.3 K/UL  
 ABS. EOSINOPHILS 0.2 0.0 - 0.8 K/UL  
 ABS. BASOPHILS 0.0 0.0 - 0.2 K/UL  
 ABS. IMM. GRANS. 0.0 0.0 - 0.5 K/UL METABOLIC PANEL, BASIC Collection Time: 06/17/19  6:19 AM  
Result Value Ref Range Sodium 144 136 - 145 mmol/L Potassium 4.4 3.5 - 5.1 mmol/L Chloride 112 (H) 98 - 107 mmol/L  
 CO2 24 21 - 32 mmol/L Anion gap 8 7 - 16 mmol/L Glucose 83 65 - 100 mg/dL BUN 8 8 - 23 MG/DL  Creatinine 0.99 0.6 - 1.0 MG/DL  
 GFR est AA >60 >60 ml/min/1.73m2 GFR est non-AA 57 (L) >60 ml/min/1.73m2 Calcium 8.8 8.3 - 10.4 MG/DL All Micro Results Procedure Component Value Units Date/Time Binu Rosas [[id number]] Collected:  06/13/19 2114 Order Status:  Completed Specimen:  Stool Updated:  06/16/19 3658 Special Requests: NO SPECIAL REQUESTS Culture result:    
  No Salmonella, Shigella, or Ecoli 0157 isolated. CULTURE, URINE [[id number]]  (Abnormal)  (Susceptibility) Collected:  06/14/19 7894 Order Status:  Completed Specimen:  Urine Updated:  06/16/19 8451 Special Requests: NO SPECIAL REQUESTS Culture result:    
  >100,000 COLONIES/mL MORGANELLA MORGANII  
     
      
  10,000 to 50,000 COLONIES/mL MIXED SKIN OTILIA ISOLATED C. DIFFICILE AG & TOXIN A/B [[id number]] Collected:  06/13/19 2114 Order Status:  Completed Specimen:  Stool Updated:  06/14/19 1116 [address] [place] ANTIGEN    
  C. DIFFICILE GDH ANTIGEN-NEGATIVE  
     
  C. difficile toxin C. DIFFICILE TOXIN-NEGATIVE  
     
  PCR Reflex NOT APPLICABLE INTERPRETATION    
  NEGATIVE FOR TOXIGENIC C. DIFFICILE Clinical Consideration NEGATIVE FOR TOXIGENIC C. DIFFICILE  
     
 PACO & Ziggy Dailey [[id number]] Collected:  06/13/19 2114 Order Status:  Completed Specimen:  Stool Updated:  06/13/19 2137 Current Meds: 
Current Facility-Administered Medications Medication Dose Route Frequency  lisinopril (PRINIVIL, ZESTRIL) tablet 10 mg  10 mg Oral DAILY  Warfarin on hold - Rx dosing   Other Rx Dosing/Monitoring  metroNIDAZOLE (FLAGYL) IVPB premix 500 mg  500 mg IntraVENous Q12H  furosemide (LASIX) injection 40 mg  40 mg IntraVENous Q12H  potassium chloride (K-DUR, KLOR-CON) SR tablet 20 mEq  20 mEq Oral BID  metoprolol succinate (TOPROL-XL) XL tablet 12.5 mg  12.5 mg Oral DAILY  levoFLOXacin (LEVAQUIN) 750 mg in D5W IVPB  750 mg IntraVENous Q48H  gabapentin (NEURONTIN) capsule 600 mg  600 mg Oral DAILY  ondansetron (ZOFRAN ODT) tablet 4 mg  4 mg Oral Q8H PRN  
 oxyCODONE IR (ROXICODONE) tablet 5 mg  5 mg Oral Q6H PRN  
 simvastatin (ZOCOR) tablet 20 mg  20 mg Oral QHS  acetaminophen (TYLENOL) tablet 650 mg  650 mg Oral Q4H PRN  
 naloxone (NARCAN) injection 0.4 mg  0.4 mg IntraVENous PRN  
 diphenhydrAMINE (BENADRYL) capsule 25 mg  25 mg Oral Q4H PRN  
 ondansetron (ZOFRAN) injection 4 mg  4 mg IntraVENous Q4H PRN  
 bisacodyl (DULCOLAX) tablet 10 mg  10 mg Oral DAILY PRN  
 zolpidem (AMBIEN) tablet 5 mg  5 mg Oral QHS PRN  
 sodium chloride (NS) flush 5-40 mL  5-40 mL IntraVENous Q8H  
 sodium chloride (NS) flush 5-40 mL  5-40 mL IntraVENous PRN Diet: DIET NUTRITIONAL SUPPLEMENTS 
DIET GI SOFT Other Studies (last 24 hours): No results found. Assessment and Plan:  
 
Hospital Problems as of 6/17/2019 Date Reviewed: 6/23/2011 Codes Class Noted - Resolved POA [facility] ICD-10-CM: C48.835 ICD-9-CM: 284.19  6/16/2019 - Present Unknown Hypotension ICD-10-CM: I95.9 ICD-9-CM: 458.9  6/15/2019 - Present Unknown Moderate protein malnutrition (HCC) ICD-10-CM: E44.0 ICD-9-CM: 263.0  6/15/2019 - Present Unknown Failure to thrive (0-17) ICD-10-CM: R62.51 
ICD-9-CM: 783.41  6/15/2019 - Present Unknown Hyperthyroidism ICD-10-CM: E05.90 ICD-9-CM: 242.90  6/15/2019 - Present Unknown Breast mass ICD-10-CM: N63.0 ICD-9-CM: 611.72  6/14/2019 - Present Unknown PAF (paroxysmal atrial fibrillation) (HCC) ICD-10-CM: I48.0 ICD-9-CM: 427.31  6/14/2019 - Present Unknown Colitis ICD-10-CM: K52.9 ICD-9-CM: 558.9  6/14/2019 - Present Unknown Diarrhea ICD-10-CM: R19.7 ICD-9-CM: 787.91  6/13/2019 - Present Unknown UTI (urinary tract infection) ICD-10-CM: N39.0 ICD-9-CM: 599.0  6/3/2019 - Present Yes Chronic systolic dysfunction of left ventricle (Chronic) ICD-10-CM: I51.9 ICD-9-CM: 429.9  5/29/2019 - Present Yes Compression fracture of fourth lumbar vertebra (HCC) ICD-10-CM: R29.384R ICD-9-CM: 805.4  5/29/2019 - Present Yes Thromboembolic disorder ([facility] LFF-94-LI: I74.9 ICD-9-CM: 444.9  5/29/2019 - Present Yes * (Principal) Acute on chronic combined systolic and diastolic ACC/AHA stage C congestive heart failure (HCC) ICD-10-CM: I50.43 ICD-9-CM: 428.43, 428.0  11/10/2018 - Present Unknown Follicular thyroid cancer ([facility] ICD-10-CM: I97 ICD-9-CM: 933  6/19/2011 - Present Yes Overview Signed 6/21/2011  7:12 AM by Myah Sender  
  thyroglobulin high > 100 obvious residual thyroid tissue Pericardial effusion ICD-10-CM: I31.3 ICD-9-CM: 423.9  6/17/2011 - Present Yes A/P:   
-- hypotension--resolved with iv diuresis (cardiogenic) Monitor with med titration for syst. Dysfxn. --continue iv diuresis at least another 24hr Or as directed by cardiology if followed -- colitis? and chronic watery diarrhea-  Appears resolved and gi has signed off Keep off levothyroxine and observe 
 
-- T4 hyperthyroidism Observe off levothyroxine (T4) follow tsh t4 t3 total in few week If not improved may  Need thyroid imaging No record of suppressive therapy Hx of follicular thyroid cancer treated ESCALERA--this plan is unchanged 
 
 --UTI failed oral keflex. Morganella Morganii Sens. To quinolone--full 7 day and continue with flagyl re: diarrhea, high risk for c.diff. -- moderate - severe protein betzaida. Malnutrition Nutrition consult, diet and supplementation 
 
-- acute and chronic systolic and diastolic chf- improving 
 lv lasix until euvolemia ,titrate lisinopril to 10mg daily today , toprol xl Continue low dose hr <60. Cardiac cachexia possible contributing to poor appetite/wt loss? Asympt. Moderate effusion pericardium - apprec. Card. consult Moderate mr AI, - Moderate pericardial effusion 
 
-- pancytopenia Follow with diuresis if worsens will need PBS and hematology opinon- unchanged -- warfarin coagulopathy Hold , Pharmacy managing-- low dose vit k if worsens. Improved today 3.0 
 
-- recent sbo- responded to NGT suction-no recurrence 
 
-- Wt loss, Breast [id number] prior imaging, L4 lytic lesion-cor L4 lesion chronic and seen 2011 ct, daughter reports possible prior Report of breast lesion(no records seen by me), and hopeful wt Loss is related to T4 hyperthy. And poor oral intake. --clara - resolved 
--hypok- keep supplement to k 4.0 
 
-- hypomagnesemia 
 corrected --hx of follicular thryoid cancer and history of  Iatrogenic hypothyroidism following ESCALERA ablation--- . -- Hx of significant thromboembolic disease per daughter \"clotted around filter\" in past. Currently pharm manage warfarin - goal inr 2-3 
 
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 DC planning/Dispo: home health DVT ppx:  Anticoagulated Code status:full Medical decision maker:self- daughter Arsen Grajeda # on file Signed: 
Jesus SAMS

## 2019-06-17 NOTE — PROGRESS NOTES
Date 06/16/19 0700 - 06/17/19 6382 06/17/19 0700 - 06/18/19 0314 Shift [id number] 1900-0659 24 Hour Total 1131-7438 7891-5947 24 Hour Total  
INTAKE  
P.O. 120  120     
  P. O. 120  120 Shift Total(mL/kg) 120(2.5)  120(2.8) OUTPUT Urine(mL/kg/hr) 1500(2.6) 1050 2550 Urine Occurrence(s) 2 x  2 x Urine Output (mL) (External Female Catheter 06/15/19) 1500 1050 2550 Shift Total(mL/kg) 1500(31.1) [id number]) [id number]) NET -Treinta Y Genaro [id number] Weight (kg) 48.2 43 43 43 43 43 Hourly rounds done. Pt denies pain, nausea, vomiting. 1 Large brown/mucous/soft BM. Cardiac monitor convert from Afib  to NSR 56. Family at bedside. All needs met at this time.

## 2019-06-17 NOTE — PROGRESS NOTES
Hourly rounds done. Patient didn't complain of pain or distress. All needs have been met. Vitals signs stable. Will give report to oncoming nurse.

## 2019-06-17 NOTE — PROGRESS NOTES
Problem: Falls - Risk of 
Goal: *Absence of Falls Description Document Moikevin Strange Fall Risk and appropriate interventions in the flowsheet. Outcome: Progressing Towards Goal 
  
Problem: Pressure Injury - Risk of 
Goal: *Prevention of pressure injury Description Document Sid Scale and appropriate interventions in the flowsheet. Outcome: Progressing Towards Goal 
  
Problem: Nutrition Deficit Goal: *Optimize nutritional status Outcome: Progressing Towards Goal

## 2019-06-17 NOTE — PROGRESS NOTES
Problem: Risk for Spread of Infection Goal: Prevent transmission of infectious organism to others Description Prevent the transmission of infectious organisms to other patients, staff members, and visitors. Outcome: Progressing Towards Goal 
  
Problem: Patient Education:  Go to Education Activity Goal: Patient/Family Education Outcome: Progressing Towards Goal 
  
Problem: Falls - Risk of 
Goal: *Absence of Falls Description Document Deepa Latin Fall Risk and appropriate interventions in the flowsheet.  
Outcome: Progressing Towards Goal

## 2019-06-17 NOTE — PROGRESS NOTES
Warfarin dosing per pharmacist 
 
Aquilino Lloyd is a 80 y.o. female. Height: 5' 2\" (157.5 cm)    Weight: 43 kg (94 lb 14.4 oz)(NOTHING ON BED) Indication:  Afib, PE, DVT Goal INR:  2-3 Home dose:  2 mg qhs 
 
Risk factors or significant drug interactions:  Levofloxacin, metronidazole, Amiodarone (PTA) Other anticoagulants:  none Daily Monitoring Date  INR     Warfarin dose HGB              Notes 6/13  ---  Held  12.8 
6/14  ---  Held  11.1 6/15  ---  Held  10.2 6/16  4.9  Hold  11.0   
6/17  3.0  0.5 mg  10.9 Pharmacy is consulted to manage warfarin for Ms. Pilar Hammer during this admission. INR trending down after being held for several days, now at upper end of therapeutic range at 3.0. Need to utilize caution due to drug interactions with metronidazole and levofloxacin. Will cautiously resume dosing this evening with 0.5 mg. Follow INR daily. Pharmacy will continue to follow. Please call with any questions. Thank you, 
Saskia Mello, PharmD Clinical Pharmacist 
[phone]

## 2019-06-17 NOTE — PROGRESS NOTES
Problem: Mobility Impaired (Adult and Pediatric) Goal: *Acute Goals and Plan of Care (Insert Text) Description LTG: 
(1.)Ms. Junie Troncoso will move from supine to sit and sit to supine , scoot up and down and roll side to side with MODERATE ASSIST of 1 within 7 treatment day(s). MET 6/17/19 
(2.)Ms. Junie Troncoso will perform seated activities and exercises at edge of bed for 10+ minutes to improve strength and prepare for transfers within 7 days. (3.)Ms. Junie Troncoso will transfer from bed to chair and chair to bed with MAXIMAL ASSIST using the least restrictive device within 7 treatment day(s). ________________________________________________________________________________________________ Outcome: Progressing Towards Goal 
  
PHYSICAL THERAPY: Daily Note and PM 6/17/2019 INPATIENT: PT Visit Days : 2 Payor: [employer] MEDICARE / Plan: [address] / Product Type: Managed Care Medicare /   
  
NAME/AGE/GENDER: Roni Dong is a 80 y.o. 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  
 · Generalized Muscle Weakness (M62.81) · Difficulty in walking, Not elsewhere classified (R26.2) · Other abnormalities of gait and mobility (R26.89) Precaution/Allergies: 
Ativan [lorazepam]; Lortab [hydrocodone-acetaminophen]; and Morphine ASSESSMENT:  
Ms. Junie Troncoso is an 80year old female admitted from home for diarrhea. She lives with family who provides 24/7 assistance. 1-2 months ago she was ambulating with mod I using one crutch due to right hip surgery when she was a teenager. Has leg length discrepancy with shorter R LE from surgery and wears built up shoe at all times.   
6/17/19:  Pt supine in bed on arrival. She is agreeable to PT with some encouragement. She is questioning why the doctor ordered PT. Finally agreeable to sit EOB. Required Min assist for supine to sit. Pt sat with B knees extended, but can bend both. Built up shoe donned to right foot. Sit to stand with max x2. She only stood for a few seconds before abruptly sitting. She states she was having a BM and didn't want it to go everywhere. Sit to supine with max assist x2. Pt positioned in bed with pillows in place. RN notified. This section established at most recent assessment PROBLEM LIST (Impairments causing functional limitations): 1. Decreased Strength 2. Decreased Transfer Abilities 3. Decreased Ambulation Ability/Technique 4. Decreased Balance 5. Decreased Activity Tolerance 6. Decreased Flexibility/Joint Mobility 7. Decreased Cognition INTERVENTIONS PLANNED: (Benefits and precautions of physical therapy have been discussed with the patient.) 1. Balance Exercise 2. Bed Mobility 3. Gait Training 4. Home Exercise Program (HEP) 5. Therapeutic Activites 6. Therapeutic Exercise/Strengthening 7. Transfer Training TREATMENT PLAN: Frequency/Duration: 3 times a week for duration of hospital stay Rehabilitation Potential For Stated Goals: Good REHAB RECOMMENDATIONS (at time of discharge pending progress):   
Placement: It is my opinion, based on this patient's performance to date, that Ms. Atha Oppenheim may benefit from intensive therapy at a [address] [place] [address] after discharge due to the functional deficits listed above that are likely to improve with skilled rehabilitation and concerns that he/she may be unsafe to be unsupervised at home due to weakness, fall risk, currently functioning far below baseline with mobility . Equipment: ? Tbd pending progress HISTORY:  
History of Present Injury/Illness (Reason for Referral): 
Per H&P, \"Pt is a 80 y.o. female with CHF (EF 30-35%), pulm HTN, Afib (on warfarin), h/o PE/DVT, thyroid CA s/p ESCALERA (now with iatrogenic hypothyroidism), who was admitted several weeks ago for diarrhea, abd pain, N/V. Found to have SBO which resolved with conservative measures. During this admit, imaging also incidentally found moderate R pleural effusion, 12mm breast mass, and L4 lytic lesion with compression fracture. She was discharged home to f/u as outpt to eval these additional issues. D/c'd on Keflex for UTI. She returns now due to persistent diarrhea, at least 4-5 watery stools/day, which has been ongoing for at least 6-8 weeks (was persistent for 3-4 weeks prior to admit for SBO). Nonbloody. She also endorses poor PO intake, no appetite, weight loss of approx 30 lbs over past 6 months, no energy. She also endorses pain to R hip with standing which is worsening. In ED, VSS, afebrile. No leukocytosis, K 2.5, Cr 1.71. CT abd/pelv (without contrast) showing possible early colitis, new moderate to large pericardial effusion, and large lytic lesion and pathological fracture at L4\" Past Medical History/Comorbidities: Ms. Elisabeth Choi  has a past medical history of Acquired hypothyroidism, Acute pancreatitis, Arthritis, CAD (coronary artery disease) (4/29/11), Cancer ([address] CHF (congestive heart failure) ([address] (11/8/2018), Chronic pain, Coagulopathy ([address] (6/16/2011), Colon polyps, Follicular thyroid cancer ([address] Gallstone pancreatitis, Glaucoma, Gout, Hypertension, Other ill-defined conditions(799.89), Pancreatitis, Paroxysmal A-fib ([address] (11/8/2018), PE (pulmonary embolism) (2008), Pulmonary HTN ([address] Thromboembolus ([address] (1995), Thyroid disease, and UTI (urinary tract infection) (6/3/2019).  She also has no past medical history of Aneurysm ([address] Asthma, Autoimmune disease ([address] Chronic kidney disease, COPD, Dementia, Diabetes ([address] Difficult intubation, Liver disease, Malignant hyperthermia due to anesthesia, Nausea & vomiting, Neurological disorder, Pseudocholinesterase deficiency, Psychiatric disorder, PUD (peptic ulcer disease), Seizures ([facility] Sleep disorder, Stroke ([facility] [address] [facility] Unspecified adverse effect of anesthesia, or Unspecified sleep apnea. Ms. Daniel Leal  has a past surgical history that includes hx thyroidectomy (2010); hx appendectomy (?1990); hx tonsillectomy; hx other surgical (1995); pr total hip arthroplasty (2008 & 2011); and hx cholecystectomy (6/2011). Social History/Living Environment:  
Home Environment: Private residence # Steps to Enter: 2 One/Two Story Residence: One story Living Alone: No 
Support Systems: Child(chavo), Family member(s) Patient Expects to be Discharged to[de-identified] Private residence Current DME Used/Available at Home: Cane, straight, Commode, bedside, Walker, rolling, Wheelchair Tub or Shower Type: Shower Prior Level of Function/Work/Activity: 
Pt has 24/7 supervision and assistance at home from her sons. 1-2 months ago, pt was ambulatory around household with one crutch. Was cooking and performing all activities without assist. Since being sick, she is needing much more assistance per family and needs 1-2 people to help with bedside commode transfer. No falls. Number of Personal Factors/Comorbidities that affect the Plan of Care: 1-2: MODERATE COMPLEXITY EXAMINATION:  
Most Recent Physical Functioning:  
Gross Assessment: 
  
         
  
Posture: 
  
Balance: 
  Bed Mobility: 
Supine to Sit: Minimum assistance Sit to Supine: Maximum assistance;Assist x2 Wheelchair Mobility: 
  
Transfers: 
Sit to Stand: Maximum assistance;Assist x2 Stand to Sit: Maximum assistance;Assist x2 Gait: 
  
   
  
Body Structures Involved: 1. Joints 2. Muscles Body Functions Affected: 1. Mental 
2. Movement Related 3. Digestive Activities and Participation Affected: 1. General Tasks and Demands 2. Mobility 3. Self Care 4. Domestic Life 5. Community, Social and [place] Number of elements that affect the Plan of Care: 4+: HIGH COMPLEXITY CLINICAL PRESENTATION:  
Presentation: Evolving clinical presentation with changing clinical characteristics: MODERATE COMPLEXITY CLINICAL DECISION MAKIN[address] [place] [address] AM-PAC 6 Clicks Basic Mobility Inpatient Short Form How much difficulty does the patient currently have. .. Unable A Lot A Little None 1. Turning over in bed (including adjusting bedclothes, sheets and blankets)? ? 1   ? 2   ? 3   ? 4  
2. Sitting down on and standing up from a chair with arms ( e.g., wheelchair, bedside commode, etc.)   ? 1   ? 2   ? 3   ? 4  
3. Moving from lying on back to sitting on the side of the bed?   ? 1   ? 2   ? 3   ? 4 How much help from another person does the patient currently need. .. Total A Lot A Little None 4. Moving to and from a bed to a chair (including a wheelchair)? ? 1   ? 2   ? 3   ? 4  
5. Need to walk in hospital room? ? 1   ? 2   ? 3   ? 4  
6. Climbing 3-5 steps with a railing? ? 1   ? 2   ? 3   ? 4  
© , Trustees of [address] [place] [address], under license to [employer]. All rights reserved Score:  Initial: 9 Most Recent: X (Date: -- ) Interpretation of Tool:  Represents activities that are increasingly more difficult (i.e. Bed mobility, Transfers, Gait). Medical Necessity:    
· Patient demonstrates good ·  rehab potential due to higher previous functional level. Reason for Services/Other Comments: 
· Patient continues to demonstrate capacity to improve strength, mobility, transfers, balance which will increase independence and increase safety · . Use of outcome tool(s) and clinical judgement create a POC that gives a: Questionable prediction of patient's progress: MODERATE COMPLEXITY  
  
 
 
 
TREATMENT:  
(In addition to Assessment/Re-Assessment sessions the following treatments were rendered) Pre-treatment Symptoms/Complaints:  \"Why do I have to have therapy? \" 
Pain: Initial: Post Session:  No complaints of pain Therapeutic Activity: (    20 minutes): Therapeutic activities including Bed transfers to improve mobility, strength and balance. Required minimal   to promote dynamic balance in sitting. Braces/Orthotics/Lines/Etc:  
· IV 
· O2 Device: Room air Treatment/Session Assessment:   
· Response to Treatment:  Pt still requiring extensive assist for sit to stand. · Interdisciplinary Collaboration:  
o Physical Therapy Assistant 
o Registered Nurse 
o Rehabilitation Attendant · After treatment position/precautions:  
o Supine in bed 
o Bed/Chair-wheels locked 
o Bed in low position 
o Call light within reach 
o Family at bedside · Compliance with Program/Exercises: Will assess as treatment progresses · Recommendations/Intent for next treatment session: \"Next visit will focus on advancements to more challenging activities and reduction in assistance provided\". Total Treatment Duration: PT Patient Time In/Time Out Time In: 1561 Time Out: 1335 Flako Warren, PTA

## 2019-06-18 NOTE — PROGRESS NOTES
Problem: Risk for Spread of Infection Goal: Prevent transmission of infectious organism to others Description Prevent the transmission of infectious organisms to other patients, staff members, and visitors. Outcome: Progressing Towards Goal 
  
Problem: Patient Education:  Go to Education Activity Goal: Patient/Family Education Outcome: Progressing Towards Goal 
  
Problem: Falls - Risk of 
Goal: *Absence of Falls Description Document Amadou Hampton Fall Risk and appropriate interventions in the flowsheet.  
Outcome: Progressing Towards Goal

## 2019-06-18 NOTE — PROGRESS NOTES
Warfarin dosing per pharmacist 
 
Angelita Hudson is a 80 y.o. female. Height: 5' 2\" (157.5 cm)    Weight: 42.8 kg (94 lb 5.7 oz) Indication:  Afib, PE, DVT Goal INR:  2-3 Home dose:  2 mg qhs 
 
Risk factors or significant drug interactions:  Levofloxacin, metronidazole, Amiodarone (PTA) Other anticoagulants:  none Daily Monitoring Date  INR     Warfarin dose HGB              Notes 6/13  ---  Held  12.8 
6/14  ---  Held  11.1 6/15  ---  Held  10.2 6/16  4.9  Hold  11.0   
6/17  3.0  0.5 mg  10.9 6/18  2.3  1 mg  11.2 Pharmacy is consulted to manage warfarin for Ms. Kvng Monaco during this admission. Need to utilize caution due to drug interactions with metronidazole and levofloxacin. INR continues to trend down, now down to within therapeutic range at 2.3. Will cautiously give 1 mg this evening. Follow INR daily. Pharmacy will continue to follow. Please call with any questions. Thank you, 
Ivana Magana, PharmD Clinical Pharmacist 
[phone]

## 2019-06-18 NOTE — PROGRESS NOTES
Met with patient at bedside. Discussed discharging planning with patient. Patient states she does not want SNF rehab at all and the plan is for her to return home with [facility]. Resume orders sent to [facility].

## 2019-06-18 NOTE — PROGRESS NOTES
Hospitalist Progress Note Admit Date:  2019 11:06 AM  
Name:  Dragan Poe Age:  80 y.o. 
:  1933 MRN:  [id number] PCP:  Leigh Rocha MD 
Treatment Team: Attending Provider: Heber Chong MD; Care Manager: Valentine Pittman, RN; Utilization Review: Kirk Mederos RN; Charge Nurse: Dillon Walters Subjective: The patient was examined at bedside. She was found in no distress. She felt okay except a little weak. Adult children were present in the room. Objective:  
 
Patient Vitals for the past 24 hrs: 
 Temp Pulse Resp BP SpO2  
19 0400 97.8 °F (36.6 °C) (!) 59 17 93/50 99 % 19 0100  (!) 59  129/57   
19 2225 98.2 °F (36.8 °C) (!) 55 16 92/47 100 % 19 1916 98.3 °F (36.8 °C) 60 16 100/54 97 % 19 1708 97.9 °F (36.6 °C) (!) 56 18 124/53 100 % 19 1359 98.1 °F (36.7 °C) 60 18 114/64 98 % 19 0815 98.1 °F (36.7 °C) (!) 55 18 109/59 99 % Oxygen Therapy O2 Sat (%): 99 % (19 0400) Pulse via Oximetry: 61 beats per minute (19 1840) O2 Device: Room air (06/15/19 1917) Intake/Output Summary (Last 24 hours) at 2019 0740 Last data filed at 2019 0401 Gross per 24 hour Intake 720 ml Output 2103 ml Net -1383 ml REVIEW OF SYSTEMS: Comprehensive ROS performed and negative except as stated in HPI. Physical Examination: 
Alert, in no distress Heent: perrla, no jvd Cardiac: no murmurs, no gallops Lungs: no rales, no wheezing, no accessory muscle use Abdomen: soft, non tender Extremities: no edema, pulses present Neurology: grossly normal 
Skin: no rash Data Review: 
I have reviewed all labs, meds, telemetry events, and studies from the last 24 hours. Recent Results (from the past 24 hour(s)) PLEASE READ & DOCUMENT PPD TEST IN 72 HRS Collection Time: 19 11:00 AM  
Result Value Ref Range PPD Negative Negative  
 mm Induration 0 0 - 5 mm PROTHROMBIN TIME + INR  
 Collection Time: 06/18/19  5:34 AM  
Result Value Ref Range Prothrombin time 24.9 (H) 11.7 - 14.5 sec INR 2.3    
CBC WITH AUTOMATED DIFF Collection Time: 06/18/19  5:34 AM  
Result Value Ref Range WBC 3.8 (L) 4.3 - 11.1 K/uL  
 RBC 4.39 4.05 - 5.2 M/uL  
 HGB 11.2 (L) 11.7 - 15.4 g/dL HCT 35.0 (L) 35.8 - 46.3 % MCV 79.7 79.6 - 97.8 FL  
 MCH 25.5 (L) 26.1 - 32.9 PG  
 MCHC 32.0 31.4 - 35.0 g/dL  
 RDW 18.4 (H) 11.9 - 14.6 % PLATELET 79 (L) 333 - 450 K/uL MPV Unable to calculate. Recommend adding IPF. 9.4 - 12.3 FL ABSOLUTE NRBC 0.00 0.0 - 0.2 K/uL  
 DF AUTOMATED NEUTROPHILS 47 43 - 78 % LYMPHOCYTES 37 13 - 44 % MONOCYTES 10 4.0 - 12.0 % EOSINOPHILS 6 0.5 - 7.8 % BASOPHILS 0 0.0 - 2.0 % IMMATURE GRANULOCYTES 0 0.0 - 5.0 %  
 ABS. NEUTROPHILS 1.8 1.7 - 8.2 K/UL  
 ABS. LYMPHOCYTES 1.4 0.5 - 4.6 K/UL  
 ABS. MONOCYTES 0.4 0.1 - 1.3 K/UL  
 ABS. EOSINOPHILS 0.2 0.0 - 0.8 K/UL  
 ABS. BASOPHILS 0.0 0.0 - 0.2 K/UL  
 ABS. IMM. GRANS. 0.0 0.0 - 0.5 K/UL METABOLIC PANEL, BASIC Collection Time: 06/18/19  5:34 AM  
Result Value Ref Range Sodium 143 136 - 145 mmol/L Potassium 4.2 3.5 - 5.1 mmol/L Chloride 109 (H) 98 - 107 mmol/L  
 CO2 26 21 - 32 mmol/L Anion gap 8 7 - 16 mmol/L Glucose 90 65 - 100 mg/dL BUN 13 8 - 23 MG/DL Creatinine 1.02 (H) 0.6 - 1.0 MG/DL  
 GFR est AA >60 >60 ml/min/1.73m2 GFR est non-AA 55 (L) >60 ml/min/1.73m2 Calcium 7.6 (L) 8.3 - 10.4 MG/DL All Micro Results Procedure Component Value Units Date/Time Nancy Coelho [[id number]] Collected:  06/13/19 2114 Order Status:  Completed Specimen:  Stool Updated:  06/16/19 9233 Special Requests: NO SPECIAL REQUESTS Culture result:    
  No Salmonella, Shigella, or Ecoli 0157 isolated. CULTURE, URINE [[id number]]  (Abnormal)  (Susceptibility) Collected:  06/14/19 9566 Order Status:  Completed Specimen:  Urine Updated:  06/16/19 0433 Special Requests: NO SPECIAL REQUESTS Culture result:    
  >100,000 COLONIES/mL MORGANELLA MORGANII  
     
      
  10,000 to 50,000 COLONIES/mL MIXED SKIN OTILIA ISOLATED C. DIFFICILE AG & TOXIN A/B [[id number]] Collected:  06/13/19 2114 Order Status:  Completed Specimen:  Stool Updated:  06/14/19 1116 [address] [place] ANTIGEN    
  C. DIFFICILE GDH ANTIGEN-NEGATIVE  
     
  C. difficile toxin C. DIFFICILE TOXIN-NEGATIVE  
     
  PCR Reflex NOT APPLICABLE INTERPRETATION    
  NEGATIVE FOR TOXIGENIC C. DIFFICILE Clinical Consideration NEGATIVE FOR TOXIGENIC C. DIFFICILE  
     
 OVA & Launie Reji [[id number]] Collected:  06/13/19 2114 Order Status:  Completed Specimen:  Stool Updated:  06/13/19 2137 Current Meds: 
Current Facility-Administered Medications Medication Dose Route Frequency  lisinopril (PRINIVIL, ZESTRIL) tablet 10 mg  10 mg Oral DAILY  warfarin (COUMADIN) tablet 0.5 mg  0.5 mg Oral QPM  
 metoprolol succinate (TOPROL-XL) XL tablet 25 mg  25 mg Oral DAILY  metroNIDAZOLE (FLAGYL) IVPB premix 500 mg  500 mg IntraVENous Q12H  furosemide (LASIX) injection 40 mg  40 mg IntraVENous Q12H  potassium chloride (K-DUR, KLOR-CON) SR tablet 20 mEq  20 mEq Oral BID  levoFLOXacin (LEVAQUIN) 750 mg in D5W IVPB  750 mg IntraVENous Q48H  
 gabapentin (NEURONTIN) capsule 600 mg  600 mg Oral DAILY  ondansetron (ZOFRAN ODT) tablet 4 mg  4 mg Oral Q8H PRN  
 oxyCODONE IR (ROXICODONE) tablet 5 mg  5 mg Oral Q6H PRN  
 simvastatin (ZOCOR) tablet 20 mg  20 mg Oral QHS  acetaminophen (TYLENOL) tablet 650 mg  650 mg Oral Q4H PRN  
 naloxone (NARCAN) injection 0.4 mg  0.4 mg IntraVENous PRN  
 diphenhydrAMINE (BENADRYL) capsule 25 mg  25 mg Oral Q4H PRN  
 ondansetron (ZOFRAN) injection 4 mg  4 mg IntraVENous Q4H PRN  
 bisacodyl (DULCOLAX) tablet 10 mg  10 mg Oral DAILY PRN  
 zolpidem (AMBIEN) tablet 5 mg  5 mg Oral QHS PRN  
  sodium chloride (NS) flush 5-40 mL  5-40 mL IntraVENous Q8H  
 sodium chloride (NS) flush 5-40 mL  5-40 mL IntraVENous PRN Diet: DIET NUTRITIONAL SUPPLEMENTS 
DIET GI SOFT Other Studies (last 24 hours): No results found. Assessment and Plan:  
 
Hospital Problems as of 6/18/2019 Date Reviewed: 6/23/2011 Codes Class Noted - Resolved POA Pancytopenia ([facility] ICD-10-CM: U86.355 ICD-9-CM: 284.19  6/16/2019 - Present Unknown Hypotension ICD-10-CM: I95.9 ICD-9-CM: 458.9  6/15/2019 - Present Unknown Moderate protein malnutrition (HCC) ICD-10-CM: E44.0 ICD-9-CM: 263.0  6/15/2019 - Present Unknown Failure to thrive (0-17) ICD-10-CM: R62.51 
ICD-9-CM: 783.41  6/15/2019 - Present Unknown Hyperthyroidism ICD-10-CM: E05.90 ICD-9-CM: 242.90  6/15/2019 - Present Unknown Breast mass ICD-10-CM: N63.0 ICD-9-CM: 611.72  6/14/2019 - Present Unknown PAF (paroxysmal atrial fibrillation) (HCC) ICD-10-CM: I48.0 ICD-9-CM: 427.31  6/14/2019 - Present Unknown Colitis ICD-10-CM: K52.9 ICD-9-CM: 558.9  6/14/2019 - Present Unknown Diarrhea ICD-10-CM: R19.7 ICD-9-CM: 787.91  6/13/2019 - Present Unknown UTI (urinary tract infection) ICD-10-CM: N39.0 ICD-9-CM: 599.0  6/3/2019 - Present Yes Chronic systolic dysfunction of left ventricle (Chronic) ICD-10-CM: I51.9 ICD-9-CM: 429.9  5/29/2019 - Present Yes Compression fracture of fourth lumbar vertebra (HCC) ICD-10-CM: G10.246Y ICD-9-CM: 805.4  5/29/2019 - Present Yes Thromboembolic disorder ([facility] TGY-77-HJ: I74.9 ICD-9-CM: 444.9  5/29/2019 - Present Yes * (Principal) 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  
  thyroglobulin high > 100 obvious residual thyroid tissue Pericardial effusion ICD-10-CM: I31.3 ICD-9-CM: 423.9  6/17/2011 - Present Yes A/P:   
 
-Morganella Morganii UTI:  
Dc levaquin and start po vantin 
 
-Iatrogenic hyperthyroidism: 
The patient was on 75 mcg of synthroid Hx of follicular thyroid cancer treated ESCALERA--this plan is unchanged TSH decreased upon arrival  
She was held of synthroid and will need ths,t3,t4 ,levels repeated in 2-3 weeks She had diarrhea and hypotension on admission, possible her symptoms could be explained by this -Hypovolemia/hypotension: resolved Possible medication induced 
 
-colitis and chronic watery diarrhea: 
Appears resolved and gi has signed off 
Continue po flagyl. Start florastor  
 
-moderate - severe protein betzaida. Malnutrition 
-Cardiac cachexia:  
Nutrition consult, diet and supplementation 
 
-acute and chronic systolic and diastolic chf:  Improving 
-Moderate, pericardial effusion:  
Physical exam with no overt signs of vol overload Change to po lasix. Cardiology signed off. Will refer as outpatient   
 
- pancytopenia: not new finding She had pancytopenia in the past. No history of BMx Possible related to her current infection Will refer to hematology as outpatient unless her values worsen -- warfarin coagulopathy: 
Levaquin was stopped, this may worsens INR readings Start po vantin for her UTI Pharmacy managing-- low dose vit k if worsens. -Breast [id number] prior imaging, L4 lytic lesion: L4 lesion chronic and seen 2011 ct, daughter reports possible prior Report of breast lesion Monitor  
 
-Hx of significant thromboembolic disease + IVC filter:  
-A fib: On warfarin, amiodarone DVT ppx: Anticoagulated Code status:full Medical decision maker:self- daughter Brandan Bush # on file Disposition: home tomorrow Signed: 
Daniel Paez MD

## 2019-06-18 NOTE — PROGRESS NOTES
Order for discharge plan recommendation received from Dr. Ernesto Dominique. Per CM Ms. Ary Rock will be going home with home health.    
Annie Springer, PT

## 2019-06-18 NOTE — PROGRESS NOTES
Patient didn't complain of pain or distress. Hourly rounds done. Call light within reach. Vitals remained stable. Patient in bed surrounded by family.

## 2019-06-18 NOTE — PROGRESS NOTES
Problem: Mobility Impaired (Adult and Pediatric) Goal: *Acute Goals and Plan of Care (Insert Text) Description LTG: 
(1.)Ms. Mehran Hubbard will move from supine to sit and sit to supine , scoot up and down and roll side to side with MODERATE ASSIST of 1 within 7 treatment day(s). MET 6/17/19 
(2.)Ms. Mehran Hubbard will perform seated activities and exercises at edge of bed for 10+ minutes to improve strength and prepare for transfers within 7 days. (3.)Ms. Mehran Hubbard will transfer from bed to chair and chair to bed with MAXIMAL ASSIST using the least restrictive device within 7 treatment day(s). 4.  Ms. Mehran Hubbard will perform gait with rolling walker and mod assist of 2  20 ft in 7 days. ________________________________________________________________________________________________ Outcome: Progressing Towards Goal 
  
PHYSICAL THERAPY: Daily Note and PM 6/18/2019 INPATIENT: PT Visit Days : 3 Payor: [facility] MEDICARE / Plan: [address] / Product Type: [employer] Medicare /   
  
NAME/AGE/GENDER: Jorge A Saucedo is a 80 y.o. 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  
 · Generalized Muscle Weakness (M62.81) · Difficulty in walking, Not elsewhere classified (R26.2) · Other abnormalities of gait and mobility (R26.89) Precaution/Allergies: 
Ativan [lorazepam]; Lortab [hydrocodone-acetaminophen]; and Morphine ASSESSMENT:  
Ms. Mehran Hubbard is an 80year old female admitted from home for diarrhea. She lives with family who provides 24/7 assistance. 1-2 months ago she was ambulating with mod I using one crutch due to right hip surgery when she was a teenager.  Has leg length discrepancy with shorter R LE from surgery and wears built up shoe at all times. Today after some encouragement and with her family present she agreed to participate. Needed extra time and she is very unconventional about the way she moves and does things. Needed really to just roll with it and listen to what she wanted. Her family was very helpful and it is understandable now why they will take care of her and that she does not go to rehab. Home health will be good. She got to the edge of the bed with min assist.  Sit to stand with max assist of 2 and then gait with max assist of 3 with son helping. It was 1 person on either side and son in front. It worked. She was so happy that she walked. She has a built up shoe that she must have on to get on her feet. It took extra time but it really went well. This section established at most recent assessment PROBLEM LIST (Impairments causing functional limitations): 1. Decreased Strength 2. Decreased Transfer Abilities 3. Decreased Ambulation Ability/Technique 4. Decreased Balance 5. Decreased Activity Tolerance 6. Decreased Flexibility/Joint Mobility 7. Decreased Cognition INTERVENTIONS PLANNED: (Benefits and precautions of physical therapy have been discussed with the patient.) 1. Balance Exercise 2. Bed Mobility 3. Gait Training 4. Home Exercise Program (HEP) 5. Therapeutic Activites 6. Therapeutic Exercise/Strengthening 7. Transfer Training TREATMENT PLAN: Frequency/Duration: 3 times a week for duration of hospital stay Rehabilitation Potential For Stated Goals: Good REHAB RECOMMENDATIONS (at time of discharge pending progress):   
Placement: It is my opinion, based on this patient's performance to date, that Ms. Atha Oppenheim may benefit from intensive therapy at a [address] [place] [address] after discharge due to the functional deficits listed above that are likely to improve with skilled rehabilitation and concerns that he/she may be unsafe to be unsupervised at home due to weakness, fall risk, currently functioning far below baseline with mobility . Equipment: ? Tbd pending progress HISTORY:  
History of Present Injury/Illness (Reason for Referral): 
Per H&P, \"Pt is a 80 y.o. female with CHF (EF 30-35%), pulm HTN, Afib (on warfarin), h/o PE/DVT, thyroid CA s/p ESCALERA (now with iatrogenic hypothyroidism), who was admitted several weeks ago for diarrhea, abd pain, N/V. Found to have SBO which resolved with conservative measures. During this admit, imaging also incidentally found moderate R pleural effusion, 12mm breast mass, and L4 lytic lesion with compression fracture. She was discharged home to f/u as outpt to eval these additional issues. D/c'd on Keflex for UTI. She returns now due to persistent diarrhea, at least 4-5 watery stools/day, which has been ongoing for at least 6-8 weeks (was persistent for 3-4 weeks prior to admit for SBO). Nonbloody. She also endorses poor PO intake, no appetite, weight loss of approx 30 lbs over past 6 months, no energy. She also endorses pain to R hip with standing which is worsening. In ED, VSS, afebrile. No leukocytosis, K 2.5, Cr 1.71. CT abd/pelv (without contrast) showing possible early colitis, new moderate to large pericardial effusion, and large lytic lesion and pathological fracture at L4\" Past Medical History/Comorbidities: Ms. Agustín Uribe  has a past medical history of Acquired hypothyroidism, Acute pancreatitis, Arthritis, CAD (coronary artery disease) (4/29/11), Cancer ([address] CHF (congestive heart failure) ([address] (11/8/2018), Chronic pain, Coagulopathy ([address] (6/16/2011), Colon polyps, Follicular thyroid cancer ([address] Gallstone pancreatitis, Glaucoma, Gout, Hypertension, Other ill-defined conditions(799.89), Pancreatitis, Paroxysmal A-fib ([address] (11/8/2018), PE (pulmonary embolism) (2008), Pulmonary HTN ([address] Thromboembolus ([facility] [address] (1995), Thyroid disease, and UTI (urinary tract infection) (6/3/2019). She also has no past medical history of Aneurysm ([address] Asthma, Autoimmune disease ([address] Chronic kidney disease, COPD, Dementia, Diabetes ([address] Difficult intubation, Liver disease, Malignant hyperthermia due to anesthesia, Nausea & vomiting, Neurological disorder, Pseudocholinesterase deficiency, Psychiatric disorder, PUD (peptic ulcer disease), Seizures ([address] Sleep disorder, Stroke ([address] Unspecified adverse effect of anesthesia, or Unspecified sleep apnea. Ms. Atha Oppenheim  has a past surgical history that includes hx thyroidectomy (2010); hx appendectomy (?1990); hx tonsillectomy; hx other surgical (1995); pr total hip arthroplasty (2008 & 2011); and hx cholecystectomy (6/2011). Social History/Living Environment:  
Home Environment: Private residence # Steps to Enter: 2 One/Two Story Residence: One story Living Alone: No 
Support Systems: Child(chavo), Family member(s) Patient Expects to be Discharged to[de-identified] Private residence Current DME Used/Available at Home: Cane, straight, Commode, bedside, Walker, rolling, Wheelchair Tub or Shower Type: Shower Prior Level of Function/Work/Activity: 
Pt has 24/7 supervision and assistance at home from her sons. 1-2 months ago, pt was ambulatory around household with one crutch. Was cooking and performing all activities without assist. Since being sick, she is needing much more assistance per family and needs 1-2 people to help with bedside commode transfer. No falls. Number of Personal Factors/Comorbidities that affect the Plan of Care: 1-2: MODERATE COMPLEXITY EXAMINATION:  
Most Recent Physical Functioning:  
Gross Assessment: 
  
         
  
Posture: 
  
Balance: 
Sitting: Impaired Sitting - Static: Fair (occasional); Supported sitting Sitting - Dynamic: Fair (occasional); Supported sitting Standing: Impaired Standing - Static: Poor Standing - Dynamic : Poor Bed Mobility: 
Supine to Sit: Contact guard assistance;Minimum assistance Sit to Supine: Maximum assistance;Assist x2 Wheelchair Mobility: 
  
Transfers: 
Sit to Stand: Maximum assistance;Assist x2 Stand to Sit: Maximum assistance;Assist x2 Gait: 
  
Base of Support: Narrowed Speed/Laura: Slow Step Length: Right shortened;Left shortened Distance (ft): 6 Feet (ft) Ambulation - Level of Assistance: Maximum assistance(of 3) Body Structures Involved: 1. Joints 2. Muscles Body Functions Affected: 1. Mental 
2. Movement Related 3. Digestive Activities and Participation Affected: 1. General Tasks and Demands 2. Mobility 3. Self Care 4. Domestic Life 5. Community, Social and [place] Number of elements that affect the Plan of Care: 4+: HIGH COMPLEXITY CLINICAL PRESENTATION:  
Presentation: Evolving clinical presentation with changing clinical characteristics: MODERATE COMPLEXITY CLINICAL DECISION MAKING:  
[employer] 6 Clicks Basic Mobility Inpatient Short Form How much difficulty does the patient currently have. .. Unable A Lot A Little None 1. Turning over in bed (including adjusting bedclothes, sheets and blankets)? ? 1   ? 2   ? 3   ? 4  
2. Sitting down on and standing up from a chair with arms ( e.g., wheelchair, bedside commode, etc.)   ? 1   ? 2   ? 3   ? 4  
3. Moving from lying on back to sitting on the side of the bed?   ? 1   ? 2   ? 3   ? 4 How much help from another person does the patient currently need. .. Total A Lot A Little None 4. Moving to and from a bed to a chair (including a wheelchair)? ? 1   ? 2   ? 3   ? 4  
5. Need to walk in hospital room? ? 1   ? 2   ? 3   ? 4  
6. Climbing 3-5 steps with a railing? ? 1   ? 2   ? 3   ? 4  
© 2007, Trustees of [facility] [employer], under license to [employer]. All rights reserved Score:  Initial: 9 Most Recent: X (Date: -- ) Interpretation of Tool:  Represents activities that are increasingly more difficult (i.e. Bed mobility, Transfers, Gait). Medical Necessity:    
· Patient demonstrates good ·  rehab potential due to higher previous functional level. Reason for Services/Other Comments: 
· Patient continues to demonstrate capacity to improve strength, mobility, transfers, balance which will increase independence and increase safety · . Use of outcome tool(s) and clinical judgement create a POC that gives a: Questionable prediction of patient's progress: MODERATE COMPLEXITY  
  
 
 
 
TREATMENT:  
(In addition to Assessment/Re-Assessment sessions the following treatments were rendered) Pre-treatment Symptoms/Complaints:  \"Why do I have to have therapy? \" 
Pain: Initial:  
Pain Intensity 1: 0  Post Session:  No complaints of pain Therapeutic Activity: (    20 minutes): Therapeutic activities including Bed transfers to improve mobility, strength and balance. Required minimal   to promote dynamic balance in sitting. Braces/Orthotics/Lines/Etc:  
· IV 
· O2 Device: Room air Treatment/Session Assessment:   
· Response to Treatment:  Pt still requiring extensive assist for sit to stand. · Interdisciplinary Collaboration:  
o Physical Therapy Assistant 
o Registered Nurse 
o Rehabilitation Attendant · After treatment position/precautions:  
o Supine in bed 
o Bed/Chair-wheels locked 
o Bed in low position 
o Call light within reach 
o Family at bedside · Compliance with Program/Exercises: Will assess as treatment progresses · Recommendations/Intent for next treatment session: \"Next visit will focus on advancements to more challenging activities and reduction in assistance provided\". Total Treatment Duration: PT Patient Time In/Time Out Time Out: [id number], PT

## 2019-06-18 NOTE — PROGRESS NOTES
Hourly rounds completed on patient. All needs are met. No complaints at this time. Bed locked in and in low position. Family at bedside. Call light within reach. Will report to oncoming nurse at bedside.

## 2019-06-18 NOTE — PROGRESS NOTES
[facility] CARDIOLOGY PROGRESS NOTE 
      
 
6/18/2019 11:10 AM 
 
Admit Date: 6/13/2019 Subjective: No cp or sob Objective:  
  
Vitals:  
 06/18/19 0100 06/18/19 0400 06/18/19 0800 06/18/19 2886 BP: 129/57 93/50 108/57 104/61 Pulse: (!) 59 (!) 59 (!) 53 61 Resp:  17 18 18 Temp:  97.8 °F (36.6 °C) 98.4 °F (36.9 °C) 97.6 °F (36.4 °C) SpO2:  99% 98% 99% Weight:  42.8 kg (94 lb 5.7 oz) Height:      
 
 
Physical Exam: 
General-No Acute Distress Neck- supple, no JVD 
CV- regular rate and rhythm no MRG Lung- clear bilaterally Abd- soft, nontender, nondistended Ext- no edema bilaterally. Skin- warm and dry Data Review:  
Recent Labs  
  06/18/19 
0534 06/17/19 
8914  144  
K 4.2 4.4 BUN 13 8 CREA 1.02* 0.99 GLU 90 83 WBC 3.8* 3.5* HGB 11.2* 10.9* HCT 35.0* 34.7*  
PLT 79* 94* INR 2.3 3.0 Assessment/Plan:  
 
Principal Problem: 
  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 
  Pericardial effusion (6/17/2011) Follicular thyroid cancer ([facility] [address] (6/19/2011) Chronic systolic dysfunction of left ventricle (5/29/2019) Compression fracture of fourth lumbar vertebra ([facility] [address] (5/29/2019) Thromboembolic disorder ([facility] [address] (7/21/0407) UTI (urinary tract infection) (6/3/2019) Diarrhea (6/13/2019) Breast mass (6/14/2019) PAF (paroxysmal atrial fibrillation) ([facility] [address] (6/14/2019) Colitis (6/14/2019) Hypotension (6/15/2019) Moderate protein malnutrition ([address] (6/15/2019) Failure to thrive (0-17) (6/15/2019) Hyperthyroidism (6/15/2019) Pancytopenia ([address] (6/16/2019) 
 
  //// In spite of her elevated BNP, she is well compensated without volume overload ( flat neck veins, clear lungs, no gallop and no edema).  She is on guideline directed medical therapy. No new recommendation at this time. If low bp an issue then I would decrease dose of lasix to 20. If she achieves outpt status then we can see in follow up. Thanks.  
Huyen Cardoso MD 
6/18/2019 11:10 AM

## 2019-06-19 PROBLEM — R93.7 ABNORMAL CT SCAN, LUMBAR SPINE: Status: ACTIVE | Noted: 2019-01-01

## 2019-06-19 NOTE — ADT AUTH CERT NOTES
Utilization Reviews  
 
   
Systemic or Infectious Condition GRG - Care Day 6 (6/18/2019) by Lauren London  
 
   
Review Status Review Entered Completed 6/19/2019 09:47  
   
Criteria Review Care Day: 6 Care Date: 6/18/2019 Level of Care: Telemetry Guideline Day 3 Level Of Care  
(X) * Activity level acceptable Clinical Status  
(X) * Hemodynamic stability   
(X) * Respiratory status acceptable   
(X) * Neurologic status acceptable   
(X) * Temperature status acceptable ( ) * No infection, or status acceptable ( ) * No neutropenia, or status acceptable ( ) * Special infection or injury situations absent   
(X) * Electrolyte status acceptable ( ) * General Discharge Criteria met Interventions ( ) * Intake acceptable ( ) * No inpatient interventions needed * Milestone Additional Notes c/s review 06/18  
  
internal med - Temp Pulse Resp BP SpO2  
06/18/19 0400 97.8 °F (36.6 °C) (!) 59 17 93/50 99 % Labs: creat 1.02, wbc 3.8, hgb 11.2, hct 35, plt 79 A/P:    
   
-Morganella Morganii UTI:   
Dc levaquin and start po vantin  
   
-Iatrogenic hyperthyroidism:  
The patient was on 75 mcg of synthroid Hx of follicular thyroid cancer treated ESCALERA--this plan is unchanged TSH decreased upon arrival   
She was held of synthroid and will need ths,t3,t4 ,levels repeated in 2-3 weeks She had diarrhea and hypotension on admission, possible her symptoms could be explained by this  
   
-Hypovolemia/hypotension: resolved Possible medication induced  
   
-colitis and chronic watery diarrhea:  
Appears resolved and gi has signed off  
Continue po flagyl. Start florastor   
   
-moderate - severe protein betzaida. Malnutrition  
-Cardiac cachexia:   
Nutrition consult, diet and supplementation  
   
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  
-Moderate, pericardial effusion:   
Physical exam with no overt signs of vol overload Change to po lasix. Cardiology signed off. Will refer as outpatient             
   
- pancytopenia: not new finding She had pancytopenia in the past. No history of BMx Possible related to her current infection Will refer to hematology as outpatient unless her values worsen  
   
-- warfarin coagulopathy:  
Levaquin was stopped, this may worsens INR readings Start po vantin for her UTI Pharmacy managing-- low dose vit k if worsens.  
   
-Breast [id number] prior imaging, L4 lytic lesion: L4 lesion chronic and seen 2011 ct, daughter reports possible prior Report of breast lesion Monitor   
   
-Hx of significant thromboembolic disease + IVC filter:   
-A fib: On warfarin, amiodarone   
   
   
DVT ppx: Anticoagulated   
  
  
  
  
  
   
c/s review 06/16 by Marino Tirado  
 
   
Review Status Review Entered In Primary 6/19/2019 09:45  
   
Criteria Review c/s review 06/16 
  Temp Pulse Resp BP SpO2  
06/16/19 1150 97.7 °F (36.5 °C) 87 16 100/46 100 % Labs: wbc 3.1, hgb 11, hct 34.1, cl 117, alb 2.4, tp 5.9, inr 4.9 
  
gi note -  
Plan:  
  
Patient's diarrhea seems to have resolved Will arrange outpatient follow up 
  
Internal med - Cc: poor appetite 
  
Discussed coagulopathy has recurred. inr 4.9 - intial order vit k low dose held as not shown to reduce serious bleed risk and pt with severe coagulopathy described by daughter. Reviewed old imaging studies and L4 lesion seen 2011, no reports of right breast 12 mm nodule in past by avail. Studies but daughter reports this is not new. Will need mammogram when dc. 
            PINEDA is morganella morganii and sens. To quinolone   
            Acute chf combined acute and chronic syst and diast. Chf. With increased bmp today but increased BP with iv diuresis allowing us to start arb and toprol low dose. Discussed plan slow titration with daughter.  Continue iv diuresis for now.  
  
             T4 hyperthyroidism-- levothyroxine (T4) held. 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 -- will need outpt follow up. HOPEFUL this is cause of wt loss and diarrhea.  
  
            Pancytopenia- new- possible due to total body fluid overload- follow and if not improved with diuresis - hematology consult and PBS.   
Appetite improved today per daughter, hopeful that passive liver congestion is improving. Resp. Status stable and small mushy bm today. No diarrhea  
   
c/s review 06/15 by Aaron Gudino Status Review Entered In Primary 6/19/2019 09:01  
   
Criteria Review c/s review 06/15 
06/15/19 1133 97.5 °F (36.4 °C) (!) 51 16 100/53 97 %  
labs: wbc 3.3, hgb 10.2, hct 33.1, plt 93, cl 112, co2 18, ca 8, mg 2.5, glu 140, alb 2.3, tp 5.7 
cxr - Impression:  Mild CHF/volume overload. gi note - No further episodes of diarrhea. Some mild abdominal discomfort Assessment:  
  
Principal Problem: 
Augusta Sieving (6/13/2019) Colitis (6/14/2019) Plan:  
Awaiting stool studies Symptoms seem to be improving on their own. Viral gastroenteritis? 
  
Internal med - Called earlier today re: hypotension and stat cxr and bnp revealed acute chf.admit with acute renal insuff. And hypokalemia likely related to stool volume loss. Better with ivf.  GNR ON URINE CULT ID PND. Serum alb 2.3. Following iv lasix today she is improved--BUT BP STILL BORDERLINE .Found to have T4 hyperthyroidism and she does take T4 (levothyroxine which I stopped). Moderate pericardial effusion no hemodynamic compomise likely due to her poor nutritional status.   
A/P:   
-- hypotension 
            Improved with ivlasix for acute s and d chf 
            Continue diuresis if bp normalizes need restart cardiac meds 
            For systolic dysfxn-- doubt septic shock re: uti gnr 
  
-- colitis? and chronic watery diarrhea-  
             Appreciate gi- observe             Keep off levothyroxine 
  
-- T4 hyperthyroidism            
            Observe off levothyroxine (T4) follow tsh t4 t3 total in few week 
            If not improved may  Need thyroid imaging  
  
 --UTI failed oral keflex.  
            CWP- await ID continue levaquin flagyl for now 
  
-- moderate - severe protein betzaida. Malnutrition 
            Nutrition consult, diet and supplementation 
  
-- acute and chronic systolic and diastolic chf 
            lv lasix ,when bp better add arb low dose , toprol xl titrate prn 
            ? Cardiac cachexia contributing to poor appetite/wt loss? 
            Asympt. Moderate effusion pericardium - apprec. Card. consult 
            GRWFUAXO mr JAN, - after load reduction add arb - observe 
  
-- recent sbo- responded to NGT suction-no recurrence 
  
-- Wt loss, Breast [id number] prior imaging, L4 lytic lesion-cor             PPJZ outpt workup if remains stable-if stabilzes consider general surgery consult re: opinion and timing biopsy I need to review old imaging daughter claims thyroid cancer dx by bone bx of lytic spinal lesion? ?? 
  
--clara - resolved 
--hypok- supplement to k 4.0 
  
-- hypomagnesemia             corrected 
  
--hx of follicular thryoid cancer and Iatrogenic hypothyroidism following ESCALERA ablation--- . 
  
-- Hx of significant thromboembolic disease per daughter \"clotted around filter\" in past. Currently pharm manage warfarin - goal inr 2-3 
  
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  
  
DC planning/Dispo: home health DVT ppx:  Anticoagulated  
  
  
   
   
Systemic or Infectious Condition GRG - Care Day 5 (6/17/2019) by Galindo Lares RN  
 
   
Review Status Review Entered Completed 6/17/2019 16:59  
   
Criteria Review Care Day: 5 Care Date: 6/17/2019 Level of Care: Telemetry Guideline Day 3 Level Of Care  
(X) * Activity level acceptable 6/17/2019 16:59:14 EDT by Isela Perdomo   
up ad anel ( ) Floor to discharge 6/17/2019 16:59:14 EDT by Isela Perdomo   
telemetry floor Clinical Status  
(X) * Hemodynamic stability 6/17/2019 16:59:14 EDT by Isela Perdomo HR 55, /59   
  
(X) * Respiratory status acceptable 6/17/2019 16:59:14 EDT by Isela Perdomo RR 18, 99%   
  
(X) * Neurologic status acceptable 6/17/2019 16:59:14 EDT by Isela Perdomo   
alert   
  
(X) * Temperature status acceptable 6/17/2019 16:59:14 EDT by Isela Perodmo T 98.1   
  
( ) * No infection, or status acceptable ( ) * No neutropenia, or status acceptable ( ) * Special infection or injury situations absent   
(X) * Electrolyte status acceptable ( ) * General Discharge Criteria met Interventions ( ) * Intake acceptable 6/17/2019 16:59:14 EDT by Isela Perdomo   
appetite improved ( ) * No inpatient interventions needed * Milestone Additional Notes 6/17/19 Vitals: 98.1, 55, 18, 109/59, 99%  
meds: lasix 40mg IV Q12h, neurontin 600mg PO daily, lisinopril 10mg PO daily, metoprolol 12.5mg PO daily, flagyl 500mg IV Q12h, zocor 20mg PO bedtime, coumadin 0.5mg PO Q evening, levaquin 750mg IV Q48h  
labs: WBC 3.5, hgb 10.9, hct 34.7, platelet 94  
plan: IV abx, AM labs, GI soft diet, PT/OT, up ad anel IM:  
A/P:    
-- hypotension--resolved with iv diuresis (cardiogenic)  
            Monitor with med titration for syst. Dysfxn. --continue iv diuresis at least another 24hr   
            Or as directed by cardiology if followed  
   
-- colitis? and chronic watery diarrhea-  Appears resolved and gi has signed off             Keep off levothyroxine and observe  
   
-- T4 hyperthyroidism             
            Observe off levothyroxine (T4) follow tsh t4 t3 total in few week             If not improved may  Need thyroid imaging   
            No record of suppressive therapy  
            Hx of follicular thyroid cancer treated ESCALERA--this plan is unchanged  
   
 --UTI failed oral keflex.   
            Morganella Morganii  
            Sens. To quinolone--full 7 day and continue with flagyl re: diarrhea, high risk for c.diff.  
   
-- moderate - severe protein betzaida. Malnutrition  
            Nutrition consult, diet and supplementation  
   
-- acute and chronic systolic and diastolic chf- improving  
            lv lasix until euvolemia ,titrate lisinopril to 10mg daily today , toprol xl   
            Continue low dose hr <60. Cardiac cachexia possible contributing to poor appetite/wt loss?  
            Asympt. Moderate effusion pericardium - apprec. Card. consult  
            Moderate mr AI, - Moderate pericardial effusion  
   
-- pancytopenia  
            Follow with diuresis if worsens will need PBS and hematology opinon- unchanged  
   
-- warfarin coagulopathy   
            Hold , Pharmacy managing-- low dose vit k if worsens. Improved today 3.0  
   
-- recent sbo- responded to NGT suction-no recurrence  
   
-- Wt loss, Breast [id number] prior imaging, L4 lytic lesion-cor  
            L4 lesion chronic and seen 2011 ct, daughter reports possible prior             Report of breast lesion(no records seen by me), and hopeful wt  
            Loss is related to T4 hyperthy.  And poor oral intake.  
   
--clara - resolved  
--hypok- keep supplement to k 4.0  
   
-- hypomagnesemia  
            corrected  
   
--hx of follicular thryoid cancer and history of  Iatrogenic hypothyroidism following ESCALERA ablation--- .  
   
-- Hx of significant thromboembolic disease per daughter \"clotted around filter\" in past. Currently pharm manage warfarin - goal inr 2-3  
   
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   
   
DC planning/Dispo: home health DVT ppx:  Anticoagulated

## 2019-06-19 NOTE — PROGRESS NOTES
Patient to discharge home with [facility]. [facility] notified of discharge. Care Management Interventions PCP Verified by CM: Yes Mode of Transport at Discharge: Other (see comment) Transition of Care Consult (CM Consult): Discharge Planning, Home Health [address] [place] [address] Road: No 
Reason Outside [place]: Patient already serviced by other home care/hospice agency Discharge Durable Medical Equipment: No 
Physical Therapy Consult: Yes Occupational Therapy Consult: Yes Current Support Network: Own Home Confirm Follow Up Transport: Family Plan discussed with Pt/Family/Caregiver: Yes Freedom of Choice Offered: Yes Discharge Location Discharge Placement: Home with home health

## 2019-06-19 NOTE — PROGRESS NOTES
Problem: Risk for Spread of Infection Goal: Prevent transmission of infectious organism to others Description Prevent the transmission of infectious organisms to other patients, staff members, and visitors. Outcome: Progressing Towards Goal 
  
Problem: Patient Education:  Go to Education Activity Goal: Patient/Family Education Outcome: Progressing Towards Goal 
  
Problem: Falls - Risk of 
Goal: *Absence of Falls Description Document Catherene Bunting Fall Risk and appropriate interventions in the flowsheet. Outcome: Progressing Towards Goal 
  
Problem: Pressure Injury - Risk of 
Goal: *Prevention of pressure injury Description Document Sid Scale and appropriate interventions in the flowsheet. Outcome: Progressing Towards Goal 
  
Problem: Patient Education: Go to Patient Education Activity Goal: Patient/Family Education Outcome: Progressing Towards Goal

## 2019-06-19 NOTE — DISCHARGE INSTRUCTIONS
Patient Education   Patient Education     DISCHARGE SUMMARY from Nurse    PATIENT INSTRUCTIONS:    After general anesthesia or intravenous sedation, for 24 hours or while taking prescription Narcotics:  · Limit your activities  · Do not drive and operate hazardous machinery  · Do not make important personal or business decisions  · Do  not drink alcoholic beverages  · If you have not urinated within 8 hours after discharge, please contact your surgeon on call. Report the following to your surgeon:  · Excessive pain, swelling, redness or odor of or around the surgical area  · Temperature over 100.5  · Nausea and vomiting lasting longer than 4 hours or if unable to take medications  · Any signs of decreased circulation or nerve impairment to extremity: change in color, persistent  numbness, tingling, coldness or increase pain  · Any questions    What to do at Home:  Recommended activity: Activity as tolerated, complete full course of antibiotics    If you experience any of the following symptoms fever, pain, persistent nausea or vomiting, or any other worrisome symptoms, please follow up with PCP. *  Please give a list of your current medications to your Primary Care Provider. *  Please update this list whenever your medications are discontinued, doses are      changed, or new medications (including over-the-counter products) are added. *  Please carry medication information at all times in case of emergency situations. These are general instructions for a healthy lifestyle:    No smoking/ No tobacco products/ Avoid exposure to second hand smoke  Surgeon General's Warning:  Quitting smoking now greatly reduces serious risk to your health.     Obesity, smoking, and sedentary lifestyle greatly increases your risk for illness    A healthy diet, regular physical exercise & weight monitoring are important for maintaining a healthy lifestyle    You may be retaining fluid if you have a history of heart failure or if you experience any of the following symptoms:  Weight gain of 3 pounds or more overnight or 5 pounds in a week, increased swelling in our hands or feet or shortness of breath while lying flat in bed. Please call your doctor as soon as you notice any of these symptoms; do not wait until your next office visit. The discharge information has been reviewed with the patient. The patient verbalized understanding. Discharge medications reviewed with the patient and appropriate educational materials and side effects teaching were provided. ___________________________________________________________________________________________________________________________________     Urinary Tract Infection in Women: Care Instructions  Your Care Instructions    A urinary tract infection, or UTI, is a general term for an infection anywhere between the kidneys and the urethra (where urine comes out). Most UTIs are bladder infections. They often cause pain or burning when you urinate. UTIs are caused by bacteria and can be cured with antibiotics. Be sure to complete your treatment so that the infection goes away. Follow-up care is a key part of your treatment and safety. Be sure to make and go to all appointments, and call your doctor if you are having problems. It's also a good idea to know your test results and keep a list of the medicines you take. How can you care for yourself at home? · Take your antibiotics as directed. Do not stop taking them just because you feel better. You need to take the full course of antibiotics. · Drink extra water and other fluids for the next day or two. This may help wash out the bacteria that are causing the infection. (If you have kidney, heart, or liver disease and have to limit fluids, talk with your doctor before you increase your fluid intake.)  · Avoid drinks that are carbonated or have caffeine. They can irritate the bladder. · Urinate often.  Try to empty your bladder each time.  · To relieve pain, take a hot bath or lay a heating pad set on low over your lower belly or genital area. Never go to sleep with a heating pad in place. To prevent UTIs  · Drink plenty of water each day. This helps you urinate often, which clears bacteria from your system. (If you have kidney, heart, or liver disease and have to limit fluids, talk with your doctor before you increase your fluid intake.)  · Urinate when you need to. · Urinate right after you have sex. · Change sanitary pads often. · Avoid douches, bubble baths, feminine hygiene sprays, and other feminine hygiene products that have deodorants. · After going to the bathroom, wipe from front to back. When should you call for help? Call your doctor now or seek immediate medical care if:    · Symptoms such as fever, chills, nausea, or vomiting get worse or appear for the first time.     · You have new pain in your back just below your rib cage. This is called flank pain.     · There is new blood or pus in your urine.     · You have any problems with your antibiotic medicine.    Watch closely for changes in your health, and be sure to contact your doctor if:    · You are not getting better after taking an antibiotic for 2 days.     · Your symptoms go away but then come back. Where can you learn more? Go to http://denise-ana m.info/. Enter C159 in the search box to learn more about \"Urinary Tract Infection in Women: Care Instructions. \"  Current as of: March 20, 2018  Content Version: 11.9  © 6666-1925 [employer]. Care instructions adapted under license by [employer] (which disclaims liability or warranty for this information). If you have questions about a medical condition or this instruction, always ask your healthcare professional. [place] [address] any warranty or liability for your use of this information.             Diarrhea: Care Instructions  Your Care Instructions    Diarrhea is loose, watery stools (bowel movements). The exact cause is often hard to find. Sometimes diarrhea is your body's way of getting rid of what caused an upset stomach. Viruses, food poisoning, and many medicines can cause diarrhea. Some people get diarrhea in response to emotional stress, anxiety, or certain foods. Almost everyone has diarrhea now and then. It usually isn't serious, and your stools will return to normal soon. The important thing to do is replace the fluids you have lost, so you can prevent dehydration. The doctor has checked you carefully, but problems can develop later. If you notice any problems or new symptoms, get medical treatment right away. Follow-up care is a key part of your treatment and safety. Be sure to make and go to all appointments, and call your doctor if you are having problems. It's also a good idea to know your test results and keep a list of the medicines you take. How can you care for yourself at home? · Watch for signs of dehydration, which means your body has lost too much water. Dehydration is a serious condition and should be treated right away. Signs of dehydration are:  ? Increasing thirst and dry eyes and mouth. ? Feeling faint or lightheaded. ? Darker urine, and a smaller amount of urine than normal.  · To prevent dehydration, drink plenty of fluids, enough so that your urine is light yellow or clear like water. Choose water and other caffeine-free clear liquids until you feel better. If you have kidney, heart, or liver disease and have to limit fluids, talk with your doctor before you increase the amount of fluids you drink. · Begin eating small amounts of mild foods the next day, if you feel like it. ? Try yogurt that has live cultures of Lactobacillus. (Check the label.)  ? Avoid spicy foods, fruits, alcohol, and caffeine until 48 hours after all symptoms are gone. ? Avoid chewing gum that contains sorbitol. ?  Avoid dairy products (except for yogurt with Lactobacillus) while you have diarrhea and for 3 days after symptoms are gone. · The doctor may recommend that you take over-the-counter medicine, such as loperamide (Imodium), if you still have diarrhea after 6 hours. Read and follow all instructions on the label. Do not use this medicine if you have bloody diarrhea, a high fever, or other signs of serious illness. Call your doctor if you think you are having a problem with your medicine. When should you call for help? Call 911 anytime you think you may need emergency care. For example, call if:    · You passed out (lost consciousness).     · Your stools are maroon or very bloody.    Call your doctor now or seek immediate medical care if:    · You are dizzy or lightheaded, or you feel like you may faint.     · Your stools are black and look like tar, or they have streaks of blood.     · You have new or worse belly pain.     · You have symptoms of dehydration, such as:  ? Dry eyes and a dry mouth. ? Passing only a little dark urine. ? Feeling thirstier than usual.     · You have a new or higher fever.    Watch closely for changes in your health, and be sure to contact your doctor if:    · Your diarrhea is getting worse.     · You see pus in the diarrhea.     · You are not getting better after 2 days (48 hours). Where can you learn more? Go to http://denise-ana m.info/. Enter D459 in the search box to learn more about \"Diarrhea: Care Instructions. \"  Current as of: September 23, 2018  Content Version: 11.9  © 2612-2077 [employer]. Care instructions adapted under license by [employer] (which disclaims liability or warranty for this information). If you have questions about a medical condition or this instruction, always ask your healthcare professional. [address] any warranty or liability for your use of this information.

## 2019-06-19 NOTE — PROGRESS NOTES
Warfarin dosing per pharmacist 
 
Romero Donahue is a 80 y.o. female. Height: 5' 2\" (157.5 cm)    Weight: 47 kg (103 lb 9.9 oz) Indication:  Afib, PE, DVT Goal INR:  2-3 Home dose:  2 mg qhs 
 
Risk factors or significant drug interactions:  Levofloxacin, metronidazole, Amiodarone (PTA) Other anticoagulants:  none Daily Monitoring Date  INR     Warfarin dose HGB              Notes 6/13  ---  Held  12.8 
6/14  ---  Held  11.1 6/15  ---  Held  10.2 6/16  4.9  Hold  11.0   
6/17  3.0  0.5 mg  10.9 6/18  2.3  1 mg  11.2 6/19  2  1 mg  11.6 Pharmacy is consulted to manage warfarin for Ms. Theresa Montoya during this admission. Need to utilize caution due to drug interactions with metronidazole. INR continues to trend down, now down to within therapeutic range at 2. Will give 1 mg this evening. Follow INR daily. Pharmacy will continue to follow. Please call with any questions. Thank you, Bal Marina, PharmD Clinical Pharmacist 
[phone]

## 2019-06-19 NOTE — DISCHARGE SUMMARY
Discharge Summary Patient: Oziel Salcedo MRN: [id number]  SSN: [id number] YOB: 1933  Age: 80 y.o. Sex: female Admit Date: 6/13/2019 Discharge Date: 6/19/2019 Admission Diagnoses: Diarrhea [R19.7] Discharge Diagnoses:  
Problem List as of 6/19/2019 Date Reviewed: 6/19/2019 Codes Class Noted - Resolved Pancytopenia ([facility] ICD-10-CM: K26.753 ICD-9-CM: 284.19  6/16/2019 - Present Hypotension ICD-10-CM: I95.9 ICD-9-CM: 458.9  6/15/2019 - Present Moderate protein malnutrition (HCC) ICD-10-CM: E44.0 ICD-9-CM: 263.0  6/15/2019 - Present Failure to thrive (0-17) ICD-10-CM: R62.51 
ICD-9-CM: 783.41  6/15/2019 - Present Hyperthyroidism ICD-10-CM: E05.90 ICD-9-CM: 242.90  6/15/2019 - Present Breast mass ICD-10-CM: N63.0 ICD-9-CM: 611.72  6/14/2019 - Present PAF (paroxysmal atrial fibrillation) (HCC) ICD-10-CM: I48.0 ICD-9-CM: 427.31  6/14/2019 - Present Colitis ICD-10-CM: K52.9 ICD-9-CM: 558.9  6/14/2019 - Present Diarrhea ICD-10-CM: R19.7 ICD-9-CM: 787.91  6/13/2019 - Present UTI (urinary tract infection) ICD-10-CM: N39.0 ICD-9-CM: 599.0  6/3/2019 - Present SBO (small bowel obstruction) ([facility].) ICD-10-CM: N73.875 ICD-9-CM: 560.9  5/29/2019 - Present Atrial fibrillation with RVR (HCC) ICD-10-CM: I48.91 
ICD-9-CM: 427.31  5/29/2019 - Present Chronic systolic dysfunction of left ventricle (Chronic) ICD-10-CM: I51.9 ICD-9-CM: 429.9  5/29/2019 - Present Thyroid cancer [facility]) ICD-10-CM: K53 ICD-9-CM: 644  5/29/2019 - Present Compression fracture of fourth lumbar vertebra (HCC) ICD-10-CM: Z84.841X ICD-9-CM: 805.4  5/29/2019 - Present Thromboembolic disorder ([facility] ERT-07-MV: I74.9 ICD-9-CM: 444.9  5/29/2019 - Present Presence of IVC filter (Chronic) ICD-10-CM: K79.625 ICD-9-CM: V45.89  5/29/2019 - Present Breast nodule ICD-10-CM: N63.0 ICD-9-CM: 793.89  5/29/2019 - Present Weight loss ICD-10-CM: R63.4 ICD-9-CM: 783.21  5/29/2019 - Present * (Principal) 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  
  bilateral 
  
  
   
 Edema ICD-10-CM: R60.9 ICD-9-CM: 782.3  11/8/2018 - Present Overview Signed 11/8/2018  4:18 PM by Brock Santoyo MD  
  Bilateral lower legs Lightheadedness ICD-10-CM: T01 ICD-9-CM: 780.4  11/8/2018 - Present Paroxysmal A-fib (HCC) ICD-10-CM: I48.0 ICD-9-CM: 427.31  11/8/2018 - Present Iron deficiency anemia ICD-10-CM: D50.9 ICD-9-CM: 280.9  8/29/2011 - Present Overview Signed 8/29/2011  3:05 PM by Karla Montez Despite oral iron For infed infusion Nutritional deficiency ICD-10-CM: E63.9 ICD-9-CM: 269.9  6/27/2011 - Present Impaired mobility ICD-10-CM: Z74.09 
ICD-9-CM: 799.89  6/27/2011 - Present Debility ICD-10-CM: R53.81 ICD-9-CM: 799.3  6/27/2011 - Present Colon polyps ICD-10-CM: K63.5 ICD-9-CM: 211.3  6/27/2011 - Present Pancreatitis ICD-10-CM: K85.90 ICD-9-CM: 539.1  6/19/2011 - Present Overview Signed 6/19/2011  4:13 PM by Karla Montez 6-19-11 lipase improving possibly from gallstones Gallstone pancreatitis ICD-10-CM: K85.10 ICD-9-CM: 544.8, 574.20  6/19/2011 - Present Follicular thyroid cancer ([facility] ICD-10-CM: F78 ICD-9-CM: 193  6/19/2011 - Present Overview Signed 6/21/2011  7:12 AM by Karla Montez thyroglobulin high > 100 obvious residual thyroid tissue Pericardial effusion ICD-10-CM: I31.3 ICD-9-CM: 423.9  6/17/2011 - Present Acute pancreatitis ICD-10-CM: K85.90 ICD-9-CM: 922.0  6/16/2011 - Present HTN (hypertension) (Chronic) ICD-10-CM: I10 
ICD-9-CM: 401.9  6/16/2011 - Present Acquired hypothyroidism (Chronic) ICD-10-CM: E03.9 ICD-9-CM: 244.9  6/16/2011 - Present Overview Signed 6/19/2011  4:15 PM by Jorge Alberto Saleh Post thyroidectomy for thyroid cancer Pulmonary embolism (HCC) (Chronic) ICD-10-CM: I26.99 
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 
ICD-9-CM: 275.2  5/30/2019 - 6/3/2019 RESOLVED: Coagulopathy ([facility] ICD-10-CM: N07.9 ICD-9-CM: 286.9  6/16/2011 - 6/3/2019 Discharge Condition: [employer] [facility] Course: Ms. Wanda Bose is a 79 yo AA female patient, with a PMH of hypothyroidism on synthroid, prior follicular cancer s/p ESCALERA,  HTN, Chronic systolic HF, A fib on coumadin, L4 lesion and right breast 12 mm nodule, who was admitted with a diagnosis of hypotension, diarrhea, acute on chronic systolic HF and KENISHA. She was found to have Morganella UTI, treated initially on levaquin and then to po vantin. Cardiology and GI consulted. Her Cardiac symptoms improved on iv lasix. Hypotension resolved after medication reconciliation made.  Amiodarone stopped due to hypotension and bradycardia. She received a short trial on flagyl for her diarrhea. C diff was negative. 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 Synthroid was stopped. She was told she needed levels repeated in 3 - 4 weeks after discharge to adjust her synthroid dose. Moderate pericardial effusion with no hemodynamic instability. Cardiology suggested to continue on po lasix. Her clinical course included pancytopenia, which improved, possible secondary to her UTI and antibiotics. Peripheral smear did not show any abnormalities. PT/OT recommended STR. The patient refused. She has good family support. She is stable enough to be discharge home and continue outpatient management. Physical Examination: 
Alert, in no distress Heent: perrla, no jvd Cardiac: no murmurs, no gallops Lungs: no rales, no wheezing, no accessory muscle use Abdomen: soft, non tender Extremities: no edema, pulses present Neurology: grossly normal 
Skin: no rash Consults: Cardiology and Gastroenterology Significant Diagnostic Studies: see note Disposition: home Discharge Medications: My Medications START taking these medications Instructions Each Dose to Equal Morning Noon Evening Bedtime  
cefpodoxime 200 mg tablet Commonly known as:  Juan Leong Start taking on:  6/20/2019 Take 1 Tab by mouth every twenty-four (24) hours for 2 days. 200 mg 
      
lisinopril 10 mg tablet Commonly known as:  Paige Lopez Start taking on:  6/20/2019 Take 1 Tab by mouth daily. 10 mg 
      
loperamide 2 mg tablet Commonly known as:  IMODIUM A-D Take 1 Tab by mouth four (4) times daily as needed for Diarrhea for up to 10 days. 2 mg Saccharomyces boulardii 250 mg capsule Commonly known as:  Jaspal Bond Take 2 Caps by mouth two (2) times a day for 7 days. 500 mg CHANGE how you take these medications Instructions Each Dose to Equal Morning Noon Evening Bedtime  
furosemide 20 mg tablet Commonly known as:  LASIX What changed:  how much to take Take 2 Tabs by mouth two (2) times a day for 30 days. 40 mg CONTINUE taking these medications Instructions Each Dose to Equal Morning Noon Evening Bedtime ALPHAGAN P 0.1 % ophthalmic solution Generic drug:  brimonidine Apply 1 Drop to eye two (2) times a day. 1 DROP TO EACH EYE. Pt to use DOS per anesthesia protocol. 1 Drop 
      
colchicine 0.6 mg tablet Commonly known as:  COLCRYS Take 1 Tab by mouth daily as needed. 0.6 mg 
      
ergocalciferol 50,000 unit capsule Commonly known as:  ERGOCALCIFEROL Take 50,000 Units by mouth every Sunday. 09918 Units 
      
folic acid 1 mg tablet Commonly known as:  Google Take 1 mg by mouth daily. 1 mg 
      
gabapentin 300 mg capsule Commonly known as:  NEURONTIN Take 300 mg by mouth two (2) times a day. Indications: also takes 3rd dose as needed 300 mg KLOR-CON 10 10 mEq tablet Generic drug:  potassium chloride SR Take 10 mEq by mouth daily. 10 mEq 
      
lidocaine 4 % patch Place on area of pain 
       
metoprolol succinate 25 mg XL tablet Commonly known as:  TOPROL-XL Take 25 mg by mouth daily. 25 mg 
      
polyethylene glycol 17 gram packet Commonly known as:  Scot Prosperity Take 1 Packet by mouth daily. Reduce or hold for a day if more than 1 bowel movement  Indications: prevent constipation 17 g 
      
simvastatin 20 mg tablet Commonly known as:  ZOCOR Take 20 mg by mouth daily. Take DOS per anesthesia protocol. 20 mg 
      
TRAVATAN (WITH BENZALKONIUM) 0.004 % ophthalmic solution Generic drug:  travoprost (benzalkonium) 1 Drop by Both Eyes route every evening. 1 Drop TYLENOL EX STR ARTHRITIS PAIN 500 mg tablet Generic drug:  acetaminophen Take  by mouth every six (6) hours as needed. warfarin 2 mg tablet Commonly known as:  COUMADIN Take 1 Tab by mouth every evening. Follow up with PCP for INR checks 2 mg STOP taking these medications   
amiodarone 200 mg tablet Commonly known as:  CORDARONE 
  
  
dilTIAZem  mg ER capsule Commonly known as:  CARDIZEM CD 
  
  
LEVOXYL 125 mcg tablet Generic drug:  levothyroxine 
  
  
metoprolol tartrate 25 mg tablet Commonly known as:  LOPRESSOR 
  
  
ondansetron 4 mg disintegrating tablet Commonly known as:  ZOFRAN ODT 
  
  
oxyCODONE IR 5 mg immediate release tablet Commonly known as:  Victorino Fuelling Where to Get Your Medications Information on where to get these meds will be given to you by the nurse or doctor. Ask your nurse or doctor about these medications · cefpodoxime 200 mg tablet · furosemide 20 mg tablet · lisinopril 10 mg tablet · loperamide 2 mg tablet · Saccharomyces boulardii 250 mg capsule Activity: Activity as tolerated Diet: Cardiac Diet Wound Care: None needed Follow-up Appointments Procedures  FOLLOW UP VISIT Appointment in: One Week pcp  
  pcp Standing Status:   Standing Number of Occurrences:   1 Order Specific Question:   Appointment in Answer: One Week  FOLLOW UP VISIT Appointment in: One Month Cardiology Cardiology Standing Status:   Standing Number of Occurrences:   1 Standing Expiration Date:   6/20/2019 Order Specific Question:   Appointment in Answer:   One Month Signed By: Ginger Quevedo MD   
 June 19, 2019

## 2019-06-20 NOTE — PROGRESS NOTES
This note will not be viewable in [address] Transition of Care Discharge Follow-up Questionnaire Date/Time of Call: 
 6/20/19 
 1247pm  
What was the patient hospitalized for? Acute on chronic combined systolic and diastolic ACC/AHA stage C congestive heart failure Does the patient understand his/her diagnosis and/or treatment and what happened during the hospitalization? Yes, spoke with patients daughter, she states understanding of diagnosis and treatment; and is agreeable to call. Daughter states patient is doing well Did the patient receive discharge instructions? Yes   
CM Assessed Risk for Readmission:  
 
 
Patient stated Risk for Readmission: Low diagnosis and/or comorbidities None stated Review any discharge instructions (see discharge instructions/AVS in [facility]). Ask patient if they understand these. Do they have any questions? Reviewed, understanding is stated, no questions at this time Were home services ordered (nursing, PT, OT, ST, etc.)? Yes, Interim  PT/OT/RN to resume If so, has the first visit occurred? If not, why? (Assist with coordination of services if necessary.) Scheduled for today Was any DME ordered? No 
  
If so, has it been received? If not, why?  (Assist patient in obtaining DME orders &/or equipment if necessary.) N/A Complete a review of all medications (new, continued and discontinued meds per the D/C instructions and medication tab in [address] [place] [address]). Completed START taking: 
cefpodoxime 200 mg tablet Ximena Monday) Start taking on: 6/20/2019 
lisinopril 10 mg tablet (Paz Primmer) Start taking on: 6/20/2019 
loperamide 2 mg tablet (IMODIUM A-D) Saccharomyces boulardii 250 mg 
capsule (FLORASTOR) CHANGE how you take: 
furosemide 20 mg tablet (LASIX) STOP taking: 
amiodarone 200 mg tablet (CORDARONE) dilTIAZem  mg ER capsule (CARDIZEM CD) LEVOXYL 125 mcg tablet 
metoprolol tartrate 25 mg tablet (LOPRESSOR) ondansetron 4 mg disintegrating tablet (ZOFRAN ODT) oxyCODONE IR 5 mg immediate release 
tablet (ROXICODONE) Were all new prescriptions filled? If not, why?  (Assist patient in obtaining medications if necessary  escalate for CCM &/or SW if ongoing issues are verbalized by pt or anticipated) Yes Does the patient understand the purpose and dosing instructions for all medications? (If patient has questions, provide explanation and education.) Yes Does the patient have any problems in performing ADLs? (If patient is unable to perform ADLs  what is the limiting factor(s)? Do they have a support system that can assist? If no support system is present, discuss possible assistance that they may be able to obtain. Escalate for CCM/SW if ongoing issues are verbalized by pt or anticipated) Independent with ADLs, strong family support Does the patient have all follow-up appointments scheduled? 7 day f/up with PCP?  
(f/up with PCP may be w/in 14 days if patient has a f/up with their specialist w/in 7 days) 7-14 day f/up with specialist?  
(or per discharge instructions) If f/up has not been made  what actions has the care coordinator made to accomplish this? Has transportation been arranged? Yes Dr. Selena Chong 6/26/19 [facility] 7/15/19 Yes, there are no transportation needs at this time. Any other questions or concerns expressed by the patient? No other needs or concerns identified. Patients daughter states her gratitude for follow up. Contact information for [facility] was given, instructed to call with new questions or concerns. Schedule next appointment with [place] [facility] Coordinator or refer to RN Case Manager/ per the workflow guidelines. When is care coordinators next follow-up call scheduled? If referred for CCM  what RN care manager was the referral assigned? Community Care Coordinator will follow per workflow guidelines. Within 30 days [facility] Call Completed By: Radha Garcia LPN Care Coordinator

## 2019-06-21 NOTE — PROGRESS NOTES
Patient identified as High Risk for Readmission RRAT 23:  Admitted 6/13 through 6/19 acute on chronic combined systolic/diastolic heart failure. Medical History includes: CHF Compression fracture Thromboembolic disorder UTI Breast Mass Diarrhea/Colitis Plan: 
Transitions of Care Call completed Route chart to [facility], Stephanie Cisse to evaluate for ongoing needs.

## 2019-06-28 PROBLEM — E86.0 DEHYDRATION: Status: ACTIVE | Noted: 2019-01-01

## 2019-06-28 PROBLEM — K52.9 ENTERITIS: Status: ACTIVE | Noted: 2019-01-01

## 2019-06-28 NOTE — ED NOTES
TRANSFER - OUT REPORT: 
 
Verbal report given to Saima Mccullough RN (name) on Kandy Callaway  being transferred to [address] [place] [address] (unit) for routine progression of care Report consisted of patients Situation, Background, Assessment and  
Recommendations(SBAR). Information from the following report(s) SBAR, ED Summary, Intake/Output, MAR and Recent Results was reviewed with the receiving nurse. Lines:  
Peripheral IV 06/28/19 Right Antecubital (Active) Opportunity for questions and clarification was provided. Patient transported with: 
 [place]

## 2019-06-28 NOTE — ED TRIAGE NOTES
Pt began having severe epigastric pain last night. Has hx of colitis and was started on prednisone this past Wednesday. Pt is writhing in triage. Was hospitalized last week for chronic diarrhea with dehydration and altered mental status. Decreased intake of food and fluids. No problems with urination.

## 2019-06-28 NOTE — H&P
INTERNAL MEDICINE H&P/CONSULT Subjective:  
 
Patient is a 80years old female with history of recent hospitalization treated for acute UTI likely 2ry to acute diarrhea and discharged on Vantin according to her urine culture which grew Morganella. Last week, the pt had abdominal pain, nausea and vomiting w/ some bilious vomitus last night. No fever, diarrhea or sick contacts or new urinary spx. She was treated earlier this week by her PCP w/ Keflex and Prednisone. Past Medical History:  
Diagnosis Date  Acquired hypothyroidism  Acute pancreatitis  Arthritis  CAD (coronary artery disease) 4/29/11 RCA occluded and fills with collaterals, LAD and Cx nonobstructive CAD, EF 50-55%  Cancer ([address] THYROID - treated with surgery and radiation  CHF (congestive heart failure) ([address] 11/8/2018  Chronic pain   
 right hip  Coagulopathy ([address] 6/16/2011  Colon polyps  Follicular thyroid cancer ([address]  Gallstone pancreatitis  Glaucoma  Gout   
 hx of gout to right great toe - no problems  Hypertension   
 controlled with medication  Other ill-defined conditions(799.89)  Pancreatitis  Paroxysmal A-fib ([address] 11/8/2018  PE (pulmonary embolism) 2008  Pulmonary HTN (HCC)   
 moderate  Thromboembolus ([address] 1995  
 right leg - Hemashield graft/filter placed in abdomen  Thyroid disease   
 pt had total thyroidectomy - takes levoxyl daily  UTI (urinary tract infection) 6/3/2019 Past Surgical History:  
Procedure Laterality Date  HX APPENDECTOMY  ? 1990  
 HX CHOLECYSTECTOMY  6/2011 Dr. Deirdre Reyes - [facility] [address] [place]  
 hemashield graft/filter placed due to blood clot right leg  HX THYROIDECTOMY  2010  
 partial  
 HX TONSILLECTOMY [facility] ARTHROPLASTY  2008 & 2011  
 right hip Prior to Admission medications Medication Sig Start Date End Date Taking? Authorizing Provider lisinopril (PRINIVIL, ZESTRIL) 10 mg tablet Take 1 Tab by mouth daily. 6/20/19  Yes Ruma Woods MD  
furosemide (LASIX) 20 mg tablet Take 2 Tabs by mouth two (2) times a day for 30 days. 6/19/19 7/19/19 Yes Ruma Woods MD  
loperamide (IMODIUM A-D) 2 mg tablet Take 1 Tab by mouth four (4) times daily as needed for Diarrhea for up to 10 days. 6/19/19 6/29/19 Yes Ruma Woods MD  
potassium chloride SR (KLOR-CON 10) 10 mEq tablet Take 10 mEq by mouth daily. Yes Provider, Historical  
warfarin (COUMADIN) 2 mg tablet Take 1 Tab by mouth every evening. Follow up with PCP for INR checks 6/4/19  Yes Nasir Beyer MD  
lidocaine (SALONPAS/ASPERCREME) 4 % patch Place on area of pain 7/23/18  Yes David Lai MD  
acetaminophen (TYLENOL EX STR ARTHRITIS PAIN) 500 mg tablet Take  by mouth every six (6) hours as needed. Yes Provider, Historical  
folic acid (FOLVITE) 1 mg tablet Take 1 mg by mouth daily. Yes Provider, Historical  
gabapentin (NEURONTIN) 300 mg capsule Take 300 mg by mouth two (2) times a day. Indications: also takes 3rd dose as needed   Yes Provider, Historical  
ergocalciferol (ERGOCALCIFEROL) 50,000 unit capsule Take 50,000 Units by mouth every Sunday. 6/19/11  Yes Other, MD Emi  
simvastatin (ZOCOR) 20 mg tablet Take 20 mg by mouth daily. Take DOS per anesthesia protocol. 4/29/11  Yes Rebecca Duarte MD  
ALPHAGAN P 0.1 % Drop Apply 1 Drop to eye two (2) times a day. 1 DROP TO EACH EYE. Pt to use DOS per anesthesia protocol. 4/21/11  Yes Provider, Historical  
TRAVATAN 0.004 % Drop 1 Drop by Both Eyes route every evening. Yes Provider, Historical  
 
Allergies Allergen Reactions  Ativan [Lorazepam] Other (comments) Has opposite reaction  Lortab [Hydrocodone-Acetaminophen] Other (comments) Causes disorientation. - denies allergy, states she can take it  Morphine Other (comments) Patient not allergic per daughter Monica Siegel.  Morphine interacts with patient's amiodarone per daughter. Social History Tobacco Use  Smoking status: Former Smoker  Smokeless tobacco: Never Used  Tobacco comment: quit smoking 1995 Substance Use Topics  Alcohol use: No  
  
 
Family History: HTN Review of Systems A comprehensive review of systems was negative except for that written in the HPI. Objective: Intake / Output: 
No intake/output data recorded. No intake/output data recorded. Physical Exam: 
Visit Vitals /82 Pulse 86 Temp 97.4 °F (36.3 °C) Resp 16 Ht 5' (1.524 m) Wt 44.5 kg (98 lb) SpO2 100% BMI 19.14 kg/m² General appearance: awake, alert, cooperative, moderate distress, appears stated age - Pale, No icterus, Dry mucous membranes Head: Normocephalic, without obvious abnormality, atraumatic Back: symmetric, no curvature. ROM normal. No CVA tenderness. Lungs: clear to auscultation bilaterally - Diminished bs bibasilar. Heart: regular rate and rhythm, S1, S2 normal, no murmur, click, rub or gallop Abdomen: soft, generalized tenderness, midline incision w/ upper small reducible incisional hernia, very diminished bs, no distension, no masses, no organomegaly Extremities: atraumatic, no cyanosis - Bilateral lower limbs edema none. Skin: No rashes or ulceration. Neurologic: Grossly intact ECG: sinus rhythm Data Review (Labs):  
Recent Results (from the past 24 hour(s)) EKG, 12 LEAD, INITIAL Collection Time: 06/28/19  8:31 AM  
Result Value Ref Range Ventricular Rate 47 BPM  
 Atrial Rate 47 BPM  
 P-R Interval 310 ms QRS Duration 128 ms Q-T Interval 562 ms QTC Calculation (Bezet) 497 ms Calculated P Axis 43 degrees Calculated R Axis -66 degrees Calculated T Axis -57 degrees Diagnosis Sinus bradycardia with 1st degree A-V block with Premature atrial complexes  
with Aberrant conduction Left axis deviation Left ventricular hypertrophy with QRS widening Cannot rule out Anterior infarct (cited on or before 23-JUL-2018) T wave abnormality, consider inferolateral ischemia Abnormal ECG When compared with ECG of 13-JUN-2019 17:14, 
CO interval has increased Right bundle branch block is no longer Present Questionable change in initial forces of Anterior leads Confirmed by Wily Kenny MD (), Montserrat Corbin ([id number]) on 6/28/2019 10:43:14 AM 
  
CBC WITH AUTOMATED DIFF Collection Time: 06/28/19  8:45 AM  
Result Value Ref Range WBC 8.5 4.3 - 11.1 K/uL  
 RBC 4.72 4.05 - 5.2 M/uL  
 HGB 12.2 11.7 - 15.4 g/dL HCT 36.6 35.8 - 46.3 % MCV 77.5 (L) 79.6 - 97.8 FL  
 MCH 25.8 (L) 26.1 - 32.9 PG  
 MCHC 33.3 31.4 - 35.0 g/dL  
 RDW 17.8 (H) 11.9 - 14.6 % PLATELET 938 808 - 122 K/uL MPV 12.7 (H) 9.4 - 12.3 FL ABSOLUTE NRBC 0.00 0.0 - 0.2 K/uL  
 DF AUTOMATED NEUTROPHILS 89 (H) 43 - 78 % LYMPHOCYTES 6 (L) 13 - 44 % MONOCYTES 4 4.0 - 12.0 % EOSINOPHILS 0 (L) 0.5 - 7.8 % BASOPHILS 0 0.0 - 2.0 % IMMATURE GRANULOCYTES 1 0.0 - 5.0 %  
 ABS. NEUTROPHILS 7.5 1.7 - 8.2 K/UL  
 ABS. LYMPHOCYTES 0.5 0.5 - 4.6 K/UL  
 ABS. MONOCYTES 0.4 0.1 - 1.3 K/UL  
 ABS. EOSINOPHILS 0.0 0.0 - 0.8 K/UL  
 ABS. BASOPHILS 0.0 0.0 - 0.2 K/UL  
 ABS. IMM. GRANS. 0.0 0.0 - 0.5 K/UL METABOLIC PANEL, COMPREHENSIVE Collection Time: 06/28/19  8:45 AM  
Result Value Ref Range Sodium 136 136 - 145 mmol/L Potassium 4.6 3.5 - 5.1 mmol/L Chloride 99 98 - 107 mmol/L  
 CO2 29 21 - 32 mmol/L Anion gap 8 7 - 16 mmol/L Glucose 130 (H) 65 - 100 mg/dL BUN 31 (H) 8 - 23 MG/DL Creatinine 1.64 (H) 0.6 - 1.0 MG/DL  
 GFR est AA 38 (L) >60 ml/min/1.73m2 GFR est non-AA 32 (L) >60 ml/min/1.73m2 Calcium 10.2 8.3 - 10.4 MG/DL Bilirubin, total 0.5 0.2 - 1.1 MG/DL  
 ALT (SGPT) 27 12 - 65 U/L  
 AST (SGOT) 44 (H) 15 - 37 U/L Alk. phosphatase 98 50 - 136 U/L Protein, total 7.8 6.3 - 8.2 g/dL Albumin 2.8 (L) 3.2 - 4.6 g/dL Globulin 5.0 (H) 2.3 - 3.5 g/dL A-G Ratio 0.6 (L) 1.2 - 3.5 LIPASE Collection Time: 06/28/19  8:45 AM  
Result Value Ref Range Lipase 109 73 - 393 U/L  
TROPONIN I Collection Time: 06/28/19  8:45 AM  
Result Value Ref Range Troponin-I, Qt. <0.02 (L) 0.02 - 0.05 NG/ML  
URINE MICROSCOPIC Collection Time: 06/28/19 10:57 AM  
Result Value Ref Range WBC  0 /hpf  
 RBC 5-10 0 /hpf Epithelial cells 3-5 0 /hpf Bacteria TRACE 0 /hpf Casts 5-10 0 /lpf Assessment:  
 
Principal Problem: 
  Enteritis (6/28/2019) Active Problems: 
  KENISHA (acute kidney injury) ([facility] (5/30/2019) Acute UTI (urinary tract infection) (6/3/2019) Dehydration (6/28/2019) Plan:  
 
Telemetry Rocephin IV Follow cultures PPD placed last admission IVF hydration w/ caution, hx of EF 30% IO monitor Clear liquids po Blood pressure control AM lab Warfarin for hx of afib Further management depends on patient progress. Thank you for the oppourtinity to contribute in the care of your patient. Time 35 minutes. Signed By: Yanni Gomez MD   
 June 28, 2019

## 2019-06-28 NOTE — PROGRESS NOTES
Patient with recent admit and discharge from [facility] (3-74-48 to 6-19-19) with [facility] referral.

## 2019-06-28 NOTE — ED PROVIDER NOTES
The history is provided by the patient and a relative. Abdominal Pain This is a new problem. The current episode started more than 2 days ago. The problem occurs constantly. The problem has been gradually worsening. The pain is associated with an unknown factor. The pain is located in the epigastric region. The quality of the pain is aching. The pain is at a severity of 10/10. Associated symptoms include anorexia and nausea. Pertinent negatives include no fever, no belching, no diarrhea, no flatus, no hematochezia, no melena, no vomiting, no constipation, no dysuria, no frequency, no hematuria, no headaches, no arthralgias, no myalgias, no trauma, no chest pain and no back pain. The pain is worsened by eating. The pain is relieved by nothing. Her past medical history is significant for cancer, UTI, pancreatitis and small bowel obstruction. Her past medical history does not include PUD, gallstones, GERD, ulcerative colitis, Crohn's disease, irritable bowel syndrome, ectopic pregnancy, ovarian cysts, diverticulitis, atrial fibrillation, DM or kidney stones. The patient's surgical history includes appendectomy and cholecystectomy. Past Medical History:  
Diagnosis Date  Acquired hypothyroidism  Acute pancreatitis  Arthritis  CAD (coronary artery disease) 4/29/11 RCA occluded and fills with collaterals, LAD and Cx nonobstructive CAD, EF 50-55%  Cancer ([address] THYROID - treated with surgery and radiation  CHF (congestive heart failure) ([address] 11/8/2018  Chronic pain   
 right hip  Coagulopathy ([address] 6/16/2011  Colon polyps  Follicular thyroid cancer ([address]  Gallstone pancreatitis  Glaucoma  Gout   
 hx of gout to right great toe - no problems  Hypertension   
 controlled with medication  Other ill-defined conditions(799.89)  Pancreatitis  Paroxysmal A-fib ([address] 11/8/2018  PE (pulmonary embolism) 2008  Pulmonary HTN (HCC)   
 moderate  Thromboembol[address] ([facility] 1995  
 right leg - Hemashield graft/filter placed in abdomen  Thyroid disease   
 pt had total thyroidectomy - takes levoxyl daily  UTI (urinary tract infection) 6/3/2019 Past Surgical History:  
Procedure Laterality Date  HX APPENDECTOMY  ? 1990  
 HX CHOLECYSTECTOMY  6/2011 Dr. Gio Bertrand [facility] [address] [place]  
 hemashield graft/filter placed due to blood clot right leg  HX THYROIDECTOMY  2010  
 partial  
 HX TONSILLECTOMY [address] [facility] ARTHROPLASTY  2008 & 2011  
 right hip Family History:  
Problem Relation Age of Onset  Stroke Mother  Hypertension Mother  Cancer Father   
     prostate  Diabetes Maternal Uncle Social History Socioeconomic History  Marital status:  Spouse name: Not on file  Number of children: Not on file  Years of education: Not on file  Highest education level: Not on file Occupational History  Not on file Social Needs  Financial resource strain: Not on file  Food insecurity:  
  Worry: Not on file Inability: Not on file  Transportation needs:  
  Medical: Not on file Non-medical: Not on file Tobacco Use  Smoking status: Former Smoker  Smokeless tobacco: Never Used  Tobacco comment: quit smoking 1995 Substance and Sexual Activity  Alcohol use: No  
 Drug use: No  
 Sexual activity: Not on file Lifestyle  Physical activity:  
  Days per week: Not on file Minutes per session: Not on file  Stress: Not on file Relationships  Social connections:  
  Talks on phone: Not on file Gets together: Not on file Attends [employer] service: Not on file Active member of club or organization: Not on file Attends meetings of clubs or organizations: Not on file Relationship status: Not on file  Intimate partner violence:  
  Fear of current or ex partner: Not on file Emotionally abused: Not on file Physically abused: Not on file Forced sexual activity: Not on file Other Topics Concern  Not on file Social History Narrative  Not on file ALLERGIES: Ativan [lorazepam]; Lortab [hydrocodone-acetaminophen]; and Morphine Review of Systems Constitutional: Positive for activity change, appetite change and fatigue. Negative for chills and fever. Cardiovascular: Negative for chest pain. Gastrointestinal: Positive for abdominal pain, anorexia and nausea. Negative for blood in stool, constipation, diarrhea, flatus, hematochezia, melena and vomiting. Genitourinary: Negative for dysuria, frequency and hematuria. Musculoskeletal: Negative for arthralgias, back pain and myalgias. Neurological: Negative for headaches. All other systems reviewed and are negative. Vitals:  
 06/28/19 1378 06/28/19 4031 BP: 125/65 159/72 Pulse: (!) 44 (!) 46 Resp: 18 23 Temp: 97.4 °F (36.3 °C) SpO2: 100% 94% Weight: 44.5 kg (98 lb) Height: 5' (1.524 m) Physical Exam  
Constitutional: She is oriented to person, place, and time. She appears well-developed and well-nourished. She appears distressed. HENT:  
Head: Normocephalic and atraumatic. Right Ear: External ear normal.  
Left Ear: External ear normal.  
Mouth/Throat: Oropharynx is clear and moist.  
Eyes: Pupils are equal, round, and reactive to light. Conjunctivae and EOM are normal.  
Neck: Normal range of motion. Neck supple. Cardiovascular: Normal rate, regular rhythm, normal heart sounds and intact distal pulses. Pulmonary/Chest: Effort normal and breath sounds normal.  
Abdominal: Soft. Bowel sounds are normal. She exhibits no shifting dullness, no distension, no pulsatile liver, no fluid wave, no abdominal bruit, no ascites, no pulsatile midline mass and no mass. There is no hepatosplenomegaly.  There is tenderness in the epigastric area and left upper quadrant. There is no rigidity, no rebound, no guarding, no CVA tenderness, no tenderness at McBurney's point and negative Tomas's sign. No hernia. Musculoskeletal: Normal range of motion. She exhibits no edema. Neurological: She is alert and oriented to person, place, and time. Skin: Skin is warm and dry. Capillary refill takes less than 2 seconds. Psychiatric: She has a normal mood and affect. Nursing note and vitals reviewed. MDM Number of Diagnoses or Management Options Acute abdominal pain: new and requires workup Acute kidney injury [facility]): new and requires workup Dehydration: new and requires workup Ileus [facility]): new and requires workup Urinary tract infection without hematuria, site unspecified: new and requires workup Amount and/or Complexity of Data Reviewed Clinical lab tests: ordered and reviewed Tests in the radiology section of CPT®: ordered and reviewed Obtain history from someone other than the patient: yes Review and summarize past medical records: yes Discuss the patient with other providers: yes Independent visualization of images, tracings, or specimens: yes Risk of Complications, Morbidity, and/or Mortality Presenting problems: high Diagnostic procedures: high Management options: high Patient Progress Patient progress: improved Procedures The patient was observed in the ED. Patient was hydrated and given Zofran for nausea, Low-dose Dilaudid for pain, and Pepcid for suspected gastritis. Patient was mildly improved with this, did get more relief with Mylanta and viscous lidocaine. Abdominal series revealed a possible obstructive pattern, and the patient was sent for repeat CT scan. CT scan showed a worsening enteritis as well as a worsening pericardial effusion. Urinalysis revealed evidence of infection and the patient was given Rocephin 1 g IV and urine was sent for culture.   Case was discussed, the hospitalist, who will admit for further care. Results Reviewed: 
 
 
Recent Results (from the past 24 hour(s)) EKG, 12 LEAD, INITIAL Collection Time: 06/28/19  8:31 AM  
Result Value Ref Range Ventricular Rate 47 BPM  
 Atrial Rate 47 BPM  
 P-R Interval 310 ms QRS Duration 128 ms Q-T Interval 562 ms QTC Calculation (Bezet) 497 ms Calculated P Axis 43 degrees Calculated R Axis -66 degrees Calculated T Axis -57 degrees Diagnosis Sinus bradycardia with 1st degree A-V block with Premature atrial complexes  
with Aberrant conduction Left axis deviation Left ventricular hypertrophy with QRS widening Cannot rule out Anterior infarct (cited on or before 23-JUL-2018) T wave abnormality, consider inferolateral ischemia Abnormal ECG When compared with ECG of 13-JUN-2019 17:14, 
SD interval has increased Right bundle branch block is no longer Present Questionable change in initial forces of Anterior leads Confirmed by Netta Stock MD (), Jessika Olivier ([id number]) on 6/28/2019 10:43:14 AM 
  
CBC WITH AUTOMATED DIFF Collection Time: 06/28/19  8:45 AM  
Result Value Ref Range WBC 8.5 4.3 - 11.1 K/uL  
 RBC 4.72 4.05 - 5.2 M/uL  
 HGB 12.2 11.7 - 15.4 g/dL HCT 36.6 35.8 - 46.3 % MCV 77.5 (L) 79.6 - 97.8 FL  
 MCH 25.8 (L) 26.1 - 32.9 PG  
 MCHC 33.3 31.4 - 35.0 g/dL  
 RDW 17.8 (H) 11.9 - 14.6 % PLATELET 141 629 - 445 K/uL MPV 12.7 (H) 9.4 - 12.3 FL ABSOLUTE NRBC 0.00 0.0 - 0.2 K/uL  
 DF AUTOMATED NEUTROPHILS 89 (H) 43 - 78 % LYMPHOCYTES 6 (L) 13 - 44 % MONOCYTES 4 4.0 - 12.0 % EOSINOPHILS 0 (L) 0.5 - 7.8 % BASOPHILS 0 0.0 - 2.0 % IMMATURE GRANULOCYTES 1 0.0 - 5.0 %  
 ABS. NEUTROPHILS 7.5 1.7 - 8.2 K/UL  
 ABS. LYMPHOCYTES 0.5 0.5 - 4.6 K/UL  
 ABS. MONOCYTES 0.4 0.1 - 1.3 K/UL  
 ABS. EOSINOPHILS 0.0 0.0 - 0.8 K/UL  
 ABS. BASOPHILS 0.0 0.0 - 0.2 K/UL  
 ABS. IMM. GRANS. 0.0 0.0 - 0.5 K/UL METABOLIC PANEL, COMPREHENSIVE  
 Collection Time: 06/28/19  8:45 AM  
Result Value Ref Range Sodium 136 136 - 145 mmol/L Potassium 4.6 3.5 - 5.1 mmol/L Chloride 99 98 - 107 mmol/L  
 CO2 29 21 - 32 mmol/L Anion gap 8 7 - 16 mmol/L Glucose 130 (H) 65 - 100 mg/dL BUN 31 (H) 8 - 23 MG/DL Creatinine 1.64 (H) 0.6 - 1.0 MG/DL  
 GFR est AA 38 (L) >60 ml/min/1.73m2 GFR est non-AA 32 (L) >60 ml/min/1.73m2 Calcium 10.2 8.3 - 10.4 MG/DL Bilirubin, total 0.5 0.2 - 1.1 MG/DL  
 ALT (SGPT) 27 12 - 65 U/L  
 AST (SGOT) 44 (H) 15 - 37 U/L Alk. phosphatase 98 50 - 136 U/L Protein, total 7.8 6.3 - 8.2 g/dL Albumin 2.8 (L) 3.2 - 4.6 g/dL Globulin 5.0 (H) 2.3 - 3.5 g/dL A-G Ratio 0.6 (L) 1.2 - 3.5 LIPASE Collection Time: 06/28/19  8:45 AM  
Result Value Ref Range Lipase 109 73 - 393 U/L  
TROPONIN I Collection Time: 06/28/19  8:45 AM  
Result Value Ref Range Troponin-I, Qt. <0.02 (L) 0.02 - 0.05 NG/ML  
URINE MICROSCOPIC Collection Time: 06/28/19 10:57 AM  
Result Value Ref Range WBC  0 /hpf  
 RBC 5-10 0 /hpf Epithelial cells 3-5 0 /hpf Bacteria TRACE 0 /hpf Casts 5-10 0 /lpf  
 
CT ABD PELV WO CONT Final Result IMPRESSION:  
  
1. Increasing small bowel distention with air-fluid levels suggesting worsening  
enteritis with moderate stool throughout the colon. 2. Developing ascites. 3. Increasing pericardial effusion 4. Known lytic lesion virtually replacing the L4 vertebral body. CPT code(s): P0701554, [id number] XR ABD ACUTE W 1 V CHEST Final Result Impression:   
  
1. Infiltrative changes at left lung base. 2.  Gaseous distended loops of small bowel with stool throughout colon suggests  
ongoing ileus. Obstruction cannot be excluded. CPT code(s) 05947,68751

## 2019-06-28 NOTE — PROGRESS NOTES
Warfarin dosing per pharmacist 
 
Azra Donovan is a 80 y.o. female. Height: 5' (152.4 cm)    Weight: 44.5 kg (98 lb) Indication:  afib Goal INR:  2-3 Home dose:  2 mg daily Risk factors or significant drug interactions:  questionable compliance; recent hospitalization Other anticoagulants:  none Daily Monitoring Date  INR     Warfarin dose HGB              Notes 6/28  1.2  2 mg  12.2 Pharmacy consulted by Dr. Patrizia Wright to manage warfarin. Patient was recently hospitalized in early June and INR > 4 (partially ?r/t amiodarone, which pt is no longer taking). Patient has been ill and probably not taking warfarin (INR 1.2). Will restart home dose of 2 mg daily and order daily INRs to start 06/29 AM. Pharmacy to follow daily. Thank you, Beatriz Nj, PharmD, [facility] Pharmacist 
[phone]

## 2019-06-29 PROBLEM — K56.7 ILEUS (HCC): Status: ACTIVE | Noted: 2019-01-01

## 2019-06-29 NOTE — PROGRESS NOTES
Call from Gretel in lab, urine culture from yesterday entered in labs as gram negative rods is in error, actual results per Gretel, gram positive cocci. Dr. Tania Braxton at nurses station informed of call from lab urine culture gram positive cocci and correction to urine culture to be done by lab.

## 2019-06-29 NOTE — PROGRESS NOTES
06/28/19 1910 Dual Skin Pressure Injury Assessment Dual Skin Pressure Injury Assessment X Second Care Provider (Based on [address] [place] [address]) VALERIE Weiss Sacrum  Mid  
Skin Integumentary Skin Integumentary (WDL) X Skin Color Appropriate for ethnicity Skin Condition/Temp Dry; Warm  
Skin Integrity Other (comment) Turgor Epidermis thin w/ loss of subcut tissue Hair Growth Present Varicosities Absent Wound Prevention and Protection Methods Orientation of Wound Prevention Posterior Location of Wound Prevention Heel;Sacrum/Coccyx Dressing Present  Yes (New) Dressing Status Intact Wound Offloading (Prevention Methods) Bed, pressure reduction mattress; Chair cushion;Repositioning;Turning Old scar seen on the sacrum area, with a tiny open area noted, Allevyn applied on assessment. No skin breakdown on heels noted. Scar observed on the mid abd. Family at bedside. Will continue to monitor.

## 2019-06-29 NOTE — PROGRESS NOTES
Hospitalist Progress Note 2019 Admit Date: 2019  8:37 AM  
NAME: Alfreda Storm :  1933 MRN:  [id number] Attending: Luci Curran MD 
PCP:  Natasha Grissom MD 
 
SUBJECTIVE:  
Patient 65G with pmhx of CAD, moderate pericardial effusion, hypothyroidism, prior follicular cancer s/p ESCALERA, HTN, chronic systolic CHF, afib on coumadin, hx of DVT's and PE's with ivc filter in place,  l4 lesion and right breast 12mm nodule. Patient was admitted   -  for abdominal pain and found to have SBO. Incidental findings of right breast mass and l4 lesion during imaging then. Surgery was consulted. SBO treated conservatively and resolved. She was admitted  -  for hypotension, diarrhea, ac/chronic systolic CHF and KENISHA, UTI. Cardiology/ GI were consulted at the time. Hypotension resolved with med changed (amiodarone dc'd). Diarrhea resolved. Cardiology saw pt for perciardial effusion and said to continue lasix. She was told to follow with Dr Miguel Zuluaga for breast mass and neurosurgery for L4 lesion. Pt returns  with report of nausea, vomiting, UA ?for UTI. Started on Rocephin and IVF. CT imaging shows bowel distension and moderate stool through colon. Also concern for increasing pericardial effusion on CT scan, still reporting as moderate on repeat echo.  - pt appears frail. Does not appear toxic or in distress. Reports mild nausea. No vomiting or diarrhea since arrival. She can't recall last BM but RN says that on intake it was listed as day prior to arrival. Patient denies abdominal pain. Review of Systems negative with exception of pertinent positives noted above PHYSICAL EXAM  
 
Visit Vitals /72 Pulse 71 Temp 98.3 °F (36.8 °C) Resp 17 Ht 5' (1.524 m) Wt 51.3 kg (113 lb 3.2 oz) SpO2 93% BMI 22.11 kg/m² Temp (24hrs), Av.1 °F (36.7 °C), Min:97.8 °F (36.6 °C), Max:98.3 °F (36.8 °C) Oxygen Therapy O2 Sat (%): 93 % (19 1218) Pulse via Oximetry: 86 beats per minute (06/28/19 1615) O2 Device: Room air (06/28/19 1230) Intake/Output Summary (Last 24 hours) at 6/29/2019 1613 Last data filed at 6/29/2019 6633 Gross per 24 hour Intake 631 ml Output  Net 631 ml General: No acute distress  frail/thin. Temporal wasting. Lungs:  Diminished bilaterally Heart:  Regular rate and rhythm,  No murmur, rub, or gallop Abdomen: Soft, Non distended, Non tender, hypoactive bowel sounds Extremities: No cyanosis, clubbing or edema Neurologic:  No focal deficits ASSESSMENT Active Hospital Problems Diagnosis Date Noted  Ileus ([facility] [address] 06/29/2019  Dehydration 06/28/2019  Acute UTI (urinary tract infection) 06/03/2019  KENISHA (acute kidney injury) ([facility] [address] 05/30/2019  Follicular thyroid cancer ([facility] [address] 06/19/2011 thyroglobulin high > 100 obvious residual thyroid tissue  HTN (hypertension) 06/16/2011  Acquired hypothyroidism 06/16/2011 Post thyroidectomy for thyroid cancer A/P 
 
- Small bowel obstruction vs ileus - as per CT abd on admission. Pt appears clinically improved today. Repeat KUB though unchanged with bowel distension. Will change diet to NPO. Keep IVF. Pt known to gen surgery from previous June admission for SBO - will consult. Will trial NGT decompression 
 
 
- UTI - GPC pos culture. Id/sens pending. Add vanco, cont rocephin. Suspect she has had a herman at recent admission - ?source. - Afib - continue coumadin. Rate controlled. - KENISHA - continue IVF, Cr trending down. Repeat bmp in AM. HOlding lasix/lisinopriol. 
 
- pericardial effusion - stable per repeat echo June 29. Seemingly asymptomatic. Cardiology aware last admission. Lasix on hold for KENISHA. RESUME when appropriate - ?tomorrow. 
 
- HTN - controlled off lisinopril, diuretics. Resume as appropraite. - Acquired hypothyroidism - taken off synthroid at last admission. Needs repeat thyroid functions toward end of July DVT Prophylaxis: coumadin per pharmacy Signed By: Tita Limon,    
 June 29, 2019

## 2019-06-29 NOTE — PROGRESS NOTES
Pharmacokinetic Consult to Pharmacist 
 
Alfreda Lópezobi is a 80 y.o. female being treated for UTI with vancomycin and rocephin. Height: 5' (152.4 cm)  Weight: 51.3 kg (113 lb 3.2 oz) Lab Results Component Value Date/Time BUN 34 (H) 06/29/2019 05:29 AM  
 Creatinine 1.46 (H) 06/29/2019 05:29 AM  
 WBC 8.1 06/29/2019 05:29 AM  
 Lactic Acid (POC) 2.03 (H) 05/29/2019 06:51 PM  
  
Estimated Creatinine Clearance: 19.9 mL/min (A) (based on SCr of 1.46 mg/dL (H)). Day 1 of vancomycin. Goal trough is 10-20. Scr improved today. Dosing started with 1.25g x 1 and then 750 mg q24h. Will continue to follow patient. Thank you, Desean Palacios, Pharm. D. Clinical Pharmacist 
[phone]

## 2019-06-29 NOTE — PROGRESS NOTES
Patient afebrile for this shift, VSS, on NSR upon checks per Tele. Patient complained no abd pain, given prn Zofran for nausea and Mylanta for indigestion. Hourly rounds performed;all pt needs met. Bed in low position and call light/ personal items within reach. Will continue to monitor and give bedside shift report to oncoming day shift nurse.

## 2019-06-29 NOTE — CONSULTS
H&P/Consult Note/Progress Note/Office Note:  
Ariana Gaines  MRN: [id number]  :1933  Age:86 y.o. General Surgery Consult ordered by: Dr. Adriano Harrell Reason for General Surgery Consult: SBO versus ileus As previously noted - HPI: Ariana Gaines is a 80 y.o. female with pmhx of CAD, moderate pericardial effusion, hypothyroidism, prior follicular cancer s/p ESCALERA, HTN, chronic systolic CHF, afib on coumadin, hx of DVT's and PE's with ivc filter in place,  l4 lesion and right breast 12mm nodule.  
  
Patient was admitted   -  for abdominal pain and found to have SBO. Incidental findings of right breast mass and l4 lesion during imaging then. Surgery was consulted. SBO treated conservatively and resolved.  
  
She was admitted  -  for hypotension, diarrhea, ac/chronic systolic CHF and KENISHA, UTI. Cardiology/ GI were consulted at the time. Hypotension resolved with med changed (amiodarone dc'd). Diarrhea resolved. Cardiology saw pt for perciardial effusion and said to continue lasix. She was told to follow with Dr Mart Chacon for breast mass and neurosurgery for L4 lesion.  
  
Pt returns  with report of nausea, vomiting, UA ? for UTI. Started on Rocephin and IVF. CT imaging shows bowel distension and moderate stool through colon. Also concern for increasing pericardial effusion on CT scan, still reporting as moderate on repeat echo. 19: Awake in bed. No complaints at this time. Denies abd pain. LBM Thursday. -flatus. Reports +belching today. Family at bedside. NAD. Past Medical History:  
Diagnosis Date  Acquired hypothyroidism  Acute pancreatitis  Arthritis  CAD (coronary artery disease) 11 RCA occluded and fills with collaterals, LAD and Cx nonobstructive CAD, EF 50-55%  Cancer ([address] THYROID - treated with surgery and radiation  CHF (congestive heart failure) ([address] 2018  Chronic pain   
 right hip  Coagulopathy ([address] 2011  Colon polyps  Follicular thyroid cancer ([facility] [address]  Gallstone pancreatitis  Glaucoma  Gout   
 hx of gout to right great toe - no problems  Hypertension   
 controlled with medication  Other ill-defined conditions(799.89)  Pancreatitis  Paroxysmal A-fib ([facility] [address] 11/8/2018  PE (pulmonary embolism) 2008  Pulmonary HTN (HCC)   
 moderate  Thromboembolus ([facility] [address] 1995  
 right leg - Hemashield graft/filter placed in abdomen  Thyroid disease   
 pt had total thyroidectomy - takes levoxyl daily  UTI (urinary tract infection) 6/3/2019 Past Surgical History:  
Procedure Laterality Date  HX APPENDECTOMY  ? 1990  
 HX CHOLECYSTECTOMY  6/2011 Dr. Mary Ellen Hong [facility]  
 hemashield graft/filter placed due to blood clot right leg  HX THYROIDECTOMY  2010  
 partial  
 HX TONSILLECTOMY [facility] ARTHROPLASTY  2008 & 2011  
 right hip Current Facility-Administered Medications Medication Dose Route Frequency  [START ON 6/30/2019] vancomycin (VANCOCIN) 750 mg in  mL infusion  750 mg IntraVENous Q24H  
 tuberculin injection 5 Units  5 Units IntraDERMal ONCE  
 bisacodyl (DULCOLAX) suppository 10 mg  10 mg Rectal DAILY PRN  
 [START ON 6/30/2019] docusate sodium (COLACE) capsule 100 mg  100 mg Oral DAILY  0.9% sodium chloride infusion  100 mL/hr IntraVENous CONTINUOUS  
 sodium chloride (NS) flush 5-40 mL  5-40 mL IntraVENous Q8H  
 sodium chloride (NS) flush 5-40 mL  5-40 mL IntraVENous PRN  
 acetaminophen (TYLENOL) suppository 650 mg  650 mg Rectal Q4H PRN  
 morphine injection 2 mg  2 mg IntraVENous Q4H PRN  
 naloxone (NARCAN) injection 0.4 mg  0.4 mg IntraVENous PRN  
 diphenhydrAMINE (BENADRYL) capsule 25 mg  25 mg Oral Q4H PRN  
 magnesium hydroxide (MILK OF MAGNESIA) 400 mg/5 mL oral suspension 30 mL  30 mL Oral DAILY PRN  
 nicotine (NICODERM CQ) 21 mg/24 hr patch 1 Patch  1 Patch TransDERmal DAILY PRN  
  HYDROcodone-acetaminophen (NORCO) 5-325 mg per tablet 1 Tab  1 Tab Oral Q4H PRN  
 cefTRIAXone (ROCEPHIN) 1 g in 0.9% sodium chloride (MBP/ADV) 50 mL  1 g IntraVENous Q24H  
 ondansetron (ZOFRAN) injection 4 mg  4 mg IntraVENous Q4H PRN  
 cloNIDine HCl (CATAPRES) tablet 0.2 mg  0.2 mg Oral BID PRN  
 hydrALAZINE (APRESOLINE) 20 mg/mL injection 10 mg  10 mg IntraVENous Q6H PRN  
 warfarin (COUMADIN) tablet 2 mg  2 mg Oral QPM  
 metoclopramide HCl (REGLAN) injection 10 mg  10 mg IntraVENous Q8H PRN  
 alum-mag hydroxide-simeth (MYLANTA) oral suspension 30 mL  30 mL Oral Q4H PRN Ativan [lorazepam] Social History Socioeconomic History  Marital status:  Spouse name: Not on file  Number of children: Not on file  Years of education: Not on file  Highest education level: Not on file Tobacco Use  Smoking status: Former Smoker  Smokeless tobacco: Never Used  Tobacco comment: quit smoking 1995 Substance and Sexual Activity  Alcohol use: No  
 Drug use: No  
 
Social History Tobacco Use Smoking Status Former Smoker Smokeless Tobacco Never Used Tobacco Comment  
 quit smoking 1995 Family History Problem Relation Age of Onset  Stroke Mother  Hypertension Mother  Cancer Father   
     prostate  Diabetes Maternal Uncle ROS: Comprehensive review of systems was otherwise unremarkable except as noted above. Physical Exam:  
Visit Vitals /86 Pulse 88 Temp 98.3 °F (36.8 °C) Resp 18 Ht 5' (1.524 m) Wt 113 lb 3.2 oz (51.3 kg) SpO2 98% BMI 22.11 kg/m² Vitals:  
 06/29/19 3371 06/29/19 1128 06/29/19 1218 06/29/19 1624 BP: 128/79  120/72 125/86 Pulse: 72  71 88 Resp: 16  17 18 Temp: 97.8 °F (36.6 °C)  98.3 °F (36.8 °C) 98.3 °F (36.8 °C) SpO2: 95%  93% 98% Weight:  113 lb 3.2 oz (51.3 kg) Height:      
 
No intake/output data recorded. 06/27 1901 - 06/29 0700 In: 058 [P.O.:30; I.V.:601] Out: - Constitutional: Frail appearance, Alert, cooperative, no acute distress Eyes: Sclera are clear. EOMs intact ENMT: no external lesions gross hearing normal; no obvious neck masses, no ear or lip lesions, nares normal 
CV: RRR. Normal perfusion Resp: No JVD. Breathing is  non-labored; no audible wheezing. GI: soft, non-tender, non-distended, BS hypoactive Musculoskeletal: No embolic signs or cyanosis. Neuro: no focal deficits Psychiatric: normal affect and mood Recent vitals (if inpt): 
Patient Vitals for the past 24 hrs: 
 BP Temp Pulse Resp SpO2 Weight  
06/29/19 1624 125/86 98.3 °F (36.8 °C) 88 18 98 %   
06/29/19 1218 120/72 98.3 °F (36.8 °C) 71 17 93 %   
06/29/19 1128      113 lb 3.2 oz (51.3 kg) 06/29/19 0811 128/79 97.8 °F (36.6 °C) 72 16 95 %   
06/29/19 0431 143/87 98.2 °F (36.8 °C) 78 14 95 %   
06/28/19 2321 128/76 98 °F (36.7 °C) 75 16 93 %   
06/28/19 1907 136/82 98 °F (36.7 °C) 75 14 98 %   
06/28/19 1700 143/82       
06/28/19 1641 145/88      Labs: 
Recent Labs  
  06/29/19 
0529 06/28/19 
0845 WBC 8.1 8.5 HGB 11.6* 12.2 * 159  136  
K 4.6 4.6  99 CO2 31 29 BUN 34* 31* CREA 1.46* 1.64* * 130* PTP 16.9* 15.1* INR 1.4 1.2 TBILI  --  0.5 SGOT  --  44* ALT  --  27  
AP  --  98  
LPSE  --  109 TROIQ  --  <0.02* Lab Results Component Value Date/Time  WBC 8.1 06/29/2019 05:29 AM  
 HGB 11.6 (L) 06/29/2019 05:29 AM  
 PLATELET 764 (L) 26/29/4429 05:29 AM  
 Sodium 142 06/29/2019 05:29 AM  
 Potassium 4.6 06/29/2019 05:29 AM  
 Chloride 103 06/29/2019 05:29 AM  
 CO2 31 06/29/2019 05:29 AM  
 BUN 34 (H) 06/29/2019 05:29 AM  
 Creatinine 1.46 (H) 06/29/2019 05:29 AM  
 Glucose 107 (H) 06/29/2019 05:29 AM  
 INR 1.4 06/29/2019 05:29 AM  
 aPTT 62.1 (H) 06/04/2019 04:58 AM  
 Bilirubin, total 0.5 06/28/2019 08:45 AM  
 Bilirubin, direct 0.1 06/25/2011 06:00 AM  
 AST (SGOT) 44 (H) 06/28/2019 08:45 AM  
 ALT (SGPT) 27 06/28/2019 08:45 AM  
 Alk. phosphatase 98 06/28/2019 08:45 AM  
 Amylase 64 06/17/2011 03:53 AM  
 Lipase 109 06/28/2019 08:45 AM  
 Troponin-I, Qt. <0.02 (L) 06/28/2019 08:45 AM  
 
 
CT Results  (Last 48 hours) 06/28/19 1421  CT ABD PELV WO CONT Final result Impression:  IMPRESSION:  
   
1. Increasing small bowel distention with air-fluid levels suggesting worsening  
enteritis with moderate stool throughout the colon. 2. Developing ascites. 3. Increasing pericardial effusion 4. Known lytic lesion virtually replacing the L4 vertebral body. CPT code(s): L9338561, [id number] Narrative:  Clinical History: The patient is a 80years year old Female presenting with  
symptoms of abdominal pain Comparison:  Abdomen pelvis CT 6/13/2019. Technique: Thin slice axial images were obtained through the abdomen and pelvis without  
intravenous and with oral contrast.  Coronal reformatted images were also  
provided for review. All CT scans at this facility are performed using dose reduction/dose modulation  
techniques, as appropriate the performed exam, including the following: Automated Exposure Control; Adjustment of the mA and/or kV according to patient  
size (this includes techniques or standardized protocols for targeted exams  
where dose is matched to indication/reason for exam); and Use of Iterative Reconstruction Technique. Radiation Exposure Indices:  
Reference Air Kerma (Ka,r) = 264 mGy-cm Findings: Abdomen:  
Lung bases: There is minimal atelectasis at the lung bases and increasing  
pericardial effusion is demonstrated now measuring 2 cm in thickness. Liver: Normal size, contour, and density without focal mass. Mild ascites is  
developing around the liver Biliary: Cholecystectomy. No evidence of intra or extrahepatic biliary  
dilatation. Pancreas: Normal in size and contour without focal lesion. Spleen: Normal in size and contour without focal lesion. Adrenal glands: Normal in size without focal lesion. Kidneys: Probable small nonobstructing bilateral renal stones. Ralene Jeramie Bowel: There is increasing gaseous distention of the small bowel measuring up to  
3 cm with moderate stool throughout the colon. Few air-fluid levels are  
scattered throughout the small bowel. A transition point is not evident. Retroperitoneum/vasculature: No evidence of significant adenopathy. Tortuous and  
ectatic supra and infrarenal abdominal aorta. Abdominal soft tissues: Unremarkable. Osseous structures: A lytic process is again seen essentially replacing the  
entire anterior L4 vertebral body. Right hip prosthesis. Pelvis:  
Unremarkable bladder. There is minimal free fluid in the pelvis. The uterus is not visualized and a pessary ring is in place. chest X-ray I reviewed recent labs, recent radiologic studies, and pertinent records including other doctor notes if needed. I independently reviewed radiology images for studies I described above or studies I have ordered. Admission date (for inpatients): 6/28/2019 * No surgery found *  * No surgery found * ASSESSMENT/PLAN: 
Problem List  Date Reviewed: 6/19/2019 Codes Class Noted Ileus ([email or web address] ICD-10-CM: K56.7 ICD-9-CM: 560.1  6/29/2019 Enteritis ICD-10-CM: K52.9 ICD-9-CM: 558.9  6/28/2019 Dehydration ICD-10-CM: E86.0 ICD-9-CM: 276.51  6/28/2019 Abnormal CT scan, lumbar spine ICD-10-CM: R93.7 ICD-9-CM: 793.7  6/19/2019 Pancytopenia ([email or web address] ICD-10-CM: X68.464 ICD-9-CM: 284.19  6/16/2019 Hypotension ICD-10-CM: I95.9 ICD-9-CM: 458.9  6/15/2019 Moderate protein malnutrition (HCC) ICD-10-CM: E44.0 ICD-9-CM: 263.0  6/15/2019  Failure to thrive (0-17) ICD-10-CM: R62.51 
 ICD-9-CM: 783.41  6/15/2019 Hyperthyroidism ICD-10-CM: E05.90 ICD-9-CM: 242.90  6/15/2019 Breast mass ICD-10-CM: N63.0 ICD-9-CM: 611.72  6/14/2019 PAF (paroxysmal atrial fibrillation) (HCC) ICD-10-CM: I48.0 ICD-9-CM: 427.31  6/14/2019 Colitis ICD-10-CM: K52.9 ICD-9-CM: 558.9  6/14/2019 Diarrhea ICD-10-CM: R19.7 ICD-9-CM: 787.91  6/13/2019 Acute UTI (urinary tract infection) ICD-10-CM: N39.0 ICD-9-CM: 599.0  6/3/2019 KENISHA (acute kidney injury) ([facility] ICD-10-CM: N17.9 ICD-9-CM: 584.9  5/30/2019 SBO (small bowel obstruction) ([facility] ICD-10-CM: V97.709 ICD-9-CM: 560.9  5/29/2019 Atrial fibrillation with RVR ([facility] ICD-10-CM: I48.91 
ICD-9-CM: 427.31  5/29/2019 Chronic systolic dysfunction of left ventricle (Chronic) ICD-10-CM: I51.9 ICD-9-CM: 429.9  5/29/2019 Thyroid cancer [facility]) ICD-10-CM: A30 ICD-9-CM: 663  5/29/2019 Compression fracture of fourth lumbar vertebra (HCC) ICD-10-CM: M81.813F ICD-9-CM: 805.4  5/29/2019 Thromboembolic disorder ([facility] QOI-94-ZF: I74.9 ICD-9-CM: 444.9  5/29/2019 Presence of IVC filter (Chronic) ICD-10-CM: B26.524 ICD-9-CM: V45.89  5/29/2019 Breast nodule ICD-10-CM: N63.0 ICD-9-CM: 793.89  5/29/2019 Weight loss ICD-10-CM: R63.4 ICD-9-CM: 783.21  5/29/2019 Acute on chronic combined systolic and diastolic ACC/AHA stage C congestive heart failure (HCC) ICD-10-CM: I50.43 ICD-9-CM: 428.43, 428.0  11/10/2018 CHF (congestive heart failure) (HCC) ICD-10-CM: I50.9 ICD-9-CM: 428.0  11/8/2018 Supratherapeutic INR ICD-10-CM: R79.1 ICD-9-CM: 790.92  11/8/2018 Pleural effusion ICD-10-CM: J90 ICD-9-CM: 511.9  11/8/2018 Overview Signed 11/8/2018  4:18 PM by Kong Zhang MD  
  bilateral 
  
  
   
 Edema ICD-10-CM: R60.9 ICD-9-CM: 782.3  11/8/2018 Overview Signed 11/8/2018  4:18 PM by Kong Zhang MD  
  Bilateral lower legs Lightheadedness ICD-10-CM: R43 ICD-9-CM: 780.4  11/8/2018 Paroxysmal A-fib (HCC) ICD-10-CM: I48.0 ICD-9-CM: 427.31  11/8/2018 Iron deficiency anemia ICD-10-CM: D50.9 ICD-9-CM: 280.9  8/29/2011 Overview Signed 8/29/2011  3:05 PM by Danisha Stephenson Despite oral iron For infed infusion Nutritional deficiency ICD-10-CM: E63.9 ICD-9-CM: 269.9  6/27/2011 Impaired mobility ICD-10-CM: Z74.09 
ICD-9-CM: 799.89  6/27/2011 Debility ICD-10-CM: R53.81 ICD-9-CM: 799.3  6/27/2011 Colon polyps ICD-10-CM: K63.5 ICD-9-CM: 211.3  6/27/2011 Pancreatitis ICD-10-CM: K85.90 ICD-9-CM: 448.1  6/19/2011 Overview Signed 6/19/2011  4:13 PM by Danisha Stephenson 6-19-11 lipase improving possibly from gallstones Gallstone pancreatitis ICD-10-CM: K85.10 ICD-9-CM: 848.3, 574.20  6/19/2011 Follicular thyroid cancer ([facility] ICD-10-CM: F08 ICD-9-CM: 147  6/19/2011 Overview Signed 6/21/2011  7:12 AM by Danisha Stephenson  
  thyroglobulin high > 100 obvious residual thyroid tissue Pericardial effusion ICD-10-CM: I31.3 ICD-9-CM: 423.9  6/17/2011 Acute pancreatitis ICD-10-CM: K85.90 ICD-9-CM: 335.9  6/16/2011 HTN (hypertension) (Chronic) ICD-10-CM: I10 
ICD-9-CM: 401.9  6/16/2011 Acquired hypothyroidism (Chronic) ICD-10-CM: E03.9 ICD-9-CM: 244.9  6/16/2011 Overview Signed 6/19/2011  4:15 PM by Danisha Stephenson Post thyroidectomy for thyroid cancer Pulmonary embolism ([facility] [address] (Chronic) ICD-10-CM: I26.99 
ICD-9-CM: 415.19  5/2/2011 Unstable angina (HCC) ICD-10-CM: I20.0 ICD-9-CM: 411.1  4/28/2011 Prosthetic hip implant failure ([facility] [address] (Chronic) ICD-10-CM: T84.018A, A14.898 ICD-9-CM: 996.47, V43.64  4/26/2011 KENNEDY (total hip arthroplasty) ICD-9-CM: V43.64  4/26/2011 Active Problems: 
  HTN (hypertension) (6/16/2011) Acquired hypothyroidism (6/16/2011) Overview: Post thyroidectomy for thyroid cancer Follicular thyroid cancer ([facility] [address] (6/19/2011) Overview: thyroglobulin high > 100 obvious residual thyroid tissue KENISHA (acute kidney injury) ([facility] [address] (5/30/2019) Acute UTI (urinary tract infection) (6/3/2019) Dehydration (6/28/2019) Ileus ([facility] [address] (6/29/2019) Plan Care management per Hospitalist 
IV Abx - Rocephin & Vanco (UTI) NPO 
IVF 
NGT for decompression General Surgery will follow. Conservative management.   
 
Kaur Cheney NP

## 2019-06-29 NOTE — PROGRESS NOTES
Problem: Falls - Risk of 
Goal: *Absence of Falls Description Document Arelis Late Fall Risk and appropriate interventions in the flowsheet.  
Outcome: Progressing Towards Goal

## 2019-06-29 NOTE — PROGRESS NOTES
Pt and family informed, per Dr. Gillian Preston, will hold off on placing NGT at this time, if pt starts vomiting will attempt placement again.

## 2019-06-29 NOTE — PROGRESS NOTES
Attempt to place NGT unsuccessful, attempt with 16FR and 14FR NGT, pt unable to relax to allow placement. Call to Dr. Rasheed Slater, new order received, hold off on placing NGT at this time, if pt start vomiting may attempt placement again.

## 2019-06-29 NOTE — PROGRESS NOTES
Call to Dr. Fanny Holder, informed of today's KUB results, persistent small bowel dilation, ileus or partial obstruction. No new orders received.

## 2019-06-30 NOTE — PROGRESS NOTES
Patient complained nausea once for this shift, given prn Zofran, and patient refused to put NGT in. No significant emesis (scant amount in Kleenex when nausea occurred) noted. And no BM reported. No IV available now, IV access team called for a new IV. Hourly rounds performed;all pt needs met. Bed in low position and call light/ personal items within reach. Will continue to monitor and give bedside shift report to oncoming day shift nurse.

## 2019-06-30 NOTE — PROGRESS NOTES
Call to Lisa Zuñiga, informed pt's family requesting diet order, they are reporting to staff PA had told them she could have liquids. Per Lisa Zuñiga, will hold on liquids due to surgery seeing pt on consult. New order received, keep NPO pt may have ice chips and meds.

## 2019-06-30 NOTE — PROGRESS NOTES
Warfarin dosing per pharmacist 
 
Sherryle Caldwell is a 80 y.o. female. Height: 5' (152.4 cm)    Weight: 51.3 kg (113 lb 3.2 oz) Indication:  afib Goal INR:  2-3 Home dose:  2 mg daily Risk factors or significant drug interactions:  questionable compliance; recent hospitalization Other anticoagulants:  none Daily Monitoring Date  INR     Warfarin dose HGB              Notes 6/28  1.2  2 mg  12.2  
6/29  1.4  2 mg  11.6 6/30  1.8  2 mg  ----- 
 
INR has been trending up. Would continue current dosing with 2mg tonight. Pharmacy will continue to monitor and adjust as needed Thank you, 
Pritesh Weiss PharmD 
[phone]

## 2019-06-30 NOTE — PROGRESS NOTES
Problem: Mobility Impaired (Adult and Pediatric) Goal: *Acute Goals and Plan of Care (Insert Text) Description LTG: 
(1.)Ms. Nathalie Grimaldo will roll side to side in bed with MINIMAL ASSIST within 7 treatment day(s). (2.)Ms. Nathalie Grimaldo will move from supine to sit and sit to supine in bed with MAXIMAL ASSIST within 7 treatment day(s). (3.)Ms. Nathalie Grimaldo will transfer from bed to chair and chair to bed with MAXIMAL ASSIST using the least restrictive device within 7 treatment day(s). ________________________________________________________________________________________________ Outcome: Progressing Towards Goal 
  
PHYSICAL THERAPY: Initial Assessment and AM 6/30/2019 INPATIENT: PT Visit Days : 1 Payor: [employer] MEDICARE / Plan: [employer] / Product Type: Managed Care Medicare /   
  
NAME/AGE/GENDER: Binu Mckeon is a 80 y.o. female PRIMARY DIAGNOSIS: Enteritis [K52.9] <principal problem not specified> 
 <principal problem not specified> 
 
  
  
ICD-10: Treatment Diagnosis:  
 Generalized Muscle Weakness (M62.81) Difficulty in walking, Not elsewhere classified (R26.2) Other abnormalities of gait and mobility (R26.89) Precaution/Allergies: 
Ativan [lorazepam] ASSESSMENT:  
 
Ms. Nathalie Grimaldo is a 80 y.o female admitted to inpatient stay with nausea and vomiting. Patient has had previous hospitalizations from 5/14-6/4 with a SBO and again on 6/13-6/19 for hypotension, diarrhea, ac/chronic systolic CHF, and a UTI. Patient found to have a L4 lesion and 12 mm R breast nodule during those stays. Patient sleeping supine in bed with her two daughters present upon PT entry this AM. Her daughters provide the patient's history. Patient lives with her two sons who provide 24/7 assistance.  Prior to 5/14/19 she was ambulating with mod I using one crutch, as patient has a leg length discrepancy (R LE shorter) from surgery as a teenager and wears built up shoe at all times. Since her most recent admissions, patient has not been ambulating and requires maxA to take a few steps from her bed to the recliner. Her sons have been carrying her out to the car when she needs to leave the home. Patient utilizes briefs and a BSC. Today, patient was unwilling to attempt a supine to sitting at EOB transfer, as she reported not feeling well. Patient performed rolling to R and L in supine position with modA and scooting to head of bed with maxA, with some initiation of assistance by pushing through her R UE and lifting her hips. Franchesca Uribe appears to be functioning well below her baseline with strength, mobility, balance, transfers, and activity tolerance and will need continued PT during hospital stay to address her deficits and to maximize her independence with mobility. Patient's family provide excellent care and would rather take the patient home with HHPT opposed to having the patient go to a rehabilitation facility at discharge. Will continue to follow patient. This section established at most recent assessment PROBLEM LIST (Impairments causing functional limitations): 
Decreased Strength Decreased ADL/Functional Activities Decreased Transfer Abilities Decreased Ambulation Ability/Technique Decreased Balance Increased Pain Decreased Activity Tolerance Increased Fatigue INTERVENTIONS PLANNED: (Benefits and precautions of physical therapy have been discussed with the patient.) Balance Exercise Bed Mobility Family Education Gait Training Home Exercise Program (HEP) Neuromuscular Re-education/Strengthening Range of Motion (ROM) Therapeutic Activites Therapeutic Exercise/Strengthening Transfer Training TREATMENT PLAN: Frequency/Duration: 3 times a week for duration of hospital stay Rehabilitation Potential For Stated Goals: Fair REHAB RECOMMENDATIONS (at time of discharge pending progress):   
 Placement: It is my opinion, based on this patient's performance to date, that Ms. Sanjuana Orozco may benefit from [address] after discharge due to the functional deficits listed above that are likely to improve with skilled rehabilitation because he/she has multiple medical issues that affect his/her functional mobility in the community. Equipment:  
None at this time HISTORY:  
History of Present Injury/Illness (Reason for Referral): 
Per H&P: \"Patient 86F with pmhx of CAD, moderate pericardial effusion, hypothyroidism, prior follicular cancer s/p ESCALERA, HTN, chronic systolic CHF, afib on coumadin, hx of DVT's and PE's with ivc filter in place,  l4 lesion and right breast 12mm nodule. Patient was admitted  5/14 - 6/4 for abdominal pain and found to have SBO. Incidental findings of right breast mass and l4 lesion during imaging then. Surgery was consulted. SBO treated conservatively and resolved. She was admitted  6/13- 6/19 for hypotension, diarrhea, ac/chronic systolic CHF and KENISHA, UTI. Cardiology/ GI were consulted at the time. Hypotension resolved with med changed (amiodarone dc'd). Diarrhea resolved. Cardiology saw pt for perciardial effusion and said to continue lasix. She was told to follow with Dr Diaz Ruiz for breast mass and neurosurgery for L4 lesion. Pt returns 6/28 with report of nausea, vomiting, UA ?for UTI. Started on Rocephin and IVF. CT imaging shows bowel distension and moderate stool through colon. Also concern for increasing pericardial effusion on CT scan, still reporting as moderate on repeat echo. \" 
Past Medical History/Comorbidities: Ms. Sanjuana Orozco  has a past medical history of Acquired hypothyroidism, Acute pancreatitis, Arthritis, CAD (coronary artery disease) (4/29/11), Cancer ([facility] [address] CHF (congestive heart failure) ([facility] [address] (11/8/2018), Chronic pain, Coagulopathy ([facility] [address] (6/16/2011), Colon polyps, Follicular thyroid cancer ([address] Gallstone pancreatitis, Glaucoma, Gout, Hypertension, Other ill-defined conditions(799.89), Pancreatitis, Paroxysmal A-fib ([address] (11/8/2018), PE (pulmonary embolism) (2008), Pulmonary HTN ([address] Thromboembolus ([address] (1995), Thyroid disease, and UTI (urinary tract infection) (6/3/2019). She also has no past medical history of Aneurysm ([address] Asthma, Autoimmune disease ([address] Chronic kidney disease, COPD, Dementia, Diabetes ([address] Difficult intubation, Liver disease, Malignant hyperthermia due to anesthesia, Nausea & vomiting, Neurological disorder, Pseudocholinesterase deficiency, Psychiatric disorder, PUD (peptic ulcer disease), Seizures ([address] Sleep disorder, Stroke ([address] Unspecified adverse effect of anesthesia, or Unspecified sleep apnea. Ms. Fela Person  has a past surgical history that includes hx thyroidectomy (2010); hx appendectomy (?1990); hx tonsillectomy; hx other surgical (1995); pr total hip arthroplasty (2008 & 2011); and hx cholecystectomy (6/2011). Social History/Living Environment:  
Home Environment: Private residence One/Two Story Residence: One story Living Alone: No 
Support Systems: Child(chavo), Family member(s) Patient Expects to be Discharged to[de-identified] Private residence Current DME Used/Available at Home: Commode, bedside, Walker, rolling Prior Level of Function/Work/Activity: 
Patient was independent with mobility prior to 05/14/19. Since then, she requires assistance with all functional transfers and ADLs. Number of Personal Factors/Comorbidities that affect the Plan of Care: 1-2: MODERATE COMPLEXITY EXAMINATION:  
Most Recent Physical Functioning:  
Gross Assessment: 
AROM: Generally decreased, functional 
Strength: Grossly decreased, non-functional 
Coordination: Generally decreased, functional 
Sensation: Intact Posture: 
Posture (WDL): Exceptions to [place] Posture Assessment: Forward head, Trunk flexion, Rounded shoulders Balance: 
  Bed Mobility: Rolling: Moderate assistance Scooting: Moderate assistance Wheelchair Mobility: 
  
Transfers: 
  
Gait: 
  
   
  
Body Structures Involved: 
Bones Joints Muscles Body Functions Affected: 
Neuromusculoskeletal 
Movement Related Activities and Participation Affected: 
General Tasks and Demands Mobility Self Care Domestic Life Community, Social and [place] Number of elements that affect the Plan of Care: 4+: HIGH COMPLEXITY CLINICAL PRESENTATION:  
Presentation: Evolving clinical presentation with changing clinical characteristics: MODERATE COMPLEXITY CLINICAL DECISION MA[address] [place] [address] AM-PAC? ?6 Clicks? Basic Mobility Inpatient Short Form How much difficulty does the patient currently have. .. Unable A Lot A Little None 1. Turning over in bed (including adjusting bedclothes, sheets and blankets)? ? 1   ? 2   ? 3   ? 4  
2. Sitting down on and standing up from a chair with arms ( e.g., wheelchair, bedside commode, etc.)   ? 1   ? 2   ? 3   ? 4  
3. Moving from lying on back to sitting on the side of the bed?   ? 1   ? 2   ? 3   ? 4 How much help from another person does the patient currently need. .. Total A Lot A Little None 4. Moving to and from a bed to a chair (including a wheelchair)? ? 1   ? 2   ? 3   ? 4  
5. Need to walk in hospital room? ? 1   ? 2   ? 3   ? 4  
6. Climbing 3-5 steps with a railing? ? 1   ? 2   ? 3   ? 4  
© , Trustees of [address] [place] [address], under license to [employer]. All rights reserved Score:  Initial: 9 Most Recent: X (Date: -- ) Interpretation of Tool:  Represents activities that are increasingly more difficult (i.e. Bed mobility, Transfers, Gait). Medical Necessity:    
Skilled intervention continues to be required due to patient not performing at her baseline level. Reason for Services/Other Comments: 
Patient continues to require modification of therapeutic interventions to increase complexity of exercises Ronit Portillo Use of outcome tool(s) and clinical judgement create a POC that gives a: Questionable prediction of patient's progress: MODERATE COMPLEXITY  
  
 
 
 
TREATMENT:  
(In addition to Assessment/Re-Assessment sessions the following treatments were rendered) Pre-treatment Symptoms/Complaints:  \"I'm tired and don't feel good. \" 
Pain: Initial:  
Pain Intensity 1: 5 Pain Location 1: Abdomen  Post Session:  5/10 Assessment/Reassessment only, no treatment provided today Braces/Orthotics/Lines/Etc:  
O2 Device: Room air Treatment/Session Assessment:   
Response to Treatment:  Patient able to perform bed mobility with modA, however requested to not perform transfers at this time as she wasn't feeling well. Interdisciplinary Collaboration:  
Physical Therapist 
Registered Nurse After treatment position/precautions:  
Supine in bed Bed/Chair-wheels locked Bed in low position Call light within reach RN notified Family at bedside Compliance with Program/Exercises: Will assess as treatment progresses Recommendations/Intent for next treatment session: \"Next visit will focus on advancements to more challenging activities and reduction in assistance provided\". Total Treatment Duration: PT Patient Time In/Time Out Time In: 5966 Time Out: 2161 Frederic Melendez DPT

## 2019-06-30 NOTE — PROGRESS NOTES
Progress Note/Office Note:  
Binu Mckeon  MRN: [id number]  :1933  Age:86 y.o. General Surgery Consult ordered by: Dr. Aracelis Gan Reason for General Surgery Consult: SBO versus ileus As previously noted - HPI: Binu Mckeon is a 80 y.o. female with pmhx of CAD, moderate pericardial effusion, hypothyroidism, prior follicular cancer s/p ESCALERA, HTN, chronic systolic CHF, afib on coumadin, hx of DVT's and PE's with ivc filter in place,  l4 lesion and right breast 12mm nodule.  
  
Patient was admitted   -  for abdominal pain and found to have SBO. Incidental findings of right breast mass and l4 lesion during imaging then. Surgery was consulted. SBO treated conservatively and resolved.  
  
She was admitted  -  for hypotension, diarrhea, ac/chronic systolic CHF and KENISHA, UTI. Cardiology/ GI were consulted at the time. Hypotension resolved with med changed (amiodarone dc'd). Diarrhea resolved. Cardiology saw pt for perciardial effusion and said to continue lasix. She was told to follow with Dr Zafar Dior for breast mass and neurosurgery for L4 lesion.  
  
Pt returns  with report of nausea, vomiting, UA ? for UTI. Started on Rocephin and IVF. CT imaging shows bowel distension and moderate stool through colon. Also concern for increasing pericardial effusion on CT scan, still reporting as moderate on repeat echo. 19: Awake in bed. No complaints at this time. Denies abd pain. LBM Thursday. -flatus. Reports +belching today. Family at bedside. NAD.  
19: Resting in bed. RN unable to place NG tube yesterday. No complaints at this time. Denies abd pain. LBM Thursday. -flatus. Family at bedside. NAD. Past Medical History:  
Diagnosis Date  Acquired hypothyroidism  Acute pancreatitis  Arthritis  CAD (coronary artery disease) 11 RCA occluded and fills with collaterals, LAD and Cx nonobstructive CAD, EF 50-55%  Cancer ([facility] THYROID - treated with surgery and radiation  CHF (congestive heart failure) ([facility] 11/8/2018  Chronic pain   
 right hip  Coagulopathy ([address] 6/16/2011  Colon polyps  Follicular thyroid cancer ([facility]  Gallstone pancreatitis  Glaucoma  Gout   
 hx of gout to right great toe - no problems  Hypertension   
 controlled with medication  Other ill-defined conditions(799.89)  Pancreatitis  Paroxysmal A-fib ([facility] [address] 11/8/2018  PE (pulmonary embolism) 2008  Pulmonary HTN (HCC)   
 moderate  Thromboembolus ([address] 1995  
 right leg - Hemashield graft/filter placed in abdomen  Thyroid disease   
 pt had total thyroidectomy - takes levoxyl daily  UTI (urinary tract infection) 6/3/2019 Past Surgical History:  
Procedure Laterality Date  HX APPENDECTOMY  ? 1990  
 HX CHOLECYSTECTOMY  6/2011 Dr. Alvarez [facility] [address] [place]  
 hemashield graft/filter placed due to blood clot right leg  HX THYROIDECTOMY  2010  
 partial  
 HX TONSILLECTOMY [address] [facility] ARTHROPLASTY  2008 & 2011  
 right hip Current Facility-Administered Medications Medication Dose Route Frequency  0.9% sodium chloride infusion  100 mL/hr IntraVENous CONTINUOUS  
 vancomycin (VANCOCIN) 750 mg in  mL infusion  750 mg IntraVENous Q24H  
 tuberculin injection 5 Units  5 Units IntraDERMal ONCE  
 bisacodyl (DULCOLAX) suppository 10 mg  10 mg Rectal DAILY PRN  
 docusate sodium (COLACE) capsule 100 mg  100 mg Oral DAILY  sodium chloride (NS) flush 5-40 mL  5-40 mL IntraVENous Q8H  
 sodium chloride (NS) flush 5-40 mL  5-40 mL IntraVENous PRN  
 acetaminophen (TYLENOL) suppository 650 mg  650 mg Rectal Q4H PRN  
 morphine injection 2 mg  2 mg IntraVENous Q4H PRN  
 naloxone (NARCAN) injection 0.4 mg  0.4 mg IntraVENous PRN  
 diphenhydrAMINE (BENADRYL) capsule 25 mg  25 mg Oral Q4H PRN  
 magnesium hydroxide (MILK OF MAGNESIA) 400 mg/5 mL oral suspension 30 mL 30 mL Oral DAILY PRN  
 nicotine (NICODERM CQ) 21 mg/24 hr patch 1 Patch  1 Patch TransDERmal DAILY PRN  
 HYDROcodone-acetaminophen (NORCO) 5-325 mg per tablet 1 Tab  1 Tab Oral Q4H PRN  
 cefTRIAXone (ROCEPHIN) 1 g in 0.9% sodium chloride (MBP/ADV) 50 mL  1 g IntraVENous Q24H  
 ondansetron (ZOFRAN) injection 4 mg  4 mg IntraVENous Q4H PRN  
 cloNIDine HCl (CATAPRES) tablet 0.2 mg  0.2 mg Oral BID PRN  
 hydrALAZINE (APRESOLINE) 20 mg/mL injection 10 mg  10 mg IntraVENous Q6H PRN  
 warfarin (COUMADIN) tablet 2 mg  2 mg Oral QPM  
 metoclopramide HCl (REGLAN) injection 10 mg  10 mg IntraVENous Q8H PRN  
 alum-mag hydroxide-simeth (MYLANTA) oral suspension 30 mL  30 mL Oral Q4H PRN Ativan [lorazepam] Social History Socioeconomic History  Marital status:  Spouse name: Not on file  Number of children: Not on file  Years of education: Not on file  Highest education level: Not on file Tobacco Use  Smoking status: Former Smoker  Smokeless tobacco: Never Used  Tobacco comment: quit smoking 1995 Substance and Sexual Activity  Alcohol use: No  
 Drug use: No  
 
Social History Tobacco Use Smoking Status Former Smoker Smokeless Tobacco Never Used Tobacco Comment  
 quit smoking 1995 Family History Problem Relation Age of Onset  Stroke Mother  Hypertension Mother  Cancer Father   
     prostate  Diabetes Maternal Uncle ROS: Comprehensive review of systems was otherwise unremarkable except as noted above. Physical Exam:  
Visit Vitals /77 (BP 1 Location: Left arm, BP Patient Position: At rest) Pulse 90 Comment: charted 97, recheck 90 Temp 97.7 °F (36.5 °C) Resp 16 Ht 5' (1.524 m) Wt 113 lb 3.2 oz (51.3 kg) SpO2 94% BMI 22.11 kg/m² Vitals:  
 06/29/19 2310 06/30/19 2019 06/30/19 0744 06/30/19 1207 BP: 124/61 123/73 100/68 111/77 Pulse: (!) 109 97 90 90 Resp: 18 18 17 16 Temp: 98.4 °F (36.9 °C) 97.9 °F (36.6 °C) 97.7 °F (36.5 °C) 97.7 °F (36.5 °C) SpO2: 91% 92% 92% 94% Weight:      
Height:      
 
No intake/output data recorded. 06/28 1901 - 06/30 0700 In: 2625 [P.O.:30; I.V.:2595] Out: -  
 
Constitutional: Frail appearance, Alert, cooperative, no acute distress Eyes: Sclera are clear. EOMs intact ENMT: no external lesions gross hearing normal; no obvious neck masses, no ear or lip lesions, nares normal 
CV: RRR. Normal perfusion Resp: No JVD. Breathing is  non-labored; no audible wheezing. GI: soft, non-tender, non-distended Musculoskeletal: No embolic signs or cyanosis. Neuro: no focal deficits Psychiatric: normal affect and mood Recent vitals (if inpt): 
Patient Vitals for the past 24 hrs: 
 BP Temp Pulse Resp SpO2  
06/30/19 1207 111/77 97.7 °F (36.5 °C) 90 16 94 % 06/30/19 0733 100/68 97.7 °F (36.5 °C) 90 17 92 % 06/30/19 0443 123/73 97.9 °F (36.6 °C) 97 18 92 %  
06/29/19 2310 124/61 98.4 °F (36.9 °C) (!) 109 18 91 %  
06/29/19 1921   (!) 107    
06/29/19 1907 111/80 98.5 °F (36.9 °C) (!) 120 16 91 %  
06/29/19 1624 125/86 98.3 °F (36.8 °C) 88 18 98 % Labs: 
Recent Labs  
  06/30/19 
0558 06/29/19 
0529 06/28/19 
0845 WBC  --  8.1 8.5 HGB  --  11.6* 12.2 PLT  --  147* 159 NA  --  142 136 K  --  4.6 4.6 CL  --  103 99 CO2  --  31 29 BUN  --  34* 31* CREA  --  1.46* 1.64* GLU  --  107* 130* PTP 20.6* 16.9* 15.1* INR 1.8 1.4 1.2 TBILI  --   --  0.5 SGOT  --   --  44* ALT  --   --  27  
AP  --   --  98  
LPSE  --   --  109 TROIQ  --   --  <0.02* Lab Results Component Value Date/Time  WBC 8.1 06/29/2019 05:29 AM  
 HGB 11.6 (L) 06/29/2019 05:29 AM  
 PLATELET 767 (L) 41/21/8439 05:29 AM  
 Sodium 142 06/29/2019 05:29 AM  
 Potassium 4.6 06/29/2019 05:29 AM  
 Chloride 103 06/29/2019 05:29 AM  
 CO2 31 06/29/2019 05:29 AM  
 BUN 34 (H) 06/29/2019 05:29 AM  
 Creatinine 1.46 (H) 06/29/2019 05:29 AM  
 Glucose 107 (H) 06/29/2019 05:29 AM  
 INR 1.8 06/30/2019 05:58 AM  
 aPTT 62.1 (H) 06/04/2019 04:58 AM  
 Bilirubin, total 0.5 06/28/2019 08:45 AM  
 Bilirubin, direct 0.1 06/25/2011 06:00 AM  
 AST (SGOT) 44 (H) 06/28/2019 08:45 AM  
 ALT (SGPT) 27 06/28/2019 08:45 AM  
 Alk. phosphatase 98 06/28/2019 08:45 AM  
 Amylase 64 06/17/2011 03:53 AM  
 Lipase 109 06/28/2019 08:45 AM  
 Troponin-I, Qt. <0.02 (L) 06/28/2019 08:45 AM  
 
 
CT Results  (Last 48 hours) 06/28/19 1421  CT ABD PELV WO CONT Final result Impression:  IMPRESSION:  
   
1. Increasing small bowel distention with air-fluid levels suggesting worsening  
enteritis with moderate stool throughout the colon. 2. Developing ascites. 3. Increasing pericardial effusion 4. Known lytic lesion virtually replacing the L4 vertebral body. CPT code(s): [id number], [id number] Narrative:  Clinical History: The patient is a 80years year old Female presenting with  
symptoms of abdominal pain Comparison:  Abdomen pelvis CT 6/13/2019. Technique: Thin slice axial images were obtained through the abdomen and pelvis without  
intravenous and with oral contrast.  Coronal reformatted images were also  
provided for review. All CT scans at this facility are performed using dose reduction/dose modulation  
techniques, as appropriate the performed exam, including the following: Automated Exposure Control; Adjustment of the mA and/or kV according to patient  
size (this includes techniques or standardized protocols for targeted exams  
where dose is matched to indication/reason for exam); and Use of Iterative Reconstruction Technique. Radiation Exposure Indices:  
Reference Air Kerma (Ka,r) = 264 mGy-cm Findings: Abdomen:  
Lung bases: There is minimal atelectasis at the lung bases and increasing  
pericardial effusion is demonstrated now measuring 2 cm in thickness. Liver: Normal size, contour, and density without focal mass. Mild ascites is  
developing around the liver Biliary: Cholecystectomy. No evidence of intra or extrahepatic biliary  
dilatation. Pancreas: Normal in size and contour without focal lesion. Spleen: Normal in size and contour without focal lesion. Adrenal glands: Normal in size without focal lesion. Kidneys: Probable small nonobstructing bilateral renal stones. Heide Chars Bowel: There is increasing gaseous distention of the small bowel measuring up to  
3 cm with moderate stool throughout the colon. Few air-fluid levels are  
scattered throughout the small bowel. A transition point is not evident. Retroperitoneum/vasculature: No evidence of significant adenopathy. Tortuous and  
ectatic supra and infrarenal abdominal aorta. Abdominal soft tissues: Unremarkable. Osseous structures: A lytic process is again seen essentially replacing the  
entire anterior L4 vertebral body. Right hip prosthesis. Pelvis:  
Unremarkable bladder. There is minimal free fluid in the pelvis. The uterus is not visualized and a pessary ring is in place. chest X-ray I reviewed recent labs, recent radiologic studies, and pertinent records including other doctor notes if needed. I independently reviewed radiology images for studies I described above or studies I have ordered. Admission date (for inpatients): 6/28/2019 * No surgery found *  * No surgery found * ASSESSMENT/PLAN: 
Problem List  Date Reviewed: 6/19/2019 Codes Class Noted Ileus ([facility] ICD-10-CM: K56.7 ICD-9-CM: 560.1  6/29/2019 Enteritis ICD-10-CM: K52.9 ICD-9-CM: 558.9  6/28/2019 Dehydration ICD-10-CM: E86.0 ICD-9-CM: 276.51  6/28/2019 Abnormal CT scan, lumbar spine ICD-10-CM: R93.7 ICD-9-CM: 793.7  6/19/2019 Pancytopenia ([facility] ICD-10-CM: Q04.736 ICD-9-CM: 284.19  6/16/2019 Hypotension ICD-10-CM: I95.9 ICD-9-CM: 458.9  6/15/2019 Moderate protein malnutrition (HCC) ICD-10-CM: E44.0 ICD-9-CM: 263.0  6/15/2019 Failure to thrive (0-17) ICD-10-CM: R62.51 
ICD-9-CM: 783.41  6/15/2019 Hyperthyroidism ICD-10-CM: E05.90 ICD-9-CM: 242.90  6/15/2019 Breast mass ICD-10-CM: N63.0 ICD-9-CM: 611.72  6/14/2019 PAF (paroxysmal atrial fibrillation) (HCC) ICD-10-CM: I48.0 ICD-9-CM: 427.31  6/14/2019 Colitis ICD-10-CM: K52.9 ICD-9-CM: 558.9  6/14/2019 Diarrhea ICD-10-CM: R19.7 ICD-9-CM: 787.91  6/13/2019 Acute UTI (urinary tract infection) ICD-10-CM: N39.0 ICD-9-CM: 599.0  6/3/2019 KENISHA (acute kidney injury) ([facility] ICD-10-CM: N17.9 ICD-9-CM: 584.9  5/30/2019 SBO (small bowel obstruction) ([facility] ICD-10-CM: L74.685 ICD-9-CM: 560.9  5/29/2019 Atrial fibrillation with RVR ([facility] ICD-10-CM: I48.91 
ICD-9-CM: 427.31  5/29/2019 Chronic systolic dysfunction of left ventricle (Chronic) ICD-10-CM: I51.9 ICD-9-CM: 429.9  5/29/2019 Thyroid cancer [facility]) ICD-10-CM: A02 ICD-9-CM: 410  5/29/2019 Compression fracture of fourth lumbar vertebra (HCC) ICD-10-CM: R15.281L ICD-9-CM: 805.4  5/29/2019 Thromboembolic disorder ([facility] PKR-90-EG: I74.9 ICD-9-CM: 444.9  5/29/2019 Presence of IVC filter (Chronic) ICD-10-CM: N83.235 ICD-9-CM: V45.89  5/29/2019 Breast nodule ICD-10-CM: N63.0 ICD-9-CM: 793.89  5/29/2019 Weight loss ICD-10-CM: R63.4 ICD-9-CM: 783.21  5/29/2019 Acute on chronic combined systolic and diastolic ACC/AHA stage C congestive heart failure (HCC) ICD-10-CM: I50.43 ICD-9-CM: 428.43, 428.0  11/10/2018 CHF (congestive heart failure) (HCC) ICD-10-CM: I50.9 ICD-9-CM: 428.0  11/8/2018 Supratherapeutic INR ICD-10-CM: R79.1 ICD-9-CM: 790.92  11/8/2018 Pleural effusion ICD-10-CM: J90 ICD-9-CM: 511.9  11/8/2018 Overview Signed 11/8/2018  4:18 PM by Whitley Lema MD  
  bilateral 
  
  
   
 Edema ICD-10-CM: R60.9 ICD-9-CM: 782.3  11/8/2018 Overview Signed 11/8/2018  4:18 PM by Whitley Lema MD  
  Bilateral lower legs Lightheadedness ICD-10-CM: I55 ICD-9-CM: 780.4  11/8/2018 Paroxysmal A-fib (HCC) ICD-10-CM: I48.0 ICD-9-CM: 427.31  11/8/2018 Iron deficiency anemia ICD-10-CM: D50.9 ICD-9-CM: 280.9  8/29/2011 Overview Signed 8/29/2011  3:05 PM by Santosh Gan Despite oral iron For infed infusion Nutritional deficiency ICD-10-CM: E63.9 ICD-9-CM: 269.9  6/27/2011 Impaired mobility ICD-10-CM: Z74.09 
ICD-9-CM: 799.89  6/27/2011 Debility ICD-10-CM: R53.81 ICD-9-CM: 799.3  6/27/2011 Colon polyps ICD-10-CM: K63.5 ICD-9-CM: 211.3  6/27/2011 Pancreatitis ICD-10-CM: K85.90 ICD-9-CM: 860.1  6/19/2011 Overview Signed 6/19/2011  4:13 PM by Santosh Gan 6-19-11 lipase improving possibly from gallstones Gallstone pancreatitis ICD-10-CM: K85.10 ICD-9-CM: 983.3, 574.20  6/19/2011 Follicular thyroid cancer ([facility] [address] ICD-10-CM: E21 ICD-9-CM: 382  6/19/2011 Overview Signed 6/21/2011  7:12 AM by Santosh Gan  
  thyroglobulin high > 100 obvious residual thyroid tissue Pericardial effusion ICD-10-CM: I31.3 ICD-9-CM: 423.9  6/17/2011 Acute pancreatitis ICD-10-CM: K85.90 ICD-9-CM: 723.7  6/16/2011 HTN (hypertension) (Chronic) ICD-10-CM: I10 
ICD-9-CM: 401.9  6/16/2011 Acquired hypothyroidism (Chronic) ICD-10-CM: E03.9 ICD-9-CM: 244.9  6/16/2011 Overview Signed 6/19/2011  4:15 PM by Santosh Gan Post thyroidectomy for thyroid cancer Pulmonary embolism ([facility] (Chronic) ICD-10-CM: I26.99 
ICD-9-CM: 415.19  5/2/2011 Unstable angina (HCC) ICD-10-CM: I20.0 ICD-9-CM: 411.1  4/28/2011  Prosthetic hip implant failure (HCC) (Chronic) ICD-10-CM: T84.018A, F15.274 ICD-9-CM: 996.47, V43.64  4/26/2011 KENNEDY (total hip arthroplasty) ICD-9-CM: V43.64  4/26/2011 Active Problems: 
  HTN (hypertension) (6/16/2011) Acquired hypothyroidism (6/16/2011) Overview: Post thyroidectomy for thyroid cancer Follicular thyroid cancer ([facility] (6/19/2011) Overview: thyroglobulin high > 100 obvious residual thyroid tissue KENISHA (acute kidney injury) ([address] (5/30/2019) Acute UTI (urinary tract infection) (6/3/2019) Dehydration (6/28/2019) Ileus ([address] (6/29/2019) Plan Care management per Hospitalist 
IV Abx - Rocephin & Vanco (UTI) NPO 
IVF 
NGT for decompression Continue conservative management.   
 
Alla Stratton, NP

## 2019-06-30 NOTE — PROGRESS NOTES
Hospitalist Progress Note 2019 Admit Date: 2019  8:37 AM  
NAME: Yanick Lopez :  1933 MRN:  [id number] Attending: Guillaume Jonas MD 
PCP:  Amalia Chowdary MD 
 
SUBJECTIVE:  
Patient 79O with pmhx of CAD, moderate pericardial effusion, hypothyroidism, prior follicular cancer s/p ESCALERA, HTN, chronic systolic CHF, afib on coumadin, hx of DVT's and PE's with ivc filter in place,  l4 lesion and right breast 12mm nodule. Patient was admitted   -  for abdominal pain and found to have SBO. Incidental findings of right breast mass and l4 lesion during imaging then. Surgery was consulted. SBO treated conservatively and resolved. She was admitted  -  for hypotension, diarrhea, ac/chronic systolic CHF and KENISHA, UTI. Cardiology/ GI were consulted at the time. Hypotension resolved with med changed (amiodarone dc'd). Diarrhea resolved. Cardiology saw pt for perciardial effusion and said to continue lasix. She was told to follow with Dr Eduar Zaragoza for breast mass and neurosurgery for L4 lesion. Pt returns  with report of nausea, vomiting, UA ?for UTI. Started on Rocephin and IVF. CT imaging shows bowel distension and moderate stool through colon. Also concern for increasing pericardial effusion on CT scan, still reporting as moderate on repeat echo.  - Pt continues to appear frail, minimally talking, but awake. Is denying pain. Denies BM, not passing flatus. Family asking if patient can have liquids. Review of Systems negative with exception of pertinent positives noted above PHYSICAL EXAM  
 
Visit Vitals /77 (BP 1 Location: Left arm, BP Patient Position: At rest) Pulse 90 Comment: charted 97, recheck 90 Temp 97.7 °F (36.5 °C) Resp 16 Ht 5' (1.524 m) Wt 51.3 kg (113 lb 3.2 oz) SpO2 94% BMI 22.11 kg/m² Temp (24hrs), Av.1 °F (36.7 °C), Min:97.7 °F (36.5 °C), Max:98.5 °F (36.9 °C) Oxygen Therapy O2 Sat (%): 94 % (19 1207) Pulse via Oximetry: 86 beats per minute (06/28/19 1615) O2 Device: Room air (06/28/19 1230) Intake/Output Summary (Last 24 hours) at 6/30/2019 1324 Last data filed at 6/30/2019 1226 Gross per 24 hour Intake 1994 ml Output  Net 1994 ml General: No acute distress  frail/thin. Temporal wasting. Lungs:  Diminished bilaterally Heart:  Mildly, tachycardic, regular rhythm,  No murmur, rub, or gallop Abdomen: Soft, Non distended, Non tender, hypoactive bowel sounds Extremities: No cyanosis, clubbing or edema Neurologic:  No focal deficits ASSESSMENT Active Hospital Problems Diagnosis Date Noted  KENISHA (acute kidney injury) ([facility] 05/30/2019 Priority: 1 - One  Ileus ([facility] 06/29/2019  Dehydration 06/28/2019  Acute UTI (urinary tract infection) 06/03/2019  Follicular thyroid cancer ([facility] [facility] 06/19/2011 thyroglobulin high > 100 obvious residual thyroid tissue  HTN (hypertension) 06/16/2011  Acquired hypothyroidism 06/16/2011 Post thyroidectomy for thyroid cancer A/P 
 
- Small bowel obstruction vs ileus - as per CT abd on admission. Pt does not have flatus, therefore will not start on advancing diet. May have ice chips. Keep IVF, will increase rate today. Pt refusing NGT. Appreciate surgery consult. - UTI - >100k col/mL staph growing. Await final cxs.  
 
- Afib - continue coumadin. Rate controlled. - KENISHA - continue IVF, will increase rate for today, Cr trending down. Repeat BMP in AM. Holding Lasix/lisinopriol. 
 
- Pericardial effusion - stable per repeat echo June 29. Seemingly asymptomatic. Cardiology aware last admission. Lasix on hold for KENISHA. RESUME when appropriate, hopefully once Cr to baseline. Monitor hemodynamics 
 
- HTN - controlled off lisinopril, diuretics. Resume as tolerated, once KENISHA resolves - Acquired hypothyroidism - taken off synthroid at last admission. Needs repeat thyroid functions toward end of July DVT Prophylaxis: coumadin per pharmacy Dispo: needs PT/OT, severely debilitated. Possible d/c in 3 days Care d/w Dr. Shannan Haro Signed By: KENAN Morrison   
 June 30, 2019

## 2019-07-01 NOTE — PROGRESS NOTES
H&P/Consult Note/Progress Note/Office Note:  
Franchesca Uribe  MRN: [id number]  :1933  Age:86 y.o. 
 
HPI: Franchesca Uribe is a 80 y.o. female who  I am following for a breast mass and L4 abnormality noted on CT imaging during a May, 2019 admission. She missed her outpt office appt with me. She has mult medical problems including CHF with EF 30-35%, CAD, HTN, pulm HTN, chronic afib on coumadin, s/p IVC filter for DVT/PE (), thyroid carcinoma treated with ablation. She is s/p appy, rhonda, right hip surgery, and back surgery She was admitted 19-19 with SBO which resolved without surgery. She was on coumadin for afib with INR>4 at presentation who came to the ER  
with 6 day h/o diarrhea for which she took several anti-diarrheal meds (pepto-bismol and lomotil among them) This was followed by diffuse, moderate, progressive, non-radiating abd pain and cramping. Nothing made symptoms better or worse. She was seen in ER day before admission and given IVF and released. She has associated N/V. No fever or leukocytosis on admission She was admitted  19- 19  for hypotension, diarrhea, acute and chronic systolic CHF and KENISHA, UTI. Cardiology/ GI were consulted at the time. Hypotension resolved with med changed (amiodarone dc'd). Diarrhea resolved. Cardiology saw pt for perciardial effusion and said to continue lasix. She was told to follow with Dr Kaitlyn Gonzalez for breast mass and neurosurgery for L4 lesion.  
  
Pt was readmitted again on 19 with N/V and UTI Rx'd with Rocephin and IVF. CT showed bowel distension and moderate stool through colon. Also concern for increasing pericardial effusion on CT scan, still reporting as moderate on repeat echo.  
  
 She had the below CT performed in ER with SBO, right breast mass, and abnormal L4 spine 19 CT abd/pelvis with oral and IV contrast 
There is no pericardial effusion. Heart is enlarged.  There is coronary artery calcification. There is dependent subsegmental atelectasis. 
  
There is a 12 mm nodule within the lateral right breast (series 2, image 5). There is subtle micronodular contour of the liver. There is periportal edema. The gallbladder is surgically absent. Pancreas is not well visualized. The 
spleen is normal in contour. Stable small right adrenal nodule (since 2011).   
There are tiny nonobstructing renal calculi. No hydronephrosis left kidney is 
small in size. There is prominent atherosclerotic calcification of the abdominal aorta. 
  
The stomach is normal in contour. 
  
Pelvic findings: There are multiple fluid-filled dilated small bowel loops with transition point 
appearing to be in the lower abdomen. The colon appears normal in caliber. Urinary bladder is normal in contour. There is a pessary. 
  
There is no significant free fluid. No free air. Bones are osteopenic. There are 
degenerative changes of the spine. There is severe compression deformity of L4 
with associated high density structure. Right total hip arthroplasty is partially seen. 
  
  
IMPRESSION: 
  
1. Small bowel obstruction. Multiple fluid-filled dilated small bowel loops with 
transition point in the lower abdomen. Etiology is likely adhesion. Closed-loop 
obstruction is in the differential. 
  
2. No evidence of colitis or diverticulitis, although the colon is not well 
visualized. . No hydronephrosis. 
  
3. Severe compression deformity of L4 with associated high density material. 
This may be related to prior intervention such as kyphoplasty. Neoplastic 
involvement is considered less likely. Osteopenia. 
  
4. A 12 mm nodule within the lateral right breast. Correlation with mammogram is 
recommended. 6/3/19 CT abd/pelvis without contrast 
There is moderate right pleural effusion. There is small left pleural effusion. Dependent densities in the lower lobes, likely due to aspiration or atelectasis.  
  
 The heart is enlarged. There is coronary artery calcification. There is trace 
pericardial effusion. 
  
Abdomen findings: 
  
Absence of IV contrast limits sensitivity. 
  
There is subtle micronodular contour of the liver. The spleen is normal in 
contour. Gallbladder is surgically absent. Pancreas and adrenal glands are not 
well visualized. 
  
There are small nonobstructing renal calculi. There is no hydronephrosis. There 
is severe atherosclerotic calcification of the abdominal aorta. The stomach is 
under distended. Small bowel loops are normal in caliber. There is no small 
bowel obstruction. 
  
Pelvic findings: 
  
There is a pessary. Uterus is not well evaluated. Urinary bladder is 
decompressed by Ornelas catheter. There is sigmoid diverticulosis without 
diverticulitis. Colon is normal in caliber. 
  
There is small free fluid. There is no free air. Bones are diffusely osteopenic. There are degenerative changes of the spine. There is lytic lesion involving the 
L4. There is associated compression fracture. 
  
  
IMPRESSION: 
1. Previously noted dilated small bowel loops have resolved. No evidence of bowel obstruction on today's study. 
  
2. Moderate right pleural effusion and small left pleural effusion. Mild dependent densities in the lower lobes, likely due to atelectasis or aspiration. 
  
3. Sigmoid diverticulosis. No evidence of diverticulitis. 
  
4. Lytic lesion of L4 and associated compression fracture 6/13/19 CT abd/pelvis without IV contrast 
Hx: Diarrhea. Recently admitted for SBO  
  
CT ABD:   
Limited evaluation of the lung bases and base of the mediastinum demonstrates a 
new moderate to large pericardial effusion. Irregular basilar densities are seen 
likely representing mild to moderate scarring versus atelectasis. 
  
Assessment of the solid organs is limited by the lack of administered 
intravenous contrast. A subtle abnormality could be missed. The Liver is homogeneous in attenuation. The spleen is homogeneous in attenuation. No 
contour deforming mass lesions are seen of the pancreas or adrenal glands. The 
gallbladder has been removed. Multiple bilateral renal calcifications are seen 
felt to represent renal stones. No hydronephrosis is seen of either kidney to 
suggest obstruction.   
  
No abnormal small bowel dilation is seen with small bowel loops measuring up to 
2.7 cm in diameter. In addition, no abnormal abrupt transition point is seen of 
small bowel. Gas and fluid are seen throughout the colon. Fluid throughout the 
colon can be seen with diarrhea. No clear abnormal inflammatory wall thickening 
of small bowel or colon is seen. The mesentery appears diffusely edematous. No 
free air is seen. No abnormal fluid collection is identified. .  No adenopathy is 
seen. The abdominal aorta moderate to advanced atherosclerotic calcification is 
seen of the abdominal aorta. The patient appears to be status post aortic 
bifemoral bypass graft. Once again, there is the suggestion of a large lytic 
lesion destroying much of the L4 vertebral body with a likely secondary mild to 
moderate pathologic compression fracture. The appearance is not felt to be 
significantly changed when compared to the prior study. 
  
CT PELVIS: 
A right total hip replacement is present. This produces scatter artifact which 
obscures portions of the right pelvis. No abnormal pelvic fluid collections or 
inflammatory changes are present. No pelvic adenopathy is seen. The urinary 
bladder is unremarkable. 
  
IMPRESSION:   
1. No abnormal bowel dilation or transition point to suggest bowel obstruction. No clearly acute bowel changes are otherwise seen. It is only noted that the 
colon is fluid-filled consistent with the history of diarrhea. The possibility 
of a mild or early colitis cannot be excluded. 
  
2.  New moderate to large pericardial effusion. 
  
 3. Large lytic lesion and pathologic fracture of the L4 vertebral body as 
previously described which is not felt to be significantly changed 6/29/19: Awake in bed. No complaints at this time. Denies abd pain. LBM Thursday. -flatus. Reports +belching today. Family at bedside. NAD.  
6/30/19: Resting in bed. RN unable to place NG tube yesterday. No complaints at this time. Denies abd pain. LBM Thursday. -flatus. Family at bedside. NAD. 7/1/19 Having NT placed this am when I rounded; abd soft and without tenderness 
  
 
 
 
 
Past Medical History:  
Diagnosis Date  Acquired hypothyroidism  Acute pancreatitis  Arthritis  CAD (coronary artery disease) 4/29/11 RCA occluded and fills with collaterals, LAD and Cx nonobstructive CAD, EF 50-55%  Cancer ([address] THYROID - treated with surgery and radiation  CHF (congestive heart failure) ([address] 11/8/2018  Chronic pain   
 right hip  Coagulopathy ([address] 6/16/2011  Colon polyps  Follicular thyroid cancer ([address]  Gallstone pancreatitis  Glaucoma  Gout   
 hx of gout to right great toe - no problems  Hypertension   
 controlled with medication  Other ill-defined conditions(799.89)  Pancreatitis  Paroxysmal A-fib ([address] 11/8/2018  PE (pulmonary embolism) 2008  Pulmonary HTN (HCC)   
 moderate  Thromboembolus ([address] 1995  
 right leg - Hemashield graft/filter placed in abdomen  Thyroid disease   
 pt had total thyroidectomy - takes levoxyl daily  UTI (urinary tract infection) 6/3/2019 Past Surgical History:  
Procedure Laterality Date  HX APPENDECTOMY  ? 1990  
 HX CHOLECYSTECTOMY  6/2011 Dr. Kathryn Stephens - [place] [address]  
 hemashield graft/filter placed due to blood clot right leg  HX THYROIDECTOMY  2010  
 partial  
 HX TONSILLECTOMY [facility] ARTHROPLASTY  2008 & 2011  
 right hip Current Facility-Administered Medications Medication Dose Route Frequency  0.9% sodium chloride infusion  125 mL/hr IntraVENous CONTINUOUS  
 metoprolol (LOPRESSOR) injection 2.5 mg  2.5 mg IntraVENous Q6H  
 heparin (porcine) injection 5,000 Units  5,000 Units SubCUTAneous Q8H  
 vancomycin (VANCOCIN) 750 mg in  mL infusion  750 mg IntraVENous Q24H  
 bisacodyl (DULCOLAX) suppository 10 mg  10 mg Rectal DAILY PRN  
 docusate sodium (COLACE) capsule 100 mg  100 mg Oral DAILY  sodium chloride (NS) flush 5-40 mL  5-40 mL IntraVENous Q8H  
 sodium chloride (NS) flush 5-40 mL  5-40 mL IntraVENous PRN  
 acetaminophen (TYLENOL) suppository 650 mg  650 mg Rectal Q4H PRN  
 naloxone (NARCAN) injection 0.4 mg  0.4 mg IntraVENous PRN  
 diphenhydrAMINE (BENADRYL) capsule 25 mg  25 mg Oral Q4H PRN  
 nicotine (NICODERM CQ) 21 mg/24 hr patch 1 Patch  1 Patch TransDERmal DAILY PRN  
 cefTRIAXone (ROCEPHIN) 1 g in 0.9% sodium chloride (MBP/ADV) 50 mL  1 g IntraVENous Q24H  
 ondansetron (ZOFRAN) injection 4 mg  4 mg IntraVENous Q4H PRN  
 cloNIDine HCl (CATAPRES) tablet 0.2 mg  0.2 mg Oral BID PRN  
 hydrALAZINE (APRESOLINE) 20 mg/mL injection 10 mg  10 mg IntraVENous Q6H PRN  
 metoclopramide HCl (REGLAN) injection 10 mg  10 mg IntraVENous Q8H PRN Ativan [lorazepam] Social History Socioeconomic History  Marital status:  Spouse name: Not on file  Number of children: Not on file  Years of education: Not on file  Highest education level: Not on file Tobacco Use  Smoking status: Former Smoker  Smokeless tobacco: Never Used  Tobacco comment: quit smoking 1995 Substance and Sexual Activity  Alcohol use: No  
 Drug use: No  
 
Social History Tobacco Use Smoking Status Former Smoker Smokeless Tobacco Never Used Tobacco Comment  
 quit smoking 1995 Family History Problem Relation Age of Onset  Stroke Mother  Hypertension Mother  Cancer Father   
     prostate  Diabetes Maternal Uncle ROS: The patient has no difficulty with chest pain or shortness of breath. No fever or chills. Comprehensive review of systems was otherwise unremarkable except as noted above. Physical Exam:  
Visit Vitals /76 (BP 1 Location: Right arm, BP Patient Position: At rest) Pulse (!) 102 Temp 98.2 °F (36.8 °C) Resp 18 Ht 5' (1.524 m) Wt 109 lb 4.8 oz (49.6 kg) SpO2 95% BMI 21.35 kg/m² Vitals:  
 07/01/19 0457 07/01/19 0720 07/01/19 0750 07/01/19 1254 BP: 101/63  131/74 114/76 Pulse: 89  (!) 106 (!) 102 Resp: 18  17 18 Temp: 97.9 °F (36.6 °C)  97.5 °F (36.4 °C) 98.2 °F (36.8 °C) SpO2: 95%  93% 95% Weight:  109 lb 4.8 oz (49.6 kg) Height:  5' (1.524 m) No intake/output data recorded. 05/30 1901 - 06/01 0700 In: 2567 [I.V.:4476] Out: 200 [Urine:200] Constitutional: Alert, oriented, cooperative patient in no acute distress; appears stated age Eyes:Sclera are clear. EOMs intact ENMT: no external lesions gross hearing normal; no obvious neck masses, no ear or lip lesions, nares normal 
CV: RRR. Normal perfusion Resp: No JVD. Breathing is  non-labored; no audible wheezing. Breast exam deferred while inpt GI: less distended; no mass,  No guarding or rebound Musculoskeletal: unremarkable with normal function. No embolic signs or cyanosis. Neuro:  Oriented; moves all 4; no focal deficits Psychiatric: normal affect and mood, no memory impairment Recent vitals (if inpt): 
Patient Vitals for the past 24 hrs: 
 BP Temp Pulse Resp SpO2 Weight 06/01/19 0515 124/87 98 °F (36.7 °C) 94 18 99 % 114 lb 4.8 oz (51.8 kg) 06/01/19 0238 117/76 98.3 °F (36.8 °C) 100 18 99 %   
05/31/19 2123 118/84 98 °F (36.7 °C) 98 18 100 %   
05/31/19 1618 127/68 98.5 °F (36.9 °C) (!) 102 16 100 %   
05/31/19 1304 119/72 97.9 °F (36.6 °C) (!) 101 16 98 %   
05/31/19 0818 129/76 97.2 °F (36.2 °C) 94 16 94 %  Labs: 
Recent Labs  
  07/01/19 
4989  06/29/19 
4569 WBC  --   --  8.1 HGB  --   --  11.6* PLT  --   --  147* NA  --   --  142 K  --   --  4.6 CL  --   --  103 CO2  --   --  31 BUN  --   --  34* CREA  --   --  1.46* GLU  --   --  107* PTP 27.7*   < > 16.9* INR 2.6   < > 1.4  
 < > = values in this interval not displayed. Lab Results Component Value Date/Time WBC 8.1 06/29/2019 05:29 AM  
 HGB 11.6 (L) 06/29/2019 05:29 AM  
 PLATELET 729 (L) 28/81/7675 05:29 AM  
 Sodium 142 06/29/2019 05:29 AM  
 Potassium 4.6 06/29/2019 05:29 AM  
 Chloride 103 06/29/2019 05:29 AM  
 CO2 31 06/29/2019 05:29 AM  
 BUN 34 (H) 06/29/2019 05:29 AM  
 Creatinine 1.46 (H) 06/29/2019 05:29 AM  
 Glucose 107 (H) 06/29/2019 05:29 AM  
 INR 2.6 07/01/2019 07:18 AM  
 aPTT 62.1 (H) 06/04/2019 04:58 AM  
 Bilirubin, total 0.5 06/28/2019 08:45 AM  
 Bilirubin, direct 0.1 06/25/2011 06:00 AM  
 AST (SGOT) 44 (H) 06/28/2019 08:45 AM  
 ALT (SGPT) 27 06/28/2019 08:45 AM  
 Alk. phosphatase 98 06/28/2019 08:45 AM  
 Amylase 64 06/17/2011 03:53 AM  
 Lipase 109 06/28/2019 08:45 AM  
 Troponin-I, Qt. <0.02 (L) 06/28/2019 08:45 AM  
 
 
CT Results  (Last 48 hours) None  
  
 
chest X-ray I reviewed recent labs, recent radiologic studies, and pertinent records including other doctor notes if needed. I independently reviewed radiology images for studies I described above or studies I have ordered. Admission date (for inpatients): 6/28/2019 * No surgery found *  * No surgery found * ASSESSMENT/PLAN: 
Problem List  Date Reviewed: 6/19/2019 Codes Class Noted * (Principal) Ileus ([facility] ICD-10-CM: K56.7 ICD-9-CM: 560.1  6/29/2019 Enteritis ICD-10-CM: K52.9 ICD-9-CM: 558.9  6/28/2019 Dehydration ICD-10-CM: E86.0 ICD-9-CM: 276.51  6/28/2019 Abnormal CT scan, lumbar spine ICD-10-CM: R93.7 ICD-9-CM: 793.7  6/19/2019 Pancytopenia ([facility] ICD-10-CM: F44.587 ICD-9-CM: 284.19  6/16/2019 Hypotension ICD-10-CM: I95.9 ICD-9-CM: 458.9  6/15/2019 Moderate protein malnutrition (HCC) ICD-10-CM: E44.0 ICD-9-CM: 263.0  6/15/2019 Failure to thrive (0-17) ICD-10-CM: R62.51 
ICD-9-CM: 783.41  6/15/2019 Hyperthyroidism ICD-10-CM: E05.90 ICD-9-CM: 242.90  6/15/2019 Breast mass ICD-10-CM: N63.0 ICD-9-CM: 611.72  6/14/2019 PAF (paroxysmal atrial fibrillation) (HCC) ICD-10-CM: I48.0 ICD-9-CM: 427.31  6/14/2019 Colitis ICD-10-CM: K52.9 ICD-9-CM: 558.9  6/14/2019 Diarrhea ICD-10-CM: R19.7 ICD-9-CM: 787.91  6/13/2019 Acute UTI (urinary tract infection) ICD-10-CM: N39.0 ICD-9-CM: 599.0  6/3/2019 KENISHA (acute kidney injury) ([facility] ICD-10-CM: N17.9 ICD-9-CM: 584.9  5/30/2019 SBO (small bowel obstruction) ([facility] ICD-10-CM: V26.890 ICD-9-CM: 560.9  5/29/2019 Atrial fibrillation with RVR ([facility] ICD-10-CM: I48.91 
ICD-9-CM: 427.31  5/29/2019 Chronic systolic dysfunction of left ventricle (Chronic) ICD-10-CM: I51.9 ICD-9-CM: 429.9  5/29/2019 Thyroid cancer [facility]) ICD-10-CM: S84 ICD-9-CM: 469  5/29/2019 Compression fracture of fourth lumbar vertebra (HCC) ICD-10-CM: N91.679B ICD-9-CM: 805.4  5/29/2019 Thromboembolic disorder ([facility] ATI-53-YX: I74.9 ICD-9-CM: 444.9  5/29/2019 Presence of IVC filter (Chronic) ICD-10-CM: B75.183 ICD-9-CM: V45.89  5/29/2019 Breast nodule ICD-10-CM: N63.0 ICD-9-CM: 793.89  5/29/2019 Weight loss ICD-10-CM: R63.4 ICD-9-CM: 783.21  5/29/2019 Acute on chronic combined systolic and diastolic ACC/AHA stage C congestive heart failure (HCC) ICD-10-CM: I50.43 ICD-9-CM: 428.43, 428.0  11/10/2018 CHF (congestive heart failure) (HCC) ICD-10-CM: I50.9 ICD-9-CM: 428.0  11/8/2018 Supratherapeutic INR ICD-10-CM: R79.1 ICD-9-CM: 790.92  11/8/2018 Pleural effusion ICD-10-CM: J90 ICD-9-CM: 511.9  11/8/2018  Overview Signed 11/8/2018  4:18 PM by Mumtaz Cha MD  
 bilateral 
  
  
   
 Edema ICD-10-CM: R60.9 ICD-9-CM: 782.3  11/8/2018 Overview Signed 11/8/2018  4:18 PM by Fidel Melendez MD  
  Bilateral lower legs Lightheadedness ICD-10-CM: E80 ICD-9-CM: 780.4  11/8/2018 Paroxysmal A-fib (HCC) ICD-10-CM: I48.0 ICD-9-CM: 427.31  11/8/2018 Iron deficiency anemia ICD-10-CM: D50.9 ICD-9-CM: 280.9  8/29/2011 Overview Signed 8/29/2011  3:05 PM by Carry Rach Despite oral iron For infed infusion Nutritional deficiency ICD-10-CM: E63.9 ICD-9-CM: 269.9  6/27/2011 Impaired mobility ICD-10-CM: Z74.09 
ICD-9-CM: 799.89  6/27/2011 Debility ICD-10-CM: R53.81 ICD-9-CM: 799.3  6/27/2011 Colon polyps ICD-10-CM: K63.5 ICD-9-CM: 211.3  6/27/2011 Pancreatitis ICD-10-CM: K85.90 ICD-9-CM: 310.9  6/19/2011 Overview Signed 6/19/2011  4:13 PM by Desirae Loyd 6-19-11 lipase improving possibly from gallstones Gallstone pancreatitis ICD-10-CM: K85.10 ICD-9-CM: 855.1, 574.20  6/19/2011 [facility]lic[facility] ICD-10-CM: J08 ICD-9-CM: 977  6/19/2011 Overview Signed 6/21/2011  7:12 AM by Desirae Loyd  
  thyroglobulin high > 100 obvious residual thyroid tissue Pericardial effusion ICD-10-CM: I31.3 ICD-9-CM: 423.9  6/17/2011 Acute pancreatitis ICD-10-CM: K85.90 ICD-9-CM: 702.0  6/16/2011 HTN (hypertension) (Chronic) ICD-10-CM: I10 
ICD-9-CM: 401.9  6/16/2011 Acquired hypothyroidism (Chronic) ICD-10-CM: E03.9 ICD-9-CM: 244.9  6/16/2011 Overview Signed 6/19/2011  4:15 PM by Desirae Loyd Post thyroidectomy for thyroid cancer Pulmonary embolism ([facility] (Chronic) ICD-10-CM: I26.99 
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 
  Ileus ([facility] (6/29/2019) Active Problems: 
  HTN (hypertension) (6/16/2011) Acquired hypothyroidism (6/16/2011) Overview: Post thyroidectomy for thyroid cancer Follicular thyroid cancer ([facility] (6/19/2011) Overview: thyroglobulin high > 100 obvious residual thyroid tissue KENISHA (acute kidney injury) ([facility] (5/30/2019) Acute UTI (urinary tract infection) (6/3/2019) Dehydration (6/28/2019) SBO She responded to bowel rest and no surgery and by 6/3/19 CT showed no residual SBO Poor surgical candidate for expl lap secondary to age 80, frailty and debilitation NGT-->LIsx NPO/Bowel rest 
IVF Right Breast mass I have asked to her to see me in the office for exam and imaging Outpt imaging and follow-up planned I will write instructions in dc instructions section of connect care L4 abnormality on CT Follow-up with spine surgeon for further workup as outpt I will workup  If she has breast carcinoma I have personally performed a face-to-face diagnostic evaluation and management  service on this patient. I have independently seen the patient. I have independently obtained the above history from the patient/family. I have independently examined the patient with above findings. I have independently reviewed data/labs for this patient and developed the above plan of care (MDM). Signed: Danay Michaels.  Miguel Zuluaga MD, FACS

## 2019-07-01 NOTE — PROGRESS NOTES
Problem: Self Care Deficits Care Plan (Adult) Goal: *Acute Goals and Plan of Care (Insert Text) Description 1. Patient will complete total body bathing and dressing with maximal assistance and adaptive equipment as needed. 2. Patient will complete self-feeding/grooming with moderate assistance and adaptive equipment as needed. 3. Patient will tolerate 20 minutes of OT treatment with up to minimal rest breaks to increase activity tolerance for ADLs. 4. Patient will complete bed mobility with minimal assistance in preparation for functional transfers. 5. Patient will attempt to  preparation for functional transfers with adaptive equipment as needed. 6. Patient will demonstrate improved sitting balance for ADLs with minimal assistance and adaptive equipment as needed. Timeframe: 7 visits Outcome: Progressing Towards Goal 
  
OCCUPATIONAL THERAPY: Initial Assessment, Daily Note and PM 7/1/2019 INPATIENT: OT Visit Days: 1 Payor: [facility] MEDICARE / Plan: [address] / Product Type: Managed Care Medicare /  
  
NAME/AGE/GENDER: Rex Leong is a 80 y.o. female PRIMARY DIAGNOSIS:  Enteritis [K52.9] Ileus ([address] Ileus ([address] ICD-10: Treatment Diagnosis:  
 Generalized Muscle Weakness (M62.81) Other lack of cordination (R27.8) Precautions/Allergies: 
  Fall precautions  Ativan [lorazepam] ASSESSMENT:  
Ms. Tom Ramires is a 80 y.o. female admitted with the above. Hx including leg length discrepancy, RLE shorter, CA, L4 lesion. Per daughter at bedside, at baseline pt lives with sons and requires significant assistance with ADLs and transfers, her sons pick her up and put her in bed/chair, however prior to [facility] Day weekend patient was independent to modified independent with ADLs, ambulation with cane/walker, and some IADLs. Pt with significant recent decline.  Upon arrival pt confused and agreeable to OT evaluation and treatment. BUE assessment revealed AROM and strength decreased in BUEs. Pt with hx of leg length discrepancy and prior hip surgery, presents today with decreased R knee flexion AROM and pt wanting to keep R knee fully extended. Pt with confusion and decreased command following this date. Pt completed rolling in supine with Deniz. Treatment initiated to include supine to sit with MaxA/cueing and static sitting balance with fluctuating assist levels, requiring MaxA at times for posterior lean, CGA at times with cueing for posture. Requires MaxA/physical assist to keep L knee bend and L foot on floor for support, pt wanting to extend knees and lean posteriorly. Educated pt and family that that puts pt at risk for sliding off of bed. Pt required TA to return to supine. Pt left supine with call bell within reach. Pt presents with deficits in cognition, strength, activity tolerance, balance, bed mobility and functional transfers, all of which negatively impact safety and independence with ADLs. Doris Goodrich is currently functioning below baseline and would benefit from continued OT to increase safety and independence with ADLs. Will follow. This section established at most recent assessment PROBLEM LIST (Impairments causing functional limitations): 
Decreased Strength Decreased ADL/Functional Activities Decreased Transfer Abilities Decreased Ambulation Ability/Technique Decreased Balance Increased Pain Decreased Activity Tolerance Increased Fatigue Decreased Flexibility/Joint Mobility Decreased Knowledge of Precautions Decreased [place] with Home Exercise Program 
Decreased Cognition INTERVENTIONS PLANNED: (Benefits and precautions of occupational therapy have been discussed with the patient.) Activities of daily living training Adaptive equipment training Balance training Clothing management Cognitive training Donning&doffing training Hygiene training Neuromuscular re-eduation Re-evaluation Therapeutic activity Therapeutic exercise Wheelchair management TREATMENT PLAN: Frequency/Duration: Follow patient 3x/week to address above goals. Rehabilitation Potential For Stated Goals: Fair REHAB RECOMMENDATIONS (at time of discharge pending progress):   
Placement: It is my opinion, based on this patient's performance to date, that Ms. Radha Estrella may benefit from [address] after discharge due to the functional deficits listed above that are likely to improve with skilled rehabilitation because he/she has multiple medical issues that affect his/her functional mobility in the community. Pt would likely benefit from STR at this time, however pt and family report they desire to go home with [facility]. Equipment: TBD OCCUPATIONAL PROFILE AND HISTORY:  
History of Present Injury/Illness (Reason for Referral): 
See H&P. Past Medical History/Comorbidities: Ms. Radha Estrella  has a past medical history of Acquired hypothyroidism, Acute pancreatitis, Arthritis, CAD (coronary artery disease) (4/29/11), Cancer ([address] CHF (congestive heart failure) ([address] (11/8/2018), Chronic pain, Coagulopathy ([address] (6/16/2011), Colon polyps, Follicular thyroid cancer ([address] Gallstone pancreatitis, Glaucoma, Gout, Hypertension, Other ill-defined conditions(799.89), Pancreatitis, Paroxysmal A-fib ([address] (11/8/2018), PE (pulmonary embolism) (2008), Pulmonary HTN ([address] Thromboembolus ([address] (1995), Thyroid disease, and UTI (urinary tract infection) (6/3/2019).  She also has no past medical history of Aneurysm ([address] Asthma, Autoimmune disease ([address] Chronic kidney disease, COPD, Dementia, Diabetes ([address] Difficult intubation, Liver disease, Malignant hyperthermia due to anesthesia, Nausea & vomiting, Neurological disorder, Pseudocholinesterase deficiency, Psychiatric disorder, PUD (peptic ulcer disease), Seizures ([address] Sleep disorder, Stroke ([address] Unspecified adverse effect of anesthesia, or Unspecified sleep apnea. Ms. Albina Kawasaki  has a past surgical history that includes hx thyroidectomy (2010); hx appendectomy (?1990); hx tonsillectomy; hx other surgical (1995); pr total hip arthroplasty (2008 & 2011); and hx cholecystectomy (6/2011). Social History/Living Environment:  
Home Environment: Private residence One/Two Story Residence: One story Living Alone: No 
Support Systems: Child(chavo), Family member(s) Patient Expects to be Discharged to[de-identified] Private residence Current DME Used/Available at Home: be [place] Prior Level of Function/Work/Activity: 
Per daughter at bedside, at baseline pt lives with sons and requires assistance with ADLs and transfers, her sons pick her up and put her in bed/chair, however prior to [facility] Day weekend patient was independent to modified independent with ADLs, ambulation with cane/walker, and some IADLs. Pt with significant recent decline. Personal Factors:   
      Sex:  female Age:  80 y.o. Other factors that influence how disability is experienced by the patient:  Multiple co-morbidities Number of Personal Factors/Comorbidities that affect the Plan of Care: Expanded review of therapy/medical records (1-2):  MODERATE COMPLEXITY ASSESSMENT OF OCCUPATIONAL PERFORMANCE[de-identified]  
Activities of Daily Living:  
Basic ADLs (From Assessment) Complex ADLs (From Assessment) Feeding: Total assistance Oral Facial Hygiene/Grooming: Total assistance Bathing: Total assistance Upper Body Dressing: Total assistance Lower Body Dressing: Total assistance Toileting: Total assistance Instrumental ADL Meal Preparation: Total assistance Homemaking: Total assistance Medication Management: Total assistance Grooming/Bathing/Dressing Activities of Daily Living Cognitive Retraining Safety/Judgement: Decreased awareness of need for assistance;Decreased awareness of need for safety;Decreased insight into deficits; Fall prevention Bed/Mat Mobility Rolling: Minimum assistance Supine to Sit: Maximum assistance Sit to Supine: Total assistance Most Recent Physical Functioning:  
Gross Assessment: 
AROM: Generally decreased, functional(BUEs) Strength: Generally decreased, functional(BUEs) Posture: 
Posture (WDL): Exceptions to [facility] Posture Assessment: Forward head, Trunk flexion, Rounded shoulders Balance: 
Sitting: Impaired Sitting - Static: Poor (constant support) Bed Mobility: 
Rolling: Minimum assistance Supine to Sit: Maximum assistance Sit to Supine: Total assistance Wheelchair Mobility: 
  
Transfers: 
   
 
    
 
Patient Vitals for the past 6 hrs: 
 BP BP Patient Position SpO2 Pulse 19 1254 114/76 At rest 95 % (!) 102 Mental Status Neurologic State: Confused Orientation Level: Oriented to person Cognition: Decreased attention/concentration, Decreased command following, Memory loss Perseveration: Perseverates during mobility Safety/Judgement: Decreased awareness of need for assistance, Decreased awareness of need for safety, Decreased insight into deficits, Fall prevention Physical Skills Involved: 
Range of Motion Balance Strength Activity Tolerance Pain (Chronic) Cognitive Skills Affected (resulting in the inability to perform in a timely and safe manner): 
Perception Executive Function Immediate Memory Short Term Recall Long Term Memory Sustained Attention Divided Attention Comprehension Psychosocial Skills Affected: 
Habits/Routines Environmental Adaptation Social Interaction Emotional Regulation Self-Awareness Awareness of Others Social Roles Number of elements that affect the Plan of Care: 5+:  HIGH COMPLEXITY CLINICAL DECISION MAKIN [facility] [address] [address] AM-PAC? ?6 Clicks? Daily Activity Inpatient Short Form How much help from another person does the patient currently need. .. Total A Lot A Little None 1.  Putting on and taking off regular lower body clothing? ? 1   ? 2   ? 3   ? 4  
2. Bathing (including washing, rinsing, drying)? ? 1   ? 2   ? 3   ? 4  
3. Toileting, which includes using toilet, bedpan or urinal?   ? 1   ? 2   ? 3   ? 4  
4. Putting on and taking off regular upper body clothing? ? 1   ? 2   ? 3   ? 4  
5. Taking care of personal grooming such as brushing teeth? ? 1   ? 2   ? 3   ? 4  
6. Eating meals? ? 1   ? 2   ? 3   ? 4  
© 2007, Trustees of [facility] [employer], under license to [employer]. All rights reserved Score:  Initial: 6 7/1/19 Most Recent: X (Date: -- ) Interpretation of Tool:  Represents activities that are increasingly more difficult (i.e. Bed mobility, Transfers, Gait). Medical Necessity:    
Patient demonstrates fair 
 rehab potential due to higher previous functional level. Reason for Services/Other Comments: 
Patient continues to require skilled intervention due to inability to complete ADLs at prior level of independence Howard Harrison Use of outcome tool(s) and clinical judgement create a POC that gives a: HIGH COMPLEXITY  
 
 
 
TREATMENT:  
(In addition to Assessment/Re-Assessment sessions the following treatments were rendered) Pre-treatment Symptoms/Complaints:   
Pain: Initial:  
Pain Intensity 1: 0  Post Session:  no complaint of pain at rest  
 
Therapeutic Activity: (    10 minutes): Therapeutic activities including Bed transfers and sitting edge of bed  to improve mobility, strength, balance, coordination and activity tolerance . Required maximal   to promote static balance in sitting. Treatment initiated to include supine to sit with MaxA/cueing and static sitting balance with fluctuating assist levels, requiring MaxA at times for posterior lean, CGA at times with cueing for posture.  Requires MaxA/physical assist to keep L knee bend and L foot on floor for support, pt wanting to extend knees and lean posteriorly. Educated pt and family that that puts pt at risk for sliding off of bed. Pt required TA to return to supine. Braces/Orthotics/Lines/Etc:  
IV 
O2 Device: Room air Treatment/Session Assessment:   
Response to Treatment:  Tolerated well, decreased command following, poor sitting balance, confusion Interdisciplinary Collaboration: Occupational Therapist 
Registered Nurse After treatment position/precautions:  
Supine in bed Bed alarm/tab alert on Bed/Chair-wheels locked Bed in low position Call light within reach RN notified Family at bedside Compliance with Program/Exercises: Compliant most of the time, Will assess as treatment progresses. Recommendations/Intent for next treatment session: \"Next visit will focus on advancements to more challenging activities and reduction in assistance provided\". Total Treatment Duration: OT Patient Time In/Time Out Time In: 1519 Time Out: 3444 Liliana Zuniga OTR/L

## 2019-07-01 NOTE — PROGRESS NOTES
Pt c/o chest pains. Stating it hurts when she takes a deep breath. Spoke with KERMIT MARIE to get pain medication orders.

## 2019-07-01 NOTE — PROGRESS NOTES
Occupational Therapy Note: 
OT orders received, chart reviewed, and evaluation attempted. RN requesting to hold therapy evaluation at this time. Will check back as schedule permits and patient is available to initiate occupational therapy evaluation.   
Thank you for this consult,  
Liliana Zuniga, OTR/L

## 2019-07-01 NOTE — PROGRESS NOTES
CM is familiar with pt. CM left VM with pt's family regarding resumption of [facility]. Pt is current with RN and OT. Resumption orders faxed but CM will follow therapy for any other needs. Care Management Interventions PCP Verified by CM: Yes Transition of Care Consult (CM Consult): Discharge Planning Physical Therapy Consult: Yes Occupational Therapy Consult: Yes Current Support Network: Own Home Confirm Follow Up Transport: Family Plan discussed with Pt/Family/Caregiver: Yes Freedom of Choice Offered: Yes Discharge Location Discharge Placement: Home with home health

## 2019-07-01 NOTE — PROGRESS NOTES
Spoke with Jacob Hernandez NP to make aware about placement of NGT, and family doesn't want it to be replaced. No orders received, but NP did state to make sure pt continued with strict NPO. Will continue to monitor care.

## 2019-07-01 NOTE — PROGRESS NOTES
Radiologist called to report that NGT is placed in the left bronchus. NGT has been removed. Will call Dr. Bret Juarez to make aware.

## 2019-07-01 NOTE — PROGRESS NOTES
Patient was calm, pitiful Family was present and very supportive Room was bright, peaceful Patient receptive to  presence Provided calming presence Spiritual: 
Elderly lady with a complicated medical history Patient kept saying \" can you help me? \" Used touch ministry and words of hope to calm her Refocused her thoughts when she asked about her mitts and the nasal tube Encouraged her to have patience Prayer offered Family Demonstrates a positive attitude towards the care team 
 
 
 
Will follow patient closely during this admission Signed by Kadie Swan, staff

## 2019-07-01 NOTE — PROGRESS NOTES
Physical therapy note: Was going to attempt PT treatment this PM. Noted OT attempt - pt was asleep and family requesting she not be disturbed, as she just fell asleep - less than an hour ago. Will continue to treat as pt is able and time/schedule permit.

## 2019-07-01 NOTE — PROGRESS NOTES
Occupational Therapy Note: 
OT orders received, chart reviewed, and evaluation re-attempted. Patient asleep upon arrival, does not awaken to OT voice. Family at bedside requesting OT let patient at this time, as patient just fell asleep. Will check back as schedule permits and patient is available to initiate occupational therapy evaluation.   
Thank you for this consult,  
Liliana Zuniga, OTR/L

## 2019-07-01 NOTE — PROGRESS NOTES
Interdisciplinary Rounds completed 07/01/19. Nursing, Case Management, Physician and PT present. Plan of care reviewed and updated.

## 2019-07-01 NOTE — PROGRESS NOTES
Hospitalist Progress Note Subjective:  
Daily Progress Note: 7/1/2019 11:37 AM 
 
Ms. Suraj Spears is an 80 AAF, with a pmhx of CAD, moderate pericardial effusion, hypothyroidism, prior follicular cancer s/p ESCALERA, HTN, chronic systolic CHF, afib on coumadin, hx of DVT's and PE's with ivc filter in place,  l4 lesion and right breast 12mm nodule. This is her third admission, both previously lengthly, in the past two months.  
  
Patient was admitted  5/14 - 6/4 for abdominal pain and found to have SBO. Incidental findings of right breast mass and l4 lesion during imaging then. Surgery was consulted. SBO treated conservatively and resolved.  
  
She was admitted  6/13- 6/19 for hypotension, diarrhea, ac/chronic systolic CHF and KENISHA, UTI. Cardiology/ GI were consulted at the time. Hypotension resolved with med changed (amiodarone dc'd). Diarrhea resolved. Cardiology saw pt for perciardial effusion and said to continue lasix. She was told to follow with Dr Stevie Cogan for breast mass and neurosurgery for L4 lesion. Per family, she has not had biopsy of these nor evaluations for these as of yet. 
  
Pt returns 6/28 with report of nausea, vomiting and a  UA questionable for UTI. Started on Rocephin and IVF. CT imaging shows bowel distension and moderate stool through colon. Also concern for increasing pericardial effusion on CT scan, still reporting as moderate on repeat echo. She has been made NPO with /hr running. She had refused NGT until earlier today when attempt made by placing but went into her bronchus. Patient refusing to allow for another attempt. Prior CTs reviewed by me and no obvious mass in GI tract seen that would cause obstruction but I question whether there is malignancy behind all of this. Patient is very, very weak and frail appearing. Multiple children at bedside. Current Facility-Administered Medications Medication Dose Route Frequency  0.9% sodium chloride infusion  125 mL/hr IntraVENous CONTINUOUS  
 vancomycin (VANCOCIN) 750 mg in  mL infusion  750 mg IntraVENous Q24H  
 bisacodyl (DULCOLAX) suppository 10 mg  10 mg Rectal DAILY PRN  
 docusate sodium (COLACE) capsule 100 mg  100 mg Oral DAILY  sodium chloride (NS) flush 5-40 mL  5-40 mL IntraVENous Q8H  
 sodium chloride (NS) flush 5-40 mL  5-40 mL IntraVENous PRN  
 acetaminophen (TYLENOL) suppository 650 mg  650 mg Rectal Q4H PRN  
 naloxone (NARCAN) injection 0.4 mg  0.4 mg IntraVENous PRN  
 diphenhydrAMINE (BENADRYL) capsule 25 mg  25 mg Oral Q4H PRN  
 nicotine (NICODERM CQ) 21 mg/24 hr patch 1 Patch  1 Patch TransDERmal DAILY PRN  
 cefTRIAXone (ROCEPHIN) 1 g in 0.9% sodium chloride (MBP/ADV) 50 mL  1 g IntraVENous Q24H  
 ondansetron (ZOFRAN) injection 4 mg  4 mg IntraVENous Q4H PRN  
 cloNIDine HCl (CATAPRES) tablet 0.2 mg  0.2 mg Oral BID PRN  
 hydrALAZINE (APRESOLINE) 20 mg/mL injection 10 mg  10 mg IntraVENous Q6H PRN  
 metoclopramide HCl (REGLAN) injection 10 mg  10 mg IntraVENous Q8H PRN Review of Systems A comprehensive review of systems was negative except for that written in the HPI. Objective:  
 
Visit Vitals /74 (BP 1 Location: Right arm, BP Patient Position: At rest) Pulse (!) 106 Temp 97.5 °F (36.4 °C) Resp 17 Ht 5' (1.524 m) Wt 49.6 kg (109 lb 4.8 oz) SpO2 93% BMI 21.35 kg/m² O2 Device: Room air Temp (24hrs), Av.7 °F (36.5 °C), Min:97.5 °F (36.4 °C), Max:97.9 °F (36.6 °C) No intake/output data recorded.  1901 -  0700 In: 0031 [I.V.:1042] Out: - General: awake, alert, oriented to person and place, very weak and frail appearing, temporal wasting present Eyes; non icteric, EOMI Neck; supple CV; IRIR, tachycardic 
Pulm; diminished in bases, no wheezing Abd: absent bowel sounds, distended Ext: poor skin turgor Additional comments:I reviewed the patient's new clinical lab test results. ct scans and consult reports Data Review Recent Results (from the past 24 hour(s)) PROTHROMBIN TIME + INR Collection Time: 07/01/19  7:18 AM  
Result Value Ref Range Prothrombin time 27.7 (H) 11.7 - 14.5 sec INR 2.6 Assessment/Plan:  
 
Principal Problem: 
  Ileus ([facility] (6/29/2019) Versus Partial SBO. Will ask surgery to weigh in on this as well as her breast lesion. I am concerned there is probable malignancy leading to her recurrent abdominal issues. Long discussion with daughter today about goals of care and code status and she will discuss with patient and all siblings today. Continue NPO status for now, consult surgery for assistance, /hr. Active Problems: 
  HTN (hypertension) (6/16/2011) Acquired hypothyroidism (6/16/2011) Overview: Post thyroidectomy for thyroid cancer Follicular thyroid cancer ([facility] (6/19/2011) Overview: thyroglobulin high > 100 obvious residual thyroid tissue KENISHA (acute kidney injury) ([facility] (5/30/2019) Acute UTI (urinary tract infection) (6/3/2019) Rocephin. Follow culture Dehydration (6/28/2019) Pericardial effusion:  Could it possibly be malignant? On IVFs, holding diuretics due to SBO/elevated SCr on admission Atrial fibrillation Amiodarone stopped at last hospitalization. Will start lopressor 2.5 mg iv q6 as she is not rate controlled currently. 35 minutes spent with patient with greater than 50% of time being involved in counseling and coordination of care. Care Plan discussed with: Patient/Family and

## 2019-07-02 NOTE — PROGRESS NOTES
Daughter at bedside, stated that she didn't want patient have NGT insertion any more. Will also make day shift aware.

## 2019-07-02 NOTE — PROGRESS NOTES
Follow-up visit requested by Ms. Felipe's daughter Esa Reis, an employee of the hospital. Visited with patient and family offering spiritual interventions, including prayer. Mateusz Barajas MDiv Board Certified [employer]

## 2019-07-02 NOTE — PROGRESS NOTES
Hospitalist Progress Note 2019 Admit Date: 2019  8:37 AM  
NAME: Jose Rosales :  1933 DOS:              19 MRN:  [id number] Attending: Samia Leal MD 
PCP:  Leisa Mortimer, MD 
Treatment Team: Attending Provider: Lulu Love MD; Consulting Provider: Gunnar Jones MD; Surgeon: Gunnar Jones MD; Care Manager: Joyce Mandel RN; Utilization Review: Claudene Pebbles, RN; Consulting Provider: Zack Caruso MD; Speech Language Pathologist: GERALD Barfield Full Code SUBJECTIVE: As previously documented: 
 
80 AAF, with a pmhx of CAD, moderate pericardial effusion, hypothyroidism, prior follicular cancer s/p ESCALERA, HTN, chronic systolic CHF, afib on coumadin, hx of DVT's and PE's with ivc filter in place,  l4 lesion and right breast 12mm nodule. This is her third admission, both previously lengthly, in the past two months.  
  
Patient was admitted   -  for abdominal pain and found to have SBO. Incidental findings of right breast mass and l4 lesion during imaging then. Surgery was consulted. SBO treated conservatively and resolved.  
  
She was admitted  -  for hypotension, diarrhea, ac/chronic systolic CHF and KENISHA, UTI. Cardiology/ GI were consulted at the time. Hypotension resolved with med changed (amiodarone dc'd). Diarrhea resolved. Cardiology saw pt for perciardial effusion and said to continue lasix. She was told to follow with Dr Rachael De Jesus for breast mass and neurosurgery for L4 lesion. Per family, she has not had biopsy of these nor evaluations for these as of yet. 
  
Pt returns  with report of nausea, vomiting and a  UA questionable for UTI. Started on Rocephin and IVF. CT imaging shows bowel distension and moderate stool through colon. Also concern for increasing pericardial effusion on CT scan, still reporting as moderate on repeat echo. She had refused NGT. Surgery following 07/02/19    Ghulam Weir is frail looking not that talkative. Daughter at bedside all questions answered. 10+ ROS reviewed and negative except for positive in HPI. Allergies Allergen Reactions  Ativan [Lorazepam] Other (comments) Has opposite reaction Current Facility-Administered Medications Medication Dose Route Frequency  dextrose 5 % - 0.45% NaCl infusion  125 mL/hr IntraVENous CONTINUOUS  
 dextrose 5% infusion  75 mL/hr IntraVENous CONTINUOUS  
 metoprolol (LOPRESSOR) injection 2.5 mg  2.5 mg IntraVENous Q6H  
 docusate sodium (COLACE) capsule 100 mg  100 mg Oral DAILY  sodium chloride (NS) flush 5-40 mL  5-40 mL IntraVENous Q8H  
 sodium chloride (NS) flush 5-40 mL  5-40 mL IntraVENous PRN  
 acetaminophen (TYLENOL) suppository 650 mg  650 mg Rectal Q4H PRN  
 naloxone (NARCAN) injection 0.4 mg  0.4 mg IntraVENous PRN  
 diphenhydrAMINE (BENADRYL) capsule 25 mg  25 mg Oral Q4H PRN  
 nicotine (NICODERM CQ) 21 mg/24 hr patch 1 Patch  1 Patch TransDERmal DAILY PRN  
 cefTRIAXone (ROCEPHIN) 1 g in 0.9% sodium chloride (MBP/ADV) 50 mL  1 g IntraVENous Q24H  
 ondansetron (ZOFRAN) injection 4 mg  4 mg IntraVENous Q4H PRN  
 cloNIDine HCl (CATAPRES) tablet 0.2 mg  0.2 mg Oral BID PRN  
 hydrALAZINE (APRESOLINE) 20 mg/mL injection 10 mg  10 mg IntraVENous Q6H PRN  
 metoclopramide HCl (REGLAN) injection 10 mg  10 mg IntraVENous Q8H PRN Immunization History Administered Date(s) Administered  TB Skin Test (PPD) Intradermal 05/29/2019, 06/14/2019, 06/29/2019  Tuberculin 12/12/2008 Objective:  
 
Patient Vitals for the past 24 hrs: 
 Temp Pulse Resp BP SpO2  
07/02/19 1310 97.1 °F (36.2 °C) (!) 102 16 123/79 94 % 07/02/19 0732 97.9 °F (36.6 °C) (!) 101 16 (!) 130/91 97 % 07/02/19 0453  (!) 104 16 137/86 95 % 07/01/19 2244 96.4 °F (35.8 °C) (!) 110 17 128/88 96 % 07/01/19 2000 97.6 °F (36.4 °C) 96 17 120/85 96 % Temp (24hrs), Av.3 °F (36.3 °C), Min:96.4 °F (35.8 °C), Max:97.9 °F (36.6 °C) Oxygen Therapy O2 Sat (%): 94 % (19 1310) Pulse via Oximetry: 86 beats per minute (19 1615) O2 Device: Room air (19 1230) Oxygen Therapy O2 Sat (%): 94 % (19 1310) Pulse via Oximetry: 86 beats per minute (19 1615) O2 Device: Room air (19 1230) Physical Exam: 
General:         Alert, frail HEENT:               NCAT. No obvious deformity. Lungs: No wheezing/rhonchi/rales Cardiovascular:   RRR. No m/r/g. No pedal edema b/l. Abdomen:       S/nt/nd. Bowel sounds normal. .  
Skin:         No rashes or lesions. Not Jaundiced Neurologic:     No gross focal deficit. DIAGNOSTIC STUDIES Data Review:  
Recent Results (from the past 24 hour(s)) PROTHROMBIN TIME + INR Collection Time: 19  4:40 AM  
Result Value Ref Range Prothrombin time 43.6 (H) 11.7 - 14.5 sec INR 4.7 (HH)    
CBC W/O DIFF Collection Time: 19  8:37 AM  
Result Value Ref Range WBC 8.0 4.3 - 11.1 K/uL  
 RBC 4.11 4.05 - 5.2 M/uL  
 HGB 10.4 (L) 11.7 - 15.4 g/dL HCT 33.6 (L) 35.8 - 46.3 % MCV 81.8 79.6 - 97.8 FL  
 MCH 25.3 (L) 26.1 - 32.9 PG  
 MCHC 31.0 (L) 31.4 - 35.0 g/dL  
 RDW 19.1 (H) 11.9 - 14.6 % PLATELET 268 910 - 354 K/uL MPV 12.2 9.4 - 12.3 FL ABSOLUTE NRBC 0.04 0.0 - 0.2 K/uL METABOLIC PANEL, BASIC Collection Time: 19  8:37 AM  
Result Value Ref Range Sodium 150 (H) 136 - 145 mmol/L Potassium 4.5 3.5 - 5.1 mmol/L Chloride 120 (H) 98 - 107 mmol/L  
 CO2 19 (L) 21 - 32 mmol/L Anion gap 11 7 - 16 mmol/L Glucose 75 65 - 100 mg/dL BUN 31 (H) 8 - 23 MG/DL Creatinine 0.94 0.6 - 1.0 MG/DL  
 GFR est AA >60 >60 ml/min/1.73m2 GFR est non-AA >60 >60 ml/min/1.73m2 Calcium 8.7 8.3 - 10.4 MG/DL All Micro Results Procedure Component Value Units Date/Time CULTURE, URINE [[id number]]  (Abnormal)  (Susceptibility) Collected:  06/28/19 1057 Order Status:  Completed Specimen:  Urine from Clean catch Updated:  07/01/19 6370 Special Requests: NO SPECIAL REQUESTS Culture result:    
  >100,000 COLONIES/mL STAPHYLOCOCCUS SPECIES, COAGULASE NEGATIVE  
     
      
  10,000 to 50,000 COLONIES/mL MIXED SKIN OTILIA ISOLATED  
     
 CULTURE, URINE [[id number]] Order Status:  Canceled Specimen:  Urine from Clean catch Imaging [facility] /Studies: CXR Results  (Last 48 hours) None CT Results  (Last 48 hours) 07/02/19 1223  CT ABD PELV W CONT Final result Impression:  IMPRESSION:  
1. Mildly compromised examination due to motion. 2. Destructive process with associated pathologic fracture of L4 with  
extraosseous extension as described. Differential diagnosis would include  
metastatic disease or multiple myeloma/plasmacytoma. 3. Ascites. 4. Small pleural effusions. 5. There is no evidence of bowel obstruction. Narrative:  CT abdomen and pelvis with contrast: 07/02/2019 History: sbo vs ileus; evaluate; also with suspected L4 met. Technique: Helically acquired images were obtained from the domes of the  
diaphragms to the ischial tuberosities reconstructed at 5mm intervals after the  
uneventful administration of oral contrast for bowel opacification and 100 cc's  
of intravenous Isovue-370 to evaluate the solid abdominal viscera and vascular  
structures. Coronal reformatted images were submitted. Radiation dose reduction techniques were used for this study:  Our CT scanners  
use one or all of the following: Automated exposure control, adjustment of the  
mA and/or kVp according to patient's size, iterative reconstruction. Comparison: Noncontrast study 06/28/2019 CT ABDOMEN: There is global cardiac enlargement. There is excessive motion artifact throughout the study. There are partially imaged small bilateral  
pleural effusions and bilateral lower lobe atelectasis/infiltrate. The liver,  
spleen, pancreas and adrenal glands are unremarkable. Few bilateral  
nonobstructing renal calculi present. There is a small to moderate amount of  
abdominal ascites. Extensive atherosclerotic changes are present involving the  
abdominal aorta and mesenteric vessels. There are no dilated bowel loops. Contrast material is present within the colon. There are advanced degenerative  
changes of the lumbar spine. Extensive bony destruction is noted involving the  
L4 vertebra with areas of enhancement, also extending within the extraosseous  
soft tissues on the left and into the spinal canal causing severe stenosis. CT PELVIS: There is extensive streak artifact from right hip arthroplasty. A  
pessary is present. There is a small amount of ascites. No definite adenopathy  
is present. There is diffuse subcutaneous edema. Ct Abd Pelv W Cont Result Date: 2019 IMPRESSION: 1. Mildly compromised examination due to motion. 2. Destructive process with associated pathologic fracture of L4 with extraosseous extension as described. Differential diagnosis would include metastatic disease or multiple myeloma/plasmacytoma. 3. Ascites. 4. Small pleural effusions. 5. There is no evidence of bowel obstruction. Results for orders placed or performed during the hospital encounter of 19  
2D ECHO COMPLETE ADULT (TTE) W OR WO CONTR N[facility], [address] [facility] 
[phone] Transthoracic Echocardiogram 
2D and Doppler Patient: Frank Middleton 
MR #: [id number] : [id number] Age: 80 years Gender: Female Study date: 2019 Account #: [de-identified] Height: 60 in Weight: 113 lb 
BSA: 1.47 mï¾² Status:Routine Location: 617 BP: 120/ 72 Allergies: LORAZEPAM 
 
Sonographer:  Jose Miguel [facility] RDCS Group:  [address] Cardiology Referring Physician:  Vania Roberts MD 
Reading Physician:  Chavo Calero. Elida Gorman MD 
 
INDICATIONS: Reevaluate pericardial effusion PROCEDURE: This was a routine study. A transthoracic echocardiogram was 
performed. The study included limited 2D imaging and limited spectral  
Doppler. Image quality was adequate. PERICARDIUM: A moderate pericardial effusion was identified circumferential  
to 
the heart. There was a left pleural effusion. SUMMARY: 
 
-  Pericardium: A moderate pericardial effusion was identified  
circumferential 
to the heart. There was a left pleural effusion. Prepared and signed by 
 
Harley Gorman MD 
Signed 29-Jun-2019 14:37:02 Labs and Studies from previous 24 hours have been personally reviewed by myself   
ASSESSMENT Active Hospital Problems Diagnosis Date Noted  Ileus ([facility] [address] 06/29/2019  Dehydration 06/28/2019  Acute UTI (urinary tract infection) 06/03/2019  KENISHA (acute kidney injury) ([facility] [address] 05/30/2019  Follicular thyroid cancer ([facility] [address] 06/19/2011 thyroglobulin high > 100 obvious residual thyroid tissue  HTN (hypertension) 06/16/2011  Acquired hypothyroidism 06/16/2011 Post thyroidectomy for thyroid cancer Hospital Problems as of 7/2/2019 Date Reviewed: 6/19/2019 Codes Class Noted - Resolved POA * (Principal) Ileus ([facility] [address] ICD-10-CM: K56.7 ICD-9-CM: 560.1  6/29/2019 - Present Yes Enteritis ICD-10-CM: K52.9 ICD-9-CM: 558.9  6/28/2019 - Present Dehydration ICD-10-CM: E86.0 ICD-9-CM: 276.51  6/28/2019 - Present Yes Acute UTI (urinary tract infection) ICD-10-CM: N39.0 ICD-9-CM: 599.0  6/3/2019 - Present Yes KENISHA (acute kidney injury) ([facility] [address] ICD-10-CM: N17.9 ICD-9-CM: 584.9  5/30/2019 - Present Yes Follicular thyroid cancer ([facility] [address] ICD-10-CM: O55 ICD-9-CM: 628  6/19/2011 - Present Yes Overview Signed 6/21/2011  7:12 AM by Dwight Tavares  
  thyroglobulin high > 100 obvious residual thyroid tissue HTN (hypertension) (Chronic) ICD-10-CM: I10 
ICD-9-CM: 401.9  6/16/2011 - Present Yes Acquired hypothyroidism (Chronic) ICD-10-CM: E03.9 ICD-9-CM: 244.9  6/16/2011 - Present Yes Overview Signed 6/19/2011  4:15 PM by Dwight Tavares Post thyroidectomy for thyroid cancer A/P: 
 
-Ileus vs partial SBO Surgery evaluation appreciated. Recommended CT AP 
CT  No evidence of SBO. Advance diet to mechanical soft diet 
 
-L4 fracture CT showing destructive process L4 pathological fracture metastasic disease vs myeloma/plasmacytoma. Ortho already consulted 
 
-KENISHA Resolved with IVFs 
 
-Hypernatremia Likely secondary to poor PO intake Start D5 
 
-Acute UTI Ucx with staph coag nen On ceftriaxone day #4 Switch to keflex to complete 5 days course 
 
-Afib INR: 4.7 Hold coumadin today DVT Prophylaxis: coumadin CODE Status: Full Plan of Care Discussed with: patient. Care team. 
 
Pratik Colindres MD 
07/02/19

## 2019-07-02 NOTE — PROGRESS NOTES
H&P/Consult Note/Progress Note/Office Note:  
Conner Herrera  MRN: [id number]  :1933  Age:86 y.o. 
 
HPI: Conner Herrera is a 80 y.o. female who  I am following for a breast mass and L4 abnormality noted on CT imaging during a May, 2019 admission. She missed her outpt office appt with me. She has mult medical problems including CHF with EF 30-35%, CAD, HTN, pulm HTN, chronic afib on coumadin, s/p IVC filter for DVT/PE (), thyroid carcinoma treated with ablation. She is s/p appy, rhonda, right hip surgery, and back surgery She was admitted 19-19 with SBO which resolved without surgery. She was on coumadin for afib with INR>4 at presentation who came to the ER  
with 6 day h/o diarrhea for which she took several anti-diarrheal meds (pepto-bismol and lomotil among them) This was followed by diffuse, moderate, progressive, non-radiating abd pain and cramping. Nothing made symptoms better or worse. She was seen in ER day before admission and given IVF and released. She has associated N/V. No fever or leukocytosis on admission She was admitted  19- 19  for hypotension, diarrhea, acute and chronic systolic CHF and KENISHA, UTI. Cardiology/ GI were consulted at the time. Hypotension resolved with med changed (amiodarone dc'd). Diarrhea resolved. Cardiology saw pt for perciardial effusion and said to continue lasix. She was told to follow with Dr Jace Lara for breast mass and neurosurgery for L4 lesion.  
  
Pt was readmitted again on 19 with N/V and UTI Rx'd with Rocephin and IVF. CT showed bowel distension and moderate stool through colon. Also concern for increasing pericardial effusion on CT scan, still reporting as moderate on repeat echo.  
  
 She had the below CT performed in ER with SBO, right breast mass, and abnormal L4 spine 19 CT abd/pelvis with oral and IV contrast 
There is no pericardial effusion. Heart is enlarged.  There is coronary artery calcification. There is dependent subsegmental atelectasis. 
  
There is a 12 mm nodule within the lateral right breast (series 2, image 5). There is subtle micronodular contour of the liver. There is periportal edema. The gallbladder is surgically absent. Pancreas is not well visualized. The 
spleen is normal in contour. Stable small right adrenal nodule (since 2011).   
There are tiny nonobstructing renal calculi. No hydronephrosis left kidney is 
small in size. There is prominent atherosclerotic calcification of the abdominal aorta. 
  
The stomach is normal in contour. 
  
Pelvic findings: There are multiple fluid-filled dilated small bowel loops with transition point 
appearing to be in the lower abdomen. The colon appears normal in caliber. Urinary bladder is normal in contour. There is a pessary. 
  
There is no significant free fluid. No free air. Bones are osteopenic. There are 
degenerative changes of the spine. There is severe compression deformity of L4 
with associated high density structure. Right total hip arthroplasty is partially seen. 
  
  
IMPRESSION: 
  
1. Small bowel obstruction. Multiple fluid-filled dilated small bowel loops with 
transition point in the lower abdomen. Etiology is likely adhesion. Closed-loop 
obstruction is in the differential. 
  
2. No evidence of colitis or diverticulitis, although the colon is not well 
visualized. . No hydronephrosis. 
  
3. Severe compression deformity of L4 with associated high density material. 
This may be related to prior intervention such as kyphoplasty. Neoplastic 
involvement is considered less likely. Osteopenia. 
  
4. A 12 mm nodule within the lateral right breast. Correlation with mammogram is 
recommended. 6/3/19 CT abd/pelvis without contrast 
There is moderate right pleural effusion. There is small left pleural effusion. Dependent densities in the lower lobes, likely due to aspiration or atelectasis.  
  
 The heart is enlarged. There is coronary artery calcification. There is trace 
pericardial effusion. 
  
Abdomen findings: 
  
Absence of IV contrast limits sensitivity. 
  
There is subtle micronodular contour of the liver. The spleen is normal in 
contour. Gallbladder is surgically absent. Pancreas and adrenal glands are not 
well visualized. 
  
There are small nonobstructing renal calculi. There is no hydronephrosis. There 
is severe atherosclerotic calcification of the abdominal aorta. The stomach is 
under distended. Small bowel loops are normal in caliber. There is no small 
bowel obstruction. 
  
Pelvic findings: 
  
There is a pessary. Uterus is not well evaluated. Urinary bladder is 
decompressed by Ornelas catheter. There is sigmoid diverticulosis without 
diverticulitis. Colon is normal in caliber. 
  
There is small free fluid. There is no free air. Bones are diffusely osteopenic. There are degenerative changes of the spine. There is lytic lesion involving the 
L4. There is associated compression fracture. 
  
  
IMPRESSION: 
1. Previously noted dilated small bowel loops have resolved. No evidence of bowel obstruction on today's study. 
  
2. Moderate right pleural effusion and small left pleural effusion. Mild dependent densities in the lower lobes, likely due to atelectasis or aspiration. 
  
3. Sigmoid diverticulosis. No evidence of diverticulitis. 
  
4. Lytic lesion of L4 and associated compression fracture 6/13/19 CT abd/pelvis without IV contrast 
Hx: Diarrhea. Recently admitted for SBO  
  
CT ABD:   
Limited evaluation of the lung bases and base of the mediastinum demonstrates a 
new moderate to large pericardial effusion. Irregular basilar densities are seen 
likely representing mild to moderate scarring versus atelectasis. 
  
Assessment of the solid organs is limited by the lack of administered 
intravenous contrast. A subtle abnormality could be missed. The Liver is homogeneous in attenuation. The spleen is homogeneous in attenuation. No 
contour deforming mass lesions are seen of the pancreas or adrenal glands. The 
gallbladder has been removed. Multiple bilateral renal calcifications are seen 
felt to represent renal stones. No hydronephrosis is seen of either kidney to 
suggest obstruction.   
  
No abnormal small bowel dilation is seen with small bowel loops measuring up to 
2.7 cm in diameter. In addition, no abnormal abrupt transition point is seen of 
small bowel. Gas and fluid are seen throughout the colon. Fluid throughout the 
colon can be seen with diarrhea. No clear abnormal inflammatory wall thickening 
of small bowel or colon is seen. The mesentery appears diffusely edematous. No 
free air is seen. No abnormal fluid collection is identified. .  No adenopathy is 
seen. The abdominal aorta moderate to advanced atherosclerotic calcification is 
seen of the abdominal aorta. The patient appears to be status post aortic 
bifemoral bypass graft. Once again, there is the suggestion of a large lytic 
lesion destroying much of the L4 vertebral body with a likely secondary mild to 
moderate pathologic compression fracture. The appearance is not felt to be 
significantly changed when compared to the prior study. 
  
CT PELVIS: 
A right total hip replacement is present. This produces scatter artifact which 
obscures portions of the right pelvis. No abnormal pelvic fluid collections or 
inflammatory changes are present. No pelvic adenopathy is seen. The urinary 
bladder is unremarkable. 
  
IMPRESSION:   
1. No abnormal bowel dilation or transition point to suggest bowel obstruction. No clearly acute bowel changes are otherwise seen. It is only noted that the 
colon is fluid-filled consistent with the history of diarrhea. The possibility 
of a mild or early colitis cannot be excluded. 
  
2.  New moderate to large pericardial effusion. 
  
 3. Large lytic lesion and pathologic fracture of the L4 vertebral body as 
previously described which is not felt to be significantly changed 7/2/19 CT abd/pelvis with oral and IV contrast 
Hx: sbo vs ileus; evaluate; also with suspected L4 met. 
  
CT ABDOMEN: There is global cardiac enlargement. There is excessive motion 
artifact throughout the study. There are partially imaged small bilateral 
pleural effusions and bilateral lower lobe atelectasis/infiltrate. The liver, 
spleen, pancreas and adrenal glands are unremarkable. Few bilateral 
nonobstructing renal calculi present. There is a small to moderate amount of 
abdominal ascites. Extensive atherosclerotic changes are present involving the 
abdominal aorta and mesenteric vessels. There are no dilated bowel loops. Contrast material is present within the colon. There are advanced degenerative 
changes of the lumbar spine. Extensive bony destruction is noted involving the 
L4 vertebra with areas of enhancement, also extending within the extraosseous 
soft tissues on the left and into the spinal canal causing severe stenosis. 
  
CT PELVIS: There is extensive streak artifact from right hip arthroplasty. A 
pessary is present. There is a small amount of ascites. No definite adenopathy 
is present. There is diffuse subcutaneous edema. 
  
IMPRESSION: 
1. Mildly compromised examination due to motion. 2. Destructive process with associated pathologic fracture of L4 with 
extraosseous extension as described. Differential diagnosis would include 
metastatic disease or multiple myeloma/plasmacytoma. 3. Ascites. 4. Small pleural effusions. 5. There is no evidence of bowel obstruction. 
  
 
 
 
 
 
6/29/19 Awake in bed. No complaints at this time. Denies abd pain. LBM Thursday. -flatus. Reports +belching today. Family at bedside. NAD.  
6/30/19  Resting in bed. RN unable to place NG tube yesterday.  No complaints at this time. Denies abd pain. LBM Thursday. -flatus. Family at bedside. NAD. 7/1/19  Having NGT placed this am when I rounded; abd soft and without tenderness; INR 2.6; coumadin held in case surgery needed 7/2/19  NGT could not be placed. Pt refuses to allow further ngt attempts at this time; CT pending Past Medical History:  
Diagnosis Date  Acquired hypothyroidism  Acute pancreatitis  Arthritis  CAD (coronary artery disease) 4/29/11 RCA occluded and fills with collaterals, LAD and Cx nonobstructive CAD, EF 50-55%  Cancer ([address] THYROID - treated with surgery and radiation  CHF (congestive heart failure) ([address] 11/8/2018  Chronic pain   
 right hip  Coagulopathy ([address] 6/16/2011  Colon polyps  Follicular thyroid cancer ([address]  Gallstone pancreatitis  Glaucoma  Gout   
 hx of gout to right great toe - no problems  Hypertension   
 controlled with medication  Other ill-defined conditions(799.89)  Pancreatitis  Paroxysmal A-fib ([address] 11/8/2018  PE (pulmonary embolism) 2008  Pulmonary HTN (HCC)   
 moderate  Thromboembolus ([address] 1995  
 right leg - Hemashield graft/filter placed in abdomen  Thyroid disease   
 pt had total thyroidectomy - takes levoxyl daily  UTI (urinary tract infection) 6/3/2019 Past Surgical History:  
Procedure Laterality Date  HX APPENDECTOMY  ? 1990  
 HX CHOLECYSTECTOMY  6/2011 Dr. Savanah Cade - [facility] [address] [place]  
 hemashield graft/filter placed due to blood clot right leg  HX THYROIDECTOMY  2010  
 partial  
 HX TONSILLECTOMY [address] [facility] ARTHROPLASTY  2008 & 2011  
 right hip Current Facility-Administered Medications Medication Dose Route Frequency  0.9% sodium chloride infusion  125 mL/hr IntraVENous CONTINUOUS  
 metoprolol (LOPRESSOR) injection 2.5 mg  2.5 mg IntraVENous Q6H  
 heparin (porcine) injection 5,000 Units  5,000 Units SubCUTAneous Q8H  
  vancomycin (VANCOCIN) 750 mg in  mL infusion  750 mg IntraVENous Q24H  
 bisacodyl (DULCOLAX) suppository 10 mg  10 mg Rectal DAILY PRN  
 docusate sodium (COLACE) capsule 100 mg  100 mg Oral DAILY  sodium chloride (NS) flush 5-40 mL  5-40 mL IntraVENous Q8H  
 sodium chloride (NS) flush 5-40 mL  5-40 mL IntraVENous PRN  
 acetaminophen (TYLENOL) suppository 650 mg  650 mg Rectal Q4H PRN  
 naloxone (NARCAN) injection 0.4 mg  0.4 mg IntraVENous PRN  
 diphenhydrAMINE (BENADRYL) capsule 25 mg  25 mg Oral Q4H PRN  
 nicotine (NICODERM CQ) 21 mg/24 hr patch 1 Patch  1 Patch TransDERmal DAILY PRN  
 cefTRIAXone (ROCEPHIN) 1 g in 0.9% sodium chloride (MBP/ADV) 50 mL  1 g IntraVENous Q24H  
 ondansetron (ZOFRAN) injection 4 mg  4 mg IntraVENous Q4H PRN  
 cloNIDine HCl (CATAPRES) tablet 0.2 mg  0.2 mg Oral BID PRN  
 hydrALAZINE (APRESOLINE) 20 mg/mL injection 10 mg  10 mg IntraVENous Q6H PRN  
 metoclopramide HCl (REGLAN) injection 10 mg  10 mg IntraVENous Q8H PRN Ativan [lorazepam] Social History Socioeconomic History  Marital status:  Spouse name: Not on file  Number of children: Not on file  Years of education: Not on file  Highest education level: Not on file Tobacco Use  Smoking status: Former Smoker  Smokeless tobacco: Never Used  Tobacco comment: quit smoking 1995 Substance and Sexual Activity  Alcohol use: No  
 Drug use: No  
 
Social History Tobacco Use Smoking Status Former Smoker Smokeless Tobacco Never Used Tobacco Comment  
 quit smoking 1995 Family History Problem Relation Age of Onset  Stroke Mother  Hypertension Mother  Cancer Father   
     prostate  Diabetes Maternal Uncle ROS: The patient has no difficulty with chest pain or shortness of breath. No fever or chills. Comprehensive review of systems was otherwise unremarkable except as noted above. Physical Exam:  
Visit Vitals /86 (BP 1 Location: Right arm) Pulse (!) 104 Temp 96.4 °F (35.8 °C) Resp 16 Ht 5' (1.524 m) Wt 111 lb (50.3 kg) SpO2 95% BMI 21.68 kg/m² Vitals:  
 07/01/19 1554 07/01/19 2000 07/01/19 2244 07/02/19 5758 BP: 112/70 120/85 128/88 137/86 Pulse: (!) 113 96 (!) 110 (!) 104 Resp: 17 17 17 16 Temp: 97.8 °F (36.6 °C) 97.6 °F (36.4 °C) 96.4 °F (35.8 °C) SpO2: 93% 96% 96% 95% Weight:    111 lb (50.3 kg) Height:      
 
No intake/output data recorded. 05/30 1901 - 06/01 0700 In: 9158 [I.V.:4476] Out: 200 [Urine:200] Constitutional: lethargic, mumbles and speaks; no acute distress; appears stated age Eyes:Sclera are clear. EOMs intact ENMT: no external lesions gross hearing normal; no obvious neck masses, no ear or lip lesions, nares normal 
CV: RRR. Normal perfusion Resp: No JVD. Breathing is  non-labored; no audible wheezing. Breast exam deferred GI: soft; no mass,  No guarding or rebound Musculoskeletal: deconditioned. No embolic signs or cyanosis. Neuro:  Not oriented; moves all 4; no focal deficits Psychiatric: no affect, +memory impairment Recent vitals (if inpt): 
Patient Vitals for the past 24 hrs: 
 BP Temp Pulse Resp SpO2 Weight 06/01/19 0515 124/87 98 °F (36.7 °C) 94 18 99 % 114 lb 4.8 oz (51.8 kg) 06/01/19 0238 117/76 98.3 °F (36.8 °C) 100 18 99 %   
05/31/19 2123 118/84 98 °F (36.7 °C) 98 18 100 %   
05/31/19 1618 127/68 98.5 °F (36.9 °C) (!) 102 16 100 %   
05/31/19 1304 119/72 97.9 °F (36.6 °C) (!) 101 16 98 %   
05/31/19 0818 129/76 97.2 °F (36.2 °C) 94 16 94 %  Labs: 
Recent Labs  
  07/01/19 
3909 PTP 27.7* INR 2.6 Lab Results Component Value Date/Time  WBC 8.1 06/29/2019 05:29 AM  
 HGB 11.6 (L) 06/29/2019 05:29 AM  
 PLATELET 941 (L) 90/62/6466 05:29 AM  
 Sodium 142 06/29/2019 05:29 AM  
 Potassium 4.6 06/29/2019 05:29 AM  
 Chloride 103 06/29/2019 05:29 AM  
 CO2 31 06/29/2019 05:29 AM  
 BUN 34 (H) 06/29/2019 05:29 AM  
 Creatinine 1.46 (H) 06/29/2019 05:29 AM  
 Glucose 107 (H) 06/29/2019 05:29 AM  
 INR 2.6 07/01/2019 07:18 AM  
 aPTT 62.1 (H) 06/04/2019 04:58 AM  
 Bilirubin, total 0.5 06/28/2019 08:45 AM  
 Bilirubin, direct 0.1 06/25/2011 06:00 AM  
 AST (SGOT) 44 (H) 06/28/2019 08:45 AM  
 ALT (SGPT) 27 06/28/2019 08:45 AM  
 Alk. phosphatase 98 06/28/2019 08:45 AM  
 Amylase 64 06/17/2011 03:53 AM  
 Lipase 109 06/28/2019 08:45 AM  
 Troponin-I, Qt. <0.02 (L) 06/28/2019 08:45 AM  
 
 
CT Results  (Last 48 hours) None  
  
 
chest X-ray I reviewed recent labs, recent radiologic studies, and pertinent records including other doctor notes if needed. I independently reviewed radiology images for studies I described above or studies I have ordered. Admission date (for inpatients): 6/28/2019 * No surgery found *  * No surgery found * ASSESSMENT/PLAN: 
Problem List  Date Reviewed: 6/19/2019 Codes Class Noted * (Principal) Ileus ([facility] ICD-10-CM: K56.7 ICD-9-CM: 560.1  6/29/2019 Enteritis ICD-10-CM: K52.9 ICD-9-CM: 558.9  6/28/2019 Dehydration ICD-10-CM: E86.0 ICD-9-CM: 276.51  6/28/2019 Abnormal CT scan, lumbar spine ICD-10-CM: R93.7 ICD-9-CM: 793.7  6/19/2019 Pancytopenia ([facility] ICD-10-CM: Y88.257 ICD-9-CM: 284.19  6/16/2019 Hypotension ICD-10-CM: I95.9 ICD-9-CM: 458.9  6/15/2019 Moderate protein malnutrition (HCC) ICD-10-CM: E44.0 ICD-9-CM: 263.0  6/15/2019 Failure to thrive (0-17) ICD-10-CM: R62.51 
ICD-9-CM: 783.41  6/15/2019 Hyperthyroidism ICD-10-CM: E05.90 ICD-9-CM: 242.90  6/15/2019 Breast mass ICD-10-CM: N63.0 ICD-9-CM: 611.72  6/14/2019 PAF (paroxysmal atrial fibrillation) (HCC) ICD-10-CM: I48.0 ICD-9-CM: 427.31  6/14/2019 Colitis ICD-10-CM: K52.9 ICD-9-CM: 558.9  6/14/2019 Diarrhea ICD-10-CM: R19.7 ICD-9-CM: 787.91  6/13/2019 Acute UTI (urinary tract infection) ICD-10-CM: N39.0 ICD-9-CM: 599.0  6/3/2019 KENISHA (acute kidney injury) ([facility] ICD-10-CM: N17.9 ICD-9-CM: 584.9  5/30/2019 SBO (small bowel obstruction) ([facility] ICD-10-CM: T55.142 ICD-9-CM: 560.9  5/29/2019 Atrial fibrillation with RVR ([facility] ICD-10-CM: I48.91 
ICD-9-CM: 427.31  5/29/2019 Chronic systolic dysfunction of left ventricle (Chronic) ICD-10-CM: I51.9 ICD-9-CM: 429.9  5/29/2019 Thyroid cancer [facility]) ICD-10-CM: N19 ICD-9-CM: 452  5/29/2019 Compression fracture of fourth lumbar vertebra (HCC) ICD-10-CM: T59.367B ICD-9-CM: 805.4  5/29/2019 Thromboembolic disorder ([facility] AWD-60-VM: I74.9 ICD-9-CM: 444.9  5/29/2019 Presence of IVC filter (Chronic) ICD-10-CM: M92.147 ICD-9-CM: V45.89  5/29/2019 Breast nodule ICD-10-CM: N63.0 ICD-9-CM: 793.89  5/29/2019 Weight loss ICD-10-CM: R63.4 ICD-9-CM: 783.21  5/29/2019 Acute on chronic combined systolic and diastolic ACC/AHA stage C congestive heart failure (HCC) ICD-10-CM: I50.43 ICD-9-CM: 428.43, 428.0  11/10/2018 CHF (congestive heart failure) (HCC) ICD-10-CM: I50.9 ICD-9-CM: 428.0  11/8/2018 Supratherapeutic INR ICD-10-CM: R79.1 ICD-9-CM: 790.92  11/8/2018 Pleural effusion ICD-10-CM: J90 ICD-9-CM: 511.9  11/8/2018 Overview Signed 11/8/2018  4:18 PM by Pari Palacio MD  
  bilateral 
  
  
   
 Edema ICD-10-CM: R60.9 ICD-9-CM: 782.3  11/8/2018 Overview Signed 11/8/2018  4:18 PM by Pari Palacio MD  
  Bilateral lower legs Lightheadedness ICD-10-CM: N07 ICD-9-CM: 780.4  11/8/2018 Paroxysmal A-fib (HCC) ICD-10-CM: I48.0 ICD-9-CM: 427.31  11/8/2018 Iron deficiency anemia ICD-10-CM: D50.9 ICD-9-CM: 280.9  8/29/2011 Overview Signed 8/29/2011  3:05 PM by Zora Quiroz Despite oral iron For infed infusion Nutritional deficiency ICD-10-CM: E63.9 ICD-9-CM: 269.9  6/27/2011 Impaired mobility ICD-10-CM: Z74.09 
ICD-9-CM: 799.89  6/27/2011 Debility ICD-10-CM: R53.81 ICD-9-CM: 799.3  6/27/2011 Colon polyps ICD-10-CM: K63.5 ICD-9-CM: 211.3  6/27/2011 Pancreatitis ICD-10-CM: K85.90 ICD-9-CM: 333.5  6/19/2011 Overview Signed 6/19/2011  4:13 PM by Omkar Can 6-19-11 lipase improving possibly from gallstones Gallstone pancreatitis ICD-10-CM: K85.10 ICD-9-CM: 283.1, 574.20  6/19/2011 Follicular thyroid cancer ([facility] ICD-10-CM: T43 ICD-9-CM: 839  6/19/2011 Overview Signed 6/21/2011  7:12 AM by Omkar Can  
  thyroglobulin high > 100 obvious residual thyroid tissue Pericardial effusion ICD-10-CM: I31.3 ICD-9-CM: 423.9  6/17/2011 Acute pancreatitis ICD-10-CM: K85.90 ICD-9-CM: 582.2  6/16/2011 HTN (hypertension) (Chronic) ICD-10-CM: I10 
ICD-9-CM: 401.9  6/16/2011 Acquired hypothyroidism (Chronic) ICD-10-CM: E03.9 ICD-9-CM: 244.9  6/16/2011 Overview Signed 6/19/2011  4:15 PM by Omkar Can Post thyroidectomy for thyroid cancer Pulmonary embolism ([facility] (Chronic) ICD-10-CM: I26.99 
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 
  Ileus ([facility] (6/29/2019) Active Problems: 
  HTN (hypertension) (6/16/2011) Acquired hypothyroidism (6/16/2011) Overview: Post thyroidectomy for thyroid cancer Follicular thyroid cancer ([facility] (6/19/2011) Overview: thyroglobulin high > 100 obvious residual thyroid tissue KENISHA (acute kidney injury) ([facility] (5/30/2019) Acute UTI (urinary tract infection) (6/3/2019) Dehydration (6/28/2019) SBO/Ileus She responded to bowel rest and no surgery and by 6/3/19 CT showed no residual SBO Poor surgical candidate for expl lap secondary to age 80, frailty, debilitation, and mult med problems Pt and POA refuse NGT 
NPO/Bowel rest 
IVF Would like PET CT today given breast mass, possible L4 met, and partial SBO issues. I called radiology and PET CT is not offered on this campus and is only offered as an outpt service Instead I ordered CT abd/pelvis with oral and IV contrast today to see if SBO exists. Last contrasted study over 1 month ago Avoid narcotics for next 48hrs to avoid exacerbation of ileus Pt not competent to make decisions. I had long discussion today in room with family and POA. Discussed SBO/ileus, breast mass on CT, L5 abnormality on CT, debilitation, cognitive impairment. Pt not alert to understand and not able to give consent. Discussed risks of surgery vs no surgery in the event that is indicated based on CT today. All questions answered. The pt's POA and family don't want abdominal surgery for her even if indicated but want answers as to the status of her abdomen and whether it is safe to start PO 
CT should shed light on this and provide direction for care. Coagulopathy INR 2.6 on 7/1/19 Follow INR daily Holding coumadin until after CT done in case percutaneous  procedure needed Right Breast mass I have asked to her to see me in the office for exam and imaging Outpt imaging and follow-up planned She couldn't tolerate dx mammography at this time in her current state I will write instructions in dc instructions section of connect care L4 abnormality on CT Follow-up with spine surgeon for further workup as outpt I will workup if she has breast carcinoma I have personally performed a face-to-face diagnostic evaluation and management  service on this patient. I have independently seen the patient. I have independently obtained the above history from the patient/family. I have independently examined the patient with above findings. I have independently reviewed data/labs for this patient and developed the above plan of care (MDM). Signed: Ana Aj.  Javad Mae MD, FACS

## 2019-07-02 NOTE — PROGRESS NOTES
SPEECH PATHOLOGY NOTE: 
 
Speech therapy consult received and appreciated. Attempted to see patient for dysphagia evaluation this PM, but she declined to participate despite encouragement from clinician and family. Will re-attempt in AM. Patient/family in agreement with plan.   
 
Christin Cano, [facility], CCC-SLP

## 2019-07-02 NOTE — PROGRESS NOTES
Interdisciplinary Rounds completed 07/02/19. Nursing, Case Management, Physician and PT present. Plan of care reviewed and updated. CT completed.

## 2019-07-02 NOTE — PROGRESS NOTES
PT note: Patient just returning from procedure per RN. Pt and family decline therapy treatment at this time. Will check back as schedule allows.   
 
PAUL FabianT

## 2019-07-02 NOTE — PROGRESS NOTES
END OF SHIFT NOTE:Pt voided x 1 this shift very wet brief. Family reports pt unable to tolerate Mech Soft diet, was sick after trying to eat/ drink. Resting in bed quietly at this time. Bed in low position, Call bell in reach. Hourly rounds completed, all needs met this shift. Will continue to monitor until night shift nurse takes over. INTAKE/OUTPUT 
07/01 0701 - 07/02 0700 In: 1500 [I.V.:1500] Out: -  
Voiding: Yes Catheter: NO 
Color: clear Drain: DIET Mech Soft Flatus: Patient does have flatus present. Stool:  0 occurrences. Characteristics: 
  
 
Ambulating No 
Emesis: 0 occurrences. Characteristics: VITAL SIGNS Patient Vitals for the past 12 hrs: 
 Temp Pulse Resp BP SpO2  
07/02/19 1815 98.1 °F (36.7 °C) (!) 101 16 124/70 96 % 07/02/19 1310 97.1 °F (36.2 °C) (!) 102 16 123/79 94 % Pain Assessment Pain Intensity 1: 0 (07/02/19 0730) Pain Location 1: Abdomen Pain Intervention(s) 1: Medication (see MAR) Patient Stated Pain Goal: 0 Nai Weston, RN

## 2019-07-02 NOTE — PROGRESS NOTES
CT called and instructed to wait the new lab orders (CBC and BMP) to result before start the oral contrast for Abd CT, especially creatinine. Lab called to draw labs and will wait and make day shift nurse aware as well. CT called again and stated that MD ordered to start the oral contrast now and no need to wait the lab to result. Will start the oral contrast and continue to monitor

## 2019-07-02 NOTE — PROGRESS NOTES
Lab called for critical result of INR at 4.7, MD notified and made aware coumadin had been discontinued. No orders received at this time, will continue to monitor.

## 2019-07-02 NOTE — PROGRESS NOTES
Patient afebrile, VSS, on Afib in low 100s per Tele. Patient had been very confused for this shift, oriented to person only. No nausea or vomiting complained, MD called for prn pain meds, as patient complained back & abd pain per daughter. Patient smear stool in the brief upon check this morning. Hourly rounds performed;all pt needs met. Bed in low position and call light/ personal items within reach. Will continue to monitor and give bedside shift report to oncoming day shift nurse.

## 2019-07-02 NOTE — PROGRESS NOTES
Patient hasn't urinate for this shift per PCT, brief dry, bladder scanned patient and showed approximate 190ml. MD notified, patient has IVF infusing at 125ml, hx of CHF, last /86. patient resting quietly in bed with no complains, alert and oriented to person only. No orders received at this time, will continue to monitor and let day shift nurse aware.

## 2019-07-03 NOTE — PROGRESS NOTES
Lab called for critical result of INR at 6.6, no signs of bleeding noted, MD notified and awaiting for orders.

## 2019-07-03 NOTE — PROGRESS NOTES
Family stated that they would like neurology consult as patient had been confused and not very responsive. Pain managed with prn meds per MAR. Hourly rounds performed;all pt needs met. Bed in low position and call light/ personal items within reach. Will continue to monitor and give bedside shift report to oncoming day shift nurse.

## 2019-07-03 NOTE — PROGRESS NOTES
Interdisciplinary Rounds completed 07/03/19. Nursing, Case Management, Physician and PT present. Plan of care reviewed and updated. Continue workup.

## 2019-07-03 NOTE — PROGRESS NOTES
Hospitalist Progress Note 7/3/2019 Admit Date: 2019  8:37 AM  
NAME: Efrain Brock :  1933 DOS:              19 MRN:  [id number] Attending: Caitlin Douglas MD 
PCP:  Alysha Ott MD 
Treatment Team: Attending Provider: Scot Severin, MD; Consulting Provider: Bella Rincon MD; Surgeon: Bella Rincon MD; Care Manager: Saima Hogue RN; Utilization Review: Saadia Sears RN; Consulting Provider: Fernando Donald MD; Physical Therapist: Sweetie Mccarthy DPT 
 
DNR SUBJECTIVE: As previously documented: 
 
80 AAF, with a pmhx of CAD, moderate pericardial effusion, hypothyroidism, prior follicular cancer s/p ESCALERA, HTN, chronic systolic CHF, afib on coumadin, hx of DVT's and PE's with ivc filter in place,  l4 lesion and right breast 12mm nodule. This is her third admission, both previously lengthly, in the past two months.  
  
Patient was admitted   -  for abdominal pain and found to have SBO. Incidental findings of right breast mass and l4 lesion during imaging then. Surgery was consulted. SBO treated conservatively and resolved.  
  
She was admitted  -  for hypotension, diarrhea, ac/chronic systolic CHF and KENISHA, UTI. Cardiology/ GI were consulted at the time. Hypotension resolved with med changed (amiodarone dc'd). Diarrhea resolved. Cardiology saw pt for perciardial effusion and said to continue lasix. She was told to follow with Dr Kong Boles for breast mass and neurosurgery for L4 lesion. Per family, she has not had biopsy of these nor evaluations for these as of yet. 
  
Pt returns  with report of nausea, vomiting and a  UA questionable for UTI. Started on Rocephin and IVF. CT imaging shows bowel distension and moderate stool through colon. Also concern for increasing pericardial effusion on CT scan, still reporting as moderate on repeat echo. She had refused NGT. Patient had repeat CT AP with no SBO identified.  Ortho consulted for L4 pathological fracture. Daughter reported patient had a biopsy done in the past at [facility], awaiting for medical records. Daughter also reported patient refused radiotherapy of the L4 lesion. 07/03/19    Marianne Christianson is frail looking, answered few simple questions. Discussed code status with daughter POA, stated patient is DNR. 10+ ROS reviewed and negative except for positive in HPI. Allergies Allergen Reactions  Ativan [Lorazepam] Other (comments) Has opposite reaction Current Facility-Administered Medications Medication Dose Route Frequency  risperiDONE (RisperDAL) tablet 0.5 mg  0.5 mg Oral BID  cefTRIAXone (ROCEPHIN) 1 g in 0.9% sodium chloride (MBP/ADV) 50 mL  1 g IntraVENous Q24H  
 haloperidol lactate (HALDOL) injection 2 mg  2 mg IntraVENous Q4H PRN  
 dextrose 5 % - 0.45% NaCl infusion  50 mL/hr IntraVENous CONTINUOUS  
 HYDROmorphone (PF) (DILAUDID) injection 0.5 mg  0.5 mg IntraVENous Q6H PRN  
 naloxone (NARCAN) injection 0.4 mg  0.4 mg IntraVENous PRN  
 metoprolol (LOPRESSOR) injection 2.5 mg  2.5 mg IntraVENous Q6H  
 docusate sodium (COLACE) capsule 100 mg  100 mg Oral DAILY  sodium chloride (NS) flush 5-40 mL  5-40 mL IntraVENous Q8H  
 sodium chloride (NS) flush 5-40 mL  5-40 mL IntraVENous PRN  
 acetaminophen (TYLENOL) suppository 650 mg  650 mg Rectal Q4H PRN  
 diphenhydrAMINE (BENADRYL) capsule 25 mg  25 mg Oral Q4H PRN  
 nicotine (NICODERM CQ) 21 mg/24 hr patch 1 Patch  1 Patch TransDERmal DAILY PRN  
 ondansetron (ZOFRAN) injection 4 mg  4 mg IntraVENous Q4H PRN  
 cloNIDine HCl (CATAPRES) tablet 0.2 mg  0.2 mg Oral BID PRN  
 hydrALAZINE (APRESOLINE) 20 mg/mL injection 10 mg  10 mg IntraVENous Q6H PRN Immunization History Administered Date(s) Administered  TB Skin Test (PPD) Intradermal 05/29/2019, 06/14/2019, 06/29/2019  Tuberculin 12/12/2008 Objective:  
 
Patient Vitals for the past 24 hrs: Temp Pulse Resp BP SpO2  
19 1135 97.7 °F (36.5 °C) (!) 113 22 115/85 99 % 19 0802 96.9 °F (36.1 °C) (!) 102 16 120/89 93 % 19 0449 96.9 °F (36.1 °C) (!) 107 14 122/62 97 % 19 2333 97.6 °F (36.4 °C) (!) 106 15 122/78 98 % 19 2017 97.9 °F (36.6 °C) 97 16 120/48 95 % 19 1815 98.1 °F (36.7 °C) (!) 101 16 124/70 96 % Temp (24hrs), Av.5 °F (36.4 °C), Min:96.9 °F (36.1 °C), Max:98.1 °F (36.7 °C) Oxygen Therapy O2 Sat (%): 99 % (19 1135) Pulse via Oximetry: 86 beats per minute (19 1615) O2 Device: Room air (19 0802) Oxygen Therapy O2 Sat (%): 99 % (19 1135) Pulse via Oximetry: 86 beats per minute (19 1615) O2 Device: Room air (19 0802) Physical Exam: 
General:         Alert, frail HEENT:               NCAT. No obvious deformity. Lungs: No wheezing/rhonchi/rales Cardiovascular:   RRR. No m/r/g. No pedal edema b/l. Abdomen:       S/nt/nd. Bowel sounds normal. .  
Skin:         No rashes or lesions. Not Jaundiced Neurologic:     No gross focal deficit. Moving extremities. AAO x name DIAGNOSTIC STUDIES Data Review:  
Recent Results (from the past 24 hour(s)) PROTHROMBIN TIME + INR Collection Time: 19  5:44 AM  
Result Value Ref Range Prothrombin time 56.7 (H) 11.7 - 14.5 sec INR 6.6 (HH)    
CBC W/O DIFF Collection Time: 19  5:44 AM  
Result Value Ref Range WBC 7.7 4.3 - 11.1 K/uL  
 RBC 4.14 4.05 - 5.2 M/uL  
 HGB 10.3 (L) 11.7 - 15.4 g/dL HCT 32.7 (L) 35.8 - 46.3 % MCV 79.0 (L) 79.6 - 97.8 FL  
 MCH 24.9 (L) 26.1 - 32.9 PG  
 MCHC 31.5 31.4 - 35.0 g/dL  
 RDW 19.2 (H) 11.9 - 14.6 % PLATELET 721 555 - 701 K/uL MPV 11.6 9.4 - 12.3 FL ABSOLUTE NRBC 0.25 (H) 0.0 - 0.2 K/uL METABOLIC PANEL, COMPREHENSIVE Collection Time: 19  5:44 AM  
Result Value Ref Range Sodium 145 136 - 145 mmol/L  Potassium 4.5 3.5 - 5.1 mmol/L  
 Chloride 115 (H) 98 - 107 mmol/L  
 CO2 20 (L) 21 - 32 mmol/L Anion gap 10 7 - 16 mmol/L Glucose 158 (H) 65 - 100 mg/dL BUN 34 (H) 8 - 23 MG/DL Creatinine 1.16 (H) 0.6 - 1.0 MG/DL  
 GFR est AA 57 (L) >60 ml/min/1.73m2 GFR est non-AA 47 (L) >60 ml/min/1.73m2 Calcium 9.0 8.3 - 10.4 MG/DL Bilirubin, total 0.3 0.2 - 1.1 MG/DL  
 ALT (SGPT) 22 12 - 65 U/L  
 AST (SGOT) 34 15 - 37 U/L Alk. phosphatase 86 50 - 136 U/L Protein, total 5.9 (L) 6.3 - 8.2 g/dL Albumin 2.3 (L) 3.2 - 4.6 g/dL Globulin 3.6 (H) 2.3 - 3.5 g/dL A-G Ratio 0.6 (L) 1.2 - 3.5 EKG, 12 LEAD, SUBSEQUENT Collection Time: 07/03/19 10:00 AM  
Result Value Ref Range Ventricular Rate 109 BPM  
 Atrial Rate 113 BPM  
 QRS Duration 136 ms  
 Q-T Interval 306 ms QTC Calculation (Bezet) 412 ms Calculated R Axis -64 degrees Calculated T Axis 131 degrees Diagnosis Atrial fibrillation with rapid ventricular response Left axis deviation Right bundle branch block T wave abnormality, consider lateral ischemia Abnormal ECG When compared with ECG of 28-JUN-2019 08:31, Atrial fibrillation has replaced Sinus rhythm Vent. rate has increased BY  62 BPM 
Right bundle branch block is now Present Minimal criteria for Anterior infarct are no longer Present Confirmed by JOSE NICOLAS (), Elian Napier ([id number]) on 7/3/2019 10:14:34 AM 
  
 
 
All Micro Results Procedure Component Value Units Date/Time CULTURE, URINE [[id number]]  (Abnormal)  (Susceptibility) Collected:  06/28/19 9601 Order Status:  Completed Specimen:  Urine from Clean catch Updated:  07/01/19 2451 Special Requests: NO SPECIAL REQUESTS Culture result:    
  >100,000 COLONIES/mL STAPHYLOCOCCUS SPECIES, COAGULASE NEGATIVE  
     
      
  10,000 to 50,000 COLONIES/mL MIXED SKIN OTILIA ISOLATED  
     
 CULTURE, URINE [[id number]] Order Status:  Canceled Specimen:  Urine from Clean catch Imaging Hilda Contras /Studies: CXR Results  (Last 48 hours) None CT Results  (Last 48 hours) 07/03/19 1056  CT HEAD WO CONT Final result Impression:  IMPRESSION:   
1. Stable age-related senescent changes and chronic microvascular disease with  
remote all right occipital ischemic insult. 2. Chronic right maxillary sinusitis. CPT code 72066 Narrative:  Title:  CT scan of the Head. The patient is a 80years year old Female with symptoms of declining mental  
status for a week. History of thyroid CA. Technique:  Axial CT images through the brain were obtained. All CT scans at this facility are performed using dose reduction/dose modulation  
techniques, as appropriate the performed exam, including the following: Automated Exposure Control; Adjustment of the mA and/or kV according to patient  
size (this includes techniques or standardized protocols for targeted exams  
where dose is matched to indication/reason for exam); and Use of Iterative Reconstruction Technique. Radiation Exposure Indices:  
Reference Air Kerma (Amrisa Tompkins) = 994 mGy-cm Comparison:  Head CT 11/9/2018. Findings:    
   
Cerebrum: Age-related senescent changes are seen with sulcal and ventricular  
prominence. . Mild chronic confluent periventricular white matter disease is seen  
with lacunae throughout the basal ganglia. Chronic cortical and subcortical  
ischemia involving the right occipital lobe There is incidental physiologic  
calcification involving the globus pallidi bilaterally. No evidence of  
intracranial hemorrhage, mass, or other space-occupying lesion is seen. There  
are no abnormal extra-axial fluid collections. Kade Inder Cerebellum: Mild cerebellar involutional changes. CSF spaces: The ventricular system is within normal limits. The basilar cisterns  
are unremarkable. Brainstem: Chronic central pontine micro-vascular disease Extracranial tissues: Visualized orbits and extracranial soft tissues are  
unremarkable. Paranasal sinuses/Mastoids: Chronic right maxillary sinusitis with opacification  
and surrounding hyperostosis. Mild posterior left ethmoid mucosal inflammatory  
disease. Calvarium: No acute osseous abnormality. 07/02/19 1223  CT ABD PELV W CONT Final result Impression:  IMPRESSION:  
1. Mildly compromised examination due to motion. 2. Destructive process with associated pathologic fracture of L4 with  
extraosseous extension as described. Differential diagnosis would include  
metastatic disease or multiple myeloma/plasmacytoma. 3. Ascites. 4. Small pleural effusions. 5. There is no evidence of bowel obstruction. Narrative:  CT abdomen and pelvis with contrast: 07/02/2019 History: sbo vs ileus; evaluate; also with suspected L4 met. Technique: Helically acquired images were obtained from the domes of the  
diaphragms to the ischial tuberosities reconstructed at 5mm intervals after the  
uneventful administration of oral contrast for bowel opacification and 100 cc's  
of intravenous Isovue-370 to evaluate the solid abdominal viscera and vascular  
structures. Coronal reformatted images were submitted. Radiation dose reduction techniques were used for this study:  Our CT scanners  
use one or all of the following: Automated exposure control, adjustment of the  
mA and/or kVp according to patient's size, iterative reconstruction. Comparison: Noncontrast study 06/28/2019 CT ABDOMEN: There is global cardiac enlargement. There is excessive motion  
artifact throughout the study. There are partially imaged small bilateral  
pleural effusions and bilateral lower lobe atelectasis/infiltrate. The liver,  
spleen, pancreas and adrenal glands are unremarkable. Few bilateral  
nonobstructing renal calculi present.  There is a small to moderate amount of  
 abdominal ascites. Extensive atherosclerotic changes are present involving the  
abdominal aorta and mesenteric vessels. There are no dilated bowel loops. Contrast material is present within the colon. There are advanced degenerative  
changes of the lumbar spine. Extensive bony destruction is noted involving the  
L4 vertebra with areas of enhancement, also extending within the extraosseous  
soft tissues on the left and into the spinal canal causing severe stenosis. CT PELVIS: There is extensive streak artifact from right hip arthroplasty. A  
pessary is present. There is a small amount of ascites. No definite adenopathy  
is present. There is diffuse subcutaneous edema. Ct Head Wo Cont Result Date: 7/3/2019 IMPRESSION: 1. Stable age-related senescent changes and chronic microvascular disease with remote all right occipital ischemic insult. 2. Chronic right maxillary sinusitis. CPT code 40986 Results for orders placed or performed during the hospital encounter of 19  
2D ECHO COMPLETE ADULT (TTE) W OR WO CONTR Narrative [facility] [address] [place], [address] 
[phone] Transthoracic Echocardiogram 
2D and Doppler Patient: Easton Plummer 
MR #: [id number] : [id number] Age: 80 years Gender: Female Study date: 2019 Account #: [de-identified] Height: 60 in Weight: 113 lb 
BSA: 1.47 mï¾² Status:Routine Location: [facility] BP: 120/ 72 Allergies: LORAZEPAM 
 
Sonographer:  Leland Estrada RD Group:  [id number] Cardiology Referring Physician:  Lori Read MD 
Reading Physician:  Michael Steve. Adelia Pugh MD 
 
INDICATIONS: Reevaluate pericardial effusion PROCEDURE: This was a routine study. A transthoracic echocardiogram was 
performed. The study included limited 2D imaging and limited spectral  
Doppler. Image quality was adequate.  
 
PERICARDIUM: A moderate pericardial effusion was identified circumferential  
to 
the heart. There was a left pleural effusion. SUMMARY: 
 
-  Pericardium: A moderate pericardial effusion was identified  
circumferential 
to the heart. There was a left pleural effusion. Prepared and signed by 
 
Alana Kong MD 
Signed 29-Jun-2019 14:37:02 Labs and Studies from previous 24 hours have been personally reviewed by myself   
ASSESSMENT Active Hospital Problems Diagnosis Date Noted  Ileus ([facility] 06/29/2019  Dehydration 06/28/2019  Acute UTI (urinary tract infection) 06/03/2019  KENISHA (acute kidney [facility]) ([facility] 05/30/2019  Follicular thyroid cancer ([facility] 06/19/2011 thyroglobulin high > 100 obvious residual thyroid tissue  HTN (hypertension) 06/16/2011  Acquired hypothyroidism 06/16/2011 Post thyroidectomy for thyroid cancer Hospital Problems as of 7/3/2019 Date Reviewed: 6/19/2019 Codes Class Noted - Resolved POA * (Principal) Ileus ([facility] ICD-10-CM: K56.7 ICD-9-CM: 560.1  6/29/2019 - Present Yes Enteritis ICD-10-CM: K52.9 ICD-9-CM: 558.9  6/28/2019 - Present Dehydration ICD-10-CM: E86.0 ICD-9-CM: 276.51  6/28/2019 - Present Yes Acute UTI (urinary tract infection) ICD-10-CM: N39.0 ICD-9-CM: 599.0  6/3/2019 - Present Yes KENISHA (acute kidney injury) ([facility] ICD-10-CM: N17.9 ICD-9-CM: 584.9  5/30/2019 - Present Yes Follicular thyroid cancer ([facility] ICD-10-CM: M10 ICD-9-CM: 023  6/19/2011 - Present Yes Overview Signed 6/21/2011  7:12 AM by Patria Marroquin  
  thyroglobulin high > 100 obvious residual thyroid tissue HTN (hypertension) (Chronic) ICD-10-CM: I10 
ICD-9-CM: 401.9  6/16/2011 - Present Yes Acquired hypothyroidism (Chronic) ICD-10-CM: E03.9 ICD-9-CM: 244.9  6/16/2011 - Present Yes Overview Signed 6/19/2011  4:15 PM by Patria Marroquin Post thyroidectomy for thyroid cancer A/P: 
 
-Ileus vs partial SBO 
 Surgery evaluation appreciated. Recommended CT AP 
CT  No evidence of SBO. Patient is agitated today and with an episode of vomiting overnight Cont clear liquid diet and hydration with IVFs 
 
-L4 fracture CT showing destructive process L4 pathological fracture metastasic disease vs myeloma/plasmacytoma. Ortho recommended IR for biopsy of lesion Daughter reported patient had a biopsy of the lesion in the past  
Medical records from [facility] pending 
 
-Acute encephalopathy 
Daughter reported mental status has been declining since she was admitted to the hospital 
CT brain with no acute disease Give a trial of Haldol IV for agitation Get MRI brain when she is not agitated. Add on X59, folic acid and TSH  
 
-Acute UTI Ucx with staph coag neg Patient not tolerating PO due to agitation Restart ceftriaxone to complete 5 days course 
 
-Afib INR trending up. No bleeding reported Hold coumadin Reverse INR with Vit K IV as patient may need biopsy DVT Prophylaxis: coumadin CODE Status: DNR discussed with POA Plan of Care Discussed with: patient. Care team. 
 
Belkys Gomez MD 
07/03/19

## 2019-07-03 NOTE — PROGRESS NOTES
Pt has been very agitated this shift and has required much haldol to relieve agitation. Pain medication given once this shift.

## 2019-07-03 NOTE — PROGRESS NOTES
PT note: Patient has INR of 6.6 today. Also she is not doing well medically today and RN suggests holding therapy. Will check back as schedule allows.   
 
PAUL GandhiT

## 2019-07-03 NOTE — PROGRESS NOTES
Family requested air mattress bed for comfort, educated family that wound care nurse needed to come to evaluate patient and put the order in if necessary. Will turn patient more frequently and use pillows for comfort tonight. Family verbalized understanding.

## 2019-07-03 NOTE — PROGRESS NOTES
Pt is not resting well, family is asking that haldol be increased. Spoke with MD to make aware orders received-increase Haldol to 2mg Q4hr as needed.

## 2019-07-03 NOTE — PROGRESS NOTES
Patient seen this afternoon. Several family members present. A fairly large L4 lesion was seen on MRI 9 years ago. On the more recent CT scans it has definitely enlarged and has caused more bony destruction, but this has occurred quite gradually over an extended period of time. Now there is a pathologic fracture, which may be the source of much of the patients pain. Patient would not be a candidate for any major reconstruction, but consideration could be given to biopsy and perhaps kyphoplasty. Family really does not want general aesthesia. They will decide on biopsy by tomorrow. Recommend consult IR for biopsy/kyphoplasty if POA consents.

## 2019-07-03 NOTE — PROGRESS NOTES
H&P/Consult Note/Progress Note/Office Note:  
Franchesca Uribe  MRN: [id number]  :1933  Age:86 y.o. 
 
HPI: Franchesca Uribe is a 80 y.o. female who  I am following for a breast mass noted on CT imaging during a May, 2019 admission. She has mult medical problems including CHF with EF 30-35%, CAD, HTN, pulm HTN, chronic afib on coumadin, s/p IVC filter for DVT/PE (), thyroid carcinoma treated with ablation many yrs ago at [facility]. She is s/p appy, rhonda, right hip surgery, and back surgery She was admitted 19-19 with SBO which resolved without surgery. She was on coumadin for afib with INR>4 at presentation who came to the ER  
with 6 day h/o diarrhea for which she took several anti-diarrheal meds (pepto-bismol and lomotil among them) This was followed by diffuse, moderate, progressive, non-radiating abd pain and cramping. Nothing made symptoms better or worse. She was seen in ER day before admission and given IVF and released. She has associated N/V. No fever or leukocytosis on admission She was admitted  19- 19  for hypotension, diarrhea, acute and chronic systolic CHF and KENISHA, UTI. Cardiology/ GI were consulted at the time. Hypotension resolved with med changed (amiodarone dc'd). Diarrhea resolved. Cardiology saw pt for perciardial effusion and said to continue lasix. She was told to follow with Dr Kaitlyn Gonzalez for breast mass and neurosurgery for L4 lesion.  
  
Pt was readmitted again on 19 with N/V and UTI Rx'd with Rocephin and IVF. CT showed bowel distension and moderate stool through colon. Also concern for increasing pericardial effusion on CT scan, still reporting as moderate on repeat echo.  
  
 She had the below CT performed in ER with SBO, right breast mass, and abnormal L4 spine 19 CT abd/pelvis with oral and IV contrast 
There is no pericardial effusion. Heart is enlarged.  There is coronary artery calcification. There is dependent subsegmental atelectasis. 
  
There is a 12 mm nodule within the lateral right breast (series 2, image 5). There is subtle micronodular contour of the liver. There is periportal edema. The gallbladder is surgically absent. Pancreas is not well visualized. The 
spleen is normal in contour. Stable small right adrenal nodule (since 2011).   
There are tiny nonobstructing renal calculi. No hydronephrosis left kidney is 
small in size. There is prominent atherosclerotic calcification of the abdominal aorta. 
  
The stomach is normal in contour. 
  
Pelvic findings: There are multiple fluid-filled dilated small bowel loops with transition point 
appearing to be in the lower abdomen. The colon appears normal in caliber. Urinary bladder is normal in contour. There is a pessary. 
  
There is no significant free fluid. No free air. Bones are osteopenic. There are 
degenerative changes of the spine. There is severe compression deformity of L4 
with associated high density structure. Right total hip arthroplasty is partially seen. 
  
  
IMPRESSION: 
  
1. Small bowel obstruction. Multiple fluid-filled dilated small bowel loops with 
transition point in the lower abdomen. Etiology is likely adhesion. Closed-loop 
obstruction is in the differential. 
  
2. No evidence of colitis or diverticulitis, although the colon is not well 
visualized. . No hydronephrosis. 
  
3. Severe compression deformity of L4 with associated high density material. 
This may be related to prior intervention such as kyphoplasty. Neoplastic 
involvement is considered less likely. Osteopenia. 
  
4. A 12 mm nodule within the lateral right breast. Correlation with mammogram is 
recommended. 6/3/19 CT abd/pelvis without contrast 
There is moderate right pleural effusion. There is small left pleural effusion. Dependent densities in the lower lobes, likely due to aspiration or atelectasis.  
  
 The heart is enlarged. There is coronary artery calcification. There is trace 
pericardial effusion. 
  
Abdomen findings: 
  
Absence of IV contrast limits sensitivity. 
  
There is subtle micronodular contour of the liver. The spleen is normal in 
contour. Gallbladder is surgically absent. Pancreas and adrenal glands are not 
well visualized. 
  
There are small nonobstructing renal calculi. There is no hydronephrosis. There 
is severe atherosclerotic calcification of the abdominal aorta. The stomach is 
under distended. Small bowel loops are normal in caliber. There is no small 
bowel obstruction. 
  
Pelvic findings: 
  
There is a pessary. Uterus is not well evaluated. Urinary bladder is 
decompressed by Ornelas catheter. There is sigmoid diverticulosis without 
diverticulitis. Colon is normal in caliber. 
  
There is small free fluid. There is no free air. Bones are diffusely osteopenic. There are degenerative changes of the spine. There is lytic lesion involving the 
L4. There is associated compression fracture. 
  
  
IMPRESSION: 
1. Previously noted dilated small bowel loops have resolved. No evidence of bowel obstruction on today's study. 
  
2. Moderate right pleural effusion and small left pleural effusion. Mild dependent densities in the lower lobes, likely due to atelectasis or aspiration. 
  
3. Sigmoid diverticulosis. No evidence of diverticulitis. 
  
4. Lytic lesion of L4 and associated compression fracture 6/13/19 CT abd/pelvis without IV contrast 
Hx: Diarrhea. Recently admitted for SBO  
  
CT ABD:   
Limited evaluation of the lung bases and base of the mediastinum demonstrates a 
new moderate to large pericardial effusion. Irregular basilar densities are seen 
likely representing mild to moderate scarring versus atelectasis. 
  
Assessment of the solid organs is limited by the lack of administered 
intravenous contrast. A subtle abnormality could be missed. The Liver is homogeneous in attenuation. The spleen is homogeneous in attenuation. No 
contour deforming mass lesions are seen of the pancreas or adrenal glands. The 
gallbladder has been removed. Multiple bilateral renal calcifications are seen 
felt to represent renal stones. No hydronephrosis is seen of either kidney to 
suggest obstruction.   
  
No abnormal small bowel dilation is seen with small bowel loops measuring up to 
2.7 cm in diameter. In addition, no abnormal abrupt transition point is seen of 
small bowel. Gas and fluid are seen throughout the colon. Fluid throughout the 
colon can be seen with diarrhea. No clear abnormal inflammatory wall thickening 
of small bowel or colon is seen. The mesentery appears diffusely edematous. No 
free air is seen. No abnormal fluid collection is identified. .  No adenopathy is 
seen. The abdominal aorta moderate to advanced atherosclerotic calcification is 
seen of the abdominal aorta. The patient appears to be status post aortic 
bifemoral bypass graft. Once again, there is the suggestion of a large lytic 
lesion destroying much of the L4 vertebral body with a likely secondary mild to 
moderate pathologic compression fracture. The appearance is not felt to be 
significantly changed when compared to the prior study. 
  
CT PELVIS: 
A right total hip replacement is present. This produces scatter artifact which 
obscures portions of the right pelvis. No abnormal pelvic fluid collections or 
inflammatory changes are present. No pelvic adenopathy is seen. The urinary 
bladder is unremarkable. 
  
IMPRESSION:   
1. No abnormal bowel dilation or transition point to suggest bowel obstruction. No clearly acute bowel changes are otherwise seen. It is only noted that the 
colon is fluid-filled consistent with the history of diarrhea. The possibility 
of a mild or early colitis cannot be excluded. 
  
2.  New moderate to large pericardial effusion. 
  
 3. Large lytic lesion and pathologic fracture of the L4 vertebral body as 
previously described which is not felt to be significantly changed 7/2/19 CT abd/pelvis with oral and IV contrast 
Hx: sbo vs ileus; evaluate; also with suspected L4 met. 
  
CT ABDOMEN: There is global cardiac enlargement. There is excessive motion 
artifact throughout the study. There are partially imaged small bilateral 
pleural effusions and bilateral lower lobe atelectasis/infiltrate. The liver, 
spleen, pancreas and adrenal glands are unremarkable. Few bilateral 
nonobstructing renal calculi present. There is a small to moderate amount of 
abdominal ascites. Extensive atherosclerotic changes are present involving the 
abdominal aorta and mesenteric vessels. There are no dilated bowel loops. Contrast material is present within the colon. There are advanced degenerative 
changes of the lumbar spine. Extensive bony destruction is noted involving the 
L4 vertebra with areas of enhancement, also extending within the extraosseous 
soft tissues on the left and into the spinal canal causing severe stenosis. 
  
CT PELVIS: There is extensive streak artifact from right hip arthroplasty. A 
pessary is present. There is a small amount of ascites. No definite adenopathy 
is present. There is diffuse subcutaneous edema. 
  
IMPRESSION: 
1. Mildly compromised examination due to motion. 2. Destructive process with associated pathologic fracture of L4 with 
extraosseous extension as described. Differential diagnosis would include 
metastatic disease or multiple myeloma/plasmacytoma. 3. Ascites. 4. Small pleural effusions. 5. There is no evidence of bowel obstruction. 
  
 
 
 
 
 
6/29/19 Awake in bed. No complaints at this time. Denies abd pain. LBM Thursday. -flatus. Reports +belching today. Family at bedside. NAD.  
6/30/19  Resting in bed. RN unable to place NG tube yesterday.  No complaints at this time. Denies abd pain. LBM Thursday. -flatus. Family at bedside. NAD. 7/1/19  Having NGT placed this am when I rounded; abd soft and without tenderness; INR 2.6; coumadin held in case surgery needed 7/2/19  NGT could not be placed. 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 and refused spinal XRT at that time, she refused to participate with rubén estrada yesterday, SLP to try again today Past Medical History:  
Diagnosis Date  Acquired hypothyroidism  Acute pancreatitis  Arthritis  CAD (coronary artery disease) 4/29/11 RCA occluded and fills with collaterals, LAD and Cx nonobstructive CAD, EF 50-55%  Cancer ([address] THYROID - treated with surgery and radiation  CHF (congestive heart failure) ([address] 11/8/2018  Chronic pain   
 right hip  Coagulopathy ([address] 6/16/2011  Colon polyps  Follicular thyroid cancer ([address]  Gallstone pancreatitis  Glaucoma  Gout   
 hx of gout to right great toe - no problems  Hypertension   
 controlled with medication  Other ill-defined conditions(799.89)  Pancreatitis  Paroxysmal A-fib ([address] 11/8/2018  PE (pulmonary embolism) 2008  Pulmonary HTN (HCC)   
 moderate  Thromboembolus ([address] 1995  
 right leg - Hemashield graft/filter placed in abdomen  Thyroid disease   
 pt had total thyroidectomy - takes levoxyl daily  UTI (urinary tract infection) 6/3/2019 Past Surgical History:  
Procedure Laterality Date  HX APPENDECTOMY  ? 1990  
 HX CHOLECYSTECTOMY  6/2011 Dr. Darlyn Schreiber [address]  
 hemashield graft/filter placed due to blood clot right leg  HX THYROIDECTOMY  2010  
 partial  
 HX TONSILLECTOMY [facility] ARTHROPLASTY  2008 & 2011  
 right hip Current Facility-Administered Medications Medication Dose Route Frequency  phytonadione (vitamin K1) (AQUA-MEPHYTON) 5 mg in 0.9% sodium chloride 50 mL IVPB  5 mg IntraVENous ONCE  
 dextrose 5 % - 0.45% NaCl infusion  50 mL/hr IntraVENous CONTINUOUS  
 cephALEXin (KEFLEX) capsule 500 mg  500 mg Oral Q8H  
 HYDROmorphone (PF) (DILAUDID) injection 0.5 mg  0.5 mg IntraVENous Q6H PRN  
 naloxone (NARCAN) injection 0.4 mg  0.4 mg IntraVENous PRN  
 metoprolol (LOPRESSOR) injection 2.5 mg  2.5 mg IntraVENous Q6H  
 docusate sodium (COLACE) capsule 100 mg  100 mg Oral DAILY  sodium chloride (NS) flush 5-40 mL  5-40 mL IntraVENous Q8H  
 sodium chloride (NS) flush 5-40 mL  5-40 mL IntraVENous PRN  
 acetaminophen (TYLENOL) suppository 650 mg  650 mg Rectal Q4H PRN  
 diphenhydrAMINE (BENADRYL) capsule 25 mg  25 mg Oral Q4H PRN  
 nicotine (NICODERM CQ) 21 mg/24 hr patch 1 Patch  1 Patch TransDERmal DAILY PRN  
 ondansetron (ZOFRAN) injection 4 mg  4 mg IntraVENous Q4H PRN  
 cloNIDine HCl (CATAPRES) tablet 0.2 mg  0.2 mg Oral BID PRN  
 hydrALAZINE (APRESOLINE) 20 mg/mL injection 10 mg  10 mg IntraVENous Q6H PRN  
 metoclopramide HCl (REGLAN) injection 10 mg  10 mg IntraVENous Q8H PRN Ativan [lorazepam] Social History Socioeconomic History  Marital status:  Spouse name: Not on file  Number of children: Not on file  Years of education: Not on file  Highest education level: Not on file Tobacco Use  Smoking status: Former Smoker  Smokeless tobacco: Never Used  Tobacco comment: quit smoking 1995 Substance and Sexual Activity  Alcohol use: No  
 Drug use: No  
 
Social History Tobacco Use Smoking Status Former Smoker Smokeless Tobacco Never Used Tobacco Comment  
 quit smoking 1995 Family History Problem Relation Age of Onset  Stroke Mother  Hypertension Mother  Cancer Father   
     prostate  Diabetes Maternal Uncle ROS: The patient has no difficulty with chest pain or shortness of breath. No fever or chills. Comprehensive review of systems was otherwise unremarkable except as noted above. Physical Exam:  
Visit Vitals /62 (BP 1 Location: Right arm, BP Patient Position: At rest) Pulse (!) 107 Temp 96.9 °F (36.1 °C) Resp 14 Ht 5' (1.524 m) Wt 124 lb 11.2 oz (56.6 kg) SpO2 97% BMI 24.35 kg/m² Vitals:  
 07/02/19 1815 07/02/19 2017 07/02/19 2333 07/03/19 7598 BP: 124/70 120/48 122/78 122/62 Pulse: (!) 101 97 (!) 106 (!) 107 Resp: 16 16 15 14 Temp: 98.1 °F (36.7 °C) 97.9 °F (36.6 °C) 97.6 °F (36.4 °C) 96.9 °F (36.1 °C) SpO2: 96% 95% 98% 97% Weight:    124 lb 11.2 oz (56.6 kg) Height:      
 
No intake/output data recorded. 05/30 1901 - 06/01 0700 In: 8467 [I.V.:4476] Out: 200 [Urine:200] Constitutional: lethargic, mumbles and speaks; no acute distress; appears stated age Eyes:Sclera are clear. EOMs intact ENMT: no external lesions gross hearing normal; no obvious neck masses, no ear or lip lesions, nares normal 
CV: RRR. Normal perfusion Resp: No JVD. Breathing is  non-labored; no audible wheezing. Breast exam deferred GI: soft; no mass,  No guarding or rebound Musculoskeletal: deconditioned. No embolic signs or cyanosis. Neuro:  Not oriented; moves all 4; no focal deficits Psychiatric: no affect, +memory impairment Recent vitals (if inpt): 
Patient Vitals for the past 24 hrs: 
 BP Temp Pulse Resp SpO2 Weight 06/01/19 0515 124/87 98 °F (36.7 °C) 94 18 99 % 114 lb 4.8 oz (51.8 kg) 06/01/19 0238 117/76 98.3 °F (36.8 °C) 100 18 99 %   
05/31/19 2123 118/84 98 °F (36.7 °C) 98 18 100 %   
05/31/19 1618 127/68 98.5 °F (36.9 °C) (!) 102 16 100 %   
05/31/19 1304 119/72 97.9 °F (36.6 °C) (!) 101 16 98 %   
05/31/19 0818 129/76 97.2 °F (36.2 °C) 94 16 94 %  Labs: 
Recent Labs  
  07/03/19 
0544 WBC 7.7 HGB 10.3*    
K 4.5 * CO2 20* BUN 34* CREA 1.16* * PTP 56.7* INR 6.6* TBILI 0.3 SGOT 34 ALT 22 AP 86 Lab Results Component Value Date/Time WBC 7.7 07/03/2019 05:44 AM  
 HGB 10.3 (L) 07/03/2019 05:44 AM  
 PLATELET 422 73/96/1226 05:44 AM  
 Sodium 145 07/03/2019 05:44 AM  
 Potassium 4.5 07/03/2019 05:44 AM  
 Chloride 115 (H) 07/03/2019 05:44 AM  
 CO2 20 (L) 07/03/2019 05:44 AM  
 BUN 34 (H) 07/03/2019 05:44 AM  
 Creatinine 1.16 (H) 07/03/2019 05:44 AM  
 Glucose 158 (H) 07/03/2019 05:44 AM  
 INR 6.6 (HH) 07/03/2019 05:44 AM  
 aPTT 62.1 (H) 06/04/2019 04:58 AM  
 Bilirubin, total 0.3 07/03/2019 05:44 AM  
 Bilirubin, direct 0.1 06/25/2011 06:00 AM  
 AST (SGOT) 34 07/03/2019 05:44 AM  
 ALT (SGPT) 22 07/03/2019 05:44 AM  
 Alk. phosphatase 86 07/03/2019 05:44 AM  
 Amylase 64 06/17/2011 03:53 AM  
 Lipase 109 06/28/2019 08:45 AM  
 Troponin-I, Qt. <0.02 (L) 06/28/2019 08:45 AM  
 
 
CT Results  (Last 48 hours) 07/02/19 1223  CT ABD PELV W CONT Final result Impression:  IMPRESSION:  
1. Mildly compromised examination due to motion. 2. Destructive process with associated pathologic fracture of L4 with  
extraosseous extension as described. Differential diagnosis would include  
metastatic disease or multiple myeloma/plasmacytoma. 3. Ascites. 4. Small pleural effusions. 5. There is no evidence of bowel obstruction. Narrative:  CT abdomen and pelvis with contrast: 07/02/2019 History: sbo vs ileus; evaluate; also with suspected L4 met. Technique: Helically acquired images were obtained from the domes of the  
diaphragms to the ischial tuberosities reconstructed at 5mm intervals after the  
uneventful administration of oral contrast for bowel opacification and 100 cc's  
of intravenous Isovue-370 to evaluate the solid abdominal viscera and vascular  
structures. Coronal reformatted images were submitted. Radiation dose reduction techniques were used for this study:  Our CT scanners  
use one or all of the following: Automated exposure control, adjustment of the  
mA and/or kVp according to patient's size, iterative reconstruction. Comparison: Noncontrast study 06/28/2019 CT ABDOMEN: There is global cardiac enlargement. There is excessive motion  
artifact throughout the study. There are partially imaged small bilateral  
pleural effusions and bilateral lower lobe atelectasis/infiltrate. The liver,  
spleen, pancreas and adrenal glands are unremarkable. Few bilateral  
nonobstructing renal calculi present. There is a small to moderate amount of  
abdominal ascites. Extensive atherosclerotic changes are present involving the  
abdominal aorta and mesenteric vessels. There are no dilated bowel loops. Contrast material is present within the colon. There are advanced degenerative  
changes of the lumbar spine. Extensive bony destruction is noted involving the  
L4 vertebra with areas of enhancement, also extending within the extraosseous  
soft tissues on the left and into the spinal canal causing severe stenosis. CT PELVIS: There is extensive streak artifact from right hip arthroplasty. A  
pessary is present. There is a small amount of ascites. No definite adenopathy  
is present. There is diffuse subcutaneous edema. chest X-ray I reviewed recent labs, recent radiologic studies, and pertinent records including other doctor notes if needed. I independently reviewed radiology images for studies I described above or studies I have ordered. Admission date (for inpatients): 6/28/2019 * No surgery found *  * No surgery found * ASSESSMENT/PLAN: 
Problem List  Date Reviewed: 6/19/2019 Codes Class Noted * (Principal) Ileus ([facility] ICD-10-CM: K56.7 ICD-9-CM: 560.1  6/29/2019 Enteritis ICD-10-CM: K52.9 ICD-9-CM: 558.9  6/28/2019 Dehydration ICD-10-CM: E86.0 ICD-9-CM: 276.51  6/28/2019 Abnormal CT scan, lumbar spine ICD-10-CM: R93.7 ICD-9-CM: 793.7  6/19/2019 Pancytopenia ([facility] ICD-10-CM: E82.587 ICD-9-CM: 284.19  6/16/2019 Hypotension ICD-10-CM: I95.9 ICD-9-CM: 458.9  6/15/2019 Moderate protein malnutrition (HCC) ICD-10-CM: E44.0 ICD-9-CM: 263.0  6/15/2019 Failure to thrive (0-17) ICD-10-CM: R62.51 
ICD-9-CM: 783.41  6/15/2019 Hyperthyroidism ICD-10-CM: E05.90 ICD-9-CM: 242.90  6/15/2019 Breast mass ICD-10-CM: N63.0 ICD-9-CM: 611.72  6/14/2019 PAF (paroxysmal atrial fibrillation) (HCC) ICD-10-CM: I48.0 ICD-9-CM: 427.31  6/14/2019 Colitis ICD-10-CM: K52.9 ICD-9-CM: 558.9  6/14/2019 Diarrhea ICD-10-CM: R19.7 ICD-9-CM: 787.91  6/13/2019 Acute UTI (urinary tract infection) ICD-10-CM: N39.0 ICD-9-CM: 599.0  6/3/2019 KENISHA (acute kidney injury) ([facility] ICD-10-CM: N17.9 ICD-9-CM: 584.9  5/30/2019 SBO (small bowel obstruction) ([facility] ICD-10-CM: X75.030 ICD-9-CM: 560.9  5/29/2019 Atrial fibrillation with RVR ([facility] ICD-10-CM: I48.91 
ICD-9-CM: 427.31  5/29/2019 Chronic systolic dysfunction of left ventricle (Chronic) ICD-10-CM: I51.9 ICD-9-CM: 429.9  5/29/2019 Thyroid cancer [facility]) ICD-10-CM: A66 ICD-9-CM: 808  5/29/2019 Compression fracture of fourth lumbar vertebra (HCC) ICD-10-CM: G28.836J ICD-9-CM: 805.4  5/29/2019 Thromboembolic disorder ([facility] UOG-42-BW: I74.9 ICD-9-CM: 444.9  5/29/2019 Presence of IVC filter (Chronic) ICD-10-CM: V74.604 ICD-9-CM: V45.89  5/29/2019 Breast nodule ICD-10-CM: N63.0 ICD-9-CM: 793.89  5/29/2019 Weight loss ICD-10-CM: R63.4 ICD-9-CM: 783.21  5/29/2019 Acute on chronic combined systolic and diastolic ACC/AHA stage C congestive heart failure (HCC) ICD-10-CM: I50.43 ICD-9-CM: 428.43, 428.0  11/10/2018 CHF (congestive heart failure) (HCC) ICD-10-CM: I50.9 ICD-9-CM: 428.0  11/8/2018 Supratherapeutic INR ICD-10-CM: R79.1 ICD-9-CM: 790.92  11/8/2018 Pleural effusion ICD-10-CM: J90 ICD-9-CM: 511.9  11/8/2018 Overview Signed 11/8/2018  4:18 PM by Tal Torres MD  
  bilateral 
  
  
   
 Edema ICD-10-CM: R60.9 ICD-9-CM: 782.3  11/8/2018 Overview Signed 11/8/2018  4:18 PM by Tal Torres MD  
  Bilateral lower legs Lightheadedness ICD-10-CM: G59 ICD-9-CM: 780.4  11/8/2018 Paroxysmal A-fib (HCC) ICD-10-CM: I48.0 ICD-9-CM: 427.31  11/8/2018 Iron deficiency anemia ICD-10-CM: D50.9 ICD-9-CM: 280.9  8/29/2011 Overview Signed 8/29/2011  3:05 PM by Early Rimes Despite oral iron For infed infusion Nutritional deficiency ICD-10-CM: E63.9 ICD-9-CM: 269.9  6/27/2011 Impaired mobility ICD-10-CM: Z74.09 
ICD-9-CM: 799.89  6/27/2011 Debility ICD-10-CM: R53.81 ICD-9-CM: 799.3  6/27/2011 Colon polyps ICD-10-CM: K63.5 ICD-9-CM: 211.3  6/27/2011 Pancreatitis ICD-10-CM: K85.90 ICD-9-CM: 824.9  6/19/2011 Overview Signed 6/19/2011  4:13 PM by Early Rimes 6-19-11 lipase improving possibly from gallstones Gallstone pancreatitis ICD-10-CM: K85.10 ICD-9-CM: 492.4, 574.20  6/19/2011 Follicular thyroid cancer ([facility] ICD-10-CM: T34 ICD-9-CM: 006  6/19/2011 Overview Signed 6/21/2011  7:12 AM by Early Rimes  
  thyroglobulin high > 100 obvious residual thyroid tissue Pericardial effusion ICD-10-CM: I31.3 ICD-9-CM: 423.9  6/17/2011 Acute pancreatitis ICD-10-CM: K85.90 ICD-9-CM: 278.7  6/16/2011 HTN (hypertension) (Chronic) ICD-10-CM: I10 
ICD-9-CM: 401.9  6/16/2011 Acquired hypothyroidism (Chronic) ICD-10-CM: E03.9 ICD-9-CM: 244.9  6/16/2011 Overview Signed 6/19/2011  4:15 PM by Octavio Garrido Post thyroidectomy for thyroid cancer Pulmonary embolism ([facility] (Chronic) ICD-10-CM: I26.99 
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 
  Ileus ([facility] (6/29/2019) Active Problems: 
  HTN (hypertension) (6/16/2011) Acquired hypothyroidism (6/16/2011) Overview: Post thyroidectomy for thyroid cancer Follicular thyroid cancer ([facility] (6/19/2011) Overview: thyroglobulin high > 100 obvious residual thyroid tissue KENISHA (acute kidney injury) ([facility] (5/30/2019) Acute UTI (urinary tract infection) (6/3/2019) Dehydration (6/28/2019) Nutrition Recommend Dobhoff placed in Fluor if she can't or won't take PO 
 
SBO/Ileus-->resolved She responded to bowel rest and no surgery and by 6/3/19 CT showed no residual SBO Poor surgical candidate for expl lap secondary to age 80, frailty, debilitation, and mult med problems Pt and POA refused NGT Would like PET CT given breast mass, possible L4 met, and partial SBO issues. I called radiology and PET CT is not offered on this campus and is only offered as an outpt service Instead I ordered CT abd/pelvis with oral and IV contrast shown above which showed resolution of SBO. Avoid narcotics for next 48hrs to avoid exacerbation of ileus I discussed the finding f ascites which may be drained at later date if needed and discussed possibility of a malignancy ascites with pt's POA Pt not competent to make decisions. I had long discussion today in room with family and POA. Discussed SBO/ileus, breast mass on CT, L5 abnormality on CT, debilitation, cognitive impairment. Pt not alert to understand and not able to give consent.   Discussed risks of surgery vs no surgery in the event that is indicated based on CT today. All questions answered. The pt's POA and family don't want abdominal surgery for her even if indicated but want answers as to the status of her abdomen and whether it is safe to start PO 
CT should shed light on this and provide direction for care. Encephalopathy Defer workup to Hospitalists Coagulopathy INR 2.6--->INR 6.6 this am on 7/3/19 Vit K 5mg IVPB has been ordered Follow INR daily Holding coumadin  
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 I have personally performed a face-to-face diagnostic evaluation and management  service on this patient. I have independently seen the patient. I have independently obtained the above history from the patient/family. I have independently examined the patient with above findings. I have independently reviewed data/labs for this patient and developed the above plan of care (MDM). Signed: Morgan Bashir.  Tevin Carranza MD, FACS

## 2019-07-03 NOTE — PROGRESS NOTES
Pt has become more restless and uncomfortable this shift. Haldol increased per MD orders. Pt is reaching out with moaning sounds. Facial grimacing, but unable to express if is in pain. Has medicated accordingly. Family doesn't seem to think pt is in pain just agitated.

## 2019-07-03 NOTE — PROGRESS NOTES
SPEECH PATHOLOGY NOTE: 
 
Attempted to see patient for clinical swallowing evaluation this AM; however, patient unable to remain alert. Family at bedside. Will re-attempt at later time/date as patient is able to participate and as schedule permits.  
 
Jenna Brittle MS, CCC-SLP

## 2019-07-03 NOTE — PROGRESS NOTES
Spoke with MD Bryan Saha about family's request of higher dose of Haldol, orders received-go ahead and give another dose of 2mg IV Haldol even though a dose was given around 1430. Will continue to monitor care.

## 2019-07-03 NOTE — PROGRESS NOTES
MD notified that patient's UOP for this shift approximate 150-200ml per PCT, bladder scanned patient and showed about 90ml, patient on IVF D5 at 75ml/hr,  no orders received at this time, and instructed to make day shift nurse aware and address in the morning. Will continue to monitor.

## 2019-07-03 NOTE — PROGRESS NOTES
Pt is very restless and agitated, family is asking that this nurse call MD to get higher dose of Haldol. MD paged awaiting on return phone call.

## 2019-07-04 PROBLEM — G93.40 ENCEPHALOPATHY: Status: ACTIVE | Noted: 2019-01-01

## 2019-07-04 PROBLEM — M84.48XA PATHOLOGIC FRACTURE OF LUMBAR VERTEBRA: Status: ACTIVE | Noted: 2019-01-01

## 2019-07-04 NOTE — PROGRESS NOTES
HR improved down to the 100-105 after receiving bolus of fluid and IV lopressor. I suspect she is slightly dehydrated. Not an easy situation as she also has third space edema and a pericardial effusion. For now will increase her IV fluids to 75 ml/hr and will order IV albumin bid for 2 days. May need to receive an extra-bolus of fluids during the day. Morning team to monitor her clinical progress.

## 2019-07-04 NOTE — PROGRESS NOTES
Date 07/03/19 0700 - 07/04/19 5028 07/04/19 0700 - 07/05/19 0037 Shift [id number] 1900-0659 24 Hour Total [id number] 4097-8184 24 Hour Total  
INTAKE  
I.V.(mL/kg/hr)  1067 1067 I.V.  1067 1067 Shift Total(mL/kg)  [id number]) [id number]) OUTPUT Urine(mL/kg/hr) 125(0.2) 300 425 Urine Voided 125 300 425 Urine Occurrence(s) 1 x  1 x Stool Stool Occurrence(s) 0 x  0 x Shift Total(mL/kg) 125(2.2) 300(5.2) 425(7.3) NET -191 530 306 Weight (kg) 56.6 58.2 58.2 58.2 58.2 58.2 Hourly rounds done. Pt medicated for pain w/ grimacing/restlessness. Cardiac monitor running Afib, sustaining 140-160. HR now 102 s/p 250 mL bolus & albumin. None to minimal oral input. Multiple family at bedside. All needs met at this time.

## 2019-07-04 NOTE — PROGRESS NOTES
SPEECH PATHOLOGY NOTE: 
 
Attempted to see patient for clinical swallowing evaluation this AM. Family at bedside Patient not alert for PO trials. Will re-attempt at later time/date as patient is able to participate and as schedule permits.  
 
Tavon Wolff MS, CCC-SLP

## 2019-07-04 NOTE — PROGRESS NOTES
H&P/Consult Note/Progress Note/Office Note:  
Lubna Mg  MRN: [id number]  RDV:0/36/6175  Age:86 y.o. 
 
HPI: Lubna Mg is a 80 y.o. female who has multiple medical problems including debilitation, encephalopathy, rapid afib, CHF with EF 30-35%, pulm HTN,  
s/p IVC filter for DVT/PE, metastatic thyroid carcinoma with pathologic fracture of L4, dysphagia, breast mass, coagulopathy secondary to coumadin use for afib, etc.... who was admitted from the ER by the Hospitalists on 6/28/19 for her 3rd admission in approx 2 months with N/V/abd pain/failure to thrive as outpt. She is s/p total thyroidectomy and right superior parathyroidectomy by Dr Jeffery Peabody on [id number]/2//[id number] for a 7cm follicular carcinoma Family describes a h/o lumbar bone biopsy wit proven thyroid carcinoma metastasis at [facility] for which XRT was recommended about 10 yrs ago but pt refused Rx She is also s/p appy, rhonda, right hip surgery, and back surgery She was admitted 5/14/19-6/4/19 with SBO which resolved without surgery and without NT which could not be placed. She was on coumadin for afib with INR>4 at presentation who came to the ER  
with 6 day h/o diarrhea for which she took several anti-diarrheal meds (pepto-bismol and lomotil among them) This was followed by diffuse, moderate, progressive, non-radiating abd pain and cramping. Nothing made symptoms better or worse. She was seen in ER day before admission and given IVF and released. She has associated N/V. No fever or leukocytosis on admission She was admitted  6/13/19- 6/19/19  for hypotension, diarrhea, acute and chronic systolic CHF and KENISHA, UTI. Cardiology/ GI were consulted at the time. Hypotension resolved with med changed (amiodarone dc'd). Diarrhea resolved. Cardiology saw pt for perciardial effusion and said to continue lasix.   
She was told to follow with Dr Zeenat De La Paz for breast mass and neurosurgery for L4 lesion.  
  
 Pt was readmitted again on 6/28/19 with N/V and UTI Rx'd with Rocephin and IVF. CT showed bowel distension and moderate stool through colon. Also concern for increasing pericardial effusion on CT scan, still reporting as moderate on repeat echo.  
  
 
 
 
5/29/19 CT abd/pelvis with oral and IV contrast 
There is no pericardial effusion. Heart is enlarged. There is coronary artery calcification. There is dependent subsegmental atelectasis. 
  
There is a 12 mm nodule within the lateral right breast (series 2, image 5). There is subtle micronodular contour of the liver. There is periportal edema. The gallbladder is surgically absent. Pancreas is not well visualized. The 
spleen is normal in contour. Stable small right adrenal nodule (since 2011).   
There are tiny nonobstructing renal calculi. No hydronephrosis left kidney is 
small in size. There is prominent atherosclerotic calcification of the abdominal aorta. 
  
The stomach is normal in contour. 
  
Pelvic findings: There are multiple fluid-filled dilated small bowel loops with transition point 
appearing to be in the lower abdomen. The colon appears normal in caliber. Urinary bladder is normal in contour. There is a pessary. 
  
There is no significant free fluid. No free air. Bones are osteopenic. There are 
degenerative changes of the spine. There is severe compression deformity of L4 
with associated high density structure. Right total hip arthroplasty is partially seen. 
  
  
IMPRESSION: 
  
1. Small bowel obstruction. Multiple fluid-filled dilated small bowel loops with 
transition point in the lower abdomen. Etiology is likely adhesion. Closed-loop 
obstruction is in the differential. 
  
2. No evidence of colitis or diverticulitis, although the colon is not well 
visualized. . No hydronephrosis. 
  
3.  Severe compression deformity of L4 with associated high density material. 
 This may be related to prior intervention such as kyphoplasty. Neoplastic 
involvement is considered less likely. Osteopenia. 
  
4. A 12 mm nodule within the lateral right breast. Correlation with mammogram is 
recommended. 6/3/19 CT abd/pelvis without contrast 
There is moderate right pleural effusion. There is small left pleural effusion. Dependent densities in the lower lobes, likely due to aspiration or atelectasis. 
  
The heart is enlarged. There is coronary artery calcification. There is trace 
pericardial effusion. 
  
Abdomen findings: 
  
Absence of IV contrast limits sensitivity. 
  
There is subtle micronodular contour of the liver. The spleen is normal in 
contour. Gallbladder is surgically absent. Pancreas and adrenal glands are not 
well visualized. 
  
There are small nonobstructing renal calculi. There is no hydronephrosis. There 
is severe atherosclerotic calcification of the abdominal aorta. The stomach is 
under distended. Small bowel loops are normal in caliber. There is no small 
bowel obstruction. 
  
Pelvic findings: 
  
There is a pessary. Uterus is not well evaluated. Urinary bladder is 
decompressed by Ornelas catheter. There is sigmoid diverticulosis without 
diverticulitis. Colon is normal in caliber. 
  
There is small free fluid. There is no free air. Bones are diffusely osteopenic. There are degenerative changes of the spine. There is lytic lesion involving the 
L4. There is associated compression fracture. 
  
  
IMPRESSION: 
1. Previously noted dilated small bowel loops have resolved. No evidence of bowel obstruction on today's study. 
  
2. Moderate right pleural effusion and small left pleural effusion. Mild dependent densities in the lower lobes, likely due to atelectasis or aspiration. 
  
3. Sigmoid diverticulosis. No evidence of diverticulitis. 
  
4. Lytic lesion of L4 and associated compression fracture 6/13/19 CT abd/pelvis without IV contrast 
 Hx: Diarrhea. Recently admitted for SBO  
  
CT ABD:   
Limited evaluation of the lung bases and base of the mediastinum demonstrates a 
new moderate to large pericardial effusion. Irregular basilar densities are seen 
likely representing mild to moderate scarring versus atelectasis. 
  
Assessment of the solid organs is limited by the lack of administered 
intravenous contrast. A subtle abnormality could be missed. The Liver is 
homogeneous in attenuation. The spleen is homogeneous in attenuation. No 
contour deforming mass lesions are seen of the pancreas or adrenal glands. The 
gallbladder has been removed. Multiple bilateral renal calcifications are seen 
felt to represent renal stones. No hydronephrosis is seen of either kidney to 
suggest obstruction.   
  
No abnormal small bowel dilation is seen with small bowel loops measuring up to 
2.7 cm in diameter. In addition, no abnormal abrupt transition point is seen of 
small bowel. Gas and fluid are seen throughout the colon. Fluid throughout the 
colon can be seen with diarrhea. No clear abnormal inflammatory wall thickening 
of small bowel or colon is seen. The mesentery appears diffusely edematous. No 
free air is seen. No abnormal fluid collection is identified. .  No adenopathy is 
seen. The abdominal aorta moderate to advanced atherosclerotic calcification is 
seen of the abdominal aorta. The patient appears to be status post aortic 
bifemoral bypass graft. Once again, there is the suggestion of a large lytic 
lesion destroying much of the L4 vertebral body with a likely secondary mild to 
moderate pathologic compression fracture. The appearance is not felt to be 
significantly changed when compared to the prior study. 
  
CT PELVIS: 
A right total hip replacement is present. This produces scatter artifact which 
obscures portions of the right pelvis. No abnormal pelvic fluid collections or inflammatory changes are present. No pelvic adenopathy is seen. The urinary 
bladder is unremarkable. 
  
IMPRESSION:   
1. No abnormal bowel dilation or transition point to suggest bowel obstruction. No clearly acute bowel changes are otherwise seen. It is only noted that the 
colon is fluid-filled consistent with the history of diarrhea. The possibility 
of a mild or early colitis cannot be excluded. 
  
2. New moderate to large pericardial effusion. 
  
3. Large lytic lesion and pathologic fracture of the L4 vertebral body as 
previously described which is not felt to be significantly changed 7/2/19 CT abd/pelvis with oral and IV contrast 
Hx: sbo vs ileus; evaluate; also with suspected L4 met. 
  
CT ABDOMEN: There is global cardiac enlargement. There is excessive motion 
artifact throughout the study. There are partially imaged small bilateral 
pleural effusions and bilateral lower lobe atelectasis/infiltrate. The liver, 
spleen, pancreas and adrenal glands are unremarkable. Few bilateral 
nonobstructing renal calculi present. There is a small to moderate amount of 
abdominal ascites. Extensive atherosclerotic changes are present involving the 
abdominal aorta and mesenteric vessels. There are no dilated bowel loops. Contrast material is present within the colon. There are advanced degenerative 
changes of the lumbar spine. Extensive bony destruction is noted involving the 
L4 vertebra with areas of enhancement, also extending within the extraosseous 
soft tissues on the left and into the spinal canal causing severe stenosis. 
  
CT PELVIS: There is extensive streak artifact from right hip arthroplasty. A 
pessary is present. There is a small amount of ascites. No definite adenopathy 
is present. There is diffuse subcutaneous edema. 
  
IMPRESSION: 
1. Mildly compromised examination due to motion. 2. Destructive process with associated pathologic fracture of L4 with extraosseous extension as described. Differential diagnosis would include 
metastatic disease or multiple myeloma/plasmacytoma. 3. Ascites. 4. Small pleural effusions. 5. There is no evidence of bowel obstruction. 
  
 
 
 
 
Additonal history 6/29/19 Awake in bed. No complaints at this time. Denies abd pain. LBM Thursday. -flatus. Reports +belching today. Family at bedside. NAD.  
6/30/19  Resting in bed. RN unable to place NG tube yesterday. No complaints at this time. Denies abd pain. LBM Thursday. -flatus. Family at bedside. NAD. 7/1/19  Having NGT placed this am when I rounded; abd soft and without tenderness; INR 2.6; coumadin held in case surgery needed 7/2/19  NGT could not be placed. Pt refuses to allow further ngt attempts at this time; CT pending 7/3/19 family now reports she had biopsy proven metastatic thyroid carcinoma of lumbar spine approx 10 yrs ago at [facility] and refused spinal XRT at that time, she refused to participate with swallow eval yesterday, SLP to try again today 7/4/19 encephalopathy no better; see head CT done 7/3/19; MRI ordered; rapid Afib during the night Past Medical History:  
Diagnosis Date  Acquired hypothyroidism  Acute pancreatitis  Arthritis  CAD (coronary artery disease) 4/29/11 RCA occluded and fills with collaterals, LAD and Cx nonobstructive CAD, EF 50-55%  Cancer ([address] THYROID - treated with surgery and radiation  CHF (congestive heart failure) ([address] 11/8/2018  Chronic pain   
 right hip  Coagulopathy ([address] 6/16/2011  Colon polyps  Encephalopathy 7/4/2019  Follicular thyroid cancer ([address]  Gallstone pancreatitis  Glaucoma  Gout   
 hx of gout to right great toe - no problems  Hypertension   
 controlled with medication  Other ill-defined conditions(799.89)  Pancreatitis  Paroxysmal A-fib ([address] 11/8/2018  PE (pulmonary embolism) 2008  Pulmonary HTN (HCC)   
 moderate  Thromboembolus ([address] 1995  
 right leg - Hemashield graft/filter placed in abdomen  Thyroid disease   
 pt had total thyroidectomy - takes levoxyl daily  UTI (urinary tract infection) 6/3/2019 Past Surgical History:  
Procedure Laterality Date  HX APPENDECTOMY  ? 1990  
 HX CHOLECYSTECTOMY  6/2011 Dr. Mervin Palomo - [facility] [address] [place]  
 hemashield graft/filter placed due to blood clot right leg  HX THYROIDECTOMY  2010  
 partial  
 HX TONSILLECTOMY [address] [facility] ARTHROPLASTY  2008 & 2011  
 right hip Current Facility-Administered Medications Medication Dose Route Frequency  albumin human 25% (BUMINATE) solution 12.5 g  12.5 g IntraVENous BID  cefTRIAXone (ROCEPHIN) 1 g in 0.9% sodium chloride (MBP/ADV) 50 mL  1 g IntraVENous Q24H  
 haloperidol lactate (HALDOL) injection 2 mg  2 mg IntraVENous Q4H PRN  
 ziprasidone (GEODON) 10 mg in sterile water (preservative free) 0.5 mL injection  10 mg IntraMUSCular Q12H PRN  
 dextrose 5 % - 0.45% NaCl infusion  75 mL/hr IntraVENous CONTINUOUS  
 HYDROmorphone (PF) (DILAUDID) injection 0.5 mg  0.5 mg IntraVENous Q6H PRN  
 naloxone (NARCAN) injection 0.4 mg  0.4 mg IntraVENous PRN  
 metoprolol (LOPRESSOR) injection 2.5 mg  2.5 mg IntraVENous Q6H  
 docusate sodium (COLACE) capsule 100 mg  100 mg Oral DAILY  sodium chloride (NS) flush 5-40 mL  5-40 mL IntraVENous Q8H  
 sodium chloride (NS) flush 5-40 mL  5-40 mL IntraVENous PRN  
 acetaminophen (TYLENOL) suppository 650 mg  650 mg Rectal Q4H PRN  
 diphenhydrAMINE (BENADRYL) capsule 25 mg  25 mg Oral Q4H PRN  
 nicotine (NICODERM CQ) 21 mg/24 hr patch 1 Patch  1 Patch TransDERmal DAILY PRN  
 ondansetron (ZOFRAN) injection 4 mg  4 mg IntraVENous Q4H PRN  
 cloNIDine HCl (CATAPRES) tablet 0.2 mg  0.2 mg Oral BID PRN  
 hydrALAZINE (APRESOLINE) 20 mg/mL injection 10 mg  10 mg IntraVENous Q6H PRN Ativan [lorazepam] Social History Socioeconomic History  Marital status:  Spouse name: Not on file  Number of children: Not on file  Years of education: Not on file  Highest education level: Not on file Tobacco Use  Smoking status: Former Smoker  Smokeless tobacco: Never Used  Tobacco comment: quit smoking 1995 Substance and Sexual Activity  Alcohol use: No  
 Drug use: No  
 
Social History Tobacco Use Smoking Status Former Smoker Smokeless Tobacco Never Used Tobacco Comment  
 quit smoking 1995 Family History Problem Relation Age of Onset  Stroke Mother  Hypertension Mother  Cancer Father   
     prostate  Diabetes Maternal Uncle ROS: The patient has no difficulty with chest pain or shortness of breath. No fever or chills. Comprehensive review of systems was otherwise unremarkable except as noted above. Physical Exam:  
Visit Vitals /75 (BP 1 Location: Right arm, BP Patient Position: At rest) Pulse (!) 101 Temp 97.9 °F (36.6 °C) Resp 28 Ht 5' (1.524 m) Wt 128 lb 6.4 oz (58.2 kg) SpO2 98% BMI 25.08 kg/m² Vitals:  
 07/04/19 0053 07/04/19 6069 07/04/19 1905 07/04/19 0501 BP:    118/75 Pulse: (!) 160 (!) 130 (!) 102 (!) 101 Resp:    28 Temp:    97.9 °F (36.6 °C) SpO2:    98% Weight:    128 lb 6.4 oz (58.2 kg) Height:      
 
No intake/output data recorded. 05/30 1901 - 06/01 0700 In: 1757 [I.V.:4476] Out: 200 [Urine:200] Constitutional: lethargic, mumbles and speaks; no acute distress; appears stated age Eyes:Sclera are clear. EOMs intact ENMT: no external lesions gross hearing normal; no obvious neck masses, no ear or lip lesions, nares normal 
CV: RRR. Normal perfusion Resp: No JVD. Breathing is  non-labored; no audible wheezing. Breast exam deferred GI: soft; no mass,  No guarding or rebound Musculoskeletal: deconditioned. No embolic signs or cyanosis. Neuro:  Not oriented; moves all 4; no focal deficits Psychiatric: no affect, +memory impairment Recent vitals (if inpt): 
Patient Vitals for the past 24 hrs: 
 BP Temp Pulse Resp SpO2 Weight 06/01/19 0515 124/87 98 °F (36.7 °C) 94 18 99 % 114 lb 4.8 oz (51.8 kg) 06/01/19 0238 117/76 98.3 °F (36.8 °C) 100 18 99 %   
05/31/19 2123 118/84 98 °F (36.7 °C) 98 18 100 %   
05/31/19 1618 127/68 98.5 °F (36.9 °C) (!) 102 16 100 %   
05/31/19 1304 119/72 97.9 °F (36.6 °C) (!) 101 16 98 %   
05/31/19 0818 129/76 97.2 °F (36.2 °C) 94 16 94 %  Labs: 
Recent Labs  
  07/03/19 
0544 WBC 7.7 HGB 10.3*    
K 4.5  
* CO2 20* BUN 34* CREA 1.16* * PTP 56.7* INR 6.6* TBILI 0.3 SGOT 34 ALT 22 AP 86 Lab Results Component Value Date/Time WBC 7.7 07/03/2019 05:44 AM  
 HGB 10.3 (L) 07/03/2019 05:44 AM  
 PLATELET 451 02/49/2982 05:44 AM  
 Sodium 145 07/03/2019 05:44 AM  
 Potassium 4.5 07/03/2019 05:44 AM  
 Chloride 115 (H) 07/03/2019 05:44 AM  
 CO2 20 (L) 07/03/2019 05:44 AM  
 BUN 34 (H) 07/03/2019 05:44 AM  
 Creatinine 1.16 (H) 07/03/2019 05:44 AM  
 Glucose 158 (H) 07/03/2019 05:44 AM  
 INR 6.6 (HH) 07/03/2019 05:44 AM  
 aPTT 62.1 (H) 06/04/2019 04:58 AM  
 Bilirubin, total 0.3 07/03/2019 05:44 AM  
 Bilirubin, direct 0.1 06/25/2011 06:00 AM  
 AST (SGOT) 34 07/03/2019 05:44 AM  
 ALT (SGPT) 22 07/03/2019 05:44 AM  
 Alk. phosphatase 86 07/03/2019 05:44 AM  
 Amylase 64 06/17/2011 03:53 AM  
 Lipase 109 06/28/2019 08:45 AM  
 Troponin-I, Qt. <0.02 (L) 06/28/2019 08:45 AM  
 
 
CT Results  (Last 48 hours) 07/03/19 1056  CT HEAD WO CONT Final result Impression:  IMPRESSION:   
1. Stable age-related senescent changes and chronic microvascular disease with  
remote all right occipital ischemic insult. 2. Chronic right maxillary sinusitis. CPT code 53233 Narrative:  Title:  CT scan of the Head.   
   
The patient is a 80years year old Female with symptoms of declining mental  
status for a week. History of thyroid CA. Technique:  Axial CT images through the brain were obtained. All CT scans at this facility are performed using dose reduction/dose modulation  
techniques, as appropriate the performed exam, including the following: Automated Exposure Control; Adjustment of the mA and/or kV according to patient  
size (this includes techniques or standardized protocols for targeted exams  
where dose is matched to indication/reason for exam); and Use of Iterative Reconstruction Technique. Radiation Exposure Indices:  
Reference Air Kerma (Kate Bran) = 994 mGy-cm Comparison:  Head CT 11/9/2018. Findings:    
   
Cerebrum: Age-related senescent changes are seen with sulcal and ventricular  
prominence. . Mild chronic confluent periventricular white matter disease is seen  
with lacunae throughout the basal ganglia. Chronic cortical and subcortical  
ischemia involving the right occipital lobe There is incidental physiologic  
calcification involving the globus pallidi bilaterally. No evidence of  
intracranial hemorrhage, mass, or other space-occupying lesion is seen. There  
are no abnormal extra-axial fluid collections. Remona Mellow Cerebellum: Mild cerebellar involutional changes. CSF spaces: The ventricular system is within normal limits. The basilar cisterns  
are unremarkable. Brainstem: Chronic central pontine micro-vascular disease Extracranial tissues: Visualized orbits and extracranial soft tissues are  
unremarkable. Paranasal sinuses/Mastoids: Chronic right maxillary sinusitis with opacification  
and surrounding hyperostosis. Mild posterior left ethmoid mucosal inflammatory  
disease. Calvarium: No acute osseous abnormality. 07/02/19 1223  CT ABD PELV W CONT Final result Impression:  IMPRESSION:  
1. Mildly compromised examination due to motion. 2. Destructive process with associated pathologic fracture of L4 with  
extraosseous extension as described. Differential diagnosis would include  
metastatic disease or multiple myeloma/plasmacytoma. 3. Ascites. 4. Small pleural effusions. 5. There is no evidence of bowel obstruction. Narrative:  CT abdomen and pelvis with contrast: 07/02/2019 History: sbo vs ileus; evaluate; also with suspected L4 met. Technique: Helically acquired images were obtained from the domes of the  
diaphragms to the ischial tuberosities reconstructed at 5mm intervals after the  
uneventful administration of oral contrast for bowel opacification and 100 cc's  
of intravenous Isovue-370 to evaluate the solid abdominal viscera and vascular  
structures. Coronal reformatted images were submitted. Radiation dose reduction techniques were used for this study:  Our CT scanners  
use one or all of the following: Automated exposure control, adjustment of the  
mA and/or kVp according to patient's size, iterative reconstruction. Comparison: Noncontrast study 06/28/2019 CT ABDOMEN: There is global cardiac enlargement. There is excessive motion  
artifact throughout the study. There are partially imaged small bilateral  
pleural effusions and bilateral lower lobe atelectasis/infiltrate. The liver,  
spleen, pancreas and adrenal glands are unremarkable. Few bilateral  
nonobstructing renal calculi present. There is a small to moderate amount of  
abdominal ascites. Extensive atherosclerotic changes are present involving the  
abdominal aorta and mesenteric vessels. There are no dilated bowel loops. Contrast material is present within the colon. There are advanced degenerative  
changes of the lumbar spine. Extensive bony destruction is noted involving the  
L4 vertebra with areas of enhancement, also extending within the extraosseous soft tissues on the left and into the spinal canal causing severe stenosis. CT PELVIS: There is extensive streak artifact from right hip arthroplasty. A  
pessary is present. There is a small amount of ascites. No definite adenopathy  
is present. There is diffuse subcutaneous edema. chest X-ray I reviewed recent labs, recent radiologic studies, and pertinent records including other doctor notes if needed. I independently reviewed radiology images for studies I described above or studies I have ordered. Admission date (for inpatients): 6/28/2019 * No surgery found *  * No surgery found * ASSESSMENT/PLAN: 
Problem List  Date Reviewed: 6/19/2019 Codes Class Noted Encephalopathy ICD-10-CM: G93.40 ICD-9-CM: 348.30  7/4/2019 Ileus ([facility] ICD-10-CM: K56.7 ICD-9-CM: 560.1  6/29/2019 Enteritis ICD-10-CM: K52.9 ICD-9-CM: 558.9  6/28/2019 Dehydration ICD-10-CM: E86.0 ICD-9-CM: 276.51  6/28/2019 Abnormal CT scan, lumbar spine ICD-10-CM: R93.7 ICD-9-CM: 793.7  6/19/2019 Pancytopenia ([facility] ICD-10-CM: J51.264 ICD-9-CM: 284.19  6/16/2019 Hypotension ICD-10-CM: I95.9 ICD-9-CM: 458.9  6/15/2019 Moderate protein malnutrition (HCC) ICD-10-CM: E44.0 ICD-9-CM: 263.0  6/15/2019 Failure to thrive (0-17) ICD-10-CM: R62.51 
ICD-9-CM: 783.41  6/15/2019 Hyperthyroidism ICD-10-CM: E05.90 ICD-9-CM: 242.90  6/15/2019 Breast mass ICD-10-CM: N63.0 ICD-9-CM: 611.72  6/14/2019 PAF (paroxysmal atrial fibrillation) (HCC) ICD-10-CM: I48.0 ICD-9-CM: 427.31  6/14/2019 Colitis ICD-10-CM: K52.9 ICD-9-CM: 558.9  6/14/2019 Diarrhea ICD-10-CM: R19.7 ICD-9-CM: 787.91  6/13/2019 Acute UTI (urinary tract infection) ICD-10-CM: N39.0 ICD-9-CM: 599.0  6/3/2019 KENISHA (acute kidney injury) ([facility] ICD-10-CM: N17.9 ICD-9-CM: 584.9  5/30/2019 SBO (small bowel obstruction) ([facility] ICD-10-CM: D04.248 ICD-9-CM: 560.9  5/29/2019 * (Principal) Atrial fibrillation with RVR ([facility] ICD-10-CM: I48.91 
ICD-9-CM: 427.31  5/29/2019 Chronic systolic dysfunction of left ventricle (Chronic) ICD-10-CM: I51.9 ICD-9-CM: 429.9  5/29/2019 Thyroid cancer [facility]) ICD-10-CM: R90 ICD-9-CM: 738  5/29/2019 Pathologic fracture of lumbar vertebra ICD-10-CM: M84.48XA ICD-9-CM: 733.13  5/29/2019 Thromboembolic disorder ([facility] VYB-94-MX: I74.9 ICD-9-CM: 444.9  5/29/2019 Presence of IVC filter (Chronic) ICD-10-CM: T97.751 ICD-9-CM: V45.89  5/29/2019 Breast nodule ICD-10-CM: N63.0 ICD-9-CM: 793.89  5/29/2019 Weight loss ICD-10-CM: R63.4 ICD-9-CM: 783.21  5/29/2019 Acute on chronic combined systolic and diastolic ACC/AHA stage C congestive heart failure (HCC) ICD-10-CM: I50.43 ICD-9-CM: 428.43, 428.0  11/10/2018 CHF (congestive heart failure) (HCC) ICD-10-CM: I50.9 ICD-9-CM: 428.0  11/8/2018 Supratherapeutic INR ICD-10-CM: R79.1 ICD-9-CM: 790.92  11/8/2018 Pleural effusion ICD-10-CM: J90 ICD-9-CM: 511.9  11/8/2018 Overview Signed 11/8/2018  4:18 PM by Heidy Varner MD  
  bilateral 
  
  
   
 Edema ICD-10-CM: R60.9 ICD-9-CM: 782.3  11/8/2018 Overview Signed 11/8/2018  4:18 PM by Heidy Varner MD  
  Bilateral lower legs Lightheadedness ICD-10-CM: K35 ICD-9-CM: 780.4  11/8/2018 Paroxysmal A-fib (HCC) ICD-10-CM: I48.0 ICD-9-CM: 427.31  11/8/2018 Iron deficiency anemia ICD-10-CM: D50.9 ICD-9-CM: 280.9  8/29/2011 Overview Signed 8/29/2011  3:05 PM by Lowry Bence Despite oral iron For infed infusion Nutritional deficiency ICD-10-CM: E63.9 ICD-9-CM: 269.9  6/27/2011 Impaired mobility ICD-10-CM: Z74.09 
ICD-9-CM: 799.89  6/27/2011 Debility ICD-10-CM: R53.81 ICD-9-CM: 799.3  6/27/2011 Colon polyps ICD-10-CM: K63.5 ICD-9-CM: 211.3  6/27/2011 Pancreatitis ICD-10-CM: K85.90 ICD-9-CM: 800.6  6/19/2011 Overview Signed 6/19/2011  4:13 PM by Michele Lane 6-19-11 lipase improving possibly from gallstones Gallstone pancreatitis ICD-10-CM: K85.10 ICD-9-CM: 076.2, 574.20  6/19/2011 Follicular thyroid cancer ([facility] ICD-10-CM: M42 ICD-9-CM: 848  6/19/2011 Overview Signed 6/21/2011  7:12 AM by Michele Lane  
  thyroglobulin high > 100 obvious residual thyroid tissue Pericardial effusion ICD-10-CM: I31.3 ICD-9-CM: 423.9  6/17/2011 Acute pancreatitis ICD-10-CM: K85.90 ICD-9-CM: 706.6  6/16/2011 HTN (hypertension) (Chronic) ICD-10-CM: I10 
ICD-9-CM: 401.9  6/16/2011 Acquired hypothyroidism (Chronic) ICD-10-CM: E03.9 ICD-9-CM: 244.9  6/16/2011 Overview Signed 6/19/2011  4:15 PM by Michele Lane Post thyroidectomy for thyroid cancer Pulmonary embolism ([facility] (Chronic) ICD-10-CM: I26.99 
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 
  Atrial fibrillation with RVR ([facility] (5/29/2019) Active Problems: 
  HTN (hypertension) (6/16/2011) Acquired hypothyroidism (6/16/2011) Overview: Post thyroidectomy for thyroid cancer Follicular thyroid cancer ([facility] (6/19/2011) Overview: thyroglobulin high > 100 obvious residual thyroid tissue Pathologic fracture of lumbar vertebra (5/29/2019) KENISHA (acute kidney injury) ([address] (5/30/2019) Acute UTI (urinary tract infection) (6/3/2019) Breast mass (6/14/2019) Dehydration (6/28/2019) Ileus ([address] (6/29/2019) Encephalopathy (7/4/2019) Nutrition Recommend Dobhoff placed in Fluoro since she isn't taking PO 
 But pt has refused ng tubes multiple times in past. 
She isn't interactive at this point and family doesn't want her to have ngt (Dobhoff) at this time Multiple attempts in past by multiple staff (including doctor) has not succeeded in placing NGT I am confident that even if a dobhoff could be placed she would soon pull it out. CT also showed sinusits Start PPN and ask nutrition if her nutritional needs can be covered with PPN or if she will need PICC/TPN 
 
 
 
SBO/Ileus-->resolved She responded to bowel rest and no surgery and by 7/2/19 her CT showed no residual SBO Poor surgical candidate for expl lap secondary to age 80, frailty, debilitation, and mult med problems Pt and POA refused NGT mult occasions Would like PET CT given breast mass, suspected L4 met, and partial SBO issues. I called radiology and PET CT is not offered on this campus and is only offered as an outpt service Instead I ordered CT abd/pelvis with oral and IV contrast shown above which showed resolution of SBO. Avoid narcotics for next 48hrs to avoid exacerbation of ileus and help with MS changes I discussed the finding of ascites which may be drained at later date if needed and discussed possibility of a malignancy ascites with pt's POA Pt not competent to make decisions. I have had many discussions with family and POA. Discussed SBO/ileus, breast mass on CT, L5 abnormality on CT, dysphagia, debilitation, cognitive impairment. Pt not alert to understand and not able to give consent. All questions answered. They do not want Hospice care at this time I told them that they could see palliative care anytime when ready Encephalopathy Defer workup to Hospitalists Head CT on 7/3/19 with senescent changes, microvasc disease, and and old infarct as described Coagulopathy INR 2.6--->INR 6.6 on 7/3/19-->Vit K 5mg Iv given-->repeat pending Follow INR and Hgb daily No active bleeding Holding coumadin Holding SQ heparin also Right Breast mass I have asked to her to see me in the office for exam and imaging Outpt imaging and follow-up planned Inpt mammography is not offered at [facility] She couldn't tolerate dx mammography at this time anyway in her current state I will write instructions in dc instructions section of connect care L4 abnormality on CT - Pathologic fracture related to thyroid carcinoma Defer to ortho who is following Family related info on 7/2/19 regarding a spinal met by biopsy related to her thyroid carcinoma at [address] from approx 10 yrs ago and for which she refused XRT. I have personally performed a face-to-face diagnostic evaluation and management  service on this patient. I have independently seen the patient. I have independently obtained the above history from the patient/family. I have independently examined the patient with above findings. I have independently reviewed data/labs for this patient and developed the above plan of care (MDM). Signed: Victoriano Hill.  Kong Boles MD, FACS

## 2019-07-04 NOTE — PROGRESS NOTES
Evaluated patient at bedside for hypoxia. On monitor patient O2 sat is 95% Physical exam lungs are clear and patient is breathing comfortable I noticed O2 sat drop  when patient moves her hand. Informed family members at bedside drop in O2 is likely related to sensor not picking when she moves her hand. Lab was called again for blood drawing.

## 2019-07-04 NOTE — PROGRESS NOTES
Hospitalist Progress Note 2019 Admit Date: 2019  8:37 AM  
NAME: Isaiah Castellanos :  1933 DOS:              19 MRN:  [id number] Attending: Dilan Gonzales MD 
PCP:  Alysia Gaines MD 
Treatment Team: Attending Provider: Iveth Templeton MD; Consulting Provider: Nicci Leggett MD; Surgeon: Nicci Leggett MD; Care Manager: Lyly George RN; Utilization Review: Venessa Sommer RN; Consulting Provider: Christine Tenorio MD; Physical Therapist: Timbo Layne DPT 
 
DNR SUBJECTIVE: As previously documented: 
 
80 AAF, with a pmhx of CAD, moderate pericardial effusion, hypothyroidism, prior follicular cancer s/p ESCALERA, HTN, chronic systolic CHF, afib on coumadin, hx of DVT's and PE's with ivc filter in place,  l4 lesion and right breast 12mm nodule. This is her third admission, both previously lengthly, in the past two months.  
  
Patient was admitted   -  for abdominal pain and found to have SBO. Incidental findings of right breast mass and l4 lesion during imaging then. Surgery was consulted. SBO treated conservatively and resolved.  
  
She was admitted  -  for hypotension, diarrhea, ac/chronic systolic CHF and KENISHA, UTI. Cardiology/ GI were consulted at the time. Hypotension resolved with med changed (amiodarone dc'd). Diarrhea resolved. Cardiology saw pt for perciardial effusion and said to continue lasix. She was told to follow with Dr Gareth Hodges for breast mass and neurosurgery for L4 lesion. Per family, she has not had biopsy of these nor evaluations for these as of yet. 
  
Pt returns  with report of nausea, vomiting and a  UA questionable for UTI. Started on Rocephin and IVF. CT imaging shows bowel distension and moderate stool through colon. Also concern for increasing pericardial effusion on CT scan, still reporting as moderate on repeat echo. She had refused NGT. Patient had repeat CT AP with no SBO identified.  Ortho consulted for L4 pathological fracture. Daughter reported patient had a biopsy done in the past at [facility], awaiting for medical records. Daughter also reported patient refused radiotherapy of the L4 lesion. 07/04/19    Natasha Bhagat is frail looking, answered few simple questions. 10+ ROS reviewed and negative except for positive in HPI. Allergies Allergen Reactions  Ativan [Lorazepam] Other (comments) Has opposite reaction Current Facility-Administered Medications Medication Dose Route Frequency  albumin human 25% (BUMINATE) solution 12.5 g  12.5 g IntraVENous BID  TPN ADULT - dextrose 10% amino acid 4.25%   IntraVENous CONTINUOUS  
 cefTRIAXone (ROCEPHIN) 1 g in 0.9% sodium chloride (MBP/ADV) 50 mL  1 g IntraVENous Q24H  
 haloperidol lactate (HALDOL) injection 2 mg  2 mg IntraVENous Q4H PRN  
 ziprasidone (GEODON) 10 mg in sterile water (preservative free) 0.5 mL injection  10 mg IntraMUSCular Q12H PRN  
 dextrose 5 % - 0.45% NaCl infusion  75 mL/hr IntraVENous CONTINUOUS  
 HYDROmorphone (PF) (DILAUDID) injection 0.5 mg  0.5 mg IntraVENous Q6H PRN  
 naloxone (NARCAN) injection 0.4 mg  0.4 mg IntraVENous PRN  
 metoprolol (LOPRESSOR) injection 2.5 mg  2.5 mg IntraVENous Q6H  
 docusate sodium (COLACE) capsule 100 mg  100 mg Oral DAILY  sodium chloride (NS) flush 5-40 mL  5-40 mL IntraVENous Q8H  
 sodium chloride (NS) flush 5-40 mL  5-40 mL IntraVENous PRN  
 acetaminophen (TYLENOL) suppository 650 mg  650 mg Rectal Q4H PRN  
 diphenhydrAMINE (BENADRYL) capsule 25 mg  25 mg Oral Q4H PRN  
 nicotine (NICODERM CQ) 21 mg/24 hr patch 1 Patch  1 Patch TransDERmal DAILY PRN  
 ondansetron (ZOFRAN) injection 4 mg  4 mg IntraVENous Q4H PRN  
 cloNIDine HCl (CATAPRES) tablet 0.2 mg  0.2 mg Oral BID PRN  
 hydrALAZINE (APRESOLINE) 20 mg/mL injection 10 mg  10 mg IntraVENous Q6H PRN Immunization History Administered Date(s) Administered  TB Skin Test (PPD) Intradermal 2019, 2019, 2019  Tuberculin 2008 Objective:  
 
Patient Vitals for the past 24 hrs: 
 Temp Pulse Resp BP SpO2  
19 0805 96.4 °F (35.8 °C) (!) 127 24 120/79 94 % 19 0501 97.9 °F (36.6 °C) (!) 101 28 118/75 98 % 19 0453  (!) 102     
19 0415  (!) 130     
19 0148  (!) 160     
19 2355 98 °F (36.7 °C) (!) 55 20 119/65 99 % 19 2237  (!) 160     
19 1651 97.8 °F (36.6 °C) (!) 136 22 (!) 135/96 94 % Temp (24hrs), Av.5 °F (36.4 °C), Min:96.4 °F (35.8 °C), Max:98 °F (36.7 °C) Oxygen Therapy O2 Sat (%): 94 % (19 0805) Pulse via Oximetry: 86 beats per minute (19 1615) O2 Device: Room air (19 0805) Oxygen Therapy O2 Sat (%): 94 % (19 0805) Pulse via Oximetry: 86 beats per minute (19 1615) O2 Device: Room air (19 0805) Physical Exam: 
General:         Alert, frail HEENT:               NCAT. No obvious deformity. Lungs: No wheezing/rhonchi/rales Cardiovascular:   RRR. No m/r/g. No pedal edema b/l. Abdomen:       S/nt/nd. Bowel sounds normal. .  
Skin:         No rashes or lesions. Neurologic:     No gross focal deficit. Moving extremities. DIAGNOSTIC STUDIES Data Review:  
Recent Results (from the past 24 hour(s)) EKG, 12 LEAD, INITIAL Collection Time: 19  2:01 AM  
Result Value Ref Range Ventricular Rate 145 BPM  
 Atrial Rate 86 BPM  
 QRS Duration 142 ms  
 Q-T Interval 352 ms QTC Calculation (Bezet) 546 ms Calculated R Axis -59 degrees Calculated T Axis 116 degrees Diagnosis Atrial fibrillation with rapid ventricular response Left axis deviation Right bundle branch block Possible Anteroseptal infarct , age undetermined T wave abnormality, consider lateral ischemia or digitalis effect Abnormal ECG When compared with ECG of 2019 10:00, 
 Borderline criteria for Anteroseptal infarct are now Present Confirmed by Ralf Gunderson ([id number]) on 7/4/2019 7:22:18 AM 
  
 
 
All Micro Results Procedure Component Value Units Date/Time CULTURE, URINE [[id number]]  (Abnormal)  (Susceptibility) Collected:  06/28/19 1057 Order Status:  Completed Specimen:  Urine from Clean catch Updated:  07/01/19 1098 Special Requests: NO SPECIAL REQUESTS Culture result:    
  >100,000 COLONIES/mL STAPHYLOCOCCUS SPECIES, COAGULASE NEGATIVE  
     
      
  10,000 to 50,000 COLONIES/mL MIXED SKIN OTILIA ISOLATED  
     
 CULTURE, URINE [[id number]] Order Status:  Canceled Specimen:  Urine from Clean catch Imaging Zulma Jamesle /Studies: CXR Results  (Last 48 hours) None CT Results  (Last 48 hours) 07/03/19 1056  CT HEAD WO CONT Final result Impression:  IMPRESSION:   
1. Stable age-related senescent changes and chronic microvascular disease with  
remote all right occipital ischemic insult. 2. Chronic right maxillary sinusitis. CPT code 96188 Narrative:  Title:  CT scan of the Head. The patient is a 80years year old Female with symptoms of declining mental  
status for a week. History of thyroid CA. Technique:  Axial CT images through the brain were obtained. All CT scans at this facility are performed using dose reduction/dose modulation  
techniques, as appropriate the performed exam, including the following: Automated Exposure Control; Adjustment of the mA and/or kV according to patient  
size (this includes techniques or standardized protocols for targeted exams  
where dose is matched to indication/reason for exam); and Use of Iterative Reconstruction Technique. Radiation Exposure Indices:  
Reference Air Kerma (Pollie Ora) = 994 mGy-cm Comparison:  Head CT 11/9/2018.   
   
Findings:    
   
Cerebrum: Age-related senescent changes are seen with sulcal and ventricular  
prominence. . Mild chronic confluent periventricular white matter disease is seen  
with lacunae throughout the basal ganglia. Chronic cortical and subcortical  
ischemia involving the right occipital lobe There is incidental physiologic  
calcification involving the globus pallidi bilaterally. No evidence of  
intracranial hemorrhage, mass, or other space-occupying lesion is seen. There  
are no abnormal extra-axial fluid collections. Franny Melissa Cerebellum: Mild cerebellar involutional changes. CSF spaces: The ventricular system is within normal limits. The basilar cisterns  
are unremarkable. Brainstem: Chronic central pontine micro-vascular disease Extracranial tissues: Visualized orbits and extracranial soft tissues are  
unremarkable. Paranasal sinuses/Mastoids: Chronic right maxillary sinusitis with opacification  
and surrounding hyperostosis. Mild posterior left ethmoid mucosal inflammatory  
disease. Calvarium: No acute osseous abnormality. Mri Brain Wo Cont Result Date: 2019 Impression: 1. Findings most compatible with mild chronic small vessel ischemic changes and remote right temporal lobe infarct. 2. Mild volume loss. 3. Chronic right maxillary sinus opacification. Results for orders placed or performed during the hospital encounter of 19  
2D ECHO COMPLETE ADULT (TTE) W OR WO CONTR Narrative [facility] [place], [address] [facility] 
[phone] Transthoracic Echocardiogram 
2D and Doppler Patient: Arie Durand 
MR #: [id number] : [id number] Age: 80 years Gender: Female Study date: 2019 Account #: [de-identified] Height: 60 in Weight: 113 lb 
BSA: 1.47 mï¾² Status:Routine Location: [facility] BP: 120/ 72 Allergies: LORAZEPAM 
 
Sonographer:  Janice Gay [facility] Group:  [address] Cardiology Referring Physician:  Chloe Lopez MD 
 Reading Physician:  Abraham Valerio. Enid Patino MD 
 
INDICATIONS: Reevaluate pericardial effusion PROCEDURE: This was a routine study. A transthoracic echocardiogram was 
performed. The study included limited 2D imaging and limited spectral  
Doppler. Image quality was adequate. PERICARDIUM: A moderate pericardial effusion was identified circumferential  
to 
the heart. There was a left pleural effusion. SUMMARY: 
 
-  Pericardium: A moderate pericardial effusion was identified  
circumferential 
to the heart. There was a left pleural effusion. Prepared and signed by 
 
Cj Patino MD 
Signed 29-Jun-2019 14:37:02 Labs and Studies from previous 24 hours have been personally reviewed by myself   
ASSESSMENT Active Hospital Problems Diagnosis Date Noted  Encephalopathy 07/04/2019  Ileus ([address] 06/29/2019  Dehydration 06/28/2019  Breast mass 06/14/2019  Acute UTI (urinary tract infection) 06/03/2019  KENISHA (acute kidney injury) ([address] 05/30/2019  Atrial fibrillation with RVR ([address] 05/29/2019  Pathologic fracture of lumbar vertebra 05/29/2019  Follicular thyroid cancer ([address] 06/19/2011 thyroglobulin high > 100 obvious residual thyroid tissue  HTN (hypertension) 06/16/2011  Acquired hypothyroidism 06/16/2011 Post thyroidectomy for thyroid cancer Hospital Problems as of 7/4/2019 Date Reviewed: 6/19/2019 Codes Class Noted - Resolved POA Encephalopathy ICD-10-CM: G93.40 ICD-9-CM: 348.30  7/4/2019 - Present Yes Ileus ([address] ICD-10-CM: K56.7 ICD-9-CM: 560.1  6/29/2019 - Present Yes Enteritis ICD-10-CM: K52.9 ICD-9-CM: 558.9  6/28/2019 - Present Dehydration ICD-10-CM: E86.0 ICD-9-CM: 276.51  6/28/2019 - Present Yes Breast mass ICD-10-CM: N63.0 ICD-9-CM: 611.72  6/14/2019 - Present Yes Acute UTI (urinary tract infection) ICD-10-CM: N39.0 ICD-9-CM: 599.0  6/3/2019 - Present Yes KENISHA (acute kidney injury) ([facility] ICD-10-CM: N17.9 ICD-9-CM: 584.9  5/30/2019 - Present Yes * (Principal) Atrial fibrillation with RVR ([facility] ICD-10-CM: I48.91 
ICD-9-CM: 427.31  5/29/2019 - Present Yes Pathologic fracture of lumbar vertebra ICD-10-CM: M84.48XA ICD-9-CM: 733.13  5/29/2019 - Present Yes Follicular thyroid cancer ([facility] ICD-10-CM: X04 ICD-9-CM: 316  6/19/2011 - Present Yes Overview Signed 6/21/2011  7:12 AM by Phama Siemens  
  thyroglobulin high > 100 obvious residual thyroid tissue HTN (hypertension) (Chronic) ICD-10-CM: I10 
ICD-9-CM: 401.9  6/16/2011 - Present Yes Acquired hypothyroidism (Chronic) ICD-10-CM: E03.9 ICD-9-CM: 244.9  6/16/2011 - Present Yes Overview Signed 6/19/2011  4:15 PM by Dina Siemens Post thyroidectomy for thyroid cancer A/P: 
 
-Acute encephalopathy 
Daughter reported mental status has been declining since she was admitted to the hospital 
CT brain with no acute disease Give a trial of Haldol IV for agitation MRI brain pending Get B12, tsh, ammonia and folic acid levels Labs still pending today  
 
 
-Ileus vs partial SBO Surgery evaluation appreciated. Recommended CT  No evidence of SBO. Patient is agitated today. She does not want to eat Cont clear liquid diet and hydration with IVFs Discussed with POA and family at bedside. Per living will if patient was in this condition she wanted nutrition with tubes or IV. Patient has refused in the past NGT and daughter reported she did not want Peg tube. TPN started 
 
-L4 fracture CT showing destructive process L4 pathological fracture metastasic disease vs myeloma/plasmacytoma. Ortho recommended IR for biopsy of lesion [facility] sent path from thyroidectomy Discussed with POA, she stated patient did not want aggressive treatment or chemo. 
 
 
-Acute UTI Ucx with staph coag neg Patient not tolerating PO due to agitation Restart ceftriaxone to complete 5 days course 
 
-Afib No bleeding reported Received Vit K 07/03 Labs pending Discussed with POA and family at bedside. They want patient to be comfortable and continue nutritional support per living will. They dont want aggressive treatment. Will ask Palliative to evaluate. DVT Prophylaxis: coumadin CODE Status: DNR discussed with POA Plan of Care Discussed with: patient. Care team. 
 
Randall Ridley MD 
07/04/19

## 2019-07-04 NOTE — PROGRESS NOTES
Hourly rounds completed during shift. MRI completed. INR now 1.7. Peripheral TPN started. Pt tachy all of shift w metoprolol. All needs met, bed locked in low position and call light within reach. Will give report to oncoming RN.

## 2019-07-04 NOTE — PROGRESS NOTES
Problem: Falls - Risk of 
Goal: *Absence of Falls Description Document Patti Chawla Fall Risk and appropriate interventions in the flowsheet. Outcome: Progressing Towards Goal 
  
Problem: Pressure Injury - Risk of 
Goal: *Prevention of pressure injury Description Document Sid Scale and appropriate interventions in the flowsheet. Outcome: Progressing Towards Goal 
  
Problem: Pain Goal: *Control of Pain Outcome: Progressing Towards Goal

## 2019-07-04 NOTE — PROGRESS NOTES
Patient reported to have Afib with RVR. Remains confused. Moaning. She seems to be dehydrated. Will order a bolus of IV fluids with an extra dose of IV lopressor. If that does not improve her HR will send her to the tely floor for a cardizem drip.

## 2019-07-04 NOTE — PROGRESS NOTES
Nutrition: 
Nutrition Consult for TPN Management. (Dr. Dot Momin) Assessment: Anthropometrics: Ht - 5'0\", wgt - 58.2 kg (6th floor bed 7/4/19), BMI 25.1 c/w acceptable weight in a person >72years of age, edema - none reported. Macronutrient Needs (UBW 52 kg): Estimated calorie needs - 4369-4237 betzaida/day (25-30 betzaida/kgUBW/day) Estimated protein needs - 62-78 gm pro/day (1.2-1.5 gm pro/kgUBW/day) CHO limit/day - 300 gm CHO/day (4 mg/kgUBW/min/day) Fluid/day - 1.3-1.8 liters/day (1 ml/betzaida/day) Intake/Comparative Standards: 
No oral intake is reported since admission. Current IVF provides 306 calories/day - 24% calories needs. Pertinent Labs: 
 No labs available today. (7/3): sodium 145, potassium 4.5, AM glucose 158. Pertinent Medications: 
 Rocephin IV (infuses over 30 minutes). GI Function/Activity: 
 Soft abdomen, hypoactive bowel sounds. Last bowel movement was on 6/27/19 (PTA). Diet: 
 NPO. IV Access: 
 Peripheral. 
Food/Nutrition History: 
 80year old frail lady admitted with N/V, epigastric pain and dehydration. She has a h/o thyroid cancer with mets to the spine. PPN is ordered based on her wishes listed in her Living Will. Diagnosis (Nutrition): Altered GI function related to ileus as evidenced by N/V, epigastric pain and no bowel movement x 7 days. Intervention: 
Meals and Snacks: Clear liquids. Parenteral Nutrition/IV Fluid: 1) Start PPN with 1.8 liters 4.25%AA/5%DEX, no lipids - 612 calories/day (47% calorie goal), 77 grams protein/day (100% protein goal), 90 grams CHO/day (does not exceed max CHO limit) and ~1800 ml volume/day (100% fluid goal). 2) Additives/liter: sodium 60 (acetate), potassium 10 (phosphate), magnesium 5, phosphorus 10 (potassium). 3) MVI and MTE/bag.  (Osmolar load prohibits a more concentrated solution, as well as a large amount of electrolytes to infuse via a peripheral line.) If TG level is wdl, lipids will be started tomorrow and we will be able to meet 86% of her calorie needs and 100% of her protein needs. Mineral Supplement Therapy: Nutrition Support Orders for Electrolyte Management Replacement are activated and placed on the MAR. Discharge Nutrition Plan: Too soon to determine. Emily Milan. Mumtaz Chavez 
[phone]

## 2019-07-04 NOTE — PROGRESS NOTES
PT note: Patient not appropriate for physical therapy treatment today per nursing and chart review. Per MD note, palliative care has been consulted for goals of care. Will check back as schedule allows. May end up signing off pending discussion with palliative.   
 
Eduardo Alves DPT

## 2019-07-05 NOTE — PROGRESS NOTES
Hibaclenz bath given, Amado Marr is in place. No breakdown noted on the sacrum or heels. Pt turned to right side resting comfortably right now. Will order prevalon boots and a wedge.

## 2019-07-05 NOTE — PROGRESS NOTES
H&P/Consult Note/Progress Note/Office Note:  
Ghulam Weir  MRN: [id number]  FUN:[id number]  Age:86 y.o. 
 
HPI: Ghulam Weir is a 80 y.o. female who has multiple medical problems including debilitation, encephalopathy, rapid afib, CHF with EF 30-35%, pulm HTN,  
s/p IVC filter for DVT/PE, metastatic thyroid carcinoma with pathologic fracture of L4, dysphagia, breast mass, coagulopathy secondary to coumadin use for afib, etc.... who was admitted from the ER by the Hospitalists on 6/28/19 for her 3rd admission in approx 2 months with N/V/abd pain/failure to thrive as outpt. She is s/p total thyroidectomy and right superior parathyroidectomy by Dr Patsy Miller on 01/5//2967 for a 7cm follicular carcinoma Family describes a h/o lumbar bone biopsy wit proven thyroid carcinoma metastasis at [facility] for which XRT was recommended about 10 yrs ago but pt refused Rx She is also s/p appy, rhonda, right hip surgery, and back surgery She was admitted 5/14/19-6/4/19 with SBO which resolved without surgery and without NT which could not be placed. She was on coumadin for afib with INR>4 at presentation who came to the ER  
with 6 day h/o diarrhea for which she took several anti-diarrheal meds (pepto-bismol and lomotil among them) This was followed by diffuse, moderate, progressive, non-radiating abd pain and cramping. Nothing made symptoms better or worse. She was seen in ER day before admission and given IVF and released. She has associated N/V. No fever or leukocytosis on admission She was admitted  6/13/19- 6/19/19  for hypotension, diarrhea, acute and chronic systolic CHF and KENISHA, UTI. Cardiology/ GI were consulted at the time. Hypotension resolved with med changed (amiodarone dc'd). Diarrhea resolved. Cardiology saw pt for perciardial effusion and said to continue lasix.   
She was told to follow with Dr Kitty Jeffrey for breast mass and neurosurgery for L4 lesion.  
  
 Pt was readmitted again on 6/28/19 with N/V and UTI Rx'd with Rocephin and IVF. CT showed bowel distension and moderate stool through colon. Also concern for increasing pericardial effusion on CT scan, still reporting as moderate on repeat echo.  
  
 
 
 
5/29/19 CT abd/pelvis with oral and IV contrast 
There is no pericardial effusion. Heart is enlarged. There is coronary artery calcification. There is dependent subsegmental atelectasis. 
  
There is a 12 mm nodule within the lateral right breast (series 2, image 5). There is subtle micronodular contour of the liver. There is periportal edema. The gallbladder is surgically absent. Pancreas is not well visualized. The 
spleen is normal in contour. Stable small right adrenal nodule (since 2011).   
There are tiny nonobstructing renal calculi. No hydronephrosis left kidney is 
small in size. There is prominent atherosclerotic calcification of the abdominal aorta. 
  
The stomach is normal in contour. 
  
Pelvic findings: There are multiple fluid-filled dilated small bowel loops with transition point 
appearing to be in the lower abdomen. The colon appears normal in caliber. Urinary bladder is normal in contour. There is a pessary. 
  
There is no significant free fluid. No free air. Bones are osteopenic. There are 
degenerative changes of the spine. There is severe compression deformity of L4 
with associated high density structure. Right total hip arthroplasty is partially seen. 
  
  
IMPRESSION: 
  
1. Small bowel obstruction. Multiple fluid-filled dilated small bowel loops with 
transition point in the lower abdomen. Etiology is likely adhesion. Closed-loop 
obstruction is in the differential. 
  
2. No evidence of colitis or diverticulitis, although the colon is not well 
visualized. . No hydronephrosis. 
  
3.  Severe compression deformity of L4 with associated high density material. 
 This may be related to prior intervention such as kyphoplasty. Neoplastic 
involvement is considered less likely. Osteopenia. 
  
4. A 12 mm nodule within the lateral right breast. Correlation with mammogram is 
recommended. 6/3/19 CT abd/pelvis without contrast 
There is moderate right pleural effusion. There is small left pleural effusion. Dependent densities in the lower lobes, likely due to aspiration or atelectasis. 
  
The heart is enlarged. There is coronary artery calcification. There is trace 
pericardial effusion. 
  
Abdomen findings: 
  
Absence of IV contrast limits sensitivity. 
  
There is subtle micronodular contour of the liver. The spleen is normal in 
contour. Gallbladder is surgically absent. Pancreas and adrenal glands are not 
well visualized. 
  
There are small nonobstructing renal calculi. There is no hydronephrosis. There 
is severe atherosclerotic calcification of the abdominal aorta. The stomach is 
under distended. Small bowel loops are normal in caliber. There is no small 
bowel obstruction. 
  
Pelvic findings: 
  
There is a pessary. Uterus is not well evaluated. Urinary bladder is 
decompressed by Ornelas catheter. There is sigmoid diverticulosis without 
diverticulitis. Colon is normal in caliber. 
  
There is small free fluid. There is no free air. Bones are diffusely osteopenic. There are degenerative changes of the spine. There is lytic lesion involving the 
L4. There is associated compression fracture. 
  
  
IMPRESSION: 
1. Previously noted dilated small bowel loops have resolved. No evidence of bowel obstruction on today's study. 
  
2. Moderate right pleural effusion and small left pleural effusion. Mild dependent densities in the lower lobes, likely due to atelectasis or aspiration. 
  
3. Sigmoid diverticulosis. No evidence of diverticulitis. 
  
4. Lytic lesion of L4 and associated compression fracture 6/13/19 CT abd/pelvis without IV contrast 
 Hx: Diarrhea. Recently admitted for SBO  
  
CT ABD:   
Limited evaluation of the lung bases and base of the mediastinum demonstrates a 
new moderate to large pericardial effusion. Irregular basilar densities are seen 
likely representing mild to moderate scarring versus atelectasis. 
  
Assessment of the solid organs is limited by the lack of administered 
intravenous contrast. A subtle abnormality could be missed. The Liver is 
homogeneous in attenuation. The spleen is homogeneous in attenuation. No 
contour deforming mass lesions are seen of the pancreas or adrenal glands. The 
gallbladder has been removed. Multiple bilateral renal calcifications are seen 
felt to represent renal stones. No hydronephrosis is seen of either kidney to 
suggest obstruction.   
  
No abnormal small bowel dilation is seen with small bowel loops measuring up to 
2.7 cm in diameter. In addition, no abnormal abrupt transition point is seen of 
small bowel. Gas and fluid are seen throughout the colon. Fluid throughout the 
colon can be seen with diarrhea. No clear abnormal inflammatory wall thickening 
of small bowel or colon is seen. The mesentery appears diffusely edematous. No 
free air is seen. No abnormal fluid collection is identified. .  No adenopathy is 
seen. The abdominal aorta moderate to advanced atherosclerotic calcification is 
seen of the abdominal aorta. The patient appears to be status post aortic 
bifemoral bypass graft. Once again, there is the suggestion of a large lytic 
lesion destroying much of the L4 vertebral body with a likely secondary mild to 
moderate pathologic compression fracture. The appearance is not felt to be 
significantly changed when compared to the prior study. 
  
CT PELVIS: 
A right total hip replacement is present. This produces scatter artifact which 
obscures portions of the right pelvis. No abnormal pelvic fluid collections or inflammatory changes are present. No pelvic adenopathy is seen. The urinary 
bladder is unremarkable. 
  
IMPRESSION:   
1. No abnormal bowel dilation or transition point to suggest bowel obstruction. No clearly acute bowel changes are otherwise seen. It is only noted that the 
colon is fluid-filled consistent with the history of diarrhea. The possibility 
of a mild or early colitis cannot be excluded. 
  
2. New moderate to large pericardial effusion. 
  
3. Large lytic lesion and pathologic fracture of the L4 vertebral body as 
previously described which is not felt to be significantly changed 7/2/19 CT abd/pelvis with oral and IV contrast 
Hx: sbo vs ileus; evaluate; also with suspected L4 met. 
  
CT ABDOMEN: There is global cardiac enlargement. There is excessive motion 
artifact throughout the study. There are partially imaged small bilateral 
pleural effusions and bilateral lower lobe atelectasis/infiltrate. The liver, 
spleen, pancreas and adrenal glands are unremarkable. Few bilateral 
nonobstructing renal calculi present. There is a small to moderate amount of 
abdominal ascites. Extensive atherosclerotic changes are present involving the 
abdominal aorta and mesenteric vessels. There are no dilated bowel loops. Contrast material is present within the colon. There are advanced degenerative 
changes of the lumbar spine. Extensive bony destruction is noted involving the 
L4 vertebra with areas of enhancement, also extending within the extraosseous 
soft tissues on the left and into the spinal canal causing severe stenosis. 
  
CT PELVIS: There is extensive streak artifact from right hip arthroplasty. A 
pessary is present. There is a small amount of ascites. No definite adenopathy 
is present. There is diffuse subcutaneous edema. 
  
IMPRESSION: 
1. Mildly compromised examination due to motion. 2. Destructive process with associated pathologic fracture of L4 with extraosseous extension as described. Differential diagnosis would include 
metastatic disease or multiple myeloma/plasmacytoma. 3. Ascites. 4. Small pleural effusions. 5. There is no evidence of bowel obstruction. 
  
 
7/4/19 MRI brain without contrast 
Hx: Altered mental status, history of thyroid cancer. Findings: The ventricles and sulci are prominent compatible with mild volume 
loss. There are no extra-axial fluid collections. Normal flow voids are 
present within all of the major intracranial vessels. No evidence of 
intraparenchymal hemorrhage or mass effect is identified. There are no areas of 
restricted diffusion to suggest an acute or subacute infarction. Patchy and 
discrete of T2 prolongation are present within the supratentorial white matter. These are nonspecific findings but would be most compatible with mild chronic 
small vessel ischemic changes. There is a remote infarction within the posterior 
right temporal lobe. There is complete opacification of the right maxillary 
sinus with with components of calcification and inspissated secretions. 
  
Impression: 1. Findings most compatible with mild chronic small vessel ischemic changes and 
remote right temporal lobe infarct. 2. Mild volume loss. 3. Chronic right maxillary sinus opacification Additonal history 6/29/19 Awake in bed. No complaints at this time. Denies abd pain. LBM Thursday. -flatus. Reports +belching today. Family at bedside. NAD.  
6/30/19  Resting in bed. RN unable to place NG tube yesterday. No complaints at this time. Denies abd pain. LBM Thursday. -flatus. Family at bedside. NAD. 7/1/19  Having NGT placed this am when I rounded; abd soft and without tenderness; INR 2.6; coumadin held in case surgery needed 7/2/19  NGT could not be placed. Pt refuses to allow further ngt attempts at this time; CT pending 7/3/19 family now reports she had biopsy proven metastatic thyroid carcinoma of lumbar spine approx 10 yrs ago at [facility] and refused spinal XRT at that time, she refused to participate with swallow eval yesterday, SLP to try again today 7/4/19 encephalopathy no better; see head CT done 7/3/19; MRI ordered; rapid Afib during the night 
7/5/19 somnolent; not alert, debilitated; PPN infusing; nutrition thinks we can meet 100% betzaida and prot needs with PPN alone and no PICC line Past Medical History:  
Diagnosis Date  Acquired hypothyroidism  Acute pancreatitis  Arthritis  CAD (coronary artery disease) 4/29/11 RCA occluded and fills with collaterals, LAD and Cx nonobstructive CAD, EF 50-55%  Cancer ([address] THYROID - treated with surgery and radiation  CHF (congestive heart failure) ([address] 11/8/2018  Chronic pain   
 right hip  Coagulopathy ([address] 6/16/2011  Colon polyps  Encephalopathy 7/4/2019  Follicular thyroid cancer ([address]  Gallstone pancreatitis  Glaucoma  Gout   
 hx of gout to right great toe - no problems  Hypertension   
 controlled with medication  Other ill-defined conditions(799.89)  Pancreatitis  Paroxysmal A-fib ([address] 11/8/2018  PE (pulmonary embolism) 2008  Pulmonary HTN (HCC)   
 moderate  Thromboembolus ([address] 1995  
 right leg - Hemashield graft/filter placed in abdomen  Thyroid disease   
 pt had total thyroidectomy - takes levoxyl daily  UTI (urinary tract infection) 6/3/2019 Past Surgical History:  
Procedure Laterality Date  HX APPENDECTOMY  ? 1990  
 HX CHOLECYSTECTOMY  6/2011 Dr. Dianna Miller - [facility] [address] [place]  
 hemashield graft/filter placed due to blood clot right leg  HX THYROIDECTOMY  2010  
 partial  
 HX TONSILLECTOMY [address] [facility] ARTHROPLASTY  2008 & 2011  
 right hip Current Facility-Administered Medications Medication Dose Route Frequency  albumin human 25% (BUMINATE) solution 12.5 g  12.5 g IntraVENous BID  
  TPN ADULT -PERIPHERAL- dextrose 5% amino acid 4.25%   IntraVENous QPM  
 cefTRIAXone (ROCEPHIN) 1 g in 0.9% sodium chloride (MBP/ADV) 50 mL  1 g IntraVENous Q24H  
 haloperidol lactate (HALDOL) injection 2 mg  2 mg IntraVENous Q4H PRN  
 ziprasidone (GEODON) 10 mg in sterile water (preservative free) 0.5 mL injection  10 mg IntraMUSCular Q12H PRN  
 HYDROmorphone (PF) (DILAUDID) injection 0.5 mg  0.5 mg IntraVENous Q6H PRN  
 naloxone (NARCAN) injection 0.4 mg  0.4 mg IntraVENous PRN  
 metoprolol (LOPRESSOR) injection 2.5 mg  2.5 mg IntraVENous Q6H  
 docusate sodium (COLACE) capsule 100 mg  100 mg Oral DAILY  sodium chloride (NS) flush 5-40 mL  5-40 mL IntraVENous PRN  
 acetaminophen (TYLENOL) suppository 650 mg  650 mg Rectal Q4H PRN  
 diphenhydrAMINE (BENADRYL) capsule 25 mg  25 mg Oral Q4H PRN  
 nicotine (NICODERM CQ) 21 mg/24 hr patch 1 Patch  1 Patch TransDERmal DAILY PRN  
 ondansetron (ZOFRAN) injection 4 mg  4 mg IntraVENous Q4H PRN  
 cloNIDine HCl (CATAPRES) tablet 0.2 mg  0.2 mg Oral BID PRN  
 hydrALAZINE (APRESOLINE) 20 mg/mL injection 10 mg  10 mg IntraVENous Q6H PRN Ativan [lorazepam] Social History Socioeconomic History  Marital status:  Spouse name: Not on file  Number of children: Not on file  Years of education: Not on file  Highest education level: Not on file Tobacco Use  Smoking status: Former Smoker  Smokeless tobacco: Never Used  Tobacco comment: quit smoking 1995 Substance and Sexual Activity  Alcohol use: No  
 Drug use: No  
 
Social History Tobacco Use Smoking Status Former Smoker Smokeless Tobacco Never Used Tobacco Comment  
 quit smoking 1995 Family History Problem Relation Age of Onset  Stroke Mother  Hypertension Mother  Cancer Father   
     prostate  Diabetes Maternal Uncle ROS: The patient has no difficulty with chest pain or shortness of breath. No fever or chills. Comprehensive review of systems otherwise unobtainable secondary to mental status. Physical Exam:  
Visit Vitals /72 Pulse (!) 105 Temp 97.8 °F (36.6 °C) Resp 16 Ht 5' (1.524 m) Wt 127 lb 14.4 oz (58 kg) SpO2 100% BMI 24.98 kg/m² Vitals:  
 07/05/19 2174 07/05/19 0402 07/05/19 3334 07/05/19 4108 BP:  119/68  122/72 Pulse: (!) 101 88  (!) 105 Resp:  18  16 Temp:  97.4 °F (36.3 °C)  97.8 °F (36.6 °C) SpO2:  100%  100% Weight:   127 lb 14.4 oz (58 kg) Height:      
 
No intake/output data recorded. 05/30 1901 - 06/01 0700 In: 2930 [I.V.:4476] Out: 200 [Urine:200] Constitutional: lethargic, mumbles and speaks; no acute distress; appears stated age Eyes: clsoed ENMT: no external lesions gross hearing normal; no obvious neck masses, no ear or lip lesions, nares normal 
CV: RRR. Normal perfusion Resp: No JVD. Breathing is  non-labored; no audible wheezing. Breast exam deferred GI: soft; no mass,  No guarding or rebound Musculoskeletal: deconditioned. No embolic signs or cyanosis. Neuro:  Not oriented; moves all 4; no focal deficits Psychiatric: no affect, +memory impairment Recent vitals (if inpt): 
Patient Vitals for the past 24 hrs: 
 BP Temp Pulse Resp SpO2 Weight 06/01/19 0515 124/87 98 °F (36.7 °C) 94 18 99 % 114 lb 4.8 oz (51.8 kg) 06/01/19 0238 117/76 98.3 °F (36.8 °C) 100 18 99 %   
05/31/19 2123 118/84 98 °F (36.7 °C) 98 18 100 %   
05/31/19 1618 127/68 98.5 °F (36.9 °C) (!) 102 16 100 %   
05/31/19 1304 119/72 97.9 °F (36.6 °C) (!) 101 16 98 %   
05/31/19 0818 129/76 97.2 °F (36.2 °C) 94 16 94 %  Labs: 
Recent Labs  
  07/05/19 
0648 07/04/19 
1758 07/03/19 
0544 WBC 6.6 7.4 7.7 HGB 9.6* 10.0* 10.3* * 168 184  146* 145  
K 4.1 4.0 4.5  
* 116* 115* CO2 21 19* 20* BUN 32* 26* 34* CREA 0.85 0.96 1.16* * 94 158* PTP  --  20.1* 56.7* INR  --  1.7 6.6*  
 TBILI  --   --  0.3 SGOT  --   --  34 ALT  --   --  22  
AP  --   --  86 NH4  --  21  --   
 
 
Lab Results Component Value Date/Time WBC 6.6 07/05/2019 06:48 AM  
 HGB 9.6 (L) 07/05/2019 06:48 AM  
 PLATELET 498 (L) 99/29/7563 06:48 AM  
 Sodium 145 07/05/2019 06:48 AM  
 Potassium 4.1 07/05/2019 06:48 AM  
 Chloride 115 (H) 07/05/2019 06:48 AM  
 CO2 21 07/05/2019 06:48 AM  
 BUN 32 (H) 07/05/2019 06:48 AM  
 Creatinine 0.85 07/05/2019 06:48 AM  
 Glucose 115 (H) 07/05/2019 06:48 AM  
 INR 1.7 07/04/2019 05:58 PM  
 aPTT 62.1 (H) 06/04/2019 04:58 AM  
 Bilirubin, total 0.3 07/03/2019 05:44 AM  
 Bilirubin, direct 0.1 06/25/2011 06:00 AM  
 AST (SGOT) 34 07/03/2019 05:44 AM  
 ALT (SGPT) 22 07/03/2019 05:44 AM  
 Alk. phosphatase 86 07/03/2019 05:44 AM  
 Amylase 64 06/17/2011 03:53 AM  
 Lipase 109 06/28/2019 08:45 AM  
 Ammonia 21 07/04/2019 05:58 PM  
 Troponin-I, Qt. <0.02 (L) 06/28/2019 08:45 AM  
 
 
CT Results  (Last 48 hours) 07/03/19 1056  CT HEAD WO CONT Final result Impression:  IMPRESSION:   
1. Stable age-related senescent changes and chronic microvascular disease with  
remote all right occipital ischemic insult. 2. Chronic right maxillary sinusitis. CPT code 57950 Narrative:  Title:  CT scan of the Head. The patient is a 80years year old Female with symptoms of declining mental  
status for a week. History of thyroid CA. Technique:  Axial CT images through the brain were obtained. All CT scans at this facility are performed using dose reduction/dose modulation  
techniques, as appropriate the performed exam, including the following: Automated Exposure Control; Adjustment of the mA and/or kV according to patient  
size (this includes techniques or standardized protocols for targeted exams  
where dose is matched to indication/reason for exam); and Use of Iterative Reconstruction Technique. Radiation Exposure Indices: Reference Air Kerma (Loman Calderon) = 994 mGy-cm Comparison:  Head CT 11/9/2018. Findings:    
   
Cerebrum: Age-related senescent changes are seen with sulcal and ventricular  
prominence. . Mild chronic confluent periventricular white matter disease is seen  
with lacunae throughout the basal ganglia. Chronic cortical and subcortical  
ischemia involving the right occipital lobe There is incidental physiologic  
calcification involving the globus pallidi bilaterally. No evidence of  
intracranial hemorrhage, mass, or other space-occupying lesion is seen. There  
are no abnormal extra-axial fluid collections. Nancy Abbot Cerebellum: Mild cerebellar involutional changes. CSF spaces: The ventricular system is within normal limits. The basilar cisterns  
are unremarkable. Brainstem: Chronic central pontine micro-vascular disease Extracranial tissues: Visualized orbits and extracranial soft tissues are  
unremarkable. Paranasal sinuses/Mastoids: Chronic right maxillary sinusitis with opacification  
and surrounding hyperostosis. Mild posterior left ethmoid mucosal inflammatory  
disease. Calvarium: No acute osseous abnormality. chest X-ray I reviewed recent labs, recent radiologic studies, and pertinent records including other doctor notes if needed. I independently reviewed radiology images for studies I described above or studies I have ordered. Admission date (for inpatients): 6/28/2019 * No surgery found *  * No surgery found * ASSESSMENT/PLAN: 
Problem List  Date Reviewed: 6/19/2019 Codes Class Noted Encephalopathy ICD-10-CM: G93.40 ICD-9-CM: 348.30  7/4/2019 [facility] ICD-10-CM: K56.7 ICD-9-CM: 560.1  6/29/2019 Enteritis ICD-10-CM: K52.9 ICD-9-CM: 558.9  6/28/2019 Dehydration ICD-10-CM: E86.0 ICD-9-CM: 276.51  6/28/2019 Abnormal CT scan, lumbar spine ICD-10-CM: R93.7 ICD-9-CM: 793.7  6/19/2019 Pancytopenia ([facility] ICD-10-CM: N16.016 ICD-9-CM: 284.19  6/16/2019 Hypotension ICD-10-CM: I95.9 ICD-9-CM: 458.9  6/15/2019 Moderate protein malnutrition (HCC) ICD-10-CM: E44.0 ICD-9-CM: 263.0  6/15/2019 Failure to thrive (0-17) ICD-10-CM: R62.51 
ICD-9-CM: 783.41  6/15/2019 Hyperthyroidism ICD-10-CM: E05.90 ICD-9-CM: 242.90  6/15/2019 Breast mass ICD-10-CM: N63.0 ICD-9-CM: 611.72  6/14/2019 PAF (paroxysmal atrial fibrillation) (HCC) ICD-10-CM: I48.0 ICD-9-CM: 427.31  6/14/2019 Colitis ICD-10-CM: K52.9 ICD-9-CM: 558.9  6/14/2019 Diarrhea ICD-10-CM: R19.7 ICD-9-CM: 787.91  6/13/2019 Acute UTI (urinary tract infection) ICD-10-CM: N39.0 ICD-9-CM: 599.0  6/3/2019 KENISHA (acute kidney injury) ([facility] ICD-10-CM: N17.9 ICD-9-CM: 584.9  5/30/2019 SBO (small bowel obstruction) ([facility] ICD-10-CM: G19.694 ICD-9-CM: 560.9  5/29/2019 * (Principal) Atrial fibrillation with RVR ([facility] ICD-10-CM: I48.91 
ICD-9-CM: 427.31  5/29/2019 Chronic systolic dysfunction of left ventricle (Chronic) ICD-10-CM: I51.9 ICD-9-CM: 429.9  5/29/2019 Thyroid cancer [facility]) ICD-10-CM: B65 ICD-9-CM: 303  5/29/2019 Pathologic fracture of lumbar vertebra ICD-10-CM: M84.48XA ICD-9-CM: 733.13  5/29/2019 Throm[facility]embolic disorder ([facility] CHIN-83-FV: I74.9 ICD-9-CM: 444.9  5/29/2019 Presence of IVC filter (Chronic) ICD-10-CM: H20.181 ICD-9-CM: V45.89  5/29/2019 Breast nodule ICD-10-CM: N63.0 ICD-9-CM: 793.89  5/29/2019 Weight loss ICD-10-CM: R63.4 ICD-9-CM: 783.21  5/29/2019 Acute on chronic combined systolic and diastolic ACC/AHA stage C congestive heart failure (HCC) ICD-10-CM: I50.43 ICD-9-CM: 428.43, 428.0  11/10/2018 CHF (congestive heart failure) (HCC) ICD-10-CM: I50.9 ICD-9-CM: 428.0  11/8/2018 Supratherapeutic INR ICD-10-CM: R79.1 ICD-9-CM: 790.92  11/8/2018 Pleural effusion ICD-10-CM: J90 ICD-9-CM: 511.9  11/8/2018 Overview Signed 11/8/2018  4:18 PM by Fela Hanley MD  
  bilateral 
  
  
   
 Edema ICD-10-CM: R60.9 ICD-9-CM: 782.3  11/8/2018 Overview Signed 11/8/2018  4:18 PM by Fela Hanley MD  
  Bilateral lower legs Lightheadedness ICD-10-CM: A07 ICD-9-CM: 780.4  11/8/2018 Paroxysmal A-fib (HCC) ICD-10-CM: I48.0 ICD-9-CM: 427.31  11/8/2018 Iron deficiency anemia ICD-10-CM: D50.9 ICD-9-CM: 280.9  8/29/2011 Overview Signed 8/29/2011  3:05 PM by Jose Elias Howard Despite oral iron For infed infusion Nutritional deficiency ICD-10-CM: E63.9 ICD-9-CM: 269.9  6/27/2011 Impaired mobility ICD-10-CM: Z74.09 
ICD-9-CM: 799.89  6/27/2011 Debility ICD-10-CM: R53.81 ICD-9-CM: 799.3  6/27/2011 Colon polyps ICD-10-CM: K63.5 ICD-9-CM: 211.3  6/27/2011 Pancreatitis ICD-10-CM: K85.90 ICD-9-CM: 121.6  6/19/2011 Overview Signed 6/19/2011  4:13 PM by Jose Elias Howard 6-19-11 lipase improving possibly from gallstones Gallstone pancreatitis ICD-10-CM: K85.10 ICD-9-CM: 010.8, 574.20  6/19/2011 Follicular thyroid cancer ([facility] ICD-10-CM: R78 ICD-9-CM: 468  6/19/2011 Overview Signed 6/21/2011  7:12 AM by Jose Elias Howard  
  thyroglobulin high > 100 obvious residual thyroid tissue Pericardial effusion ICD-10-CM: I31.3 ICD-9-CM: 423.9  6/17/2011 Acute pancreatitis ICD-10-CM: K85.90 ICD-9-CM: 264.8  6/16/2011 HTN (hypertension) (Chronic) ICD-10-CM: I10 
ICD-9-CM: 401.9  6/16/2011 Acquired hypothyroidism (Chronic) ICD-10-CM: E03.9 ICD-9-CM: 244.9  6/16/2011 Overview Signed 6/19/2011  4:15 PM by Jose Elias Howard Post thyroidectomy for thyroid cancer Pulmonary embolism ([facility] [address] (Chronic) ICD-10-CM: I26.99 
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 
  Atrial fibrillation with RVR ([facility] [address] (5/29/2019) Active Problems: 
  HTN (hypertension) (6/16/2011) Acquired hypothyroidism (6/16/2011) Overview: Post thyroidectomy for thyroid cancer Follicular thyroid cancer ([facility] [address] (6/19/2011) Overview: thyroglobulin high > 100 obvious residual thyroid tissue Pathologic fracture of lumbar vertebra (5/29/2019) KENISHA (acute kidney injury) ([facility] [address] (5/30/2019) Acute UTI (urinary tract infection) (6/3/2019) Breast mass (6/14/2019) Dehydration (6/28/2019) Ileus ([facility] [address] (6/29/2019) Encephalopathy (7/4/2019) Nutrition Recommended Dobhoff placed in Fluoro since she isn't taking PO But pt has refused ng tubes multiple times in past. 
She isn't interactive at this point and family doesn't want her to have ngt (Dobhoff) at this time Multiple attempts in past by multiple staff (including doctor) has not succeeded in placing NGT I am confident that even if a dobhoff could be placed that she would soon pull it out. CT also showed sinusits PPN has been initiated and I spoke to nutritionist on 7/5/19 and we will be able to 100% betzaida and prot needs with PPN alone and no need for PICC or TPN. 
 
 
SBO/Ileus-->resolved She responded to bowel rest and no surgery and by 7/2/19 her CT showed no residual SBO Poor surgical candidate for expl lap secondary to age 80, frailty, debilitation, and mult med problems Pt and POA refused NGT mult occasions Would like PET CT given breast mass, suspected L4 met, and partial SBO issues. I called radiology and PET CT is not offered on this campus and is only offered as an outpt service Instead I ordered CT abd/pelvis with oral and IV contrast shown above which showed resolution of SBO (and ascites with supected L4 met). Avoid narcotics for next 48hrs to avoid exacerbation of ileus and help with MS changes I discussed the finding of ascites which may be drained at later date if needed and discussed possibility of a malignancy ascites with pt's POA Pt not competent to make decisions. I have had many discussions with family and POA. Discussed SBO/ileus, breast mass on CT, L5 abnormality on CT, dysphagia, debilitation, cognitive impairment. Pt not alert to understand and not able to give consent. All questions answered. They do not want Hospice care at this time I told them that they could see palliative care anytime when ready Encephalopathy Surgery will defer this workup to Hospitalists Head CT on 7/3/19 with senescent changes, microvasc disease, and and old infarct as described MRI as above Consider thyroid testing - she may nee synthroid which I do not see in her pre-hospital list 
She is profoundly debilitated and may be at the end of life regardless of treatment. Coagulopathy INR 2.6--->INR 6.6 on 7/3/19-->Vit K 5mg Iv given-->INR 1.7 on 7/4/19 Follow INR and Hgb daily No active bleeding Start subq Lovenox VTE prophyalxis (and SCDs) Defer when/if to restart coumdan/Hep drip  to Hospitalists Right Breast mass I have asked to her to see me in the office for exam and imaging Outpt imaging and follow-up planned Inpt mammography is not offered at [facility] She couldn't tolerate dx mammography at this time anyway in her current state I will write instructions in dc instructions section of connect care L4 abnormality on CT - Pathologic fracture related to thyroid carcinoma Defer to ortho who is following Family related info on 7/2/19 regarding a spinal met by biopsy related to her thyroid carcinoma at [facility] from approx 10 yrs ago and for which she refused XRT. No urgent general surgical issues at this time. I will check on her Mnday 7/8/19 I have personally performed a face-to-face diagnostic evaluation and management  service on this patient. I have independently seen the patient. I have independently obtained the above history from the patient/family. I have independently examined the patient with above findings. I have independently reviewed data/labs for this patient and developed the above plan of care (MDM). Signed: Chao Reno.  Kitty Jeffrey MD, FACS

## 2019-07-05 NOTE — PROGRESS NOTES
Patient was very peaceful Family was present and very compassionate to mother Room was calming Patient receptive to  presence Provided calming presence Spiritual: 
End of life care Family of ruthie Prayer offered Scripture promises Guided imagery of heaven Family demonstrates a positive attitude towards the care team 
 
 
Will follow patient closely during this admission Signed by Kary Al, staff

## 2019-07-05 NOTE — PROGRESS NOTES
Date 07/04/19 0700 - 07/05/19 4530 07/05/19 0700 - 07/06/19 9912 Shift 6032-7928 3877-6985 24 Hour Total 7285-2313 4167-6011 24 Hour Total  
INTAKE  
I.V.(mL/kg/hr)  881 881 I.V.  881 881 Shift Total(mL/kg)  [id number]) [id number]) OUTPUT Urine(mL/kg/hr)  125 125 Urine Output (mL) (External Female Catheter 07/03/19)  125 125 Shift Total(mL/kg)  125(2.2) 125(2.2) NET  297 764 Weight (kg) 58.2 58 58 58 58 58 Hourly rounds done. Pt restless/grimacing, medicated for pain. Denies nausea, vomiting. Voiding output valeria/clear. No BM this shift. Cardiac monitor running Afib . Placed on 1 L O2 for comfort. Multiple family at bedside. All needs met at this time.

## 2019-07-05 NOTE — PROGRESS NOTES
SPEECH PATHOLOGY NOTE: 
 
Attempted to see patient for clinical swallowing evaluation this AM. RN reports patient is not alert. Speech therapy discontinued at this time after 4 attempts with patient not being alert for PO. Please re-consult if new concerns arise.  
 
 
 
Sebastián Carey MS, CCC-SLP

## 2019-07-05 NOTE — PROGRESS NOTES
Pt was comfortable in Bed. Per the patient's daughter patient has been made comfortable by the medical staff.  had prayer with patients family and offered continual support. Signed by Kathrine Funk. Samia Briones, Staff Keith pierre

## 2019-07-05 NOTE — PROGRESS NOTES
Physical Therapy Note: After attempting treatment this afternoon and conversing with RN, therapist is discontinuing acute PT services secondary to decline in medical status, patient is not appropriate for physical therapy intervention at this time. Pending POA decisions and potential palliative care intervention. Please re-consult acute PT/OT services if/when medically appropriate. Thank you, Jenifer Valverde, PT , DPT

## 2019-07-05 NOTE — PROGRESS NOTES
Nutrition F/U: 
TPN Management for Dr. Diaz Ruiz. Assessment: 
Weight 58 kg (6th floor bed 7/5/19), edema - none reported. The patient remains unresponsive, NPO and PPN-dependent to meet her daily nutritional needs. PPN was started last evening, no lipids since baseline TG level was not available. Good metabolic tolerance is reported with no abnormal labs today noted. Triglyceride 64 (7/5). Received a call from Dr. Diaz Ruiz this AM regarding advancing the PPN to meet her needs. Since the concentration of the solution cannot be advanced, our only option is to increase the rate. Dr. Diaz Ruiz felt that this should not be an issue, therefore PPN infusion rate will be increased to 100 ml/hr this evening. The family continues to request that no NGT, feeding tube or PICC be placed. IV Access: 
 Peripheral. 
Macronutrient Needs: 
Estimated calorie needs - 3569-2359 betzaida/day (25-30 betzaida/kgUBW/day) Estimated protein needs - 62-78 gm pro/day (1.2-1.5 gm pro/kgUBW/day) CHO limit/day - 300 gm CHO/day (4 mg/kgUBW/min/day) Fluid/day - 1.3-1.8 liters/day (1 ml/betzaida/day) Intake/Comparative Standards: 
Current intake of PPN (1.8 liters/78 ml/hr of 4.25%AA/5%DEX) provides 612 calories/day (47% calorie goal), 77 grams protein/day (100% protein goal), 90 grams CHO/day (does not exceed max CHO limit) and ~1800 ml volume/day (100% fluid goal). Intervention:  
Meals and Snacks: NPO. Parenteral Nutrition: 1) Continue PPN via peripheral line. 2) Start 250 ml 20% lipids over 24 hours daily. 3) Adjust PPN to decrease the sodium content from 65 meq/liter to 20 meq/liter since volume and rate will increase this evening to 2.4 liters/100 ml/hr - 1316 calories/day (100% calorie goal), 102 grams protein/day (>100% protein goal), 120 grams CHO/day (does not exceed max CHO limit) and 2650 ml volume/day (>100% fluid goal). 3) Additives/liter: sodium 20 (acetate), potassium 10 (phosphate), magnesium 5, phosphorus 10 (potassium). 3) MVI and MTE/bag. Coordination of Nutrition Care by a Nutrition Professional: Collaboration with Dr. Zafar Dior. Nutrition Discharge Plan: Too soon to determine. Zohra Ordaz. [facility] 
[phone]

## 2019-07-05 NOTE — PROGRESS NOTES
Dr. Te Wick asked this nurse to modify pt's pain medication dosage to 1mg Q6 hours from 0.5 Q6 Hours.

## 2019-07-05 NOTE — CONSULTS
Palliative Care Patient: Sherryle Caldwell MRN: [id number]  SSN: [id number] YOB: 1933  Age: 80 y.o. Sex: female Date of Request: 7/4/19 Date of Consult:  7/5/2019 Reason for Consult:  advanced care plan planning/end of life Requesting Physician: Dr. Lauren Hong Assessment/Plan:  
 
Principal Diagnosis:   
Encephalopathy, Unspecified  G93.40 Additional Diagnoses: · Agitation  R45.1 · Anorexia  R63.0 · Dysphagia  R13.10 · Frailty  R54 
· Pain, back  M54.9 · Counseling, Encounter for Medical Advice  Z71.9 
· Encounter for Palliative Care  Z51.5 Palliative Performance Scale (PPS): PPS: 10 Medical Decision Making:  
Reviewed and summarized notes from admission to present. Discussed case with appropriate providers: Dr. Lauren Hong Reviewed laboratory and x-ray data from admission to present. Pt resting in bed, dtr Debra Fallon and friend Andreea Thakur at bedside. The pt has just about fallen asleep. According to Debra Fallon, the pt is no longer speaking. Debra Fallon reports that the pt has had some back pain, for which the IV Dilaudid has been effective. Debra Fallon said that the pt's agitation has been controlled with Haldol and Geodon. Debra Fallon said that the pt has not had N/V  recently. Dr. Lauren Hong said that he plans to meet this afternoon with the pt's dtr Kevin Caballero, who is the pt's HCPOA. One of the issues he plans to discuss is nutrition. Currently the pt is receiving TPN. In Dr Cristo Bower note of 7/4, he says that the family wants \"patient to be comfortable and continue nutritional support per living will. They dont want aggressive treatment. \"  The Living Will, dated 2011, does state that the pt wants to receive artificial nutrition and hydration even if terminal.  However, the POA document, dated 2016, on p.11, in the next-to-the-last paragraph, lays out a wider range of considerations in determining whether the pt would want artificial nutrition and hydration.   These will need to be reviewed with the HCPOA. Per Dr Kuldeep Kent note of 7/5, the POA is not wanting hospice care at this time. He adds that the patient is \"profoundly debilitated and may be at the end of life regardless of treatment\". Dtr William Ronquillo said that the plan will be to take the pt home on discharge. She said that the pt has 9 children living near by and able to assist. 
 
Palliative Care will plan to address goals of care with POA on next visit. Will continue to follow. Will discuss findings with members of the interdisciplinary team.   
 
Thank you for this referral.    
 
In addition to the E&M described above, more than 50% of a 35-minute prolonged visit, from [phone], was spent on counseling and coordination of care. . 
 
Subjective:  
 
History obtained from:  Family, Care Provider and Chart Chief Complaint: Debility History of Present Illness:  Ms Andrez Cage is an 79 yo F with a PMH of HF (30-35%), pancreatitis, recent hospitalizations on 5/14 - 6/4 for a SBO that resolved without surgery, and 6/13 - 6/19 for hypotension, diarrhea, acute and chronic systolic CHF and KENISHA, UTI. Found to have L4 pathological fracture, breast mass, and UTI. She presented to the ER on 6/28/19 with c/o abdominal pain and N/V. Began vomiting on 7/2 and put on clear liquid diet. Seen by orthopedic surgery for consideration of biopsy/kyphoplasty. Experiencing acute encephalopathy. Afiv with RVR reported on 7/4. Pt not alert enough for a clinical swallowing evaluation. Advance Directive: Yes Code Status:  DNR Health Care Power of : Yes - Copy of [address] on file. Past Medical History:  
Diagnosis Date  Acquired hypothyroidism  Acute pancreatitis  Arthritis  CAD (coronary artery disease) 4/29/11 RCA occluded and fills with collaterals, LAD and Cx nonobstructive CAD, EF 50-55%  Cancer ([address] THYROID - treated with surgery and radiation  CHF (congestive heart failure) ([facility] [address] 11/8/2018  Chronic pain   
 right hip  Coagulopathy ([facility] [address] 6/16/2011  Colon polyps  Encephalopathy 7/4/2019  Follicular thyroid cancer ([facility] [address]  Gallstone pancreatitis  Glaucoma  Gout   
 hx of gout to right great toe - no problems  Hypertension   
 controlled with medication  Other ill-defined conditions(799.89)  Pancreatitis  Paroxysmal A-fib ([facility] [address] 11/8/2018  PE (pulmonary embolism) 2008  Pulmonary HTN (HCC)   
 moderate  Thromboembolus ([facility] [address] 1995  
 right leg - Hemashield graft/filter placed in abdomen  Thyroid disease   
 pt had total thyroidectomy - takes levoxyl daily  UTI (urinary tract infection) 6/3/2019 Past Surgical History:  
Procedure Laterality Date  HX APPENDECTOMY  ? 1990  
 HX CHOLECYSTECTOMY  6/2011 Dr. Santizo [facility] [address]  
 hemashield graft/filter placed due to blood clot right leg  HX THYROIDECTOMY  2010  
 partial  
 HX TONSILLECTOMY [address] [facility] ARTHROPLASTY  2008 & 2011  
 right hip Family History Problem Relation Age of Onset  Stroke Mother  Hypertension Mother  Cancer Father   
     prostate  Diabetes Maternal Uncle Social History Tobacco Use  Smoking status: Former Smoker  Smokeless tobacco: Never Used  Tobacco comment: quit smoking 1995 Substance Use Topics  Alcohol use: No  
 
Prior to Admission medications Medication Sig Start Date End Date Taking? Authorizing Provider  
lisinopril (PRINIVIL, ZESTRIL) 10 mg tablet Take 1 Tab by mouth daily. 6/20/19  Yes Isaac Kilpatrick MD  
furosemide (LASIX) 20 mg tablet Take 2 Tabs by mouth two (2) times a day for 30 days. 6/19/19 7/19/19 Yes Isaac Kilpatrick MD  
potassium chloride SR (KLOR-CON 10) 10 mEq tablet Take 10 mEq by mouth daily. Yes Provider, Historical  
warfarin (COUMADIN) 2 mg tablet Take 1 Tab by mouth every evening.  Follow up with PCP for INR checks 6/4/19  Yes Maxim Cantu MD  
lidocaine (SALONPAS/ASPERCREME) 4 % patch Place on area of pain 7/23/18  Yes Deepak Geller MD  
acetaminophen (TYLENOL EX STR ARTHRITIS PAIN) 500 mg tablet Take  by mouth every six (6) hours as needed. Yes Provider, Historical  
folic acid (FOLVITE) 1 mg tablet Take 1 mg by mouth daily. Yes Provider, Historical  
gabapentin (NEURONTIN) 300 mg capsule Take 300 mg by mouth two (2) times a day. Indications: also takes 3rd dose as needed   Yes Provider, Historical  
ergocalciferol (ERGOCALCIFEROL) 50,000 unit capsule Take 50,000 Units by mouth every Sunday. 6/19/11  Yes Emi Grant MD  
simvastatin (ZOCOR) 20 mg tablet Take 20 mg by mouth daily. Take DOS per anesthesia protocol. 4/29/11  Yes Mony Brown MD  
ALPHAGAN P 0.1 % Drop Apply 1 Drop to eye two (2) times a day. 1 DROP TO EACH EYE. Pt to use DOS per anesthesia protocol. 4/21/11  Yes Provider, Historical  
TRAVATAN 0.004 % Drop 1 Drop by Both Eyes route every evening. Yes Provider, Historical  
 
 
Allergies Allergen Reactions  Ativan [Lorazepam] Other (comments) Has opposite reaction Review of Systems: 
Review of systems not obtained due to patient factors. Pt unresponsive. Objective:  
 
Visit Vitals /72 Pulse (!) 105 Temp 97.8 °F (36.6 °C) Resp 16 Ht 5' (1.524 m) Wt 127 lb 14.4 oz (58 kg) SpO2 100% BMI 24.98 kg/m² Physical Exam: 
 
General:  Drowsy. No acute distress. Eyes:  Conjunctivae/corneas clear Nose: Nares normal. Septum midline. Neck: Supple, symmetrical, trachea midline, no JVD Lungs:   Clear to auscultation bilaterally, unlabored Heart:  Regular rate and rhythm, no murmur Abdomen:   Soft, non-tender, non-distended Extremities: Normal, atraumatic, no cyanosis or edema Skin: Skin color, texture, turgor normal. No rash or lesions. Neurologic: Unable to determine Psych: Not responsive Assessment:  
 
Hospital Problems  Date Reviewed: 6/19/2019 Codes Class Noted POA Encephalopathy ICD-10-CM: G93.40 ICD-9-CM: 348.30  7/4/2019 Yes Ileus ([facility] ICD-10-CM: K56.7 ICD-9-CM: 560.1  6/29/2019 Yes Dehydration ICD-10-CM: E86.0 ICD-9-CM: 276.51  6/28/2019 Yes Breast mass ICD-10-CM: N63.0 ICD-9-CM: 611.72  6/14/2019 Yes Acute UTI (urinary tract infection) ICD-10-CM: N39.0 ICD-9-CM: 599.0  6/3/2019 Yes KENISHA (acute kidney injury) ([facility] ICD-10-CM: N17.9 ICD-9-CM: 584.9  5/30/2019 Yes * (Principal) Atrial fibrillation with RVR ([facility] ICD-10-CM: I48.91 
ICD-9-CM: 427.31  5/29/2019 Yes Pathologic fracture of lumbar vertebra ICD-10-CM: M84.48XA ICD-9-CM: 733.13  5/29/2019 Yes Follicular thyroid cancer ([facility] ICD-10-CM: U75 ICD-9-CM: 883  6/19/2011 Yes Overview Signed 6/21/2011  7:12 AM by Morro Presley  
  thyroglobulin high > 100 obvious residual thyroid tissue HTN (hypertension) (Chronic) ICD-10-CM: I10 
ICD-9-CM: 401.9  6/16/2011 Yes Acquired hypothyroidism (Chronic) ICD-10-CM: E03.9 ICD-9-CM: 244.9  6/16/2011 Yes Overview Signed 6/19/2011  4:15 PM by Morro Presley Post thyroidectomy for thyroid cancer Signed By: Geovani Mendoza NP   
 July 5, 2019

## 2019-07-05 NOTE — PROGRESS NOTES
Problem: Falls - Risk of 
Goal: *Absence of Falls Description Document Geno Payment Fall Risk and appropriate interventions in the flowsheet. Outcome: Progressing Towards Goal 
  
Problem: Pressure Injury - Risk of 
Goal: *Prevention of pressure injury Description Document Sid Scale and appropriate interventions in the flowsheet. Outcome: Progressing Towards Goal 
  
Problem: Pain Goal: *Control of Pain Outcome: Progressing Towards Goal 
  
Problem: Nausea/Vomiting (Adult) Goal: *Absence of nausea/vomiting Outcome: Progressing Towards Goal

## 2019-07-05 NOTE — PROGRESS NOTES
Hospitalist Progress Note 2019 Admit Date: 2019  8:37 AM  
NAME: Ghulam Weir :  1933 DOS:              19 MRN:  [id number] Attending: Ijeoma Knott MD 
PCP:  Jimmy Faulkner MD 
Treatment Team: Attending Provider: Catherine Cano MD; Consulting Provider: Kim Shafer MD; Surgeon: Kim Shafer MD; Care Manager: Fidel Chaudhry RN; Utilization Review: Ivelisse Ansari RN; Consulting Provider: Anthony Hernandez NP 
 
DNR SUBJECTIVE: As previously documented: 
 
80 AAF, with a pmhx of CAD, moderate pericardial effusion, hypothyroidism, prior follicular cancer s/p ESCALERA, HTN, chronic systolic CHF, afib on coumadin, hx of DVT's and PE's with ivc filter in place,  l4 lesion and right breast 12mm nodule. This is her third admission, both previously lengthly, in the past two months.  
  
Patient was admitted   -  for abdominal pain and found to have SBO. Incidental findings of right breast mass and l4 lesion during imaging then. Surgery was consulted. SBO treated conservatively and resolved.  
  
She was admitted  -  for hypotension, diarrhea, ac/chronic systolic CHF and KENISHA, UTI. Cardiology/ GI were consulted at the time. Hypotension resolved with med changed (amiodarone dc'd). Diarrhea resolved. Cardiology saw pt for perciardial effusion and said to continue lasix. She was told to follow with Dr Kitty Jeffrey for breast mass and neurosurgery for L4 lesion. Per family, she has not had biopsy of these nor evaluations for these as of yet. 
  
Pt returns  with report of nausea, vomiting and a  UA questionable for UTI. Started on Rocephin and IVF. CT imaging shows bowel distension and moderate stool through colon. Also concern for increasing pericardial effusion on CT scan, still reporting as moderate on repeat echo. She had refused NGT. Patient had repeat CT AP with no SBO identified.  Ortho consulted for L4 pathological fracture. Daughter reported patient had a biopsy done in the past at [facility] awaiting for medical records. Daughter also reported patient refused radiotherapy of the L4 lesion. 07/05/19    David Dillon is frail looking, debilitated. Patient is not following commands. Daughter at bedside all questions answered. 10+ ROS reviewed and negative except for positive in HPI. Allergies Allergen Reactions  Ativan [Lorazepam] Other (comments) Has opposite reaction Current Facility-Administered Medications Medication Dose Route Frequency  TPN ADULT - dextrose 5% amino acid 4.25%   IntraVENous QPM  
 fat emulsion 20% (LIPOSYN, INTRAlipid) infusion 250 mL  250 mL IntraVENous QPM  
 albumin human 25% (BUMINATE) solution 12.5 g  12.5 g IntraVENous BID  cefTRIAXone (ROCEPHIN) 1 g in 0.9% sodium chloride (MBP/ADV) 50 mL  1 g IntraVENous Q24H  
 haloperidol lactate (HALDOL) injection 2 mg  2 mg IntraVENous Q4H PRN  
 ziprasidone (GEODON) 10 mg in sterile water (preservative free) 0.5 mL injection  10 mg IntraMUSCular Q12H PRN  
 HYDROmorphone (PF) (DILAUDID) injection 0.5 mg  0.5 mg IntraVENous Q6H PRN  
 naloxone (NARCAN) injection 0.4 mg  0.4 mg IntraVENous PRN  
 metoprolol (LOPRESSOR) injection 2.5 mg  2.5 mg IntraVENous Q6H  
 docusate sodium (COLACE) capsule 100 mg  100 mg Oral DAILY  sodium chloride (NS) flush 5-40 mL  5-40 mL IntraVENous PRN  
 acetaminophen (TYLENOL) suppository 650 mg  650 mg Rectal Q4H PRN  
 diphenhydrAMINE (BENADRYL) capsule 25 mg  25 mg Oral Q4H PRN  
 nicotine (NICODERM CQ) 21 mg/24 hr patch 1 Patch  1 Patch TransDERmal DAILY PRN  
 ondansetron (ZOFRAN) injection 4 mg  4 mg IntraVENous Q4H PRN  
 cloNIDine HCl (CATAPRES) tablet 0.2 mg  0.2 mg Oral BID PRN  
 hydrALAZINE (APRESOLINE) 20 mg/mL injection 10 mg  10 mg IntraVENous Q6H PRN Immunization History Administered Date(s) Administered  TB Skin Test (PPD) Intradermal 2019, 2019, 2019  Tuberculin 2008 Objective:  
 
Patient Vitals for the past 24 hrs: 
 Temp Pulse Resp BP SpO2  
19 1221 97.7 °F (36.5 °C) 99 20 (!) 140/94 99 % 19 0847 97.8 °F (36.6 °C) (!) 105 16 122/72 100 % 19 0402 97.4 °F (36.3 °C) 88 18 119/68 100 % 19 0252  (!) 101     
19 2306 99.5 °F (37.5 °C) (!) 118 22 134/82 94 % 19 1901 97.4 °F (36.3 °C) (!) 137 24 126/89 93 % 19 1646 98.1 °F (36.7 °C) (!) 105 24 130/84 (!) 67 % Temp (24hrs), Av °F (36.7 °C), Min:97.4 °F (36.3 °C), Max:99.5 °F (37.5 °C) Oxygen Therapy O2 Sat (%): 99 % (19 1221) Pulse via Oximetry: 86 beats per minute (19 1615) O2 Device: Nasal cannula (19 0148) O2 Flow Rate (L/min): 1 l/min (19 0148) Oxygen Therapy O2 Sat (%): 99 % (19 1221) Pulse via Oximetry: 86 beats per minute (19 1615) O2 Device: Nasal cannula (19 0148) O2 Flow Rate (L/min): 1 l/min (19 0148) Physical Exam: 
General:         Alert, frail HEENT:               NCAT. No obvious deformity. Lungs: No wheezing/rhonchi/rales Cardiovascular:   RRR. No m/r/g. No pedal edema b/l. Abdomen:       S/nt/nd. Bowel sounds normal. .  
Skin:         No rashes or lesions. Neurologic:     No gross focal deficit. Moving extremities. DIAGNOSTIC STUDIES Data Review:  
Recent Results (from the past 24 hour(s)) MAGNESIUM Collection Time: 19  5:58 PM  
Result Value Ref Range Magnesium 2.5 (H) 1.8 - 2.4 mg/dL PHOSPHORUS Collection Time: 19  5:58 PM  
Result Value Ref Range Phosphorus 2.9 2.3 - 3.7 MG/DL  
TRIGLYCERIDE Collection Time: 19  5:58 PM  
Result Value Ref Range Triglyceride 78 35 - 150 MG/DL PROTHROMBIN TIME + INR Collection Time: 19  5:58 PM  
Result Value Ref Range Prothrombin time 20.1 (H) 11.7 - 14.5 sec INR 1.7 VITAMIN B12 Collection Time: 07/04/19  5:58 PM  
Result Value Ref Range Vitamin B12 667 193 - 986 pg/mL CBC W/O DIFF Collection Time: 07/04/19  5:58 PM  
Result Value Ref Range WBC 7.4 4.3 - 11.1 K/uL  
 RBC 3.98 (L) 4.05 - 5.2 M/uL  
 HGB 10.0 (L) 11.7 - 15.4 g/dL HCT 31.0 (L) 35.8 - 46.3 % MCV 77.9 (L) 79.6 - 97.8 FL  
 MCH 25.1 (L) 26.1 - 32.9 PG  
 MCHC 32.3 31.4 - 35.0 g/dL  
 RDW 19.1 (H) 11.9 - 14.6 % PLATELET 007 997 - 113 K/uL MPV 11.1 9.4 - 12.3 FL ABSOLUTE NRBC 0.27 (H) 0.0 - 0.2 K/uL FOLATE Collection Time: 07/04/19  5:58 PM  
Result Value Ref Range Folate 30.0 (H) 3.1 - 17.5 ng/mL TSH 3RD GENERATION Collection Time: 07/04/19  5:58 PM  
Result Value Ref Range TSH <0.005 (L) 0.358 - 3.740 uIU/mL METABOLIC PANEL, BASIC Collection Time: 07/04/19  5:58 PM  
Result Value Ref Range Sodium 146 (H) 136 - 145 mmol/L Potassium 4.0 3.5 - 5.1 mmol/L Chloride 116 (H) 98 - 107 mmol/L  
 CO2 19 (L) 21 - 32 mmol/L Anion gap 11 7 - 16 mmol/L Glucose 94 65 - 100 mg/dL BUN 26 (H) 8 - 23 MG/DL Creatinine 0.96 0.6 - 1.0 MG/DL  
 GFR est AA >60 >60 ml/min/1.73m2 GFR est non-AA 59 (L) >60 ml/min/1.73m2 Calcium 9.1 8.3 - 10.4 MG/DL  
AMMONIA Collection Time: 07/04/19  5:58 PM  
Result Value Ref Range Ammonia 21 11 - 32 UMOL/L  
CBC W/O DIFF Collection Time: 07/05/19  6:48 AM  
Result Value Ref Range WBC 6.6 4.3 - 11.1 K/uL  
 RBC 3.78 (L) 4.05 - 5.2 M/uL HGB 9.6 (L) 11.7 - 15.4 g/dL HCT 29.8 (L) 35.8 - 46.3 % MCV 78.8 (L) 79.6 - 97.8 FL  
 MCH 25.4 (L) 26.1 - 32.9 PG  
 MCHC 32.2 31.4 - 35.0 g/dL  
 RDW 18.9 (H) 11.9 - 14.6 % PLATELET 876 (L) 557 - 450 K/uL MPV 11.0 9.4 - 12.3 FL ABSOLUTE NRBC 0.36 (H) 0.0 - 0.2 K/uL METABOLIC PANEL, BASIC Collection Time: 07/05/19  6:48 AM  
Result Value Ref Range Sodium 145 136 - 145 mmol/L Potassium 4.1 3.5 - 5.1 mmol/L  Chloride 115 (H) 98 - 107 mmol/L  
 CO2 21 21 - 32 mmol/L Anion gap 9 7 - 16 mmol/L Glucose 115 (H) 65 - 100 mg/dL BUN 32 (H) 8 - 23 MG/DL Creatinine 0.85 0.6 - 1.0 MG/DL  
 GFR est AA >60 >60 ml/min/1.73m2 GFR est non-AA >60 >60 ml/min/1.73m2 Calcium 8.4 8.3 - 10.4 MG/DL MAGNESIUM Collection Time: 19  6:48 AM  
Result Value Ref Range Magnesium 2.5 (H) 1.8 - 2.4 mg/dL PHOSPHORUS Collection Time: 19  6:48 AM  
Result Value Ref Range Phosphorus 3.0 2.3 - 3.7 MG/DL  
TRIGLYCERIDE Collection Time: 19  6:48 AM  
Result Value Ref Range Triglyceride 64 35 - 150 MG/DL All Micro Results Procedure Component Value Units Date/Time CULTURE, URINE [[id number]]  (Abnormal)  (Susceptibility) Collected:  19 1057 Order Status:  Completed Specimen:  Urine from Clean catch Updated:  19 7382 Special Requests: NO SPECIAL REQUESTS Culture result:    
  >100,000 COLONIES/mL STAPHYLOCOCCUS SPECIES, COAGULASE NEGATIVE  
     
      
  10,000 to 50,000 COLONIES/mL MIXED SKIN OTILIA ISOLATED  
     
 CULTURE, URINE [[id number]] Order Status:  Canceled Specimen:  Urine from Clean catch Imaging Jesus Freed /Studies: CXR Results  (Last 48 hours) None CT Results  (Last 48 hours) None No results found. Results for orders placed or performed during the hospital encounter of 19  
2D ECHO COMPLETE ADULT (TTE) W OR WO CONTR Narrative [facility] [address] [place], [address] [facility] 
[phone] Transthoracic Echocardiogram 
2D and Doppler Patient: Ray Arellano 
MR #: [id number] : [id number] Age: 80 years Gender: Female Study date: 2019 Account #: [de-identified] Height: 60 in Weight: 113 lb 
BSA: 1.47 mï¾² Status:Routine Location: [facility] BP: 120/ 72 Allergies: LORAZEPAM 
 
Sonographer:  Robel Mao RDCS Group:  [facility] Referring Physician:  Derick Vasquez MD 
 Reading Physician:  Mitch Lockhart. Nohemy Lambert MD 
 
INDICATIONS: Reevaluate pericardial effusion PROCEDURE: This was a routine study. A transthoracic echocardiogram was 
performed. The study included limited 2D imaging and limited spectral  
Doppler. Image quality was adequate. PERICARDIUM: A moderate pericardial effusion was identified circumferential  
to 
the heart. There was a left pleural effusion. SUMMARY: 
 
-  Pericardium: A moderate pericardial effusion was identified  
circumferential 
to the heart. There was a left pleural effusion. Prepared and signed by 
 
Ada Lambert MD 
Signed 29-Jun-2019 14:37:02 Labs and Studies from previous 24 hours have been personally reviewed by myself   
ASSESSMENT Active Hospital Problems Diagnosis Date Noted  Encephalopathy 07/04/2019  Ileus ([address] 06/29/2019  Dehydration 06/28/2019  Breast mass 06/14/2019  Acute UTI (urinary tract infection) 06/03/2019  KENISHA (acute kidney injury) ([address] 05/30/2019  Atrial fibrillation with RVR ([address] 05/29/2019  Pathologic fracture of lumbar vertebra 05/29/2019  Follicular thyroid cancer ([address] 06/19/2011 thyroglobulin high > 100 obvious residual thyroid tissue  HTN (hypertension) 06/16/2011  Acquired hypothyroidism 06/16/2011 Post thyroidectomy for thyroid cancer Hospital Problems as of 7/5/2019 Date Reviewed: 6/19/2019 Codes Class Noted - Resolved POA Encephalopathy ICD-10-CM: G93.40 ICD-9-CM: 348.30  7/4/2019 - Present Yes Ileus ([address] ICD-10-CM: K56.7 ICD-9-CM: 560.1  6/29/2019 - Present Yes Enteritis ICD-10-CM: K52.9 ICD-9-CM: 558.9  6/28/2019 - Present Dehydration ICD-10-CM: E86.0 ICD-9-CM: 276.51  6/28/2019 - Present Yes Breast mass ICD-10-CM: N63.0 ICD-9-CM: 611.72  6/14/2019 - Present Yes Acute UTI (urinary tract infection) ICD-10-CM: N39.0 ICD-9-CM: 599.0  6/3/2019 - Present Yes KENISHA (acute kidney injury) ([facility] ICD-10-CM: N17.9 ICD-9-CM: 584.9  5/30/2019 - Present Yes * (Principal) Atrial fibrillation with RVR ([facility] ICD-10-CM: I48.91 
ICD-9-CM: 427.31  5/29/2019 - Present Yes Pathologic fracture of lumbar vertebra ICD-10-CM: M84.48XA ICD-9-CM: 733.13  5/29/2019 - Present Yes Follicular thyroid cancer ([facility] ICD-10-CM: O06 ICD-9-CM: 114  6/19/2011 - Present Yes Overview Signed 6/21/2011  7:12 AM by Santosh Gan  
  thyroglobulin high > 100 obvious residual thyroid tissue HTN (hypertension) (Chronic) ICD-10-CM: I10 
ICD-9-CM: 401.9  6/16/2011 - Present Yes Acquired hypothyroidism (Chronic) ICD-10-CM: E03.9 ICD-9-CM: 244.9  6/16/2011 - Present Yes Overview Signed 6/19/2011  4:15 PM by Santosh Gan Post thyroidectomy for thyroid cancer A/P: 
 
-Acute encephalopathy MRI with no acute process I65, ammonia and folic acid on normal ranges TSH suppressed. With a history of thyroid CA this could be ?horme secreting CA Get free T4 and T3 Daughter reported JIGNESH will arrive to the hospital in the afternoon. I will discussed with JIGNESH new findings to determine plan of care. Palliative care evaluation appreciated -Ileus vs partial SBO Surgery evaluation appreciated. Recommended CT  No evidence of SBO. Patient is agitated today. She does not want to eat Cont clear liquid diet and hydration with IVFs Discussed with POA and family at bedside. Per living will if patient was in this condition she wanted nutrition with tubes or IV. Patient has refused in the past NGT and daughter reported she did not want Peg tube. On PPN  
 
-L4 fracture CT showing destructive process L4 pathological fracture metastasic disease vs myeloma/plasmacytoma. Ortho recommended IR for biopsy of lesion [facility] sent path from thyroidectomy Discussed with POA, she stated patient did not want aggressive treatment or chemo. 
 
 
-Acute UTI Ucx with staph coag neg Completed 5 days course Stop abxs. -Afib Received Vit K 07/03 for supra therapeutic INR INR: 1.7 today Start lovenox BID for now. DVT Prophylaxis: lovenox CODE Status: DNR Marlon Hyde MD 
07/05/19

## 2019-07-06 NOTE — PROGRESS NOTES
Nutrition F/U: 
TPN Management for Dr. Carlie Leach. Assessment: 
Weight 56.3 kg (6th floor bed 7/6/19) The patient remains unresponsive, NPO and PPN-dependent to meet her daily nutritional needs. PPN was started 7/4, lipids started 7/5. The family continues to request that no NGT, feeding tube or PICC be placed and has asked labs and blood glucose checks be discontinued. PPN solution was advanced to 100 ml/hr yesterday in collaboration with Dr. Carlie Leach, but per family wishes Dr. Autumn Le discontinued labs and glucose checks- RD unable to measure metabolic tolerance. Per Dr. Carlie Leach, will continue same solution tonight. IV Access: 
 Peripheral. 
Macronutrient Needs: 
Estimated calorie needs - 2118-8795 betzaida/day (25-30 betzaida/kgUBW/day) Estimated protein needs - 62-78 gm pro/day (1.2-1.5 gm pro/kgUBW/day) CHO limit/day - 300 gm CHO/day (4 mg/kgUBW/min/day) Fluid/day - 1.3-1.8 liters/day (1 ml/betzaida/day) Intake/Comparative Standards: 
Current intake of PPN (2.4 liters/100 ml/hr of 4.25%AA/5%DEX) and 250 ml 20% lipids over 24 hours daily provides 1316 calories/day (100% calorie goal), 102 grams protein/day (>100% protein goal), 120 grams CHO/day (does not exceed max CHO limit) and 2650 ml volume/day (>100% fluid goal). Intervention:  
Meals and Snacks: NPO. Parenteral Nutrition: 1) No change to PPN tonight. 2) Unable to adjust PPN formulation without data, per Dr. Carlie Leach, continue same electrolytes as yesterday. Dr. Carlie Leach to address need for lab monitoring. 3) Additives/liter: sodium 20 (acetate), potassium 10 (phosphate), magnesium 5, phosphorus 10 (potassium), MVI and MTE/bag. Coordination of Nutrition Care by a Nutrition Professional: Collaboration with Kelly Lau RN and Dr. Carlie Leach Nutrition Discharge Plan: Too soon to determine. Humberto Weir, [address], [address] [place] [address]

## 2019-07-06 NOTE — PROGRESS NOTES
Problem: Falls - Risk of 
Goal: *Absence of Falls Description Document Tam Hobbs Fall Risk and appropriate interventions in the flowsheet. Outcome: Progressing Towards Goal 
  
Problem: Pain Goal: *Control of Pain Outcome: Progressing Towards Goal 
  
Problem: Nutrition Deficit Goal: *Optimize nutritional status Outcome: Progressing Towards Goal

## 2019-07-06 NOTE — PROGRESS NOTES
Date 07/05/19 0700 - 07/06/19 9724 07/06/19 0700 - 07/07/19 5146 Shift [id number] 1900-0659 24 Hour Total [id number] 6046-6743 24 Hour Total  
INTAKE Shift Total(mL/kg) OUTPUT Urine(mL/kg/hr)  225 225 Urine Occurrence(s)  1 x 1 x Urine Output (mL) (External Female Catheter 07/03/19)  225 225 Shift Total(mL/kg)  225(3.9) 225(3.9) NET  -225 -225 Weight (kg) 58 58 58 58 58 58 Hourly rounds done. Pt restless/agitated, medicated for pain. Denies nausea, vomiting. External cath output valeria/clear. No BM this shift. Periodic episodes of clear vocal speech than night before. Cardiac monitor running Afib . Multiple family at bedside. All needs met at this time.

## 2019-07-06 NOTE — PROGRESS NOTES
Problem: Falls - Risk of 
Goal: *Absence of Falls Description Document Tam Aliciaadam Fall Risk and appropriate interventions in the flowsheet. Outcome: Progressing Towards Goal 
  
Problem: Pressure Injury - Risk of 
Goal: *Prevention of pressure injury Description Document Sid Scale and appropriate interventions in the flowsheet. Outcome: Progressing Towards Goal 
  
Problem: Pain Goal: *PALLIATIVE CARE:  Alleviation of Pain Outcome: Progressing Towards Goal 
  
Problem: Nausea/Vomiting (Adult) Goal: *Absence of nausea/vomiting Outcome: Progressing Towards Goal 
  
Problem: Nutrition Deficit Goal: *Optimize nutritional status Outcome: Progressing Towards Goal

## 2019-07-06 NOTE — PROGRESS NOTES
Oneyda, the dietitian question about labs for pt while being on PPN. Primary nurse states that she will follow up with primary physician.

## 2019-07-06 NOTE — PROGRESS NOTES
Hospitalist Progress Note 2019 Admit Date: 2019  8:37 AM  
NAME: Lukasz Chacon :  1933 DOS:              19 MRN:  [id number] Attending: Ernesto Myers MD 
PCP:  Zaida Shelby MD 
Treatment Team: Attending Provider: Gary Barrios MD; Consulting Provider: Faizan Cintron MD; Surgeon: Faizan Cintron MD; Care Manager: Mary Smith RN; Utilization Review: Meg Floyd RN; Consulting Provider: Juliana Morales NP 
 
DNR SUBJECTIVE: As previously documented: 
 
80 AAF, with a pmhx of CAD, moderate pericardial effusion, hypothyroidism, prior follicular cancer s/p ESCALERA, HTN, chronic systolic CHF, afib on coumadin, hx of DVT's and PE's with ivc filter in place,  l4 lesion and right breast 12mm nodule. This is her third admission, both previously lengthly, in the past two months.  
  
Patient was admitted   -  for abdominal pain and found to have SBO. Incidental findings of right breast mass and l4 lesion during imaging then. Surgery was consulted. SBO treated conservatively and resolved.  
  
She was admitted  -  for hypotension, diarrhea, ac/chronic systolic CHF and KENISHA, UTI. Cardiology/ GI were consulted at the time. Hypotension resolved with med changed (amiodarone dc'd). Diarrhea resolved. Cardiology saw pt for perciardial effusion and said to continue lasix. She was told to follow with Dr Christi Ch for breast mass and neurosurgery for L4 lesion. Per family, she has not had biopsy of these nor evaluations for these as of yet. 
  
Pt returns  with report of nausea, vomiting and a  UA questionable for UTI. Started on Rocephin and IVF. CT imaging shows bowel distension and moderate stool through colon. Also concern for increasing pericardial effusion on CT scan, still reporting as moderate on repeat echo. She had refused NGT. Patient had repeat CT AP with no SBO identified.  Ortho consulted for L4 pathological fracture. Daughter reported patient had a biopsy done in the past at [facility], awaiting for medical records. Daughter also reported patient refused radiotherapy of the L4 lesion. 07/06/19    Brad Jarrett is frail looking, debilitated. Patient is more awake today tolerating some sips of water. 10+ ROS reviewed and negative except for positive in HPI. Allergies Allergen Reactions  Ativan [Lorazepam] Other (comments) Has opposite reaction Current Facility-Administered Medications Medication Dose Route Frequency  TPN ADULT - dextrose 5% amino acid 4.25%   IntraVENous QPM  
 fat emulsion 20% (LIPOSYN, INTRAlipid) infusion 250 mL  250 mL IntraVENous QPM  
 enoxaparin (LOVENOX) injection 60 mg  60 mg SubCUTAneous Q12H  
 HYDROmorphone (PF) (DILAUDID) injection 1 mg  1 mg IntraVENous Q6H PRN  
 haloperidol lactate (HALDOL) injection 2 mg  2 mg IntraVENous Q4H PRN  
 ziprasidone (GEODON) 10 mg in sterile water (preservative free) 0.5 mL injection  10 mg IntraMUSCular Q12H PRN  
 naloxone (NARCAN) injection 0.4 mg  0.4 mg IntraVENous PRN  
 metoprolol (LOPRESSOR) injection 2.5 mg  2.5 mg IntraVENous Q6H  
 docusate sodium (COLACE) capsule 100 mg  100 mg Oral DAILY  sodium chloride (NS) flush 5-40 mL  5-40 mL IntraVENous PRN  
 acetaminophen (TYLENOL) suppository 650 mg  650 mg Rectal Q4H PRN  
 diphenhydrAMINE (BENADRYL) capsule 25 mg  25 mg Oral Q4H PRN  
 nicotine (NICODERM CQ) 21 mg/24 hr patch 1 Patch  1 Patch TransDERmal DAILY PRN  
 ondansetron (ZOFRAN) injection 4 mg  4 mg IntraVENous Q4H PRN  
 cloNIDine HCl (CATAPRES) tablet 0.2 mg  0.2 mg Oral BID PRN  
 hydrALAZINE (APRESOLINE) 20 mg/mL injection 10 mg  10 mg IntraVENous Q6H PRN Immunization History Administered Date(s) Administered  TB Skin Test (PPD) Intradermal 05/29/2019, 06/14/2019, 06/29/2019  Tuberculin 12/12/2008 Objective:  
 
Patient Vitals for the past 24 hrs: Temp Pulse Resp BP SpO2  
19 0807     100 % 19 0741 97.4 °F (36.3 °C) (!) 106 17 127/88 100 % 19 0454 98 °F (36.7 °C) (!) 103 16 (!) 132/92 100 % 19 0302  89     
19 2307 98.2 °F (36.8 °C) (!) 146 20 (!) 131/96 99 % 19 1913 98.8 °F (37.1 °C) (!) 104 20 119/77 100 % 19 1635 97.8 °F (36.6 °C) (!) 114 20 124/80 98 % Temp (24hrs), Av °F (36.7 °C), Min:97.4 °F (36.3 °C), Max:98.8 °F (37.1 °C) Oxygen Therapy O2 Sat (%): 100 % (19) Pulse via Oximetry: 58 beats per minute (19) O2 Device: Nasal cannula (19) O2 Flow Rate (L/min): 1 l/min (19) Oxygen Therapy O2 Sat (%): 100 % (19) Pulse via Oximetry: 58 beats per minute (19) O2 Device: Nasal cannula (19) O2 Flow Rate (L/min): 1 l/min (19) Physical Exam: 
General:         Alert, frail HEENT:               NCAT. No obvious deformity. Lungs: No wheezing/rhonchi/rales Cardiovascular:   RRR. No m/r/g. No pedal edema b/l. Abdomen:       S/nt/nd. Bowel sounds normal. .  
Skin:         No rashes or lesions. Neurologic:     No gross focal deficit. Moving extremities. DIAGNOSTIC STUDIES Data Review:  
Recent Results (from the past 24 hour(s)) PROTHROMBIN TIME + INR Collection Time: 19  3:45 PM  
Result Value Ref Range Prothrombin time 20.7 (H) 11.7 - 14.5 sec INR 1.8 T4, FREE Collection Time: 19  3:45 PM  
Result Value Ref Range T4, Free 2.2 (H) 0.9 - 1.8 NG/DL  
T3 TOTAL Collection Time: 19  3:45 PM  
Result Value Ref Range T3, total 0.56 (L) 0.60 - 1.81 ng/mL PHOSPHORUS Collection Time: 19 10:52 AM  
Result Value Ref Range Phosphorus 2.5 2.3 - 3.7 MG/DL  
TRIGLYCERIDE Collection Time: 19 10:52 AM  
Result Value Ref Range Triglyceride 118 35 - 150 MG/DL All Micro Results Procedure Component Value Units Date/Time CULTURE, URINE [[id number]]  (Abnormal)  (Susceptibility) Collected:  19 1057 Order Status:  Completed Specimen:  Urine from Clean catch Updated:  19 4186 Special Requests: NO SPECIAL REQUESTS Culture result:    
  >100,000 COLONIES/mL STAPHYLOCOCCUS SPECIES, COAGULASE NEGATIVE  
     
      
  10,000 to 50,000 COLONIES/mL MIXED SKIN OTILAI ISOLATED  
     
 CULTURE, URINE [[id number]] Order Status:  Canceled Specimen:  Urine from Clean catch Imaging Savannah Sine /Studies: CXR Results  (Last 48 hours) None CT Results  (Last 48 hours) None No results found. Results for orders placed or performed during the hospital encounter of 19  
2D ECHO COMPLETE ADULT (TTE) W OR WO CONTR [facility]arrative [facility] [address] [place] [address] Dr Muse, [address] [facility] 
[phone] Transthoracic Echocardiogram 
2D and Doppler Patient: Kandi Oneill 
MR #: [id number] : [id number] Age: 80 years Gender: Female Study date: 2019 Account #: [de-identified] Height: 60 in Weight: 113 lb 
BSA: 1.47 mï¾² Status:Routine Location: [facility] BP: 120/ 72 Allergies: LORAZEPAM 
 
Sonographer:  Geo Costa RDCS Group:  [id number] Cardiology Referring Physician:  Nemesio Mckee MD 
Reading Physician:  Maureen Corbin. Amanda Lopez MD 
 
INDICATIONS: Reevaluate pericardial effusion PROCEDURE: This was a routine study. A transthoracic echocardiogram was 
performed. The study included limited 2D imaging and limited spectral  
Doppler. Image quality was adequate. PERICARDIUM: A moderate pericardial effusion was identified circumferential  
to 
the heart. There was a left pleural effusion. SUMMARY: 
 
-  Pericardium: A moderate pericardial effusion was identified  
circumferential 
to the heart. There was a left pleural effusion. Prepared and signed by 
 
Tamara Lopez MD 
 Signed 29-Jun-2019 14:37:02 Labs and Studies from previous 24 hours have been personally reviewed by myself   
ASSESSMENT Active Hospital Problems Diagnosis Date Noted  Encephalopathy 07/04/2019  Ileus ([facility] 06/29/2019  Dehydration 06/28/2019  Breast mass 06/14/2019  Acute UTI (urinary tract infection) 06/03/2019  KENISHA (acute kidney injury) ([facility] 05/30/2019  Atrial fibrillation with RVR ([facility] 05/29/2019  Pathologic fracture of lumbar vertebra 05/29/2019  Follicular thyroid cancer ([facility] 06/19/2011 thyroglobulin high > 100 obvious residual thyroid tissue  HTN (hypertension) 06/16/2011  Acquired hypothyroidism 06/16/2011 Post thyroidectomy for thyroid cancer Hospital Problems as of 7/6/2019 Date Reviewed: 6/19/2019 Codes Class Noted - Resolved POA Encephalopathy ICD-10-CM: G93.40 ICD-9-CM: 348.30  7/4/2019 - Present Yes Ileus ([facility] ICD-10-CM: K56.7 ICD-9-CM: 560.1  6/29/2019 - Present Yes Enteritis ICD-10-CM: K52.9 ICD-9-CM: 558.9  6/28/2019 - Present Dehydration ICD-10-CM: E86.0 ICD-9-CM: 276.51  6/28/2019 - Present Yes Breast mass ICD-10-CM: N63.0 ICD-9-CM: 611.72  6/14/2019 - Present Yes Acute UTI (urinary tract infection) ICD-10-CM: N39.0 ICD-9-CM: 599.0  6/3/2019 - Present Yes KENISHA (acute kidney injury) ([facility] ICD-10-CM: N17.9 ICD-9-CM: 584.9  5/30/2019 - Present Yes * (Principal) Atrial fibrillation with RVR ([facility] ICD-10-CM: I48.91 
ICD-9-CM: 427.31  5/29/2019 - Present Yes Pathologic fracture of lumbar vertebra ICD-10-CM: M84.48XA ICD-9-CM: 733.13  5/29/2019 - Present Yes Follicular thyroid cancer ([address] ICD-10-CM: C61 ICD-9-CM: 352  6/19/2011 - Present Yes Overview Signed 6/21/2011  7:12 AM by Belkis Colby  
  thyroglobulin high > 100 obvious residual thyroid tissue  HTN (hypertension) (Chronic) ICD-10-CM: I10 
 ICD-9-CM: 401.9  6/16/2011 - Present Yes Acquired hypothyroidism (Chronic) ICD-10-CM: E03.9 ICD-9-CM: 244.9  6/16/2011 - Present Yes Overview Signed 6/19/2011  4:15 PM by Lowry Bence Post thyroidectomy for thyroid cancer A/P: 
 
-Acute encephalopathy MRI with no acute process L24, ammonia and folic acid on normal ranges TSH suppressed but free t4 and T3 with appropriate levels JIGNESH requested patient to be discharged home. Case discussed with CM. POA was informed that likely insurance wont pay for PPN/TPN at home. They don't want hospice evaluation at the moment. Speech and swallow to follow up as patient is tolerating some sips of water. Plan is to discharge home if patient can tolerate some PO regimen for comfort care. -Ileus vs partial SBO Surgery evaluation appreciated. Recommended CT  No evidence of SBO. Discussed with POA and family at bedside. Per living will if patient was in this condition she wanted nutrition with tubes or IV. Patient has refused in the past NGT and daughter reported she did not want Peg tube. On PPN Speech evaluation 
 
-L4 fracture CT showing destructive process L4 pathological fracture metastasic disease vs myeloma/plasmacytoma. Ortho recommended IR for biopsy of lesion [facility] sent path from thyroidectomy Discussed with POA, she stated patient did not want aggressive treatment or chemo. 
 
 
-Acute UTI Ucx with staph coag neg Completed 5 days course 
 
-Afib Received Vit K 07/03 for supra therapeutic INR On lovenox BID 
 
 
 
DVT Prophylaxis: lovenox CODE Status: PREM Butt MD 
07/06/19

## 2019-07-07 NOTE — HOSPICE
[facility]- 
 
I spoke with pt's daughter/HCPOA, Randa Dean, and we discussed hospice services briefly. She states that she if familiar with hospice support but that some of her siblings are not familiar and have questions. The pt has 11 living children and 10 are local.  She will reach out to her family and let me know when a good time for the meeting will be. I will discuss with CM and MD as they may need to be present if family have questions beyond hospice scope. Thank you for this referral- 
 
Ino Gomez RN BSN Hospice Liaison [phone]

## 2019-07-07 NOTE — PROGRESS NOTES
Nutrition F/U: 
TPN Management for Dr. Zeenat DeL a Paz. Assessment: 
Weight 57 kg (6th floor bed 7/7/19) The patient advanced to clear liquid diet and remains PPN dependent to meet her daily nutritional needs. SLP shahabal ordered to facilitate oral intake in preparation for discharge. PPN was started 7/4, lipids started 7/5. The family continues to request that no NGT, feeding tube or PICC be placed but has agreed for some lab monitoring. PPN solution was advanced to 100 ml/hr 7/5. K+ 3.4 (below normal range) (Osmolar load prohibits a more concentrated solution, as well as increased amount of electrolytes to infuse via a peripheral line.) IV Access: 
 Peripheral. 
Macronutrient Needs: 
Estimated calorie needs - 2264-3136 betzaida/day (25-30 betzaida/kgUBW/day) Estimated protein needs - 62-78 gm pro/day (1.2-1.5 gm pro/kgUBW/day) CHO limit/day - 300 gm CHO/day (4 mg/kgUBW/min/day) Fluid/day - 1.3-1.8 liters/day (1 ml/betzaida/day) Intake/Comparative Standards: 
Current intake of PPN (2.4 liters/100 ml/hr of 4.25%AA/5%DEX) and 250 ml 20% lipids over 24 hours daily provides 1316 calories/day (100% calorie goal), 102 grams protein/day (>100% protein goal), 120 grams CHO/day (does not exceed max CHO limit) and 2650 ml volume/day (>100% fluid goal). Intervention:  
Meals and Snacks: clear liquid- though unlikely patient will be able to support her estimated needs through oral intake alone. Parenteral Nutrition: 1) No change to PPN tonight. 2) Unable to increase K+ in TPN due to osmolar load. 3) Additives/liter: sodium 20 (acetate), potassium 10 (phosphate), magnesium 5, phosphorus 10 (potassium), MVI and MTE/bag. Mineral Supplement Therapy: Nutrition Support Orders for Electrolyte Management Replacement are activated and placed on the MAR. Nutrition Discharge Plan: Too soon to determine. Though it is unlikely she will discharge with PN. Jose Ulrich, [address] [place] [address], [address] [place] [address]

## 2019-07-07 NOTE — PROGRESS NOTES
Problem: Dysphagia (Adult) Goal: *Acute Goals and Plan of Care (Insert Text) Description Goals None Outcome: Resolved/Met SPEECH LANGUAGE PATHOLOGY: BEDSIDE SWALLOW NOTE: INITIAL ASSESSMENT AND DISCHARGE 
 
NAME/AGE/GENDER: Azra Donovan is a 80 y.o. female DATE: 7/7/2019 PRIMARY DIAGNOSIS: Enteritis [K52.9] ICD-10: Treatment Diagnosis: R13.12 Dysphagia, Oropharyngeal Phase INTERDISCIPLINARY COLLABORATION: Registered Nurse PRECAUTIONS/ALLERGIES: Ativan [lorazepam] ASSESSMENT:  
Based on the objective data described below, Ms. Moon presents with poor desire for intake. Patient initially refusing any/all trials. With further discussion regarding need to assess and encouragement from family - patient accepted ice chip, tsp water, and 4 straw sips of ensure clear with delayed swallow but no overt s/sx. Max cues to accept 1/2 tsp presentation of applesauce, however expectorated this into tissue. Patient shrugs her shoulders when asked why she is refusing PO trials. Does not denote pain/discomfort with swallowing. Also shrugs her shoulders when asked about wanting to go home. Family aware of poor desire for intake and education provided that swallow function appears delayed (likely behavioral as well) but functionally intact. Recommend continue current diet recommendations and upgrade as desired/tolerated. No further skilled speech therapy services indicated at this time. ?????? ? ? This section established at most recent assessment?????????? 
PROBLEM LIST (Impairments causing functional limitations): 
Poor desire for intake REHABILITATION POTENTIAL FOR STATED GOALS: Poor PLAN OF CARE:  
Patient will benefit from skilled intervention to address the following impairments. RECOMMENDATIONS AND PLANNED INTERVENTIONS (Benefits and precautions of therapy have been discussed with the patient.): 
Liquids:  regular thin MEDICATIONS: 
Crushed in puree Non-oral 
ASPIRATION PRECAUTIONS: 
 Slow rate of intake Small bites/sips Upright at 90 degrees during meal 
COMPENSATORY STRATEGIES/MODIFICATIONS INCLUDING: 
None OTHER RECOMMENDATIONS (including follow up treatment recommendations):  
Family training/education Patient education RECOMMENDED DIET MODIFICATIONS DISCUSSED WITH: 
Nursing Family Patient FREQUENCY/DURATION: No further speech therapy indicated at this time as oropharyngeal swallow function does not appear to be reason for poor intakes. RECOMMENDED REHABILITATION/EQUIPMENT: (at time of discharge pending progress): Due to the probability of continued deficits (see above) this patient will not likely need continued skilled speech therapy after discharge. SUBJECTIVE:  
Nods head mostly to communicate utilizing yes/no questions. Able to verbalize desires \"I do not want any more\" History of Present Injury/Illness: Ms. Rickie Cobian  has a past medical history of Acquired hypothyroidism, Acute pancreatitis, Arthritis, CAD (coronary artery disease) (4/29/11), Cancer ([address] CHF (congestive heart failure) ([address] (11/8/2018), Chronic pain, Coagulopathy ([address] (6/16/2011), Colon polyps, Encephalopathy (3/1/5648), Follicular thyroid cancer ([address] Gallstone pancreatitis, Glaucoma, Gout, Hypertension, Other ill-defined conditions(799.89), Pancreatitis, Paroxysmal A-fib ([address] (11/8/2018), PE (pulmonary embolism) (2008), Pulmonary HTN ([address] Thromboembolus ([address] (1995), Thyroid disease, and UTI (urinary tract infection) (6/3/2019).  She also has no past medical history of Aneurysm ([address] Asthma, Autoimmune disease ([address] Chronic kidney disease, COPD, Dementia, Diabetes ([address] Difficult intubation, Liver disease, Malignant hyperthermia due to anesthesia, Nausea & vomiting, Pseudocholinesterase deficiency, Psychiatric disorder, PUD (peptic ulcer disease), Seizures ([address] Sleep disorder, Stroke ([address] Unspecified adverse effect of anesthesia, or Unspecified sleep apnea. .  She also  has a past surgical history that includes hx thyroidectomy (2010); hx appendectomy (?1990); hx tonsillectomy; hx other surgical (1995); pr total hip arthroplasty (2008 & 2011); and hx cholecystectomy (6/2011). Present Symptoms:   
Pain Scale 1: FLACC Pain Intensity 1: 4 Pain Location 1: Leg 
Pain Intervention(s) 1: Nurse notified Current Medications: No current facility-administered medications on file prior to encounter. Current Outpatient Medications on File Prior to Encounter Medication Sig Dispense Refill  
 lisinopril (PRINIVIL, ZESTRIL) 10 mg tablet Take 1 Tab by mouth daily. 30 Tab 0  
 furosemide (LASIX) 20 mg tablet Take 2 Tabs by mouth two (2) times a day for 30 days. 120 Tab 0  
 potassium chloride SR (KLOR-CON 10) 10 mEq tablet Take 10 mEq by mouth daily. warfarin (COUMADIN) 2 mg tablet Take 1 Tab by mouth every evening. Follow up with PCP for INR checks 30 Tab 2  
 lidocaine (SALONPAS/ASPERCREME) 4 % patch Place on area of pain 1 Patch 0  
 acetaminophen (TYLENOL EX STR ARTHRITIS PAIN) 500 mg tablet Take  by mouth every six (6) hours as needed. folic acid (FOLVITE) 1 mg tablet Take 1 mg by mouth daily. gabapentin (NEURONTIN) 300 mg capsule Take 300 mg by mouth two (2) times a day. Indications: also takes 3rd dose as needed    
 ergocalciferol (ERGOCALCIFEROL) 50,000 unit capsule Take 50,000 Units by mouth every Sunday. simvastatin (ZOCOR) 20 mg tablet Take 20 mg by mouth daily. Take DOS per anesthesia protocol. ALPHAGAN P 0.1 % Drop Apply 1 Drop to eye two (2) times a day. 1 DROP TO EACH EYE. Pt to use DOS per anesthesia protocol. TRAVATAN 0.004 % Drop 1 Drop by Both Eyes route every evening. Current Dietary Status:   
 Clear liquids History of reflux:  NO Reflux medication:  
Social History/Home Situation:    
Home Environment: Private residence One/Two Story Residence: One story Living Alone: No 
 Support Systems: Child(chavo), Family member(s) Patient Expects to be Discharged to[de-identified] Private residence Current DME Used/Available at Home: be Ca OBJECTIVE:  
Respiratory Status: CXR Results:n/a 
MRI/CT Results:n/a 
Oral Motor Structure/Speech:  Oral-Motor Structure/Motor Speech Labial: Decreased rate Oral Hygiene: dry Lingual: Decreased rate Cognitive and Communication Status: 
Neurologic State: Eyes open to voice Orientation Level: Oriented to person Cognition: Decreased attention/concentration Perception: Appears intact Safety/Judgement: Decreased insight into deficits BEDSIDE SWALLOW EVALUATION Oral Assessment: 
Oral Assessment Labial: Decreased rate Oral Hygiene: dry Lingual: Decreased rate P.O. Trials: 
Patient Position: upright in bed The patient was given bite/sip amounts of the following:  
Consistency Presented: Thin liquid; Ice chips;Puree How Presented: SLP-fed/presented;Spoon;Straw ORAL PHASE: 
Bolus Acceptance: Impaired Bolus Formation/Control: Impaired Propulsion: Delayed (# of seconds) Type of Impairment: Piecemeal;Delayed Oral Residue: Greater than 50% of bolus(expectorated puree) PHARYNGEAL PHASE: 
Initiation of Swallow: Delayed (# of seconds) Laryngeal Elevation: Functional 
Aspiration Signs/Symptoms: None Vocal Quality: Low volume OTHER OBSERVATIONS: 
Rate/bite size: Questionable Endurance:  Questionable Comments: unable to fully assess due to poor acceptance of trials Tool Used: Dysphagia Outcome and Severity Scale (DAPHNEY) Score Comments Normal Diet  ? 7 With no strategies or extra time needed Functional Swallow  ? 6 May have mild oral or pharyngeal delay Mild Dysphagia ? 5 Which may require one diet consistency restricted (those who demonstrate penetration which is entirely cleared on MBS would be included) Mild-Moderate Dysphagia  ? 4 With 1-2 diet consistencies restricted Moderate Dysphagia  ? 3 With 2 or more diet consistencies restricted Moderately Severe Dysphagia  ? 2 With partial PO strategies (trials with ST only) Severe Dysphagia  ? 1 With inability to tolerate any PO safely Score:  Initial: 3 Most Recent: X (Date: --) Interpretation of Tool: The Dysphagia Outcome and Severity Scale (DPAHNEY) is a simple, easy-to-use, 7-point scale developed to systematically rate the functional severity of dysphagia based on objective assessment and make recommendations for diet level, independence level, and type of nutrition. Payor: [employer] MEDICARE / Plan: [employer] [address] / Product Type: [employer] Medicare /  
 
TREATMENT:  
 (In addition to Assessment/Re-Assessment sessions the following treatments were rendered) Assessment/Reassessment only, no treatment provided today MODALITIES:  
  
  
  
  
  
  
  
  
  
  
    
  
  
  
  
  
  
  
  
   
 
ORAL MOTOR  EXERCISES: 
  
  
  
  
  
  
  
  
  
  
  
  
  
  
  
  
  
  
  
  
  
  
  
  
  
  
    
  
  
  
  
  
  
  
  
  
  
  
  
  
  
  
  
  
  
  
  
  
  
  
  
  
   
 
LARYNGEAL / PHARYNGEAL EXERCISES: 
  
  
  
  
  
  
  
  
  
  
  
  
  
  
  
  
  
  
  
  
  
    
  
  
  
  
  
  
  
  
  
  
  
  
  
  
  
  
  
  
  
   
 
                                __________________________________________________________________________________________________ Safety: After treatment position/precautions: 
Family at bedside Upright in Bed Treatment Assessment:  Patient required max cues to participate in evaluation. Progression/Medical Necessity:  
discontinue Compliance with Program/Exercises: Noncompliant much of the time Reason for Continuation of Services/Other Comments: 
discontinue Recommendations/Intent for next treatment session: Discontinue Total Treatment Duration: 
Time In: 1101 Time Out: 1122 Keshia Bond.  MS Davon, CCC-SLP

## 2019-07-07 NOTE — PROGRESS NOTES
Problem: Falls - Risk of 
Goal: *Absence of Falls Description Document Lina Sykes Fall Risk and appropriate interventions in the flowsheet. Outcome: Progressing Towards Goal 
  
Problem: Pressure Injury - Risk of 
Goal: *Prevention of pressure injury Description Document Sid Scale and appropriate interventions in the flowsheet. Outcome: Progressing Towards Goal 
  
Problem: Pain Goal: *Control of Pain Outcome: Progressing Towards Goal 
  
Problem: Nausea/Vomiting (Adult) Goal: *Absence of nausea/vomiting Outcome: Progressing Towards Goal 
  
Problem: Nutrition Deficit Goal: *Optimize nutritional status Outcome: Progressing Towards Goal 
  
 negative...

## 2019-07-07 NOTE — PROGRESS NOTES
Daughter states that pt is still not resting and is daughter is requesting something for anxiety for patient. Haldol administered as ordered.

## 2019-07-07 NOTE — PROGRESS NOTES
Hospitalist Progress Note 2019 Admit Date: 2019  8:37 AM  
NAME: Marianne Christianson :  1933 DOS:              19 MRN:  [id number] Attending: Elfego Chin MD 
PCP:  Hilary Heath MD 
Treatment Team: Attending Provider: Harpal Lima MD; Consulting Provider: Savannah Holly MD; Surgeon: Savannah Holly MD; Care Manager: Kimmie Avalos RN; Utilization Review: Tommie Wilson RN; Consulting Provider: Sanjeev Duarte, KERMIT; Charge Nurse: Jacobo Cardona; Occupational Therapy Assistant: Ramni Robertson 
 
DNR SUBJECTIVE: As previously documented: 
 
80 AAF, with a pmhx of CAD, moderate pericardial effusion, hypothyroidism, prior follicular cancer s/p ESCALERA, HTN, chronic systolic CHF, afib on coumadin, hx of DVT's and PE's with ivc filter in place,  l4 lesion and right breast 12mm nodule. This is her third admission, both previously lengthly, in the past two months.  
  
Patient was admitted   -  for abdominal pain and found to have SBO. Incidental findings of right breast mass and l4 lesion during imaging then. Surgery was consulted. SBO treated conservatively and resolved.  
  
She was admitted  -  for hypotension, diarrhea, ac/chronic systolic CHF and KENISHA, UTI. Cardiology/ GI were consulted at the time. Hypotension resolved with med changed (amiodarone dc'd). Diarrhea resolved. Cardiology saw pt for perciardial effusion and said to continue lasix. She was told to follow with Dr Diaz Ruiz for breast mass and neurosurgery for L4 lesion. Per family, she has not had biopsy of these nor evaluations for these as of yet. 
  
Pt returns  with report of nausea, vomiting and a  UA questionable for UTI. Started on Rocephin and IVF. CT imaging shows bowel distension and moderate stool through colon. Also concern for increasing pericardial effusion on CT scan, still reporting as moderate on repeat echo.  She had refused NGT. Patient had repeat CT AP with no SBO identified. Ortho consulted for L4 pathological fracture. Daughter reported patient had a biopsy done in the past at [facility], awaiting for medical records. Daughter also reported patient refused radiotherapy of the L4 lesion. 07/07/19    Marianne Christianson is frail looking, debilitated. Discussed with family members and JIGNESH at bedside patient is very debilitated only tolerating few sips of fluids. They will discussed with rest of the family about discontinuing  TPN and discharge home with home hospice. 10+ ROS reviewed and negative except for positive in HPI. Allergies Allergen Reactions  Ativan [Lorazepam] Other (comments) Has opposite reaction Current Facility-Administered Medications Medication Dose Route Frequency  NUTRITIONAL SUPPORT ELECTROLYTE PRN ORDERS   Does Not Apply PRN  
 TPN ADULT - dextrose 5% amino acid 4.25%   IntraVENous QPM  
 fat emulsion 20% (LIPOSYN, INTRAlipid) infusion 250 mL  250 mL IntraVENous QPM  
 enoxaparin (LOVENOX) injection 60 mg  60 mg SubCUTAneous Q12H  
 HYDROmorphone (PF) (DILAUDID) injection 1 mg  1 mg IntraVENous Q6H PRN  
 haloperidol lactate (HALDOL) injection 2 mg  2 mg IntraVENous Q4H PRN  
 ziprasidone (GEODON) 10 mg in sterile water (preservative free) 0.5 mL injection  10 mg IntraMUSCular Q12H PRN  
 naloxone (NARCAN) injection 0.4 mg  0.4 mg IntraVENous PRN  
 metoprolol (LOPRESSOR) injection 2.5 mg  2.5 mg IntraVENous Q6H  
 docusate sodium (COLACE) capsule 100 mg  100 mg Oral DAILY  sodium chloride (NS) flush 5-40 mL  5-40 mL IntraVENous PRN  
 acetaminophen (TYLENOL) suppository 650 mg  650 mg Rectal Q4H PRN  
 diphenhydrAMINE (BENADRYL) capsule 25 mg  25 mg Oral Q4H PRN  
 nicotine (NICODERM CQ) 21 mg/24 hr patch 1 Patch  1 Patch TransDERmal DAILY PRN  
 ondansetron (ZOFRAN) injection 4 mg  4 mg IntraVENous Q4H PRN  
 cloNIDine HCl (CATAPRES) tablet 0.2 mg  0.2 mg Oral BID PRN  
  hydrALAZINE (APRESOLINE) 20 mg/mL injection 10 mg  10 mg IntraVENous Q6H PRN Immunization History Administered Date(s) Administered  TB Skin Test (PPD) Intradermal 2019, 2019, 2019  Tuberculin 2008 Objective:  
 
Patient Vitals for the past 24 hrs: 
 Temp Pulse Resp BP SpO2  
19 1154 97.7 °F (36.5 °C) (!) 118 19 135/90 97 % 19 0744  (!) 133 17 125/80 97 % 19 0504 97.9 °F (36.6 °C) (!) 120 15 151/81 99 % 19 0321  89     
19 1919 97.9 °F (36.6 °C) (!) 103 16 131/85 98 % Temp (24hrs), Av.8 °F (36.6 °C), Min:97.7 °F (36.5 °C), Max:97.9 °F (36.6 °C) Oxygen Therapy O2 Sat (%): 97 % (19 1154) Pulse via Oximetry: 58 beats per minute (19 0807) O2 Device: Nasal cannula (19 08) O2 Flow Rate (L/min): 1 l/min (19 08) Oxygen Therapy O2 Sat (%): 97 % (19 1154) Pulse via Oximetry: 58 beats per minute (19 0807) O2 Device: Nasal cannula (19) O2 Flow Rate (L/min): 1 l/min (19 08) Physical Exam: 
General:         Alert, frail HEENT:               NCAT. No obvious deformity. Lungs: No wheezing/rhonchi/rales Cardiovascular:   RRR. No m/r/g. No pedal edema b/l. Abdomen:       S/nt/nd. Bowel sounds normal. .  
Skin:         No rashes or lesions. Neurologic:     No gross focal deficit. Moving extremities. DIAGNOSTIC STUDIES Data Review:  
Recent Results (from the past 24 hour(s)) METABOLIC PANEL, BASIC Collection Time: 19  4:45 AM  
Result Value Ref Range Sodium 141 136 - 145 mmol/L Potassium 3.4 (L) 3.5 - 5.1 mmol/L Chloride 109 (H) 98 - 107 mmol/L  
 CO2 23 21 - 32 mmol/L Anion gap 9 7 - 16 mmol/L Glucose 97 65 - 100 mg/dL BUN 41 (H) 8 - 23 MG/DL Creatinine 0.61 0.6 - 1.0 MG/DL  
 GFR est AA >60 >60 ml/min/1.73m2 GFR est non-AA >60 >60 ml/min/1.73m2  Calcium 8.3 8.3 - 10.4 MG/DL  
PHOSPHORUS  
 Collection Time: 19  4:45 AM  
Result Value Ref Range Phosphorus 2.3 2.3 - 3.7 MG/DL  
TRIGLYCERIDE Collection Time: 19  4:45 AM  
Result Value Ref Range Triglyceride 83 35 - 150 MG/DL All Micro Results Procedure Component Value Units Date/Time CULTURE, URINE [[id number]]  (Abnormal)  (Susceptibility) Collected:  19 1057 Order Status:  Completed Specimen:  Urine from Clean catch Updated:  19 4362 Special Requests: NO SPECIAL REQUESTS Culture result:    
  >100,000 COLONIES/mL STAPHYLOCOCCUS SPECIES, COAGULASE NEGATIVE  
     
      
  10,000 to 50,000 COLONIES/mL MIXED SKIN OTILIA ISOLATED  
     
 CULTURE, URINE [[id number]] Order Status:  Canceled Specimen:  Urine from Clean catch Imaging Hilda Contras /Studies: CXR Results  (Last 48 hours) None CT Results  (Last 48 hours) None No results found. Results for orders placed or performed during the hospital encounter of 19  
2D ECHO COMPLETE ADULT (TTE) W OR WO CONTR Narrative [address] [place], [address] [facility] 
[phone] Transthoracic Echocardiogram 
2D and Doppler Patient: Ori Plummer 
MR #: [id number] : [id number] Age: 80 years Gender: Female Study date: 2019 Account #: [de-identified] Height: 60 in Weight: 113 lb 
BSA: 1.47 mï¾² Status:Routine Location: [facility] BP: 120/ 72 Allergies: LORAZEPAM 
 
Sonographer:  Alexus Conde RD Group:  [facility] Referring Physician:  Dolores Batista MD 
Reading Physician:  Robyn Echols. Lev Ivy MD 
 
INDICATIONS: Reevaluate pericardial effusion PROCEDURE: This was a routine study. A transthoracic echocardiogram was 
performed. The study included limited 2D imaging and limited spectral  
Doppler. Image quality was adequate.  
 
PERICARDIUM: A moderate pericardial effusion was identified circumferential  
to 
 the heart. There was a left pleural effusion. SUMMARY: 
 
-  Pericardium: A moderate pericardial effusion was identified  
circumferential 
to the heart. There was a left pleural effusion. Prepared and signed by 
 
Hollie Sue MD 
Signed 29-Jun-2019 14:37:02 Labs and Studies from previous 24 hours have been personally reviewed by myself   
ASSESSMENT Active Hospital Problems Diagnosis Date Noted  Encephalopathy 07/04/2019  Ileus ([address] 06/29/2019  Dehydration 06/28/2019  Breast mass 06/14/2019  Acute UTI (urinary tract infection) 06/03/2019  KENISHA (acute kidney injury) ([address] 05/30/2019  Atrial fibrillation with RVR ([facility] 05/29/2019  Pathologic fracture of lumbar vertebra 05/29/2019  Follicular thyroid cancer ([facility] 06/19/2011 thyroglobulin high > 100 obvious residual thyroid tissue  HTN (hypertension) 06/16/2011  Acquired hypothyroidism 06/16/2011 Post thyroidectomy for thyroid cancer Hospital Problems as of 7/7/2019 Date Reviewed: 6/19/2019 Codes Class Noted - Resolved POA Encephalopathy ICD-10-CM: G93.40 ICD-9-CM: 348.30  7/4/2019 - Present Yes Ileus ([facility] ICD-10-CM: K56.7 ICD-9-CM: 560.1  6/29/2019 - Present Yes Enteritis ICD-10-CM: K52.9 ICD-9-CM: 558.9  6/28/2019 - Present Dehydration ICD-10-CM: E86.0 ICD-9-CM: 276.51  6/28/2019 - Present Yes Breast mass ICD-10-CM: N63.0 ICD-9-CM: 611.72  6/14/2019 - Present Yes Acute UTI (urinary tract infection) ICD-10-CM: N39.0 ICD-9-CM: 599.0  6/3/2019 - Present Yes KENISHA (acute kidney injury) ([facility] ICD-10-CM: N17.9 ICD-9-CM: 584.9  5/30/2019 - Present Yes * (Principal) Atrial fibrillation with RVR ([facility] ICD-10-CM: I48.91 
ICD-9-CM: 427.31  5/29/2019 - Present Yes Pathologic fracture of lumbar vertebra ICD-10-CM: M84.48XA ICD-9-CM: 733.13  5/29/2019 - Present Yes [facility]licular [facility] ICD-10-CM: T26 ICD-9-CM: 521  6/19/2011 - Present Yes Overview Signed 6/21/2011  7:12 AM by Desirae Loyd  
  thyroglobulin high > 100 obvious residual thyroid tissue HTN (hypertension) (Chronic) ICD-10-CM: I10 
ICD-9-CM: 401.9  6/16/2011 - Present Yes Acquired hypothyroidism (Chronic) ICD-10-CM: E03.9 ICD-9-CM: 244.9  6/16/2011 - Present Yes Overview Signed 6/19/2011  4:15 PM by Desirae Loyd Post thyroidectomy for thyroid cancer A/P: 
 
-Acute encephalopathy MRI with no acute process G81, ammonia and folic acid on normal ranges TSH suppressed but free t4 and T3 with appropriate levels JIGNESH requested patient to be discharged home. Case discussed with CM. POA was informed that likely insurance wont pay for PPN/TPN at home. JIGNESH reported she wants hospice evaluation. She needs to discuss with the rest of the family about d/c TPN. -Ileus vs partial SBO Surgery evaluation appreciated. Recommended CT  No evidence of SBO. Discussed with POA and family at bedside. Per living will if patient was in this condition she wanted nutrition with tubes or IV. Patient has refused in the past NGT and daughter reported she did not want Peg tube. On PPN  
 
 
-L4 fracture CT showing destructive process L4 pathological fracture metastasic disease vs myeloma/plasmacytoma. Ortho recommended IR for biopsy of lesion [facility] sent path from thyroidectomy Discussed with POA, she stated patient did not want aggressive treatment or chemo. 
 
 
-Acute UTI Ucx with staph coag neg Completed 5 days course 
 
-Afib Received Vit K 07/03 for supra therapeutic INR On lovenox BID 
 
 
 
DVT Prophylaxis: lovenox CODE Status: DNANGELA Grant MD 
07/07/19

## 2019-07-07 NOTE — PROGRESS NOTES
Hourly rounds done. Pt restless/moaning, medicated for pain. Denies nausea, vomiting. Cardiac monitor running Afib . Oral care/suctioning performed. Dried substance placed into container for MD viewing per family. Multiple family at bedside. All needs met at this time.

## 2019-07-07 NOTE — PROGRESS NOTES
RONAN spoke with Harper Escamilla liaison with [facility] by phone. She will contact family and schedule a time to meet pt/family at the hospital tomorrow am for presentation of hospice services and pt assessment.

## 2019-07-07 NOTE — PROGRESS NOTES
Problem: Self Care Deficits Care Plan (Adult) Goal: *Acute Goals and Plan of Care (Insert Text) Description 1. Patient will complete total body bathing and dressing with maximal assistance and adaptive equipment as needed. 2. Patient will complete self-feeding/grooming with moderate assistance and adaptive equipment as needed. 3. Patient will tolerate 20 minutes of OT treatment with up to minimal rest breaks to increase activity tolerance for ADLs. 4. Patient will complete bed mobility with minimal assistance in preparation for functional transfers. 5. Patient will attempt to  preparation for functional transfers with adaptive equipment as needed. 6. Patient will demonstrate improved sitting balance for ADLs with minimal assistance and adaptive equipment as needed. Timeframe: 7 visits Outcome: Progressing Towards Goal 
  
OCCUPATIONAL THERAPY: Daily Note and PM 7/7/2019 INPATIENT: OT Visit Days: 2 Payor: [facility] MEDICARE / Plan: [employer] [address] / Product Type: Managed Care Medicare /  
  
NAME/AGE/GENDER: Binu Mckeon is a 80 y.o. female PRIMARY DIAGNOSIS:  Enteritis [K52.9] Atrial fibrillation with RVR ([facility] Atrial fibrillation with RVR ([address] ICD-10: Treatment Diagnosis:  
 · Generalized Muscle Weakness (M62.81) · Other lack of cordination (R27.8) Precautions/Allergies: 
  Fall precautions  Ativan [lorazepam] ASSESSMENT:  
Ms. Nathalie Grimaldo is a 80 y.o. female admitted with the above. Hx including leg length discrepancy, RLE shorter, CA, L4 lesion. Per daughter at bedside, at baseline pt lives with sons and requires significant assistance with ADLs and transfers, her sons pick her up and put her in bed/chair, however prior to THE [facility] Day weekend patient was independent to modified independent with ADLs, ambulation with cane/walker, and some IADLs. Pt with significant recent decline. 7/7/19 Pt was supine upon arrival. Pt was willing to work with therapy. Pt's family stated that they had her up earlier on EOB. Pt completed the exercises below on B UE's. Minimal progress made. Continue POC. This section established at most recent assessment PROBLEM LIST (Impairments causing functional limitations): 1. Decreased Strength 2. Decreased ADL/Functional Activities 3. Decreased Transfer Abilities 4. Decreased Ambulation Ability/Technique 5. Decreased Balance 6. Increased Pain 7. Decreased Activity Tolerance 8. Increased Fatigue 9. Decreased Flexibility/Joint Mobility 10. Decreased Knowledge of Precautions 11. Decreased [place] with Home Exercise Program 
12. Decreased Cognition INTERVENTIONS PLANNED: (Benefits and precautions of occupational therapy have been discussed with the patient.) 1. Activities of daily living training 2. Adaptive equipment training 3. Balance training 4. Clothing management 5. Cognitive training 6. Donning&doffing training 7. Hygiene training 8. Neuromuscular re-eduation 9. Re-evaluation 10. Therapeutic activity 11. Therapeutic exercise 12. Wheelchair management TREATMENT PLAN: Frequency/Duration: Follow patient 3x/week to address above goals. Rehabilitation Potential For Stated Goals: Fair REHAB RECOMMENDATIONS (at time of discharge pending progress):   
Placement: It is my opinion, based on this patient's performance to date, that Ms. Min Mazariegos may benefit from [address] after discharge due to the functional deficits listed above that are likely to improve with skilled rehabilitation because he/she has multiple medical issues that affect his/her functional mobility in the community. Pt would likely benefit from STR at this time, however pt and family report they desire to go home with [address] [place] [address]'[place]. Equipment: ? TBD   
    
 
 
 
OCCUPATIONAL PROFILE AND HISTORY:  
History of Present Injury/Illness (Reason for Referral): 
See H&P. Past Medical History/Comorbidities: Ms. John Mcdaniels  has a past medical history of Acquired hypothyroidism, Acute pancreatitis, Arthritis, CAD (coronary artery disease) (4/29/11), Cancer ([address] CHF (congestive heart failure) ([address] (11/8/2018), Chronic pain, Coagulopathy ([address] (6/16/2011), Colon polyps, Encephalopathy (5/2/8479), Follicular thyroid cancer ([address] Gallstone pancreatitis, Glaucoma, Gout, Hypertension, Other ill-defined conditions(799.89), Pancreatitis, Paroxysmal A-fib ([address] (11/8/2018), PE (pulmonary embolism) (2008), Pulmonary HTN ([address] Thromboembolus ([address] (1995), Thyroid disease, and UTI (urinary tract infection) (6/3/2019). She also has no past medical history of Aneurysm ([address] Asthma, Autoimmune disease ([address] Chronic kidney disease, COPD, Dementia, Diabetes ([address] Difficult intubation, Liver disease, Malignant hyperthermia due to anesthesia, Nausea & vomiting, Pseudocholinesterase deficiency, Psychiatric disorder, PUD (peptic ulcer disease), Seizures ([address] Sleep disorder, Stroke ([address] Unspecified adverse effect of anesthesia, or Unspecified sleep apnea. Ms. John Mcdaniels  has a past surgical history that includes hx thyroidectomy (2010); hx appendectomy (?1990); hx tonsillectomy; hx other surgical (1995); pr total hip arthroplasty (2008 & 2011); and hx cholecystectomy (6/2011). Social History/Living Environment:  
Home Environment: Private residence One/Two Story Residence: One story Living Alone: No 
Support Systems: Child(chavo), Family member(s) Patient Expects to be Discharged to[de-identified] Private residence Current DME Used/Available at Home: [employer] Prior Level of Function/Work/Activity: 
Per daughter at bedside, at baseline pt lives with sons and requires assistance with ADLs and transfers, her sons pick her up and put her in bed/chair, however prior to THE [facility] Day weekend patient was independent to modified independent with ADLs, ambulation with cane/walker, and some IADLs. Pt with significant recent decline. Personal Factors:   
      Sex:  female Age:  80 y.o. Other factors that influence how disability is experienced by the patient:  Multiple co-morbidities Number of Personal Factors/Comorbidities that affect the Plan of Care: Expanded review of therapy/medical records (1-2):  MODERATE COMPLEXITY ASSESSMENT OF OCCUPATIONAL PERFORMANCE[de-identified]  
Activities of Daily Living:  
Basic ADLs (From Assessment) Complex ADLs (From Assessment) Feeding: Total assistance Oral Facial Hygiene/Grooming: Total assistance Bathing: Total assistance Upper Body Dressing: Total assistance Lower Body Dressing: Total assistance Toileting: Total assistance Instrumental ADL Meal Preparation: Total assistance Homemaking: Total assistance Medication Management: Total assistance Grooming/Bathing/Dressing Activities of Daily Living Cognitive Retraining Safety/Judgement: Decreased insight into deficits Most Recent Physical Functioning:  
Gross Assessment: 
  
         
  
Posture: 
Posture (WDL): Exceptions to [facility] Posture Assessment: Forward head, Trunk flexion, Rounded shoulders Balance: 
  Bed Mobility: 
  
Wheelchair Mobility: 
  
Transfers: 
   
 
    
 
Patient Vitals for the past 6 hrs: 
 BP SpO2 Pulse 07/07/19 1154 135/90 97 % (!) 118 Mental Status Neurologic State: Eyes open to voice Orientation Level: Oriented to person Cognition: Decreased attention/concentration Perception: Appears intact Perseveration: Perseverates during mobility Safety/Judgement: Decreased insight into deficits Physical Skills Involved: 1. Range of Motion 2. Balance 3. Strength 4. Activity Tolerance 5. Pain (Chronic) Cognitive Skills Affected (resulting in the inability to perform in a timely and safe manner): 1. Perception 2. Executive Function 3. Immediate Memory 4. Short Term Recall 5. Long Term Memory 6. Sustained Attention 7. Divided Attention 8. Comprehension Psychosocial Skills Affected: 1. Habits/Routines 2. Environmental Adaptation 3. Social Interaction 4. Emotional Regulation 5. Self-Awareness 6. Awareness of Others 7. Social Roles Number of elements that affect the Plan of Care: 5+:  HIGH COMPLEXITY CLINICAL DECISION MAKING:  
[employer] AM-PAC 6 Clicks Daily Activity Inpatient Short Form How much help from another person does the patient currently need. .. Total A Lot A Little None 1. Putting on and taking off regular lower body clothing? ? 1   ? 2   ? 3   ? 4  
2. Bathing (including washing, rinsing, drying)? ? 1   ? 2   ? 3   ? 4  
3. Toileting, which includes using toilet, bedpan or urinal?   ? 1   ? 2   ? 3   ? 4  
4. Putting on and taking off regular upper body clothing? ? 1   ? 2   ? 3   ? 4  
5. Taking care of personal grooming such as brushing teeth? ? 1   ? 2   ? 3   ? 4  
6. Eating meals? ? 1   ? 2   ? 3   ? 4  
© 2007, Trustees of [facility] [employer], under license to [employer]. All rights reserved Score:  Initial: 6 7/1/19 Most Recent: X (Date: -- ) Interpretation of Tool:  Represents activities that are increasingly more difficult (i.e. Bed mobility, Transfers, Gait). Medical Necessity:    
· Patient demonstrates fair ·  rehab potential due to higher previous functional level. Reason for Services/Other Comments: 
· Patient continues to require skilled intervention due to inability to complete ADLs at prior level of independence · . Use of outcome tool(s) and clinical judgement create a POC that gives a: HIGH COMPLEXITY  
 
 
 
TREATMENT:  
(In addition to Assessment/Re-Assessment sessions the following treatments were rendered) Pre-treatment Symptoms/Complaints:   
Pain: Initial:  
   Post Session:  no complaint of pain at rest  
 
Therapeutic Exercise: (  10):  Exercises per grid below to improve mobility and strength. Required min verbal and manual cues to promote proper body posture and promote proper body mechanics. Progressed range and repetitions as indicated. B UE's  Date: 
7/7/19 Date: 
 Date: Activity/Exercise Parameters Parameters Parameters Shoulder flex/ex 10 reps Shoulder hor add/abd 10 reps Elbow flex/ex 10 reps Punches  10 reps Braces/Orthotics/Lines/Etc:  
· IV 
· O2 Device: Room air Treatment/Session Assessment:   
· Response to Treatment:  Tolerated well, decreased command following, poor sitting balance, confusion · Interdisciplinary Collaboration:  
o Certified Occupational Therapy Assistant 
o Registered Nurse · After treatment position/precautions:  
o Supine in bed 
o Bed alarm/tab alert on 
o Bed/Chair-wheels locked 
o Bed in low position 
o Call light within reach 
o RN notified 
o Family at bedside 
o Side rails x 2  
· Compliance with Program/Exercises: Compliant most of the time, Will assess as treatment progresses. · Recommendations/Intent for next treatment session: \"Next visit will focus on advancements to more challenging activities and reduction in assistance provided\". Total Treatment Duration: 
 Time in: 1255 Time out: 1305 Tyler Summers

## 2019-07-08 NOTE — PROGRESS NOTES
Hourly rounds completed. All needs met. Pt alert with periods of confusion. Pt was not able to rest during the night despite all of the effort that was given to make her comfortable. Turned pt every 2 hours. Pt c/o pain during the night. Daughter stated dilaudid makes pt more agitated. Paged MD to ask what is an alternative method for pain control. MD placed orders. Towards the end of the shift, Lab stated she was not able to get enough blood to run labs.  stated she would come back to redraw them. Pt is lying in a low, locked position with the side rails up x 2, call light within reach. Gave report to the oncoming day shift nurse.

## 2019-07-08 NOTE — PROGRESS NOTES
Nutrition: Follow Up 
TPN Management (Dr. Kenia Cobb) Assessment: 
Diet: DIET CLEAR LIQUID 
TPN ADULT - dextrose 5% amino acid 4.25% The patient continues on PPN at this time to help meet nutrition needs. She is on clear liquids per SLP evaluation in preparations for discharge. Currently family is still deciding on hospice care at this time. Labs have been stopped however the patient is to continue on PPN. IV Access: 
           Peripheral. 
Anthropometrics:Height: 5' (152.4 cm), Weight Source: Bed, Weight: 57 kg (125 lb 11.2 oz) Macronutrient Needs: 
Estimated calorie needs - 2902-5380 betzaida/day (25-30 betzaida/kgUBW/day) Estimated protein needs - 62-78 gm pro/day (1.2-1.5 gm pro/kgUBW/day) CHO limit/day - 300 gm CHO/day (4 mg/kgUBW/min/day) Fluid/day - 1.3-1.8 liters/day (1 ml/betzaida/day) Intake/Comparative Standards: 
Current intake of PPN (2.4 liters/100 ml/hr of 4.25%AA/5%DEX) and 250 ml 20% lipids over 24 hours daily provides 1316 calories/day (100% calorie goal), 102 grams protein/day (>100% protein goal), 120 grams CHO/day (does not exceed max CHO limit) and 2650 ml volume/day (>100% fluid goal). Intervention:  
Meals and snacks: NPO Nutritional Supplement Therapy: Electrolyte replacement per nutritional support protocols are active on MAR. PN/IVF:  
Continue current PPN. Discharge nutrition plan: Too soon to determine Yari Naqvi Dario [address], [address], [phone]

## 2019-07-08 NOTE — PROGRESS NOTES
Palliative Care Progress Note Patient: Yanick Lopez MRN: [id number]  SSN: [id number] YOB: 1933  Age: 80 y.o. Sex: female Assessment/Plan: Chief Complaint/Interval History: Patient had agitation and back pain last night, per dtr Tania Magana. Principal Diagnosis:   
· Encephalopathy, Unspecified  G93.40 Additional Diagnoses: · Agitation  R45.1 · Anorexia  R63.0 · Dysphagia  R13.10 · Frailty  R54 
· Pain, back  M54.9 · Counseling, Encounter for Medical Advice  Z71.9 
· Encounter for Palliative Care  Z51.5 Palliative Performance Scale (PPS) PPS: 10 Medical Decision Making:  
Reviewed and summarized notes from last palliative care visit to present. Discussed case with appropriate providers: VALERIE Carey Reviewed lab and X-ray data from last palliative care visit to present. Pt resting in bed. Dtrs Tania Magana and Maxim Furnace, and son Elias Mix at bedside. Two more sons are outside the room. Per Tania Magana (works in [place] [id number]), the pt had pain and agitation during the night. Tania Magana is also agreeable to the application of a lidocaine patch to the pt's back, with hope of reducing back pain at L4. Ced aware. Tania Magana says that the pt has not had a BM in quite some time, but is not eating. However, the pt is receiving morphine;  concerned that pt is constipated. Tania Magana is agreeable to trying a bisacodyl suppository PRN. RN Lieutenant Aase is aware. [facility] liaison Harper Escamilla spoke with Tania Magana and other children this morning. Tania Magana will talk with a remaining out-of-state child to make sure \"that we're all on the same page\"; then will call Keturah Felix to confirm the family's wish for home hospice. Will continue to follow. Will discuss findings with members of the interdisciplinary team.   
 
  
More than 50% of this 35 minute visit was spent counseling and coordination of care as outlined above. Subjective:  
 
Review of Systems: A comprehensive review of systems was negative except for: Gastrointestinal: Positive for loss of appetite, positive for lack of BM. Musculoskeletal: Positive for back pain. Behavioral/Psychiatric: Positive for minimal speech. Objective:  
 
Visit Vitals /89 (BP 1 Location: Left arm, BP Patient Position: At rest;Head of bed elevated (Comment degrees)) Pulse (!) 123 Temp 97.3 °F (36.3 °C) Resp 18 Ht 5' (1.524 m) Wt 125 lb 11.2 oz (57 kg) SpO2 98% BMI 24.55 kg/m² Physical Exam: 
 
General:  Cooperative. No acute distress. Back pain: asks children to rub her back. Eyes:  Conjunctivae/corneas clear Nose: Nares normal. Septum midline. Neck: Supple, symmetrical, trachea midline, no JVD Lungs:   Clear to auscultation bilaterally, unlabored Heart:  Regular rate and rhythm, no murmur Abdomen:   Soft, non-tender, non-distended Extremities: Normal, atraumatic, no cyanosis or edema Skin: Skin color, texture, turgor normal. No rash or lesions. Neurologic: Difficult to determine, as responds with nod or shake of head Psych: Somnolent, oriented to self. Signed By: Ck Chris NP   
 July 8, 2019

## 2019-07-08 NOTE — PROGRESS NOTES
H&P/Consult Note/Progress Note/Office Note:  
Kandy Callaway  MRN: [id number]  AOP:9/67/7288  Age:86 y.o. 
 
HPI: Kandy Callaway is a 80 y.o. female who has multiple medical problems including debilitation, encephalopathy, rapid afib, CHF with EF 30-35%, pulm HTN,  
s/p IVC filter for DVT/PE, metastatic thyroid carcinoma with pathologic fracture of L4, dysphagia, breast mass, coagulopathy secondary to coumadin use for afib, etc.... who was admitted from the ER by the Hospitalists on 6/28/19 for her 3rd admission in approx 2 months with N/V/abd pain/failure to thrive as outpt. She is s/p total thyroidectomy and right superior parathyroidectomy by Dr Tamara Celestin on [id number]//0357 for a 7cm follicular carcinoma Family describes a h/o lumbar bone biopsy wit proven thyroid carcinoma metastasis at [facility] for which XRT was recommended about 10 yrs ago but pt refused Rx She is also s/p appy, rhonda, right hip surgery, and back surgery She was admitted 5/14/19-6/4/19 with SBO which resolved without surgery and without NT which could not be placed. She was on coumadin for afib with INR>4 at presentation who came to the ER  
with 6 day h/o diarrhea for which she took several anti-diarrheal meds (pepto-bismol and lomotil among them) This was followed by diffuse, moderate, progressive, non-radiating abd pain and cramping. Nothing made symptoms better or worse. She was seen in ER day before admission and given IVF and released. She has associated N/V. No fever or leukocytosis on admission She was admitted  6/13/19- 6/19/19  for hypotension, diarrhea, acute and chronic systolic CHF and KENISHA, UTI. Cardiology/ GI were consulted at the time. Hypotension resolved with med changed (amiodarone dc'd). Diarrhea resolved. Cardiology saw pt for perciardial effusion and said to continue lasix.   
She was told to follow with Dr Jian Yan for breast mass and neurosurgery for L4 lesion.  
  
 Pt was readmitted again on 6/28/19 with N/V and UTI Rx'd with Rocephin and IVF. CT showed bowel distension and moderate stool through colon. Also concern for increasing pericardial effusion on CT scan, still reporting as moderate on repeat echo.  
  
 
 
 
5/29/19 CT abd/pelvis with oral and IV contrast 
There is no pericardial effusion. Heart is enlarged. There is coronary artery calcification. There is dependent subsegmental atelectasis. 
  
There is a 12 mm nodule within the lateral right breast (series 2, image 5). There is subtle micronodular contour of the liver. There is periportal edema. The gallbladder is surgically absent. Pancreas is not well visualized. The 
spleen is normal in contour. Stable small right adrenal nodule (since 2011).   
There are tiny nonobstructing renal calculi. No hydronephrosis left kidney is 
small in size. There is prominent atherosclerotic calcification of the abdominal aorta. 
  
The stomach is normal in contour. 
  
Pelvic findings: There are multiple fluid-filled dilated small bowel loops with transition point 
appearing to be in the lower abdomen. The colon appears normal in caliber. Urinary bladder is normal in contour. There is a pessary. 
  
There is no significant free fluid. No free air. Bones are osteopenic. There are 
degenerative changes of the spine. There is severe compression deformity of L4 
with associated high density structure. Right total hip arthroplasty is partially seen. 
  
  
IMPRESSION: 
  
1. Small bowel obstruction. Multiple fluid-filled dilated small bowel loops with 
transition point in the lower abdomen. Etiology is likely adhesion. Closed-loop 
obstruction is in the differential. 
  
2. No evidence of colitis or diverticulitis, although the colon is not well 
visualized. . No hydronephrosis. 
  
3.  Severe compression deformity of L4 with associated high density material. 
 This may be related to prior intervention such as kyphoplasty. Neoplastic 
involvement is considered less likely. Osteopenia. 
  
4. A 12 mm nodule within the lateral right breast. Correlation with mammogram is 
recommended. 6/3/19 CT abd/pelvis without contrast 
There is moderate right pleural effusion. There is small left pleural effusion. Dependent densities in the lower lobes, likely due to aspiration or atelectasis. 
  
The heart is enlarged. There is coronary artery calcification. There is trace 
pericardial effusion. 
  
Abdomen findings: 
  
Absence of IV contrast limits sensitivity. 
  
There is subtle micronodular contour of the liver. The spleen is normal in 
contour. Gallbladder is surgically absent. Pancreas and adrenal glands are not 
well visualized. 
  
There are small nonobstructing renal calculi. There is no hydronephrosis. There 
is severe atherosclerotic calcification of the abdominal aorta. The stomach is 
under distended. Small bowel loops are normal in caliber. There is no small 
bowel obstruction. 
  
Pelvic findings: 
  
There is a pessary. Uterus is not well evaluated. Urinary bladder is 
decompressed by Ornelas catheter. There is sigmoid diverticulosis without 
diverticulitis. Colon is normal in caliber. 
  
There is small free fluid. There is no free air. Bones are diffusely osteopenic. There are degenerative changes of the spine. There is lytic lesion involving the 
L4. There is associated compression fracture. 
  
  
IMPRESSION: 
1. Previously noted dilated small bowel loops have resolved. No evidence of bowel obstruction on today's study. 
  
2. Moderate right pleural effusion and small left pleural effusion. Mild dependent densities in the lower lobes, likely due to atelectasis or aspiration. 
  
3. Sigmoid diverticulosis. No evidence of diverticulitis. 
  
4. Lytic lesion of L4 and associated compression fracture 6/13/19 CT abd/pelvis without IV contrast 
 Hx: Diarrhea. Recently admitted for SBO  
  
CT ABD:   
Limited evaluation of the lung bases and base of the mediastinum demonstrates a 
new moderate to large pericardial effusion. Irregular basilar densities are seen 
likely representing mild to moderate scarring versus atelectasis. 
  
Assessment of the solid organs is limited by the lack of administered 
intravenous contrast. A subtle abnormality could be missed. The Liver is 
homogeneous in attenuation. The spleen is homogeneous in attenuation. No 
contour deforming mass lesions are seen of the pancreas or adrenal glands. The 
gallbladder has been removed. Multiple bilateral renal calcifications are seen 
felt to represent renal stones. No hydronephrosis is seen of either kidney to 
suggest obstruction.   
  
No abnormal small bowel dilation is seen with small bowel loops measuring up to 
2.7 cm in diameter. In addition, no abnormal abrupt transition point is seen of 
small bowel. Gas and fluid are seen throughout the colon. Fluid throughout the 
colon can be seen with diarrhea. No clear abnormal inflammatory wall thickening 
of small bowel or colon is seen. The mesentery appears diffusely edematous. No 
free air is seen. No abnormal fluid collection is identified. .  No adenopathy is 
seen. The abdominal aorta moderate to advanced atherosclerotic calcification is 
seen of the abdominal aorta. The patient appears to be status post aortic 
bifemoral bypass graft. Once again, there is the suggestion of a large lytic 
lesion destroying much of the L4 vertebral body with a likely secondary mild to 
moderate pathologic compression fracture. The appearance is not felt to be 
significantly changed when compared to the prior study. 
  
CT PELVIS: 
A right total hip replacement is present. This produces scatter artifact which 
obscures portions of the right pelvis. No abnormal pelvic fluid collections or inflammatory changes are present. No pelvic adenopathy is seen. The urinary 
bladder is unremarkable. 
  
IMPRESSION:   
1. No abnormal bowel dilation or transition point to suggest bowel obstruction. No clearly acute bowel changes are otherwise seen. It is only noted that the 
colon is fluid-filled consistent with the history of diarrhea. The possibility 
of a mild or early colitis cannot be excluded. 
  
2. New moderate to large pericardial effusion. 
  
3. Large lytic lesion and pathologic fracture of the L4 vertebral body as 
previously described which is not felt to be significantly changed 7/2/19 CT abd/pelvis with oral and IV contrast 
Hx: sbo vs ileus; evaluate; also with suspected L4 met. 
  
CT ABDOMEN: There is global cardiac enlargement. There is excessive motion 
artifact throughout the study. There are partially imaged small bilateral 
pleural effusions and bilateral lower lobe atelectasis/infiltrate. The liver, 
spleen, pancreas and adrenal glands are unremarkable. Few bilateral 
nonobstructing renal calculi present. There is a small to moderate amount of 
abdominal ascites. Extensive atherosclerotic changes are present involving the 
abdominal aorta and mesenteric vessels. There are no dilated bowel loops. Contrast material is present within the colon. There are advanced degenerative 
changes of the lumbar spine. Extensive bony destruction is noted involving the 
L4 vertebra with areas of enhancement, also extending within the extraosseous 
soft tissues on the left and into the spinal canal causing severe stenosis. 
  
CT PELVIS: There is extensive streak artifact from right hip arthroplasty. A 
pessary is present. There is a small amount of ascites. No definite adenopathy 
is present. There is diffuse subcutaneous edema. 
  
IMPRESSION: 
1. Mildly compromised examination due to motion. 2. Destructive process with associated pathologic fracture of L4 with extraosseous extension as described. Differential diagnosis would include 
metastatic disease or multiple myeloma/plasmacytoma. 3. Ascites. 4. Small pleural effusions. 5. There is no evidence of bowel obstruction. 
  
 
7/4/19 MRI brain without contrast 
Hx: Altered mental status, history of thyroid cancer. Findings: The ventricles and sulci are prominent compatible with mild volume 
loss. There are no extra-axial fluid collections. Normal flow voids are 
present within all of the major intracranial vessels. No evidence of 
intraparenchymal hemorrhage or mass effect is identified. There are no areas of 
restricted diffusion to suggest an acute or subacute infarction. Patchy and 
discrete of T2 prolongation are present within the supratentorial white matter. These are nonspecific findings but would be most compatible with mild chronic 
small vessel ischemic changes. There is a remote infarction within the posterior 
right temporal lobe. There is complete opacification of the right maxillary 
sinus with with components of calcification and inspissated secretions. 
  
Impression: 1. Findings most compatible with mild chronic small vessel ischemic changes and 
remote right temporal lobe infarct. 2. Mild volume loss. 3. Chronic right maxillary sinus opacification Additonal history 6/29/19 Awake in bed. No complaints at this time. Denies abd pain. LBM Thursday. -flatus. Reports +belching today. Family at bedside. NAD.  
6/30/19  Resting in bed. RN unable to place NG tube yesterday. No complaints at this time. Denies abd pain. LBM Thursday. -flatus. Family at bedside. NAD. 7/1/19  Having NGT placed this am when I rounded; abd soft and without tenderness; INR 2.6; coumadin held in case surgery needed 7/2/19  NGT could not be placed. Pt refuses to allow further ngt attempts at this time; CT pending 7/3/19 family now reports she had biopsy proven metastatic thyroid carcinoma of lumbar spine approx 10 yrs ago at [facility] and refused spinal XRT at that time, she refused to participate with swallow eval yesterday, SLP to try again today 7/4/19 encephalopathy no better; see head CT done 7/3/19; MRI ordered; rapid Afib during the night 
7/5/19 somnolent; not alert, debilitated; PPN infusing; nutrition thinks we can meet 100% betzaida and prot needs with PPN alone and no PICC line 7/8/19 a little more interactive but still confused and doesn't answer questions Past Medical History:  
Diagnosis Date  Acquired hypothyroidism  Acute pancreatitis  Arthritis  CAD (coronary artery disease) 4/29/11 RCA occluded and fills with collaterals, LAD and Cx nonobstructive CAD, EF 50-55%  Cancer ([address] THYROID - treated with surgery and radiation  CHF (congestive heart failure) ([address] 11/8/2018  Chronic pain   
 right hip  Coagulopathy ([address] 6/16/2011  Colon polyps  Encephalopathy 7/4/2019  Follicular thyroid cancer ([address]  Gallstone pancreatitis  Glaucoma  Gout   
 hx of gout to right great toe - no problems  Hypertension   
 controlled with medication  Other ill-defined conditions(799.89)  Pancreatitis  Paroxysmal A-fib ([address] 11/8/2018  PE (pulmonary embolism) 2008  Pulmonary HTN (HCC)   
 moderate  Thromboembolus ([address] 1995  
 right leg - Hemashield graft/filter placed in abdomen  Thyroid disease   
 pt had total thyroidectomy - takes levoxyl daily  UTI (urinary tract infection) 6/3/2019 Past Surgical History:  
Procedure Laterality Date  HX APPENDECTOMY  ? 1990  
 HX CHOLECYSTECTOMY  6/2011 Dr. Ochoa [facility] [address]  
 hemashield graft/filter placed due to blood clot right leg  HX THYROIDECTOMY  2010  
 partial  
 [place] [address] [facility]  2008 & 2011  
 right hip Current Facility-Administered Medications Medication Dose Route Frequency  morphine injection 2 mg  2 mg IntraVENous Q4H PRN  
 NUTRITIONAL SUPPORT ELECTROLYTE PRN ORDERS   Does Not Apply PRN  
 TPN ADULT - dextrose 5% amino acid 4.25%   IntraVENous QPM  
 fat emulsion 20% (LIPOSYN, INTRAlipid) infusion 250 mL  250 mL IntraVENous QPM  
 enoxaparin (LOVENOX) injection 60 mg  60 mg SubCUTAneous Q12H  
 haloperidol lactate (HALDOL) injection 2 mg  2 mg IntraVENous Q4H PRN  
 ziprasidone (GEODON) 10 mg in sterile water (preservative free) 0.5 mL injection  10 mg IntraMUSCular Q12H PRN  
 naloxone (NARCAN) injection 0.4 mg  0.4 mg IntraVENous PRN  
 metoprolol (LOPRESSOR) injection 2.5 mg  2.5 mg IntraVENous Q6H  
 docusate sodium (COLACE) capsule 100 mg  100 mg Oral DAILY  sodium chloride (NS) flush 5-40 mL  5-40 mL IntraVENous PRN  
 acetaminophen (TYLENOL) suppository 650 mg  650 mg Rectal Q4H PRN  
 diphenhydrAMINE (BENADRYL) capsule 25 mg  25 mg Oral Q4H PRN  
 nicotine (NICODERM CQ) 21 mg/24 hr patch 1 Patch  1 Patch TransDERmal DAILY PRN  
 ondansetron (ZOFRAN) injection 4 mg  4 mg IntraVENous Q4H PRN  
 cloNIDine HCl (CATAPRES) tablet 0.2 mg  0.2 mg Oral BID PRN  
 hydrALAZINE (APRESOLINE) 20 mg/mL injection 10 mg  10 mg IntraVENous Q6H PRN Ativan [lorazepam] Social History Socioeconomic History  Marital status:  Spouse name: Not on file  Number of children: Not on file  Years of education: Not on file  Highest education level: Not on file Tobacco Use  Smoking status: Former Smoker  Smokeless tobacco: Never Used  Tobacco comment: quit smoking 1995 Substance and Sexual Activity  Alcohol use: No  
 Drug use: No  
 
Social History Tobacco Use Smoking Status Former Smoker Smokeless Tobacco Never Used Tobacco Comment  
 quit smoking 1995 Family History Problem Relation Age of Onset  Stroke Mother  Hypertension Mother  Cancer Father   
     prostate  Diabetes Maternal Uncle ROS: The patient has no difficulty with chest pain or shortness of breath. No fever or chills. Comprehensive review of systems otherwise unobtainable secondary to mental status. Physical Exam:  
Visit Vitals /69 (BP 1 Location: Left arm, BP Patient Position: At rest) Pulse 99 Temp 97.9 °F (36.6 °C) Resp 18 Ht 5' (1.524 m) Wt 125 lb 11.2 oz (57 kg) SpO2 96% BMI 24.55 kg/m² Vitals:  
 07/07/19 2303 07/07/19 2334 07/08/19 0400 07/08/19 8434 BP:  130/58 136/69 Pulse: (!) 120 (!) 120 (!) 115 99 Resp:  18 18 Temp:  97.6 °F (36.4 °C) 97.9 °F (36.6 °C) SpO2:  97% 96% Weight:      
Height:      
 
No intake/output data recorded. 05/30 1901 - 06/01 0700 In: 6676 [I.V.:4476] Out: 200 [Urine:200] Constitutional: lethargic, mumbles and speaks; no acute distress; appears stated age Eyes: clsoed ENMT: no external lesions gross hearing normal; no obvious neck masses, no ear or lip lesions, nares normal 
CV: RRR. Normal perfusion Resp: No JVD. Breathing is  non-labored; no audible wheezing. Breast exam deferred GI: soft; no mass,  No guarding or rebound Musculoskeletal: deconditioned. No embolic signs or cyanosis. Neuro:  Not oriented; moves all 4; no focal deficits Psychiatric: no affect, +memory impairment Recent vitals (if inpt): 
Patient Vitals for the past 24 hrs: 
 BP Temp Pulse Resp SpO2 Weight 06/01/19 0515 124/87 98 °F (36.7 °C) 94 18 99 % 114 lb 4.8 oz (51.8 kg) 06/01/19 0238 117/76 98.3 °F (36.8 °C) 100 18 99 %   
05/31/19 2123 118/84 98 °F (36.7 °C) 98 18 100 %   
05/31/19 1618 127/68 98.5 °F (36.9 °C) (!) 102 16 100 %   
05/31/19 1304 119/72 97.9 °F (36.6 °C) (!) 101 16 98 %   
05/31/19 0818 129/76 97.2 °F (36.2 °C) 94 16 94 %  Labs: 
Recent Labs  
  07/07/19 
0445 07/05/19 
1545 07/05/19 
7322 WBC  --   --  6.6 HGB  --   --  9.6* PLT  --   --  116*   --  145  
K 3.4*  --  4.1 *  --  115* CO2 23  --  21  
 BUN 41*  --  32* CREA 0.61  --  0.85 GLU 97  --  115* PTP  --  20.7*  --   
INR  --  1.8  --   
 
 
Lab Results Component Value Date/Time WBC 6.6 07/05/2019 06:48 AM  
 HGB 9.6 (L) 07/05/2019 06:48 AM  
 PLATELET 197 (L) 08/76/1429 06:48 AM  
 Sodium 141 07/07/2019 04:45 AM  
 Potassium 3.4 (L) 07/07/2019 04:45 AM  
 Chloride 109 (H) 07/07/2019 04:45 AM  
 CO2 23 07/07/2019 04:45 AM  
 BUN 41 (H) 07/07/2019 04:45 AM  
 Creatinine 0.61 07/07/2019 04:45 AM  
 Glucose 97 07/07/2019 04:45 AM  
 INR 1.8 07/05/2019 03:45 PM  
 aPTT 62.1 (H) 06/04/2019 04:58 AM  
 Bilirubin, total 0.3 07/03/2019 05:44 AM  
 Bilirubin, direct 0.1 06/25/2011 06:00 AM  
 AST (SGOT) 34 07/03/2019 05:44 AM  
 ALT (SGPT) 22 07/03/2019 05:44 AM  
 Alk. phosphatase 86 07/03/2019 05:44 AM  
 Amylase 64 06/17/2011 03:53 AM  
 Lipase 109 06/28/2019 08:45 AM  
 Ammonia 21 07/04/2019 05:58 PM  
 Troponin-I, Qt. <0.02 (L) 06/28/2019 08:45 AM  
 
 
CT Results  (Last 48 hours) None  
  
 
chest X-ray I reviewed recent labs, recent radiologic studies, and pertinent records including other doctor notes if needed. I independently reviewed radiology images for studies I described above or studies I have ordered. Admission date (for inpatients): 6/28/2019 * No surgery found *  * No surgery found * ASSESSMENT/PLAN: 
Problem List  Date Reviewed: 6/19/2019 Codes Class Noted Encephalopathy ICD-10-CM: G93.40 ICD-9-CM: 348.30  7/4/2019 Ileus ([facility] ICD-10-CM: K56.7 ICD-9-CM: 560.1  6/29/2019 Enteritis ICD-10-CM: K52.9 ICD-9-CM: 558.9  6/28/2019 Dehydration ICD-10-CM: E86.0 ICD-9-CM: 276.51  6/28/2019 Abnormal CT scan, lumbar spine ICD-10-CM: R93.7 ICD-9-CM: 793.7  6/19/2019 Pancytopenia ([facility] ICD-10-CM: G26.345 ICD-9-CM: 284.19  6/16/2019 Hypotension ICD-10-CM: I95.9 ICD-9-CM: 458.9  6/15/2019 Moderate protein malnutrition (HCC) ICD-10-CM: E44.0 ICD-9-CM: 263.0  6/15/2019 Failure to thrive (0-17) ICD-10-CM: R62.51 
ICD-9-CM: 783.41  6/15/2019 Hyperthyroidism ICD-10-CM: E05.90 ICD-9-CM: 242.90  6/15/2019 Breast mass ICD-10-CM: N63.0 ICD-9-CM: 611.72  6/14/2019 PAF (paroxysmal atrial fibrillation) (HCC) ICD-10-CM: I48.0 ICD-9-CM: 427.31  6/14/2019 Colitis ICD-10-CM: K52.9 ICD-9-CM: 558.9  6/14/2019 Diarrhea ICD-10-CM: R19.7 ICD-9-CM: 787.91  6/13/2019 Acute UTI (urinary tract infection) ICD-10-CM: N39.0 ICD-9-CM: 599.0  6/3/2019 KENISHA (acute kidney injury) ([facility] ICD-10-CM: N17.9 ICD-9-CM: 584.9  5/30/2019 SBO (small bowel obstruction) ([facility] ICD-10-CM: I75.406 ICD-9-CM: 560.9  5/29/2019 * (Principal) Atrial fibrillation with RVR ([facility] ICD-10-CM: I48.91 
ICD-9-CM: 427.31  5/29/2019 Chronic systolic dysfunction of left ventricle (Chronic) ICD-10-CM: I51.9 ICD-9-CM: 429.9  5/29/2019 Thyroid cancer [facility]) ICD-10-CM: N54 ICD-9-CM: 217  5/29/2019 Pathologic fracture of lumbar vertebra ICD-10-CM: M84.48XA ICD-9-CM: 733.13  5/29/2019 Thromboembolic disorder ([facility] TDS-65-KU: I74.9 ICD-9-CM: 444.9  5/29/2019 Presence of IVC filter (Chronic) ICD-10-CM: K07.592 ICD-9-CM: V45.89  5/29/2019 Breast nodule ICD-10-CM: N63.0 ICD-9-CM: 793.89  5/29/2019 Weight loss ICD-10-CM: R63.4 ICD-9-CM: 783.21  5/29/2019 Acute on chronic combined systolic and diastolic ACC/AHA stage C congestive heart failure (HCC) ICD-10-CM: I50.43 ICD-9-CM: 428.43, 428.0  11/10/2018 CHF (congestive heart failure) (HCC) ICD-10-CM: I50.9 ICD-9-CM: 428.0  11/8/2018 Supratherapeutic INR ICD-10-CM: R79.1 ICD-9-CM: 790.92  11/8/2018 Pleural effusion ICD-10-CM: J90 ICD-9-CM: 511.9  11/8/2018 Overview Signed 11/8/2018  4:18 PM by Sofi Keith MD  
  bilateral 
  
  
   
 Edema ICD-10-CM: R60.9 ICD-9-CM: 782.3  11/8/2018 Overview Signed 11/8/2018  4:18 PM by Nel Barbosa MD  
  Bilateral lower legs Lightheadedness ICD-10-CM: H78 ICD-9-CM: 780.4  11/8/2018 Paroxysmal A-fib (HCC) ICD-10-CM: I48.0 ICD-9-CM: 427.31  11/8/2018 Iron deficiency anemia ICD-10-CM: D50.9 ICD-9-CM: 280.9  8/29/2011 Overview Signed 8/29/2011  3:05 PM by Patria Marroquin Despite oral iron For infed infusion Nutritional deficiency ICD-10-CM: E63.9 ICD-9-CM: 269.9  6/27/2011 Impaired mobility ICD-10-CM: Z74.09 
ICD-9-CM: 799.89  6/27/2011 Debility ICD-10-CM: R53.81 ICD-9-CM: 799.3  6/27/2011 Colon polyps ICD-10-CM: K63.5 ICD-9-CM: 211.3  6/27/2011 Pancreatitis ICD-10-CM: K85.90 ICD-9-CM: 883.5  6/19/2011 Overview Signed 6/19/2011  4:13 PM by Patria Marroquin 6-19-11 lipase improving possibly from gallstones Gallstone pancreatitis ICD-10-CM: K85.10 ICD-9-CM: 853.3, 574.20  6/19/2011 Follicular thyroid cancer ([facility] ICD-10-CM: H19 ICD-9-CM: 390  6/19/2011 Overview Signed 6/21/2011  7:12 AM by Patria Marroquin  
  thyroglobulin high > 100 obvious residual thyroid tissue Pericardial effusion ICD-10-CM: I31.3 ICD-9-CM: 423.9  6/17/2011 Acute pancreatitis ICD-10-CM: K85.90 ICD-9-CM: 958.6  6/16/2011 HTN (hypertension) (Chronic) ICD-10-CM: I10 
ICD-9-CM: 401.9  6/16/2011 Acquired hypothyroidism (Chronic) ICD-10-CM: E03.9 ICD-9-CM: 244.9  6/16/2011 Overview Signed 6/19/2011  4:15 PM by Patria Marroquin Post thyroidectomy for thyroid cancer Pulmonary embolism ([facility] (Chronic) ICD-10-CM: I26.99 
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 
  HTN (hypertension) (6/16/2011) Acquired hypothyroidism (6/16/2011) Overview: Post thyroidectomy for thyroid cancer Follicular thyroid cancer ([facility] (6/19/2011) Overview: thyroglobulin high > 100 obvious residual thyroid tissue Pathologic fracture of lumbar vertebra (5/29/2019) KENISHA (acute kidney injury) ([facility] (5/30/2019) Acute UTI (urinary tract infection) (6/3/2019) Breast mass (6/14/2019) Dehydration (6/28/2019) Ileus ([address] (6/29/2019) Encephalopathy (7/4/2019) Nutrition Recommended Dobhoff placed in Fluoro since she isn't taking PO But pt has refused ng tubes multiple times in past. 
She isn't interactive at this point and family doesn't want her to have ngt (Dobhoff) at this time Multiple attempts in past by multiple staff (including doctor) has not succeeded in placing NGT I am confident that even if a dobhoff could be placed that she would soon pull it out. CT also showed sinusits PPN has been initiated and I spoke to nutritionist on 7/5/19 and we will be able to 100% betzaida and prot needs with PPN alone and no need for PICC or TPN. 
 
 
SBO/Ileus-->resolved She responded to bowel rest and no surgery and by 7/2/19 her CT showed no residual SBO Poor surgical candidate for expl lap at any rate secondary to age 80, frailty, debilitation, and mult med problems Pt and POA refused NGT mult occasions Would like PET CT given breast mass, suspected L4 met, and partial SBO issues. I called radiology and PET CT is not offered on this campus and is only offered as an outpt service Instead I ordered CT abd/pelvis with oral and IV contrast shown above which showed resolution of SBO (and ascites with supected L4 met). I peviously discussed the finding of ascites which may be drained at later date if needed and discussed possibility of a malignancy ascites with pt's POA Pt not competent to make decisions. I have had many discussions with family and POA. Discussed SBO/ileus, breast mass on CT, L5 abnormality on CT, dysphagia, debilitation, cognitive impairment. Pt not alert to understand and not able to give consent. All questions answered. Notes indicate family wants to take pt home with Hospice care Will sign off No surgical needs at this time Encephalopathy Surgery will defer this workup to Hospitalists Head CT on 7/3/19 with senescent changes, microvasc disease, and and old infarct as described MRI with no acute findings as above Hospitalists evaluated thyroid function studies (see their note) She is profoundly debilitated and is likely at the end of life regardless of treatment. Coagulopathy INR 2.6--->INR 6.6 on 7/3/19-->Vit K 5mg Iv given-->INR 1.7 on 7/4/19 No active bleeding Start subq Lovenox VTE prophyalxis (and SCDs) Defer when/if to restart coumdan/Hep drip  to Hospitalists Right Breast mass I have asked to her to see me in the office for exam and imaging Outpt imaging and follow-up planned Inpt mammography is not offered at [facility] She couldn't tolerate dx mammography at this time anyway in her current state I will write instructions in dc instructions section of connect care L4 abnormality on CT - Pathologic fracture related to thyroid carcinoma Defer to ortho who is following Family related info on 7/2/19 regarding a spinal met by biopsy related to her thyroid carcinoma at [facility] from approx 10 yrs ago and for which she refused XRT. No urgent general surgical issues at this time. I will check on her Mnday 7/8/19 I have personally performed a face-to-face diagnostic evaluation and management  service on this patient. I have independently seen the patient. I have independently obtained the above history from the patient/family. I have independently examined the patient with above findings. I have independently reviewed data/labs for this patient and developed the above plan of care (MDM). Signed: Carolyn Bertrand.  Cornelio Melendez MD, FACS

## 2019-07-08 NOTE — PROGRESS NOTES
07/08/19 1524 Oxygen Therapy O2 Sat (%) 99 % Pulse via Oximetry 70 beats per minute O2 Device Nasal cannula O2 Flow Rate (L/min) 1 l/min 
(placed on room air)

## 2019-07-08 NOTE — PROGRESS NOTES
Problem: Falls - Risk of 
Goal: *Absence of Falls Description Document [facility] Fall Risk and appropriate interventions in the flowsheet.  
Outcome: Progressing Towards Goal

## 2019-07-08 NOTE — PROGRESS NOTES
Hospitalist Progress Note 2019 Admit Date: 2019  8:37 AM  
NAME: Sherryle Caldwell :  1933 DOS:              19 MRN:  [id number] Attending: Jian Yates MD 
PCP:  Kirstie Turk MD 
Treatment Team: Attending Provider: Julio Cesar Sommer MD; Care Manager: June Zhao RN; Utilization Review: Obed Mart RN; Consulting Provider: Paula Quinn NP; Charge Nurse: Shanna Garza 
 
DNR SUBJECTIVE: As previously documented: 
 
80 AAF, with a pmhx of CAD, moderate pericardial effusion, hypothyroidism, prior follicular cancer s/p ESCALERA, HTN, chronic systolic CHF, afib on coumadin, hx of DVT's and PE's with ivc filter in place,  l4 lesion and right breast 12mm nodule. This is her third admission, both previously lengthly, in the past two months.  
  
Patient was admitted   -  for abdominal pain and found to have SBO. Incidental findings of right breast mass and l4 lesion during imaging then. Surgery was consulted. SBO treated conservatively and resolved.  
  
She was admitted  -  for hypotension, diarrhea, ac/chronic systolic CHF and KENISHA, UTI. Cardiology/ GI were consulted at the time. Hypotension resolved with med changed (amiodarone dc'd). Diarrhea resolved. Cardiology saw pt for perciardial effusion and said to continue lasix. She was told to follow with Dr Gino May for breast mass and neurosurgery for L4 lesion. Per family, she has not had biopsy of these nor evaluations for these as of yet. 
  
Pt returns  with report of nausea, vomiting and a  UA questionable for UTI. Started on Rocephin and IVF. CT imaging shows bowel distension and moderate stool through colon. Also concern for increasing pericardial effusion on CT scan, still reporting as moderate on repeat echo. She had refused NGT. Patient had repeat CT AP with no SBO identified. Ortho consulted for L4 pathological fracture.  Daughter reported patient had a biopsy done in the past at [facility], awaiting for medical records. Daughter also reported patient refused radiotherapy of the L4 lesion. 07/08/19    Isaiah Castellanos is frail looking, debilitated. Discussed with family members at bedside. Hospice evaluation today. 10+ ROS reviewed and negative except for positive in HPI. Allergies Allergen Reactions  Ativan [Lorazepam] Other (comments) Has opposite reaction Current Facility-Administered Medications Medication Dose Route Frequency  morphine injection 2 mg  2 mg IntraVENous Q4H PRN  
 lidocaine 4 % patch 1 Patch  1 Patch TransDERmal Q24H  
 bisacodyl (DULCOLAX) suppository 10 mg  10 mg Rectal DAILY PRN  
 NUTRITIONAL SUPPORT ELECTROLYTE PRN ORDERS   Does Not Apply PRN  
 TPN ADULT - dextrose 5% amino acid 4.25%   IntraVENous QPM  
 fat emulsion 20% (LIPOSYN, INTRAlipid) infusion 250 mL  250 mL IntraVENous QPM  
 enoxaparin (LOVENOX) injection 60 mg  60 mg SubCUTAneous Q12H  
 haloperidol lactate (HALDOL) injection 2 mg  2 mg IntraVENous Q4H PRN  
 ziprasidone (GEODON) 10 mg in sterile water (preservative free) 0.5 mL injection  10 mg IntraMUSCular Q12H PRN  
 naloxone (NARCAN) injection 0.4 mg  0.4 mg IntraVENous PRN  
 metoprolol (LOPRESSOR) injection 2.5 mg  2.5 mg IntraVENous Q6H  
 sodium chloride (NS) flush 5-40 mL  5-40 mL IntraVENous PRN  
 acetaminophen (TYLENOL) suppository 650 mg  650 mg Rectal Q4H PRN  
 diphenhydrAMINE (BENADRYL) capsule 25 mg  25 mg Oral Q4H PRN  
 nicotine (NICODERM CQ) 21 mg/24 hr patch 1 Patch  1 Patch TransDERmal DAILY PRN  
 ondansetron (ZOFRAN) injection 4 mg  4 mg IntraVENous Q4H PRN  
 cloNIDine HCl (CATAPRES) tablet 0.2 mg  0.2 mg Oral BID PRN  
 hydrALAZINE (APRESOLINE) 20 mg/mL injection 10 mg  10 mg IntraVENous Q6H PRN Immunization History Administered Date(s) Administered  TB Skin Test (PPD) Intradermal 05/29/2019, 06/14/2019, 06/29/2019  Tuberculin 12/12/2008 Objective:  
 
Patient Vitals for the past 24 hrs: 
 Temp Pulse Resp BP SpO2  
19 1524     99 % 19 1223 97.3 °F (36.3 °C) (!) 123 18 141/89 98 % 19 1206     90 % 19 0808 98 °F (36.7 °C) (!) 123 16 132/86 100 % 19 0639  99     
19 0400 97.9 °F (36.6 °C) (!) 115 18 136/69 96 % 19 2334 97.6 °F (36.4 °C) (!) 120 18 130/58 97 % 19 2303  (!) 120     
19 2227  (!) 114  (!) 148/97   
19 1954 97.3 °F (36.3 °C) 96 18 139/81 97 % Temp (24hrs), Av.6 °F (36.4 °C), Min:97.3 °F (36.3 °C), Max:98 °F (36.7 °C) Oxygen Therapy O2 Sat (%): 99 % (19 1524) Pulse via Oximetry: 70 beats per minute (19 1524) O2 Device: Room air (19 1525) O2 Flow Rate (L/min): 0 l/min (19 1525) Oxygen Therapy O2 Sat (%): 99 % (19 1524) Pulse via Oximetry: 70 beats per minute (19 1524) O2 Device: Room air (19 1525) O2 Flow Rate (L/min): 0 l/min (19 1525) Physical Exam: 
General:         Alert, frail HEENT:               NCAT. No obvious deformity. Lungs: No wheezing/rhonchi/rales Cardiovascular:   RRR. No m/r/g. No pedal edema b/l. Abdomen:       S/nt/nd. Bowel sounds normal. .  
Skin:         No rashes or lesions. Neurologic:     No gross focal deficit. Moving extremities. DIAGNOSTIC STUDIES Data Review:  
Recent Results (from the past 24 hour(s)) METABOLIC PANEL, BASIC Collection Time: 19 11:46 AM  
Result Value Ref Range Sodium 139 136 - 145 mmol/L Potassium 3.6 3.5 - 5.1 mmol/L Chloride 106 98 - 107 mmol/L  
 CO2 22 21 - 32 mmol/L Anion gap 11 7 - 16 mmol/L Glucose 109 (H) 65 - 100 mg/dL BUN 43 (H) 8 - 23 MG/DL Creatinine 0.62 0.6 - 1.0 MG/DL  
 GFR est AA >60 >60 ml/min/1.73m2 GFR est non-AA >60 >60 ml/min/1.73m2 Calcium 8.2 (L) 8.3 - 10.4 MG/DL  
PHOSPHORUS Collection Time: 19 11:46 AM  
Result Value Ref Range Phosphorus 2.5 2.3 - 3.7 MG/DL PROTHROMBIN TIME + INR Collection Time: 19 11:46 AM  
Result Value Ref Range Prothrombin time 18.2 (H) 11.7 - 14.5 sec INR 1.5 All Micro Results Procedure Component Value Units Date/Time CULTURE, URINE [[id number]]  (Abnormal)  (Susceptibility) Collected:  19 1057 Order Status:  Completed Specimen:  Urine from Clean catch Updated:  19 8173 Special Requests: NO SPECIAL REQUESTS Culture result:    
  >100,000 COLONIES/mL STAPHYLOCOCCUS SPECIES, COAGULASE NEGATIVE  
     
      
  10,000 to 50,000 COLONIES/mL MIXED SKIN OTILIA ISOLATED  
     
 CULTURE, URINE [[id number]] Order Status:  Canceled Specimen:  Urine from Clean catch Imaging Nelly David /Studies: CXR Results  (Last 48 hours) None CT Results  (Last 48 hours) None No results found. Results for orders placed or performed during the hospital encounter of 19  
2D ECHO COMPLETE ADULT (TTE) W OR WO CONTR Narrative [address] [place], [address] [facility] 
[phone] Transthoracic Echocardiogram 
2D and Doppler Patient: Kayla Truong 
MR #: [id number] : [id number] Age: 80 years Gender: Female Study date: 2019 Account #: [de-identified] Height: 60 in Weight: 113 lb 
BSA: 1.47 mï¾² Status:Routine Location: [facility] BP: 120/ 72 Allergies: LORAZEPAM 
 
Sonographer:  Marko Reasons RD Group:  [address] Cardiology Referring Physician:  Melanie Blue MD 
Reading Physician:  Alexa Domínguez. Latoya Kong MD 
 
INDICATIONS: Reevaluate pericardial effusion PROCEDURE: This was a routine study. A transthoracic echocardiogram was 
performed. The study included limited 2D imaging and limited spectral  
Doppler. Image quality was adequate. PERICARDIUM: A moderate pericardial effusion was identified circumferential  
to 
the heart. There was a left pleural effusion. SUMMARY: 
 
-  Pericardium: A moderate pericardial effusion was identified  
circumferential 
to the heart. There was a left pleural effusion. Prepared and signed by 
 
Valarie Osborne MD 
Signed 29-Jun-2019 14:37:02 Labs and Studies from previous 24 hours have been personally reviewed by myself   
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 thyroglobulin high > 100 obvious residual thyroid tissue  HTN (hypertension) 06/16/2011  Acquired hypothyroidism 06/16/2011 Post thyroidectomy for thyroid cancer Hospital Problems as of 7/8/2019 Date Reviewed: 6/19/2019 Codes Class Noted - Resolved POA Encephalopathy ICD-10-CM: G93.40 ICD-9-CM: 348.30  7/4/2019 - Present Yes Ileus ([facility] ICD-10-CM: K56.7 ICD-9-CM: 560.1  6/29/2019 - Present Yes Enteritis ICD-10-CM: K52.9 ICD-9-CM: 558.9  6/28/2019 - Present Dehydration ICD-10-CM: E86.0 ICD-9-CM: 276.51  6/28/2019 - Present Yes Breast mass ICD-10-CM: N63.0 ICD-9-CM: 611.72  6/14/2019 - Present Yes Acute UTI (urinary tract infection) ICD-10-CM: N39.0 ICD-9-CM: 599.0  6/3/2019 - Present Yes KENISHA (acute kidney injury) ([facility] ICD-10-CM: N17.9 ICD-9-CM: 584.9  5/30/2019 - Present Yes * (Principal) Atrial fibrillation with RVR ([facility] ICD-10-CM: I48.91 
ICD-9-CM: 427.31  5/29/2019 - Present Yes Pathologic fracture of lumbar vertebra ICD-10-CM: M84.48XA ICD-9-CM: 733.13  5/29/2019 - Present Yes Follicular thyroid cancer ([facility] ICD-10-CM: Y98 ICD-9-CM: 347  6/19/2011 - Present Yes Overview Signed 6/21/2011  7:12 AM by Michele Lane  
  thyroglobulin high > 100 obvious residual thyroid tissue HTN (hypertension) (Chronic) ICD-10-CM: I10 
ICD-9-CM: 401.9  6/16/2011 - Present Yes Acquired hypothyroidism (Chronic) ICD-10-CM: E03.9 ICD-9-CM: 244.9  6/16/2011 - Present Yes Overview Signed 6/19/2011  4:15 PM by Michele Lane Post thyroidectomy for thyroid cancer A/P: 
 
-Acute ?metabolic encephalopathy MRI with no acute process W90, ammonia and folic acid on normal ranges TSH suppressed but free t4 and T3 with appropriate levels JIGNESH requested patient to be discharged home. Case discussed with CM. POA was informed that likely insurance wont pay for PPN/TPN at home. JIGNESH reported she wants hospice evaluation. Hospice discussed with some of the family members. Family members will inform rest of the family for everybody to be on same page before taking decision for home hospice.   
 
-Ileus vs partial SBO Surgery evaluation appreciated. Recommended CT  No evidence of SBO. Discussed with POA and family at bedside. Per living will if patient was in this condition she wanted nutrition with tubes or IV. Patient has refused in the past NGT and daughter reported she did not want Peg tube. On PPN  
 
 
-L4 fracture CT showing destructive process L4 pathological fracture metastasic disease vs myeloma/plasmacytoma. Ortho recommended IR for biopsy of lesion [facility] sent path from thyroidectomy Discussed with POA, she stated patient did not want aggressive treatment or chemo. 
 
 
-Acute UTI Ucx with staph coag neg Completed 5 days course 
 
-Afib Received Vit K 07/03 for supra therapeutic INR On lovenox BID 
 
 
 
DVT Prophylaxis: lovenox CODE Status: DNR Nicole Rene MD 
07/08/19

## 2019-07-08 NOTE — HOSPICE
[address] with 6 of pt's children in private room per their request to discuss hospice services/support. We discussed hospice philosophy of care, care team members, care at home and care at the [facility]. Pt has been receiving IV morphine and IV haldol for sx management so I expressed that the [facility] might be the best level of care for the pt but the family felt strongly that the pt would like to be in her own home for end of life care. Pt's  was present and she and I went in and talked to the pt in private about hospice services, stopping the TPN feedings, focusing on comfort care and whether she would like to go the the [facility] where meds could be IV or transition the meds to PO/SL and go home. Pt was able to verbalize her wishes that she wanted to go home and she understood that the comfort meds would need to be orally. Adriano Cartagena was daughter and Genaro Peed" for the group and states that she wants to talk to the other siblings not present and make sure that everyone is in agreement about going home with hospice. [facility] brochures and my contact info left with family for them to contact me with their decision. Thank you for this referral- 
 
Americo Trotter RN BSN Hospice Liaison [phone]

## 2019-07-08 NOTE — CDMP QUERY
Pt admitted with KENISHA . Patient noted to have encephalopathy . Please document in your progress  notes and the discharge summary if your are treating any of the following.  
 
      -----  Metabolic Encephalopathy 
-----  Toxic Encephalopathy 
-----  Encephalopathy due to medications or drugs (please specify) 
      -----  Dementia with behavioral disturbances         ----  Other, please specify 
-----  Clinically unable to determine The medical record reflects the following: 
 
   Risk Factors: 79 yo female with cancer, UTI, KENISHA, Ileus, pericardial effusion,and a pathological lumbar fx, recently placed on steroids Clinical Indicators: confusion, not eating,  
agitation Treatment: haldol, safety measures, ongoing tx of underlying conditions, palliative consult Thank you. Gladys Manuel RN, BSN, CDS Clinical Documentation Management Program 
[facility] AT [address] 
[phone] [email or web address]

## 2019-07-09 NOTE — PROGRESS NOTES
Per Kale Gonzalez liaison, equipment will be delivered to pt's home tomorrow. DC planned for Thursday morning. MD notified. CM will follow. Care Management Interventions PCP Verified by CM: Yes Transition of Care Consult (CM Consult): Discharge Planning Physical Therapy Consult: Yes Occupational Therapy Consult: Yes Current Support Network: Own Home Confirm Follow Up Transport: Family Plan discussed with Pt/Family/Caregiver: Yes Freedom of Choice Offered: Yes Discharge Location Discharge Placement: Home(with [facility])

## 2019-07-09 NOTE — HOSPICE
[facility]- 
 
I had a long talk on the phone with pt's daughter/POA, Janiya Mcneill, and explained my visit with other siblings and the pt yesterday. Pt yesterday stated that she wanted to go home and that hospice was fine. We reviewed that pt had a much better night last night and needed less medicine to keep her comfortable. Stated that I would reach out to Palliative Care so that meds could be changed to PO/SL to ensure that pt will be comfortable with that route of administration prior to discharging her home with hospice support. Discussed the DME that would be needed prior to DC- Bed, OTB table, and 02 set up. I reviewed the above with Jordan England NP and I will call into the room and talk with daughter Loki Briones also reviewing the tentative DC plan. Pt has many children but I will review that there will need to  be a \"caregiver\" with the pt at all times comfortable with giving the care. 80- talked with daughter Loki Briones at the bedside and she is in agreement of the above and requested that DME to be delivered tomorrow afternoon and DC home on Thurs AM. Thank you for this referral- 
 
Artemio Gil RN BSN Hospice Liaison [phone]

## 2019-07-09 NOTE — PROGRESS NOTES
Nutrition: Follow Up for TPN management Assessment: 
The patient at this time is transitioning to comfort measures only. PPN will be stopped and the patient is to discharge home on Thursday with home hospice. At this time nutrition will discontinue PPN and sign off. Please re consult RD if nutrition is needed. Yari Reveles [address], [address],   
[phone]

## 2019-07-09 NOTE — PROGRESS NOTES
Palliative Care Progress Note Patient: Gagan Villagomez MRN: [id number]  SSN: [id number] YOB: 1933  Age: 80 y.o. Sex: female Assessment/Plan: Chief Complaint/Interval History: Pt states readiness to go home with hospice. Principal Diagnosis:   
· Pain, back M54.9 Additional Diagnoses: · Agitation  R45.1 · Anorexia  R63.0 · Dysphagia  R13.10 · Frailty  R54 
· Pain, back  M54.9 · Counseling, Encounter for Medical Advice  Z71.9 
· Encounter for Palliative Care  Z51.5 Palliative Performance Scale (PPS) PPS: 10 Medical Decision Making:  
Reviewed and summarized notes from last palliative care visit to present. Discussed case with appropriate providers: RN Mg Fu, Dr. Jimbo Leary, [facility] PLANO liaison Yesica Altman Reviewed lab and X-ray data from last palliative care visit to present. Pt resting in bed. Dtr Tennille godoy, son [facility] Floor, and a friend at bedside. Pt endorses back pain, managed with lidocaine and IV morphine. Family reports that the morphine gives the pt a small amount of agitation, but much less than did Dilaudid. The family says they have observed that the agitating effect of the morphine is reduced when the pt is given Haldol. Have asked Ced to offer Haldol when offers morphine. Anthony Landin reports that the pt had a large BM this AM after receiving a bisacodyl suppository yesterday. Pt agrees to receive a bisacodyl suppository every third day to ensure regular BMs, especially as is on an opioid. Ms. Bertram Ward confirms that she wishes to go home with [facility]. Dtr [place] says that she has spoken to the siblings, most of whom are in agreement. Pointed out that the pt is now capable of making her own decisions, and has stated her wishes clearly to several people. Therefore, it does not matter whether the HCPOA or any other child is in agreement with the pt.    Nevertheless, Tennille godoy asked that Hemal Pascal inform dtr/HCPOA Marc Sun of the plan, as Marc Sun walked out of the family meeting yesterday. Have communicated with Oma John to bring her up to date. 1100 - At request of Oma John, confirmed the following: 
 - Khalida Cortez would like for the pt to go home on Thursday, so that she has time to clean the pt's bedroom 
 - changed morphine and Haldol to oral concentrates; Jaye Lr is aware. With agreement of Dr. Americo Carr, placed pt on comfort measures, including stopping TPN. Pt confirms that dtr Kirstie Santoro is to continue as HCPOA. Will continue to follow. Will discuss findings with members of the interdisciplinary team.   
 
  
More than 50% of this 40 minute visit was spent counseling and coordination of care as outlined above. In addition to the E&M described above, a 30--minute ACP meeting was spent discussing the pt's wishes concerning end of life care. In addition to the E&M described above, more than 50% of a 37-minute prolonged visit, from [id number], was spent on counseling and coordination of care. Subjective:  
 
Review of Systems: A comprehensive review of systems was negative except for: Gastrointestinal: Positive for loss of appetite Musculoskeletal: Positive for back pain. Behavioral/Psychiatric: Positive for minimal speech. Objective:  
 
Visit Vitals /88 (BP 1 Location: Left arm, BP Patient Position: At rest) Pulse (!) 108 Temp 97.5 °F (36.4 °C) Resp 18 Ht 5' (1.524 m) Wt 125 lb 11.2 oz (57 kg) SpO2 93% BMI 24.55 kg/m² Physical Exam: 
 
General:  Cooperative. No acute distress. Back pain: asks children to rub her back. Eyes:  Conjunctivae/corneas clear Nose: Nares normal. Septum midline. Neck: Supple, symmetrical, trachea midline, no JVD Lungs:   Clear to auscultation bilaterally, unlabored Heart:  Regular rate and rhythm, no murmur Abdomen:   Soft, non-tender, non-distended Extremities: Normal, atraumatic, no cyanosis or edema Skin: Skin color, texture, turgor normal. No rash or lesions. Neurologic: Must listen closely, as responds with nod or shake of head and minimal speech Psych: Somnolent, oriented Signed By: Pino Larson NP   
 July 9, 2019

## 2019-07-09 NOTE — PROGRESS NOTES
Interdisciplinary Rounds completed 07/09/19. Nursing, Case Management, Physician and PT present. Plan of care reviewed and updated. Comfort measures, d/c home with hospice on Thursday.

## 2019-07-09 NOTE — PROGRESS NOTES
Progress Note Patient: Gale Diaz               Sex: female          MRN: [id number] YOB: 1933      Age:  80 y.o. PCP:  Jorge Carty MD 
Treatment Team: Attending Provider: Milagros Rose MD; Care Manager: Yesica Corral, RN; Utilization Review: Leigh Rivera RN; Consulting Provider: Kang Osborn NP; Charge Nurse: Jesse Orozco Subjective:  
 
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 This is her third admission, both previously lengthly, in the past two months.  
  
Patient was admitted  5/14 - 6/4 for abdominal pain and found to have SBO. Incidental findings of right breast mass and l4 lesion during imaging then. Surgery was consulted. SBO treated conservatively and resolved.  
  
She was admitted  6/13- 6/19 for hypotension, diarrhea, ac/chronic systolic CHF and KENISHA, UTI. Cardiology/ GI were consulted at the time. Hypotension resolved with med changed (amiodarone dc'd). Diarrhea resolved. Cardiology saw pt for perciardial effusion and said to continue lasix. She was told to follow with Dr Nannette Lawrence for breast mass and neurosurgery for L4 lesion. Per family, she has not had biopsy of these nor evaluations for these as of yet. 
  
Pt returns 6/28 with report of nausea, vomiting and a  UA questionable for UTI. Started on Rocephin and IVF. CT imaging shows bowel distension and moderate stool through colon. Also concern for increasing pericardial effusion on CT scan, still reporting as moderate on repeat echo. She had refused NGT. Patient had repeat CT AP with no SBO identified. Ortho consulted for L4 pathological fracture. Daughter reported patient had a biopsy done in the past at [facility], awaiting for medical records.  Daughter also reported patient refused radiotherapy of the L4 lesion. 
  
  
 19: New patient for me. Complains of decreased appetite, moderate lower back pain. Also complains of moderate shortness of breath. no fevers. Objective:  
Physical Exam:  
Visit Vitals /82 (BP 1 Location: Right arm, BP Patient Position: At rest) Pulse (!) 115 Temp 97.7 °F (36.5 °C) Resp 18 Ht 5' (1.524 m) Wt 57 kg (125 lb 11.2 oz) SpO2 98% BMI 24.55 kg/m² Temp (24hrs), Av.8 °F (36.6 °C), Min:97.4 °F (36.3 °C), Max:98.4 °F (36.9 °C) Oxygen Therapy O2 Sat (%): 98 % (19 1544) Pulse via Oximetry: 70 beats per minute (19 1524) O2 Device: Room air (19 1544) O2 Flow Rate (L/min): 0 l/min (19 1525) Intake/Output Summary (Last 24 hours) at 2019 1835 Last data filed at 2019 1448 Gross per 24 hour Intake 4065 ml Output 1950 ml Net 2115 ml General: Conscious, no acute distress Eyes:  DELORES, No pallor/icterus HENT:             Oral Mucosa is dry, No sinus tenderness Neck:               Supple, No JVD Lungs:  Decreased air entry bilaterally, No significant wheeze/rhonchi Heart:  S1 S2 regular Abdomen: Soft, normal bowel sounds, distended with abdominal wall edema, mild generalized                          tenderness+ Extremities: Bilateral pedal edema+ Neurologic:  AAOX2, No acute FND Skin:                No acute rashes Musculoskeletal: No Acute findings Psych:             Depressed mood LAB Recent Results (from the past 24 hour(s)) METABOLIC PANEL, BASIC Collection Time: 19  7:02 AM  
Result Value Ref Range Sodium 137 136 - 145 mmol/L Potassium 3.4 (L) 3.5 - 5.1 mmol/L Chloride 104 98 - 107 mmol/L  
 CO2 22 21 - 32 mmol/L Anion gap 11 7 - 16 mmol/L Glucose 94 65 - 100 mg/dL BUN 46 (H) 8 - 23 MG/DL Creatinine 0.68 0.6 - 1.0 MG/DL  
 GFR est AA >60 >60 ml/min/1.73m2 GFR est non-AA >60 >60 ml/min/1.73m2 Calcium 8.2 (L) 8.3 - 10.4 MG/DL  
PHOSPHORUS Collection Time: 07/09/19  7:02 AM  
Result Value Ref Range Phosphorus 2.9 2.3 - 3.7 MG/DL No results found. No results found. All Micro Results Procedure Component Value Units Date/Time CULTURE, URINE [[id number]]  (Abnormal)  (Susceptibility) Collected:  06/28/19 1057 Order Status:  Completed Specimen:  Urine from Clean catch Updated:  07/01/19 7306 Special Requests: NO SPECIAL REQUESTS Culture result:    
  >100,000 COLONIES/mL STAPHYLOCOCCUS SPECIES, COAGULASE NEGATIVE  
     
      
  10,000 to 50,000 COLONIES/mL MIXED SKIN OTILIA ISOLATED  
     
 CULTURE, URINE [[id number]] Order Status:  Canceled Specimen:  Urine from Clean catch Current Medications Reviewed Assessment/Plan Principal Problem: 
  Atrial fibrillation with RVR ([address] (5/29/2019) Active Problems: 
  HTN (hypertension) (6/16/2011) Acquired hypothyroidism (6/16/2011) Overview: Post thyroidectomy for thyroid cancer Follicular thyroid cancer ([address] (6/19/2011) Overview: thyroglobulin high > 100 obvious residual thyroid tissue Pathologic fracture of lumbar vertebra (5/29/2019) KENISHA (acute kidney injury) ([address] (5/30/2019) Acute UTI (urinary tract infection) (6/3/2019) Breast mass (6/14/2019) Dehydration (6/28/2019) Ileus ([address] (6/29/2019) Encephalopathy (7/4/2019) -Acute metabolic encephalopathy MRI with no acute process J31, ammonia and folic acid on normal ranges TSH suppressed but free t4 and T3 with appropriate levels -Ileus vs partial SBO Surgery evaluation appreciated. Recommended CT  No evidence of SBO. Discussed with POA and family at bedside. Per living will if patient was in this condition she wanted no nutrition with tubes or IV. Patient has refused NGT in the past 
  
-L4 fracture CT showing destructive process L4 pathological fracture metastasic disease vs myeloma/plasmacytoma. Ortho recommended IR for biopsy of lesion Discussed with POA, she stated patient did not want any aggressive treatment or chemo. 
  
  
-Acute UTI Ucx with staph coag neg Completed 5 days course 
  
-Afib Received Vit K 07/03 for supra therapeutic INR Pt and family want comfort measures only and no curative treatment. Started on Morphine and haldol, all other meds DCed. DVT Prophylaxis: None as pt is hospice. High risk patient. DNR/DNI Linda Carrillo MD 
July 9, 2019

## 2019-07-09 NOTE — PROGRESS NOTES
Hourly rounds completed. All needs met. Pt alert and oriented with periods of confusion. Only gave pt one dose of haldol during the shift. Pt was able to rest well. Pt is lying in the bed in a low, locked position side rails up x 2, call light within reach. Pt daughter and son is at the bedside. Gave report to the oncoming day shift nurse.

## 2019-07-10 NOTE — PROGRESS NOTES
Date 07/09/19 0700 - 07/10/19 9361 07/10/19 0700 - 07/11/19 8689 Shift 2088-2843 2930-6114 24 Hour Total 7171-5625 3531-2167 24 Hour Total  
INTAKE  
I.V.(mL/kg/hr) 1774(2.6)  1774 I.V. 1703  7186 Shift Total(mL/kg) 1774(31.1)  1774(30.2) OUTPUT Urine(mL/kg/hr) 450(0.7) 700 1150 Urine Voided 450  450 Urine Output (mL) (External Female Catheter 07/03/19)  700 700 Stool Stool Occurrence(s) 1 x  1 x Shift Total(mL/kg) 450(7.9) 700(11.9) 1150(19.6) NET 1323 -673 853 Weight (kg) 57 58.7 58.7 58.7 58.7 58.7 Hourly rounds done. Pt c/o pain, medicated per MAR. Denies nausea, vomiting. Voiding output valeria/clear. No BM this shift. Family at bedside. AO x person/place. All needs met at this time.

## 2019-07-10 NOTE — PROGRESS NOTES
Palliative Care Progress Note Patient: Nadia Leroy MRN: [id number]  SSN: [id number] YOB: 1933  Age: 80 y.o. Sex: female Assessment/Plan: Chief Complaint/Interval History: Denies pain currently. Wants to go home today Principal Diagnosis:   
· Pain, back M54.9 Additional Diagnoses: · Agitation  R45.1 · Anorexia  R63.0 · Dysphagia  R13.10 · Frailty  R54 
· Pain, back  M54.9 · Counseling, Encounter for Medical Advice  Z71.9 
· Encounter for Palliative Care  Z51.5 Palliative Performance Scale (PPS) PPS: 30 Medical Decision Making:  
Reviewed and summarized notes from last palliative care visit to present. Discussed case with appropriate providers. Reviewed lab and X-ray data: none Patient resting in bed, a son and daughter are at the bedside. Enjoying sips of coffee via spoon. Reviewed plan with patient for equipment to be set up today, and discharge home tomorrow with hospice. Patient in agreement with this plan, other than she would like to go home today. Patient had one dose of Roxanol around 0500 this morning- this controlled her pain well and she did not require dose of Haldol with Roxanol. Continue current doses of Roxanol and Haldol oral solution. Will continue to follow for symptom management while hospice is arranged. Will discuss findings with members of the interdisciplinary team.   
 
  
More than 50% of this 15 minute visit was spent counseling and coordination of care as outlined above. Subjective:  
 
Review of Systems: A comprehensive review of systems was negative except for:  
Gastrointestinal: Positive for loss of appetite Musculoskeletal: Positive for back pain. Objective:  
 
Visit Vitals /77 (BP 1 Location: Right arm, BP Patient Position: At rest) Pulse (!) 114 Temp 97.3 °F (36.3 °C) Resp 18 Ht 5' (1.524 m) Wt 129 lb 6.4 oz (58.7 kg) SpO2 96% BMI 25.27 kg/m² Physical Exam: General:  Cooperative. No acute distress. Eyes:  Conjunctivae/corneas clear. Nose: Nares normal. Septum midline. Neck: Supple, symmetrical, trachea midline. Lungs:   Clear bilaterally. Unlabored. Heart:  Regular rate and rhythm. Abdomen:   Soft, non-tender, non-distended Extremities: Normal, atraumatic, no cyanosis or edema. Skin: Skin color, texture, turgor normal. No rash. Neurologic: Must listen closely, as responds with nod or shake of head and minimal speech Psych: Alert, limited speech. Signed By: Dave Lyon NP   
 July 10, 2019

## 2019-07-10 NOTE — HOSPICE
[facility]- 
 
Pt and family feel pt is comfortable on the oral comfort meds. DME to be delivered this afternoon. Pt to transport home via [facility] at 0930 in am and [facility] RN to admit into hospice after arrival home. Please discharge with comfort rx as ordered in the hospital- 
  
  morphine concentrate 20mg/ml  #30ml bottle  
  haldol 2mg/ml  #30ml bottle please. DNR and hospice consents signed with Jean-Pierre Parra with [address] from [facility] Thank you for this referral- 
 
Jonathan Fairchild RN BSN Hospice Liaison [phone]

## 2019-07-10 NOTE — PROGRESS NOTES
Per hospice liaison, [facility] transportation needs to be setup for tomorrow 7/11/19 at 9:30am for discharge home with [facility]. [facility] transport setup for then. Family aware and in agreement. Care Management Interventions PCP Verified by CM: Yes Mode of Transport at Discharge: BLS Transition of Care Consult (CM Consult): Discharge Planning, Home Hospice [employer]: Yes Physical Therapy Consult: Yes Occupational Therapy Consult: Yes Current Support Network: Own Home Confirm Follow Up Transport: Family Plan discussed with Pt/Family/Caregiver: Yes Freedom of Choice Offered: Yes Discharge Location Discharge Placement: Home with hospice(with [facility])

## 2019-07-10 NOTE — PROGRESS NOTES
Interdisciplinary Rounds completed 07/10/19. Nursing, Case Management, Physician and PT present. Plan of care reviewed and updated.  
 
Discharge home with hospice in am.

## 2019-07-10 NOTE — PROGRESS NOTES
Problem: Falls - Risk of 
Goal: *Absence of Falls Description Document Loreli Hunger Fall Risk and appropriate interventions in the flowsheet. Outcome: Progressing Towards Goal 
  
Problem: Pressure Injury - Risk of 
Goal: *Prevention of pressure injury Description Document Sid Scale and appropriate interventions in the flowsheet. Outcome: Progressing Towards Goal 
  
Problem: Pain Goal: *Control of Pain Outcome: Progressing Towards Goal 
  
Problem: Nausea/Vomiting (Adult) Goal: *Absence of nausea/vomiting Outcome: Progressing Towards Goal

## 2019-07-10 NOTE — PROGRESS NOTES
Progress Note Patient: Nadia Leroy               Sex: female          MRN: [id number] YOB: 1933      Age:  80 y.o. PCP:  Kika Gong MD 
Treatment Team: Attending Provider: Brown Ellison MD; Care Manager: Abena Ayala, RN; Utilization Review: Huyen Brooke, VALERIE; Consulting Provider: Rehan Burns NP; Charge Nurse: Taz Levine Subjective:  
 
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 This is her third admission, both previously lengthly, in the past two months.  
  
Patient was admitted  5/14 - 6/4 for abdominal pain and found to have SBO. Incidental findings of right breast mass and l4 lesion during imaging then. Surgery was consulted. SBO treated conservatively and resolved.  
  
She was admitted  6/13- 6/19 for hypotension, diarrhea, ac/chronic systolic CHF and KENISHA, UTI. Cardiology/ GI were consulted at the time. Hypotension resolved with med changed (amiodarone dc'd). Diarrhea resolved. Cardiology saw pt for perciardial effusion and said to continue lasix. She was told to follow with Dr Dot Momin for breast mass and neurosurgery for L4 lesion. Per family, she has not had biopsy of these nor evaluations for these as of yet. 
  
Pt returns 6/28 with report of nausea, vomiting and a  UA questionable for UTI. Started on Rocephin and IVF. CT imaging shows bowel distension and moderate stool through colon. Also concern for increasing pericardial effusion on CT scan, still reporting as moderate on repeat echo. She had refused NGT. Patient had repeat CT AP with no SBO identified. Ortho consulted for L4 pathological fracture. Daughter reported patient had a biopsy done in the past at [facility], awaiting for medical records.  Daughter also reported patient refused radiotherapy of the L4 lesion. 
  
  
 07/10/19: Complains of decreased appetite, moderate lower back pain. No sob/cp. Worsening lower extremity edema and abdominal girth. Objective:  
Physical Exam:  
Visit Vitals BP (!) 130/94 (BP 1 Location: Right arm, BP Patient Position: At rest) Pulse (!) 109 Temp 97.3 °F (36.3 °C) Resp 18 Ht 5' (1.524 m) Wt 58.7 kg (129 lb 6.4 oz) SpO2 96% BMI 25.27 kg/m² Temp (24hrs), Av.7 °F (36.5 °C), Min:97.3 °F (36.3 °C), Max:98.3 °F (36.8 °C) Oxygen Therapy O2 Sat (%): 96 % (07/10/19 1548) Pulse via Oximetry: 70 beats per minute (19 1524) O2 Device: Room air (07/10/19 1548) O2 Flow Rate (L/min): 0 l/min (19 1525) Intake/Output Summary (Last 24 hours) at 7/10/2019 1641 Last data filed at 7/10/2019 1424 Gross per 24 hour Intake  Output 800 ml Net -800 ml General: Conscious, no acute distress Eyes:  DELORES, No pallor/icterus HENT:             Oral Mucosa is moist, No sinus tenderness Neck:               Supple, No JVD Lungs:  Decreased air entry bilaterally, No significant wheeze/rhonchi Heart:  S1 S2 regular Abdomen: Soft, normal bowel sounds, distended with abdominal wall edema, Extremities: Bilateral pedal edema+ Neurologic:  AAOX2, No acute FND Skin:                No acute rashes Musculoskeletal: No Acute findings Psych:             Mood is okay LAB No results found for this or any previous visit (from the past 24 hour(s)). No results found. No results found. All Micro Results Procedure Component Value Units Date/Time CULTURE, URINE [[id number]]  (Abnormal)  (Susceptibility) Collected:  19 1054 Order Status:  Completed Specimen:  Urine from Clean catch Updated:  19 2473 Special Requests: NO SPECIAL REQUESTS   Culture result:    
  >100,000 COLONIES/mL STAPHYLOCOCCUS SPECIES, COAGULASE NEGATIVE  
     
      
 10,000 to 50,000 COLONIES/mL MIXED SKIN OTILIA ISOLATED  
     
 CULTURE, URINE [[id number]] Order Status:  Canceled Specimen:  Urine from Clean catch Current Medications Reviewed Assessment/Plan Principal Problem: 
  Atrial fibrillation with RVR ([address] (5/29/2019) Active Problems: 
  HTN (hypertension) (6/16/2011) Acquired hypothyroidism (6/16/2011) Overview: Post thyroidectomy for thyroid cancer Follicular thyroid cancer ([address] (6/19/2011) Overview: thyroglobulin high > 100 obvious residual thyroid tissue Pathologic fracture of lumbar vertebra (5/29/2019) KENISHA (acute kidney injury) ([address] (5/30/2019) Acute UTI (urinary tract infection) (6/3/2019) Breast mass (6/14/2019) Dehydration (6/28/2019) Ileus ([address] (6/29/2019) Encephalopathy (7/4/2019) -Acute metabolic encephalopathy MRI with no acute process J00, ammonia and folic acid on normal ranges TSH suppressed but free t4 and T3 with appropriate levels -Ileus vs partial SBO Surgery evaluation appreciated. Recommended CT  No evidence of SBO. Discussed with POA and family at bedside. Per living will if patient was in this condition she wanted no nutrition with tubes or IV. Patient has refused NGT in the past 
  
-L4 fracture CT showing destructive process L4 pathological fracture metastasic disease vs myeloma/plasmacytoma. Ortho recommended IR for biopsy of lesion Discussed with POA, she stated patient did not want any aggressive treatment or chemo. 
  
  
-Acute UTI Ucx with staph coag neg Completed 5 days course 
  
-Afib Received Vit K 07/03 for supra therapeutic INR Pt and family want comfort measures only and no curative treatment. Started on Morphine and haldol, all other meds DCed. DVT Prophylaxis: None as pt is hospice. DNR/DNI Likely DC home with hospice tomorrow. Maddie Oleary MD 
July 10, 2019

## 2019-07-11 NOTE — PROGRESS NOTES
Progress Note Patient: Claudia Cleveland               Sex: female          MRN: [id number] YOB: 1933      Age:  80 y.o. PCP:  Renelle Cranker, MD 
Treatment Team: Utilization Review: Tatiana Covington RN; Consulting Provider: Maki Farooq NP; Charge Nurse: Rosalina Mijares Subjective:  
 
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 This is her third admission, both previously lengthly, in the past two months.  
  
Patient was admitted  5/14 - 6/4 for abdominal pain and found to have SBO. Incidental findings of right breast mass and l4 lesion during imaging then. Surgery was consulted. SBO treated conservatively and resolved.  
  
She was admitted  6/13- 6/19 for hypotension, diarrhea, ac/chronic systolic CHF and KENISHA, UTI. Cardiology/ GI were consulted at the time. Hypotension resolved with med changed (amiodarone dc'd). Diarrhea resolved. Cardiology saw pt for perciardial effusion and said to continue lasix. She was told to follow with Dr Javad Mae for breast mass and neurosurgery for L4 lesion. Per family, she has not had biopsy of these nor evaluations for these as of yet. 
  
Pt returns 6/28 with report of nausea, vomiting and a  UA questionable for UTI. Started on Rocephin and IVF. CT imaging shows bowel distension and moderate stool through colon. Also concern for increasing pericardial effusion on CT scan, still reporting as moderate on repeat echo. She had refused NGT. Patient had repeat CT AP with no SBO identified. Ortho consulted for L4 pathological fracture. Daughter reported patient had a biopsy done in the past at [facility], awaiting for medical records.  Daughter also reported patient refused radiotherapy of the L4 lesion. 
  
  
 07/10/19: Complains of decreased appetite, moderate lower back pain. No sob/cp. Worsening lower extremity edema and abdominal girth. No fevers overnight. Objective:  
Physical Exam:  
Visit Vitals /85 (BP 1 Location: Left arm, BP Patient Position: At rest) Pulse (!) 127 Temp 97.4 °F (36.3 °C) Resp 20 Ht 5' (1.524 m) Wt 59 kg (130 lb) SpO2 96% BMI 25.39 kg/m² Temp (24hrs), Av.5 °F (36.4 °C), Min:97.3 °F (36.3 °C), Max:98.1 °F (36.7 °C) Oxygen Therapy O2 Sat (%): 96 % (19) Pulse via Oximetry: 70 beats per minute (19) O2 Device: Room air (19) O2 Flow Rate (L/min): 0 l/min (19) Intake/Output Summary (Last 24 hours) at 2019 1006 Last data filed at 2019 4374 Gross per 24 hour Intake  Output 400 ml Net -400 ml General: Conscious, no acute distress Eyes:  DELORES, No pallor/icterus HENT:             Oral Mucosa is moist, No sinus tenderness Neck:               Supple, No JVD Lungs:  Decreased air entry bilaterally, No significant wheeze/rhonchi Heart:  S1 S2 regular Abdomen: Soft, normal bowel sounds, distended with abdominal wall edema, Extremities: Bilateral pedal edema+ Neurologic:  AAOX2, No acute FND Skin:                No acute rashes Musculoskeletal: No Acute findings Psych:             Mood is okay LAB No results found for this or any previous visit (from the past 24 hour(s)). No results found. No results found. All Micro Results Procedure Component Value Units Date/Time CULTURE, URINE [[id number]]  (Abnormal)  (Susceptibility) Collected:  19 1058 Order Status:  Completed Specimen:  Urine from Clean catch Updated:  19 7114 Special Requests: NO SPECIAL REQUESTS Culture result:    
  >100,000 COLONIES/mL STAPHYLOCOCCUS SPECIES, COAGULASE NEGATIVE  
     
      
  10,000 to 50,000 COLONIES/mL MIXED SKIN OTILIA ISOLATED CULTURE, URINE [[id number]] Order Status:  Canceled Specimen:  Urine from Clean catch Current Medications Reviewed Assessment/Plan Principal Problem: 
  Atrial fibrillation with RVR ([address] (5/29/2019) Active Problems: 
  HTN (hypertension) (6/16/2011) Acquired hypothyroidism (6/16/2011) Overview: Post thyroidectomy for thyroid cancer Follicular thyroid cancer ([address] (6/19/2011) Overview: thyroglobulin high > 100 obvious residual thyroid tissue Pathologic fracture of lumbar vertebra (5/29/2019) KENISHA (acute kidney injury) ([address] (5/30/2019) Acute UTI (urinary tract infection) (6/3/2019) Breast mass (6/14/2019) Dehydration (6/28/2019) Ileus ([address] (6/29/2019) Encephalopathy (7/4/2019) -Acute metabolic encephalopathy - resolved. MRI with no acute process A75, ammonia and folic acid on normal ranges -Ileus vs partial SBO - resolved. Surgery evaluation appreciated. Recommended CT  No evidence of SBO. Discussed with POA and family at bedside. Per living will if patient was in this condition she wanted no nutrition with tubes or IV. Patient has refused NGT in the past 
  
-L4 fracture CT showing destructive process L4 pathological fracture metastasic disease vs myeloma/plasmacytoma. Ortho recommended IR for biopsy of lesion Discussed with POA, she stated patient did not want any aggressive treatment or chemo. 
  
  
-Acute UTI Ucx with staph coag neg Completed 5 days course 
  
-Afib Received Vit K 07/03 for supra therapeutic INR Started on Morphine and haldol, all other meds DCed. Only comfort measures. DVT Prophylaxis: None as pt is hospice. DNR/DNI 
DC home with hospice today. Farrukh Alba MD 
July 11, 2019

## 2019-07-11 NOTE — DISCHARGE INSTRUCTIONS
DISCHARGE SUMMARY from Nurse    PATIENT INSTRUCTIONS:    After general anesthesia or intravenous sedation, for 24 hours or while taking prescription Narcotics:  · Limit your activities  · Do not drive and operate hazardous machinery  · Do not make important personal or business decisions  · Do  not drink alcoholic beverages  · If you have not urinated within 8 hours after discharge, please contact your surgeon on call. Report the following to your surgeon:  · Excessive pain, swelling, redness or odor of or around the surgical area  · Temperature over 100.5  · Nausea and vomiting lasting longer than 4 hours or if unable to take medications  · Any signs of decreased circulation or nerve impairment to extremity: change in color, persistent  numbness, tingling, coldness or increase pain  · Any questions    What to do at Home:  Recommended activity: Activity as tolerated    If you experience concerns, please contact your hospice nurse. *  Please give a list of your current medications to your Primary Care Provider. *  Please update this list whenever your medications are discontinued, doses are      changed, or new medications (including over-the-counter products) are added. *  Please carry medication information at all times in case of emergency situations. These are general instructions for a healthy lifestyle:    No smoking/ No tobacco products/ Avoid exposure to second hand smoke  Surgeon General's Warning:  Quitting smoking now greatly reduces serious risk to your health.     Obesity, smoking, and sedentary lifestyle greatly increases your risk for illness    A healthy diet, regular physical exercise & weight monitoring are important for maintaining a healthy lifestyle    You may be retaining fluid if you have a history of heart failure or if you experience any of the following symptoms:  Weight gain of 3 pounds or more overnight or 5 pounds in a week, increased swelling in our hands or feet or shortness of breath while lying flat in bed. Please call your doctor as soon as you notice any of these symptoms; do not wait until your next office visit. The discharge information has been reviewed with the patient. The patient verbalized understanding. Discharge medications reviewed with the patient and appropriate educational materials and side effects teaching were provided.   ___________________________________________________________________________________________________________________________________

## 2019-07-11 NOTE — PROGRESS NOTES
Discharge instructions and prescriptions given and reviewed with pt and son, verbalizes understanding, pt to be discharged home at 80 by León Mckenzie, son will accompany pt.

## 2019-07-12 NOTE — PROGRESS NOTES
This note will not be viewable in [address] No LYNN outreach indicated due to discharge to hospice. Patient currently engaged with CCM RN Wellington Lr notified of patient DC home with [facility].

## 2019-07-24 ENCOUNTER — HOME CARE VISIT (OUTPATIENT)
Dept: HOSPICE | Facility: HOSPICE | Age: 84
End: 2019-07-24
Payer: MEDICARE

## 2020-12-22 NOTE — PROGRESS NOTES
"Request for medication refill:    Providers if patient needs an appointment and you are willing to give a one month supply please refill for one month and  send a letter/MyChart using \".SMILLIMITEDREFILL\" .smillimited and route chart to \"P SMI \" (Giving one month refill in non controlled medications is strongly recommended before denial)    If refill has been denied, meaning absolutely no refills without visit, please complete the smart phrase \".smirxrefuse\" and route it to the \"P SMI MED REFILLS\"  pool to inform the patient and the pharmacy.    Shaila Ray MA        " Hourly rounds complete this shift, no new complaints at this time, : bed in low, locked position, call light and bedside table within reach,  all needs met. Will continue to monitor Report to day shift nurse.

## 2021-01-06 NOTE — PROGRESS NOTES
H&P/Consult Note/Progress Note/Office Note:   Gagan Villagomez  MRN: [id number]  UMQ:0/47/2836  Age:86 y.o.    HPI: Gagan Villagomez is a 80 y.o. female on coumadin for afib with INR>4 at presentation who came to the ER   with 6 day h/o diarrhea for which she took several anti-diarrheal meds (pepto-bismol and lomotil among them)  This was followed by diffuse, moderate, progressive, non-radiating abd pain and cramping. Nothing made symptoms better or worse. She was seen in ER day before admission and given IVF and released. She has associated N/V. No fever or leukocytosis on admission    She has mult medical problems including CHF with EF 30-35%, pulm HTN, chronic afib on coumadin, s/p IVC filter for DVT/PE (1995), thyroid carcinoma treated with ablation. She is s/p appy, rhonda, right hip surgery, and back surgery    She had the below CT performed in ER with SBO, right breast mass, and abnormal L4 spine      5/29/19 CT abd/pelvis with oral and IV contrast  There is no pericardial effusion. Heart is enlarged. There is coronary artery calcification. There is dependent subsegmental atelectasis.     There is a 12 mm nodule within the lateral right breast (series 2, image 5). There is subtle micronodular contour of the liver. There is periportal edema. The gallbladder is surgically absent. Pancreas is not well visualized. The  spleen is normal in contour. Stable small right adrenal nodule (since 2011).    There are tiny nonobstructing renal calculi. No hydronephrosis left kidney is  small in size. There is prominent atherosclerotic calcification of the abdominal aorta.     The stomach is normal in contour.     Pelvic findings: There are multiple fluid-filled dilated small bowel loops with transition point  appearing to be in the lower abdomen. The colon appears normal in caliber. Urinary bladder is normal in contour. There is a pessary.     There is no significant free fluid. No free air. Bones are osteopenic. Jose M has an appointment today at 9:40 am with MD Giron.  Now is coughing and sneezing and wants to cancel appointment.  Jose M is wanting clinic to know that he will not be in form his appointment today.    COVID 19 Nurse Triage Plan/Patient Instructions    Please be aware that novel coronavirus (COVID-19) may be circulating in the community. If you develop symptoms such as fever, cough, or SOB or if you have concerns about the presence of another infection including coronavirus (COVID-19), please contact your health care provider or visit [email or web address].     Disposition/Instructions    Home care recommended. Follow home care protocol based instructions.    Thank you for taking steps to prevent the spread of this virus.  o Limit your contact with others.  o Wear a simple mask to cover your cough.  o Wash your hands well and often.    Resources    [facility]: About COVID-19: [email or web address]    CDC: What to Do If You're Sick: www.cdc.gov/coronavirus/2019-ncov/about/steps-when-sick.html    CDC: Ending Home Isolation: www.cdc.gov/coronavirus/2019-ncov/hcp/disposition-in-home-patients.html     CDC: Caring for Someone: www.cdc.gov/coronavirus/2019-ncov/if-you-are-sick/care-for-someone.html     [facility]: Interim Guidance for Hospital Discharge to Home: www.health.[facility].[place].us/diseases/coronavirus/hcp/hospdischarge.pdf    [facility] clinical trials (COVID-19 research studies): clinicalaffairs.[facility]-clinical-trials     Below are the COVID-19 hotlines at the [place] Department of Health ([facility]). Interpreters are available.   o For health questions: Call 512-011-5830 or 1-999.363.3605 (7 a.m. to 7 p.m.)  o For questions about schools and childcare: Call 579-374-4326 or 1-202.997.6941 (7 a.m. to 7 p.m.)                     Additional Information    Negative: RN needs further essential information from caller in order to complete triage    Negative: Requesting regular office appointment     Negative: Requesting referral to a specialist    Negative: [1] Caller requesting nonurgent health information AND [2] PCP's office is the best resource    Health Information question, no triage required and triager able to answer question    Protocols used: INFORMATION ONLY CALL - NO TRIAGE-P-AH       There are  degenerative changes of the spine. There is severe compression deformity of L4  with associated high density structure. Right total hip arthroplasty is partially seen.        IMPRESSION:     1. Small bowel obstruction. Multiple fluid-filled dilated small bowel loops with  transition point in the lower abdomen. Etiology is likely adhesion. Closed-loop  obstruction is in the differential.     2. No evidence of colitis or diverticulitis, although the colon is not well  visualized. . No hydronephrosis.     3. Severe compression deformity of L4 with associated high density material.  This may be related to prior intervention such as kyphoplasty. Neoplastic  involvement is considered less likely. Osteopenia.     4. A 12 mm nodule within the lateral right breast. Correlation with mammogram is  recommended.       Additional hx:  5/31/19 feels better, no N/V/F/C; no flatus overnight; less abd discomfort to deeper palpation        Past Medical History:   Diagnosis Date    Acquired hypothyroidism     Acute pancreatitis     Arthritis     CAD (coronary artery disease) 4/29/11    RCA occluded and fills with collaterals, LAD and Cx nonobstructive CAD, EF 50-55%    Cancer ([address]     THYROID - treated with surgery and radiation    CHF (congestive heart failure) ([address] 11/8/2018    Chronic pain     right hip    Coagulopathy ([address] 6/16/2011    Colon polyps     Follicular thyroid cancer ([address]     Gallstone pancreatitis     Glaucoma     Gout     hx of gout to right great toe - no problems    Hypertension     controlled with medication    Other ill-defined conditions(799.89)     Pancreatitis     Paroxysmal A-fib ([address] 11/8/2018    PE (pulmonary embolism) 2008    Pulmonary HTN (HCC)     moderate    Thromboembolus ([address] 1995    right leg - Hemashield graft/filter placed in abdomen    Thyroid disease     pt had total thyroidectomy - takes levoxyl daily     Past Surgical History:   Procedure Laterality Date    HX APPENDECTOMY  ? [address]  6/2011    Dr. Antonio Shultz - [facility]    [address]    hemashield graft/filter placed due to blood clot right leg    HX THYROIDECTOMY  2010    partial    HX TONSILLECTOMY      TOTAL HIP ARTHROPLASTY  2008 & 2011    right hip     Current Facility-Administered Medications   Medication Dose Route Frequency    lactated ringers bolus infusion 500 mL  500 mL IntraVENous ONCE    dextrose 5 % - 0.45% NaCl infusion  125 mL/hr IntraVENous CONTINUOUS    magnesium sulfate 2 g/50 ml IVPB (premix or compounded)  2 g IntraVENous ONCE    heparin 25,000 units in dextrose 500 mL infusion  12-25 Units/kg/hr IntraVENous TITRATE    LORazepam (ATIVAN) injection 0.5 mg  0.5 mg IntraVENous Q4H PRN    famotidine (PF) (PEPCID) 20 mg in sodium chloride 0.9% 10 mL injection  20 mg IntraVENous QPM    sodium chloride (NS) flush 5-40 mL  5-40 mL IntraVENous Q8H    sodium chloride (NS) flush 5-40 mL  5-40 mL IntraVENous PRN    morphine injection 2 mg  2 mg IntraVENous Q3H PRN    naloxone (NARCAN) injection 0.4 mg  0.4 mg IntraVENous PRN    ondansetron (ZOFRAN) injection 4 mg  4 mg IntraVENous Q4H PRN    LORazepam (ATIVAN) injection 1 mg  1 mg IntraVENous Q6H PRN    latanoprost (XALATAN) 0.005 % ophthalmic solution 1 Drop  1 Drop Both Eyes QPM    metoprolol (LOPRESSOR) injection 5 mg  5 mg IntraVENous Q4H PRN     Ativan [lorazepam];  Lortab [hydrocodone-acetaminophen]; and Morphine  Social History     Socioeconomic History    Marital status:      Spouse name: Not on file    Number of children: Not on file    Years of education: Not on file    Highest education level: Not on file   Tobacco Use    Smoking status: Former Smoker    Smokeless tobacco: Never Used    Tobacco comment: quit smoking 1995   Substance and Sexual Activity    Alcohol use: No    Drug use: No     Social History     Tobacco Use   Smoking Status Former Smoker   Smokeless Tobacco Never Used   Tobacco Comment    quit smoking 1995     Family History   Problem Relation Age of Onset   [address] [facility] [address] Stroke Mother     Hypertension Mother     Cancer Father         prostate    Diabetes Maternal Uncle      ROS: The patient has no difficulty with chest pain or shortness of breath. No fever or chills. Comprehensive review of systems was otherwise unremarkable except as noted above. Physical Exam:   Visit Vitals  /87 (BP 1 Location: Right arm, BP Patient Position: At rest)   Pulse 94   Temp 98 °F (36.7 °C)   Resp 18   Ht 5' 2\" (1.575 m)   Wt 114 lb 4.8 oz (51.8 kg)   SpO2 99%   BMI 20.91 kg/m²     Vitals:    05/31/19 1618 05/31/19 2123 06/01/19 0238 06/01/19 0515   BP: 127/68 118/84 117/76 124/87   Pulse: (!) 102 98 100 94   Resp: 16 18 18 18   Temp: 98.5 °F (36.9 °C) 98 °F (36.7 °C) 98.3 °F (36.8 °C) 98 °F (36.7 °C)   SpO2: 100% 100% 99% 99%   Weight:    114 lb 4.8 oz (51.8 kg)   Height:         No intake/output data recorded. 05/30 1901 - 06/01 0700  In: 4476 [I.V.:4476]  Out: 200 [Urine:200]    Constitutional: Alert, oriented, cooperative patient in no acute distress; appears stated age    Eyes:Sclera are clear. EOMs intact  ENMT: no external lesions gross hearing normal; no obvious neck masses, no ear or lip lesions, nares normal  CV: RRR. Normal perfusion  Resp: No JVD. Breathing is  non-labored; no audible wheezing. GI: less distended; no mass,  No guarding or rebound     Musculoskeletal: unremarkable with normal function. No embolic signs or cyanosis.    Neuro:  Oriented; moves all 4; no focal deficits  Psychiatric: normal affect and mood, no memory impairment    Recent vitals (if inpt):  Patient Vitals for the past 24 hrs:   BP Temp Pulse Resp SpO2 Weight   06/01/19 0515 124/87 98 °F (36.7 °C) 94 18 99 % 114 lb 4.8 oz (51.8 kg)   06/01/19 0238 117/76 98.3 °F (36.8 °C) 100 18 99 %    05/31/19 2123 118/84 98 °F (36.7 °C) 98 18 100 %    05/31/19 1618 127/68 98.5 °F (36.9 °C) (!) 102 16 100 %    05/31/19 1304 119/72 97.9 °F (36.6 °C) (!) 101 16 98 %    05/31/19 0818 129/76 97.2 °F (36.2 °C) 94 16 94 %        Labs:  Recent Labs     06/01/19  0138  05/29/19  1845   WBC 7.5   < > 5.9   HGB 11.5*   < > 13.5   PLT 84*   < > 135*      < > 145   K 4.7   < > 3.0*   *   < > 110*   CO2 22   < > 21   BUN 13   < > 18   CREA 0.81   < > 1.24*   GLU 99   < > 93   PTP 16.0*   < > 41.4*   INR 1.3   < > 4.4*   APTT 105.6*   < >  --    TBILI 0.6   < > 0.7   SGOT 29   < > 34   ALT 25   < > 27   AP 94   < > 144*   LPSE  --   --  65*    < > = values in this interval not displayed. Lab Results   Component Value Date/Time    WBC 7.5 06/01/2019 01:38 AM    HGB 11.5 (L) 06/01/2019 01:38 AM    PLATELET 84 (L) 55/71/8068 01:38 AM    Sodium 144 06/01/2019 01:38 AM    Potassium 4.7 06/01/2019 01:38 AM    Chloride 116 (H) 06/01/2019 01:38 AM    CO2 22 06/01/2019 01:38 AM    BUN 13 06/01/2019 01:38 AM    Creatinine 0.81 06/01/2019 01:38 AM    Glucose 99 06/01/2019 01:38 AM    INR 1.3 06/01/2019 01:38 AM    aPTT 105.6 (H) 06/01/2019 01:38 AM    Bilirubin, total 0.6 06/01/2019 01:38 AM    Bilirubin, direct 0.1 06/25/2011 06:00 AM    AST (SGOT) 29 06/01/2019 01:38 AM    ALT (SGPT) 25 06/01/2019 01:38 AM    Alk. phosphatase 94 06/01/2019 01:38 AM    Amylase 64 06/17/2011 03:53 AM    Lipase 65 (L) 05/29/2019 06:45 PM    Troponin-I, Qt. 0.03 11/09/2018 09:59 AM       CT Results  (Last 48 hours)    None        chest X-ray      I reviewed recent labs, recent radiologic studies, and pertinent records including other doctor notes if needed. I independently reviewed radiology images for studies I described above or studies I have ordered.    Admission date (for inpatients): 5/29/2019   * No surgery found *  * No surgery found *    ASSESSMENT/PLAN:  Problem List  Date Reviewed: 6/23/2011          Codes Class Noted    Acute renal insufficiency ICD-10-CM: N28.9  ICD-9-CM: 593.9  5/30/2019        Hypernatremia ICD-10-CM: E87.0  ICD-9-CM: 276.0 5/30/2019        Hypomagnesemia ICD-10-CM: E83.42  ICD-9-CM: 275.2  5/30/2019        * (Principal) SBO (small bowel obstruction) ([place] [address] ICD-10-CM: U44.881  ICD-9-CM: 560.9  5/29/2019        Atrial fibrillation with RVR ([place] [address] ICD-10-CM: I48.91  ICD-9-CM: 427.31  5/29/2019        Chronic systolic dysfunction of left ventricle ICD-10-CM: I51.9  ICD-9-CM: 429.9  5/29/2019        Thyroid cancer ([place] [address] ICD-10-CM: C73  ICD-9-CM: 107  5/29/2019        Compression fracture of fourth lumbar vertebra ([place] [address] ICD-10-CM: S32.040A  ICD-9-CM: 805.4  5/29/2019        Thromboembolic disorder ([place] [address] CDP-82-KK: I74.9  ICD-9-CM: 444.9  5/29/2019        Presence of IVC filter ICD-10-CM: W35.001  ICD-9-CM: V45.89  5/29/2019        Breast nodule ICD-10-CM: N63.0  ICD-9-CM: 793.89  5/29/2019        Weight loss ICD-10-CM: R63.4  ICD-9-CM: 783.21  5/29/2019        Acute on chronic systolic (congestive) heart failure ([place] [address] ICD-10-CM: I50.23  ICD-9-CM: 428.23, 428.0  11/10/2018        CHF (congestive heart failure) ([facility] ICD-10-CM: I50.9  ICD-9-CM: 428.0  11/8/2018        Supratherapeutic INR ICD-10-CM: R79.1  ICD-9-CM: 790.92  11/8/2018        Pleural effusion ICD-10-CM: J90  ICD-9-CM: 511.9  11/8/2018    Overview Signed 11/8/2018  4:18 PM by Lorin Villela MD     bilateral             Edema ICD-10-CM: R60.9  ICD-9-CM: 782.3  11/8/2018    Overview Signed 11/8/2018  4:18 PM by Lorin Villela MD     Bilateral lower legs             Lightheadedness ICD-10-CM: R42  ICD-9-CM: 780.4  11/8/2018        Paroxysmal A-fib ([address] ICD-10-CM: I48.0  ICD-9-CM: 427.31  11/8/2018        Iron deficiency anemia ICD-10-CM: D50.9  ICD-9-CM: 280.9  8/29/2011    Overview Signed 8/29/2011  3:05 PM by Elizabeth Lopez     Despite oral iron  For infed infusion               Nutritional deficiency ICD-10-CM: E63.9  ICD-9-CM: 269.9  6/27/2011        Impaired mobility ICD-10-CM: Z74.09  ICD-9-CM: 799.89  6/27/2011        Debility ICD-10-CM: R53.81  ICD-9-CM: 799.3 6/27/2011        Colon polyps ICD-10-CM: K63.5  ICD-9-CM: 211.3  6/27/2011        Pancreatitis ICD-10-CM: K85.90  ICD-9-CM: 419.4  6/19/2011    Overview Signed 6/19/2011  4:13 PM by Lowry Bence     6-19-11 lipase improving possibly from gallstones             Gallstone pancreatitis ICD-10-CM: K85.10  ICD-9-CM: 975.3, 574.20  8/55/7319        Follicular thyroid cancer ([facility] ICD-10-CM: C73  ICD-9-CM: 193  6/19/2011    Overview Signed 6/21/2011  7:12 AM by Lowry Bence     thyroglobulin high > 100 obvious residual thyroid tissue              Pericardial effusion ICD-10-CM: I31.3  ICD-9-CM: 423.9  6/17/2011        Acute pancreatitis ICD-10-CM: K85.90  ICD-9-CM: 577.0  6/16/2011        Coagulopathy ([facility] ICD-10-CM: D68.9  ICD-9-CM: 286.9  6/16/2011        HTN (hypertension) (Chronic) ICD-10-CM: I10  ICD-9-CM: 401.9  6/16/2011        Acquired hypothyroidism (Chronic) ICD-10-CM: E03.9  ICD-9-CM: 244.9  6/16/2011    Overview Signed 6/19/2011  4:15 PM by Héctor Crosser thyroidectomy for thyroid cancer             Pulmonary embolism ([facility] (Chronic) ICD-10-CM: I26.99  ICD-9-CM: 415.19  5/2/2011        Unstable angina ([facility] ICD-10-CM: I20.0  ICD-9-CM: 411.1  4/28/2011        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) ICD-10-CM: T84.018A, Z96.649  ICD-9-CM: 996.47, V43.64  4/26/2011        KENNEDY (total hip arthroplasty) ICD-9-CM: V43.64  4/26/2011            Principal Problem:    SBO (small bowel obstruction) ([facility] (5/29/2019)    Active Problems:    Coagulopathy ([address] (6/16/2011)      Atrial fibrillation with RVR (HCC) (5/29/2019)      Chronic systolic dysfunction of left ventricle (5/29/2019)      Thyroid cancer ([address] (5/29/2019)      Compression fracture of fourth lumbar vertebra ([address] (5/29/2019)      Thromboembolic disorder ([address] (2/64/0574)      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)      Breast nodule (5/29/2019)      Weight loss (5/29/2019)      Acute renal insufficiency (5/30/2019)      Hypernatremia (5/30/2019)      Hypomagnesemia (5/30/2019)           SBO  NGT placement was unsuccessful by nursing and by Dr Rufina De Dios on 5/30/19  Pt is uncooperative and emotionally labile when we attempted to place  She is responding without it   +flatus and BM by 6/1/19  Still minimal discomfort to deeper palpation in lower abdomen    Bowel rest until possible non-contrasted CT Monday  IVF  PICC and TPN if no resolution of SBO in 3-4 days  Follow- exam  Poor surgical candidate secondary to age 80, frailty and debilitation  Hopefully large component of bowel distention is from laxative overuse and not from SBO      Oliguria  Urine described as concentrated and cloudy  Check urinalysis  Bolus with 500cc LR; repeat bolus later today if needed; Increase IVF to 125cc/hr    Hypophosphatemia  Defer to Hospitalists    Hypernatremia  Hypotonic IVF    Hypomagnesemia  Replace prn      Coagulopathy  INR 6.8 (5/30/19)-->1.6-->1.3 (6/1/19) after Vit K  Hep drip started per cardiology  Plt ct low      Right Breast mass  I will follow  Outpt imaging and follow-up planned    L4 abnormality on CT  Follow-up with spine surgeon for further workup as outpt          I have personally performed a face-to-face diagnostic evaluation and management  service on this patient. I have independently seen the patient. I have independently obtained the above history from the patient/family. I have independently examined the patient with above findings. I have independently reviewed data/labs for this patient and developed the above plan of care (MDM). Signed: Viji Mccullough.  Rufina De Dios MD, FACS

## 2023-06-08 NOTE — DISCHARGE SUMMARY
Hospitalist Discharge Summary Patient ID: 
Jose Rosales [id number] 
61 y.o. 
5/14/1933 Admit date: 6/28/2019 Discharge date: Attending: Ryanne Driscoll MD 
PCP:  Leisa Mortimer, MD 
 
Admission Diagnosis: KENISHA Discharge Diagnosis: KENISHA, Dehydration, FTT Principal Problem: 
  Atrial fibrillation with RVR ([address] (5/29/2019) Active Problems: 
  HTN (hypertension) (6/16/2011) Acquired hypothyroidism (6/16/2011) Overview: Post thyroidectomy for thyroid cancer Follicular thyroid cancer ([address] (6/19/2011) Overview: thyroglobulin high > 100 obvious residual thyroid tissue Pathologic fracture of lumbar vertebra (5/29/2019) KENISHA (acute kidney injury) ([address] (5/30/2019) Acute UTI (urinary tract infection) (6/3/2019) Breast mass (6/14/2019) Dehydration (6/28/2019) Ileus ([address] (6/29/2019) Encephalopathy (7/4/2019) Hospital Course: 
Please refer to the admission H&P for details of presentation. In summary, This is a 72-year-old lady with history of coronary artery disease, previous history of cancer, hypothyroidism, hypertension, chronic systolic CHF, moderate pericardial effusion, atrial fibrillation and DVT on Coumadin, history of IVC filter, right breast nodule with L4 lesion, admitted to the hospital for nausea or vomitings and loose stools. CT abdomen showed bowel distention with constipation. Patient refused biopsy in the past for L4 vertebral lesion. She also refused radiotherapy in the past.  Patient Was n.p.o. initially, started on PPN secondary to ileus and decreased oral intake. Patient had MRI of the brain which did not show any acute findings. Patient's metabolic encephalopathy slowly improved. Patient was treated for UTI with antibiotics. Initially patient had supratherapeutic INR and Coumadin was stopped. She was seen by cardiology in consultation during the hospital stay.   Patient continues to have decreased oral intake, failure to thrive. Patient was not improving and had multiple admissions in the last few months. Patient is not interested in any further investigations for breast mass or L4 fracture. Palliative care was consulted. Patient is made DNR. Patient decided to go with comfort care only. After discussions with the patient and the family, she was made comfort measures only. She is going to be discharged to home with hospice. Lab/Data Reviewed Discharge Exam: 
Visit Vitals BP (!) 130/94 (BP 1 Location: Right arm, BP Patient Position: At rest) Pulse (!) 109 Temp 97.3 °F (36.3 °C) Resp 18 Ht 5' (1.524 m) Wt 58.7 kg (129 lb 6.4 oz) SpO2 96% BMI 25.27 kg/m² General:          Conscious, no acute distress Eyes:               DELORES, No pallor/icterus HENT:             Oral Mucosa is moist, No sinus tenderness Neck:               Supple, No JVD Lungs:             Decreased air entry bilaterally, No significant wheeze/rhonchi Heart:              S1 S2 regular Abdomen:        Soft, normal bowel sounds, distended with abdominal wall edema, Extremities:     Bilateral pedal edema+ Neurologic:      AAOX2, No acute FND Skin:                No acute rashes Musculoskeletal: No Acute findings Psych:             Mood is okay Disposition: Home hospice. Discharge Condition: Stable Patient Instructions:  
Current Discharge Medication List  
  
START taking these medications Details  
bisacodyl (DULCOLAX) 10 mg suppository Insert 10 mg into rectum daily as needed (constipation). Qty: 15 Suppository, Refills: 0  
  
haloperidol (HALDOL) 2 mg/mL oral concentrate Take 1 mL by mouth every eight (8) hours as needed (agitation or restlessness). Qty: 30 mL, Refills: 0  
  
morphine (ROXANOL) 20 mg/mL concentrated oral syringe Take 0.25 mL by mouth every four (4) hours as needed for Pain for up to 3 days. Max Daily Amount: 30 mg. 
Qty: 60 mL, Refills: 0 Associated Diagnoses: Breast mass CONTINUE these medications which have CHANGED Details  
lidocaine 4 % patch Apply daily for 12 hrs to the lumbar spine. Qty: 15 Patch, Refills: 0 Follow-up Pt was advised to follow up with Hospice in 2-3 days Total time spent in discharge day management: 25 minutes Signed: 
Dionne Pennington MD 
 
Stopped most of her home meds except for eye drops and gabapentin. She is being [facility] home today with hospice. 5-Fu Counseling: 5-Fluorouracil Counseling:  I discussed with the patient the risks of 5-fluorouracil including but not limited to erythema, scaling, itching, weeping, crusting, and pain.
